# Patient Record
Sex: MALE | Race: WHITE | Employment: OTHER | URBAN - METROPOLITAN AREA
[De-identification: names, ages, dates, MRNs, and addresses within clinical notes are randomized per-mention and may not be internally consistent; named-entity substitution may affect disease eponyms.]

---

## 2017-07-17 PROBLEM — N18.30 CHRONIC KIDNEY DISEASE, STAGE III (MODERATE) (HCC): Status: ACTIVE | Noted: 2017-07-17

## 2018-11-19 ENCOUNTER — APPOINTMENT (OUTPATIENT)
Dept: NUCLEAR MEDICINE | Age: 83
DRG: 194 | End: 2018-11-19
Payer: MEDICARE

## 2018-11-19 ENCOUNTER — APPOINTMENT (OUTPATIENT)
Dept: GENERAL RADIOLOGY | Age: 83
DRG: 194 | End: 2018-11-19
Payer: MEDICARE

## 2018-11-19 ENCOUNTER — HOSPITAL ENCOUNTER (INPATIENT)
Age: 83
LOS: 2 days | Discharge: HOME | DRG: 194 | End: 2018-11-21
Attending: EMERGENCY MEDICINE | Admitting: FAMILY MEDICINE
Payer: MEDICARE

## 2018-11-19 DIAGNOSIS — N18.30 CHRONIC KIDNEY DISEASE, STAGE III (MODERATE) (HCC): ICD-10-CM

## 2018-11-19 DIAGNOSIS — Z92.89 HISTORY OF V/Q SCAN: ICD-10-CM

## 2018-11-19 DIAGNOSIS — R06.02 SOB (SHORTNESS OF BREATH): Primary | ICD-10-CM

## 2018-11-19 DIAGNOSIS — G60.9 IDIOPATHIC PERIPHERAL NEUROPATHY: ICD-10-CM

## 2018-11-19 PROBLEM — T81.718A POSTOPERATIVE PULMONARY EMBOLISM (HCC): Status: ACTIVE | Noted: 2018-11-19

## 2018-11-19 PROBLEM — I26.99 POSTOPERATIVE PULMONARY EMBOLISM (HCC): Status: ACTIVE | Noted: 2018-11-19

## 2018-11-19 LAB
ADENOVIRUS DETECTION BY PCR: NOT DETECTED
ALBUMIN SERPL-MCNC: 3.3 GM/DL (ref 3.4–5)
ALP BLD-CCNC: 65 IU/L (ref 40–128)
ALT SERPL-CCNC: 6 U/L (ref 10–40)
ANION GAP SERPL CALCULATED.3IONS-SCNC: 10 MMOL/L (ref 4–16)
AST SERPL-CCNC: 11 IU/L (ref 15–37)
BASOPHILS ABSOLUTE: 0.1 K/CU MM
BASOPHILS RELATIVE PERCENT: 0.6 % (ref 0–1)
BILIRUB SERPL-MCNC: 0.5 MG/DL (ref 0–1)
BORDETELLA PERTUSSIS PCR: NOT DETECTED
BUN BLDV-MCNC: 28 MG/DL (ref 6–23)
CALCIUM SERPL-MCNC: 8.4 MG/DL (ref 8.3–10.6)
CHLAMYDOPHILA PNEUMONIA PCR: NOT DETECTED
CHLORIDE BLD-SCNC: 95 MMOL/L (ref 99–110)
CO2: 26 MMOL/L (ref 21–32)
CORONAVIRUS 229E PCR: NOT DETECTED
CORONAVIRUS HKU1 PCR: NOT DETECTED
CORONAVIRUS NL63 PCR: NOT DETECTED
CORONAVIRUS OC43 PCR: NOT DETECTED
CREAT SERPL-MCNC: 1.8 MG/DL (ref 0.9–1.3)
D DIMER: 418 NG/ML(DDU)
DIFFERENTIAL TYPE: ABNORMAL
EOSINOPHILS ABSOLUTE: 0.1 K/CU MM
EOSINOPHILS RELATIVE PERCENT: 1.6 % (ref 0–3)
GFR AFRICAN AMERICAN: 44 ML/MIN/1.73M2
GFR NON-AFRICAN AMERICAN: 36 ML/MIN/1.73M2
GLUCOSE BLD-MCNC: 133 MG/DL (ref 70–99)
HCT VFR BLD CALC: 35.4 % (ref 42–52)
HEMOGLOBIN: 11.2 GM/DL (ref 13.5–18)
HUMAN METAPNEUMOVIRUS PCR: NOT DETECTED
IMMATURE NEUTROPHIL %: 0.8 % (ref 0–0.43)
INFLUENZA A BY PCR: NOT DETECTED
INFLUENZA A H1 (2009) PCR: NOT DETECTED
INFLUENZA A H1 PANDEMIC PCR: NOT DETECTED
INFLUENZA A H3 PCR: NOT DETECTED
INFLUENZA B BY PCR: NOT DETECTED
LYMPHOCYTES ABSOLUTE: 1.5 K/CU MM
LYMPHOCYTES RELATIVE PERCENT: 16.9 % (ref 24–44)
MAGNESIUM: 1.6 MG/DL (ref 1.8–2.4)
MCH RBC QN AUTO: 28.7 PG (ref 27–31)
MCHC RBC AUTO-ENTMCNC: 31.6 % (ref 32–36)
MCV RBC AUTO: 90.8 FL (ref 78–100)
MONOCYTES ABSOLUTE: 0.7 K/CU MM
MONOCYTES RELATIVE PERCENT: 7.7 % (ref 0–4)
MYCOPLASMA PNEUMONIAE PCR: NOT DETECTED
NUCLEATED RBC %: 0 %
PARAINFLUENZA 1 PCR: NOT DETECTED
PARAINFLUENZA 2 PCR: NOT DETECTED
PARAINFLUENZA 3 PCR: NOT DETECTED
PARAINFLUENZA 4 PCR: NOT DETECTED
PDW BLD-RTO: 13.6 % (ref 11.7–14.9)
PLATELET # BLD: 251 K/CU MM (ref 140–440)
PMV BLD AUTO: 9.2 FL (ref 7.5–11.1)
POTASSIUM SERPL-SCNC: 4.5 MMOL/L (ref 3.5–5.1)
RBC # BLD: 3.9 M/CU MM (ref 4.6–6.2)
RHINOVIRUS ENTEROVIRUS PCR: ABNORMAL
RSV PCR: NOT DETECTED
SEGMENTED NEUTROPHILS ABSOLUTE COUNT: 6.3 K/CU MM
SEGMENTED NEUTROPHILS RELATIVE PERCENT: 72.4 % (ref 36–66)
SODIUM BLD-SCNC: 131 MMOL/L (ref 135–145)
TOTAL IMMATURE NEUTOROPHIL: 0.07 K/CU MM
TOTAL NUCLEATED RBC: 0 K/CU MM
TOTAL PROTEIN: 5.9 GM/DL (ref 6.4–8.2)
TOTAL RETICULOCYTE COUNT: 0.12 K/CU MM
TROPONIN T: 0.01 NG/ML
TROPONIN T: <0.01 NG/ML
WBC # BLD: 8.7 K/CU MM (ref 4–10.5)

## 2018-11-19 PROCEDURE — 99285 EMERGENCY DEPT VISIT HI MDM: CPT

## 2018-11-19 PROCEDURE — A9540 TC99M MAA: HCPCS | Performed by: PHYSICIAN ASSISTANT

## 2018-11-19 PROCEDURE — 83735 ASSAY OF MAGNESIUM: CPT

## 2018-11-19 PROCEDURE — 85379 FIBRIN DEGRADATION QUANT: CPT

## 2018-11-19 PROCEDURE — 96374 THER/PROPH/DIAG INJ IV PUSH: CPT

## 2018-11-19 PROCEDURE — 84484 ASSAY OF TROPONIN QUANT: CPT

## 2018-11-19 PROCEDURE — 85025 COMPLETE CBC W/AUTO DIFF WBC: CPT

## 2018-11-19 PROCEDURE — 78582 LUNG VENTILAT&PERFUS IMAGING: CPT

## 2018-11-19 PROCEDURE — 71045 X-RAY EXAM CHEST 1 VIEW: CPT

## 2018-11-19 PROCEDURE — 3430000000 HC RX DIAGNOSTIC RADIOPHARMACEUTICAL: Performed by: PHYSICIAN ASSISTANT

## 2018-11-19 PROCEDURE — 80053 COMPREHEN METABOLIC PANEL: CPT

## 2018-11-19 PROCEDURE — 87798 DETECT AGENT NOS DNA AMP: CPT

## 2018-11-19 PROCEDURE — 6360000002 HC RX W HCPCS: Performed by: FAMILY MEDICINE

## 2018-11-19 PROCEDURE — 6370000000 HC RX 637 (ALT 250 FOR IP): Performed by: FAMILY MEDICINE

## 2018-11-19 PROCEDURE — 2580000003 HC RX 258: Performed by: NURSE PRACTITIONER

## 2018-11-19 PROCEDURE — A9539 TC99M PENTETATE: HCPCS | Performed by: PHYSICIAN ASSISTANT

## 2018-11-19 PROCEDURE — 6370000000 HC RX 637 (ALT 250 FOR IP): Performed by: NURSE PRACTITIONER

## 2018-11-19 PROCEDURE — 94761 N-INVAS EAR/PLS OXIMETRY MLT: CPT

## 2018-11-19 PROCEDURE — 6360000002 HC RX W HCPCS: Performed by: NURSE PRACTITIONER

## 2018-11-19 PROCEDURE — 36415 COLL VENOUS BLD VENIPUNCTURE: CPT

## 2018-11-19 PROCEDURE — 2060000000 HC ICU INTERMEDIATE R&B

## 2018-11-19 RX ORDER — GALANTAMINE HYDROBROMIDE 8 MG/1
8 TABLET, FILM COATED ORAL 2 TIMES DAILY
Status: DISCONTINUED | OUTPATIENT
Start: 2018-11-19 | End: 2018-11-21 | Stop reason: HOSPADM

## 2018-11-19 RX ORDER — TAMSULOSIN HYDROCHLORIDE 0.4 MG/1
0.4 CAPSULE ORAL DAILY
Status: DISCONTINUED | OUTPATIENT
Start: 2018-11-19 | End: 2018-11-21 | Stop reason: HOSPADM

## 2018-11-19 RX ORDER — ATENOLOL 50 MG/1
50 TABLET ORAL DAILY
Status: DISCONTINUED | OUTPATIENT
Start: 2018-11-19 | End: 2018-11-19

## 2018-11-19 RX ORDER — ASPIRIN 81 MG/1
324 TABLET, CHEWABLE ORAL ONCE
Status: DISCONTINUED | OUTPATIENT
Start: 2018-11-19 | End: 2018-11-21 | Stop reason: HOSPADM

## 2018-11-19 RX ORDER — GABAPENTIN 300 MG/1
300 CAPSULE ORAL DAILY
Status: DISCONTINUED | OUTPATIENT
Start: 2018-11-19 | End: 2018-11-20

## 2018-11-19 RX ORDER — FLUOXETINE HYDROCHLORIDE 20 MG/1
20 CAPSULE ORAL DAILY
Status: DISCONTINUED | OUTPATIENT
Start: 2018-11-19 | End: 2018-11-21 | Stop reason: HOSPADM

## 2018-11-19 RX ORDER — DOCUSATE SODIUM 100 MG/1
100 CAPSULE, LIQUID FILLED ORAL 2 TIMES DAILY PRN
Status: DISCONTINUED | OUTPATIENT
Start: 2018-11-19 | End: 2018-11-21 | Stop reason: HOSPADM

## 2018-11-19 RX ORDER — SODIUM CHLORIDE 0.9 % (FLUSH) 0.9 %
10 SYRINGE (ML) INJECTION EVERY 12 HOURS SCHEDULED
Status: DISCONTINUED | OUTPATIENT
Start: 2018-11-19 | End: 2018-11-21 | Stop reason: HOSPADM

## 2018-11-19 RX ORDER — ONDANSETRON 2 MG/ML
4 INJECTION INTRAMUSCULAR; INTRAVENOUS EVERY 6 HOURS PRN
Status: DISCONTINUED | OUTPATIENT
Start: 2018-11-19 | End: 2018-11-21 | Stop reason: HOSPADM

## 2018-11-19 RX ORDER — SODIUM CHLORIDE 9 MG/ML
INJECTION, SOLUTION INTRAVENOUS CONTINUOUS
Status: DISCONTINUED | OUTPATIENT
Start: 2018-11-19 | End: 2018-11-21

## 2018-11-19 RX ORDER — TRIAMTERENE AND HYDROCHLOROTHIAZIDE 37.5; 25 MG/1; MG/1
1 TABLET ORAL DAILY
Status: DISCONTINUED | OUTPATIENT
Start: 2018-11-19 | End: 2018-11-19

## 2018-11-19 RX ORDER — KIT FOR THE PREPARATION OF TECHNETIUM TC 99M PENTETATE 20 MG/1
3 INJECTION, POWDER, LYOPHILIZED, FOR SOLUTION INTRAVENOUS; RESPIRATORY (INHALATION)
Status: COMPLETED | OUTPATIENT
Start: 2018-11-19 | End: 2018-11-19

## 2018-11-19 RX ORDER — ACETAMINOPHEN 500 MG
500 TABLET ORAL PRN
Status: ON HOLD | COMMUNITY
End: 2019-08-30 | Stop reason: HOSPADM

## 2018-11-19 RX ORDER — ASPIRIN 81 MG/1
81 TABLET ORAL DAILY
Status: DISCONTINUED | OUTPATIENT
Start: 2018-11-19 | End: 2018-11-21 | Stop reason: HOSPADM

## 2018-11-19 RX ORDER — GUAIFENESIN 600 MG/1
600 TABLET, EXTENDED RELEASE ORAL 2 TIMES DAILY
Status: DISCONTINUED | OUTPATIENT
Start: 2018-11-19 | End: 2018-11-19

## 2018-11-19 RX ORDER — MAGNESIUM SULFATE 1 G/100ML
1 INJECTION INTRAVENOUS ONCE
Status: COMPLETED | OUTPATIENT
Start: 2018-11-19 | End: 2018-11-19

## 2018-11-19 RX ORDER — HEPARIN SODIUM 5000 [USP'U]/ML
5000 INJECTION, SOLUTION INTRAVENOUS; SUBCUTANEOUS EVERY 8 HOURS SCHEDULED
Status: DISCONTINUED | OUTPATIENT
Start: 2018-11-19 | End: 2018-11-19

## 2018-11-19 RX ORDER — ATENOLOL 25 MG/1
12.5 TABLET ORAL DAILY
Status: DISCONTINUED | OUTPATIENT
Start: 2018-11-20 | End: 2018-11-21 | Stop reason: HOSPADM

## 2018-11-19 RX ORDER — PANTOPRAZOLE SODIUM 40 MG/1
40 TABLET, DELAYED RELEASE ORAL
Status: DISCONTINUED | OUTPATIENT
Start: 2018-11-20 | End: 2018-11-21 | Stop reason: HOSPADM

## 2018-11-19 RX ORDER — AMLODIPINE BESYLATE 5 MG/1
5 TABLET ORAL DAILY
Status: DISCONTINUED | OUTPATIENT
Start: 2018-11-19 | End: 2018-11-20

## 2018-11-19 RX ORDER — LORAZEPAM 2 MG/ML
0.5 INJECTION INTRAMUSCULAR ONCE
Status: COMPLETED | OUTPATIENT
Start: 2018-11-19 | End: 2018-11-19

## 2018-11-19 RX ORDER — HYDRALAZINE HYDROCHLORIDE 20 MG/ML
10 INJECTION INTRAMUSCULAR; INTRAVENOUS EVERY 4 HOURS PRN
Status: DISCONTINUED | OUTPATIENT
Start: 2018-11-19 | End: 2018-11-21 | Stop reason: HOSPADM

## 2018-11-19 RX ORDER — SODIUM CHLORIDE 0.9 % (FLUSH) 0.9 %
10 SYRINGE (ML) INJECTION PRN
Status: DISCONTINUED | OUTPATIENT
Start: 2018-11-19 | End: 2018-11-21 | Stop reason: HOSPADM

## 2018-11-19 RX ADMIN — SODIUM CHLORIDE, PRESERVATIVE FREE 10 ML: 5 INJECTION INTRAVENOUS at 22:20

## 2018-11-19 RX ADMIN — GALANTAMINE 8 MG: 8 TABLET, FILM COATED ORAL at 22:20

## 2018-11-19 RX ADMIN — LORAZEPAM 0.5 MG: 2 INJECTION INTRAMUSCULAR; INTRAVENOUS at 15:49

## 2018-11-19 RX ADMIN — Medication 5 MILLICURIE: at 13:38

## 2018-11-19 RX ADMIN — KIT FOR THE PREPARATION OF TECHNETIUM TC 99M PENTETATE 3 MILLICURIE: 20 INJECTION, POWDER, LYOPHILIZED, FOR SOLUTION INTRAVENOUS; RESPIRATORY (INHALATION) at 13:15

## 2018-11-19 RX ADMIN — HEPARIN SODIUM 5000 UNITS: 5000 INJECTION, SOLUTION INTRAVENOUS; SUBCUTANEOUS at 17:02

## 2018-11-19 RX ADMIN — SODIUM CHLORIDE: 9 INJECTION, SOLUTION INTRAVENOUS at 17:02

## 2018-11-19 RX ADMIN — AMLODIPINE BESYLATE 5 MG: 5 TABLET ORAL at 22:19

## 2018-11-19 RX ADMIN — ENOXAPARIN SODIUM 90 MG: 100 INJECTION SUBCUTANEOUS at 22:20

## 2018-11-19 RX ADMIN — MAGNESIUM SULFATE HEPTAHYDRATE 1 G: 1 INJECTION, SOLUTION INTRAVENOUS at 17:36

## 2018-11-19 ASSESSMENT — PAIN DESCRIPTION - FREQUENCY: FREQUENCY: INTERMITTENT

## 2018-11-19 ASSESSMENT — PAIN SCALES - GENERAL
PAINLEVEL_OUTOF10: 4
PAINLEVEL_OUTOF10: 0
PAINLEVEL_OUTOF10: 0

## 2018-11-19 ASSESSMENT — PAIN DESCRIPTION - LOCATION: LOCATION: CHEST

## 2018-11-19 ASSESSMENT — PAIN DESCRIPTION - ORIENTATION: ORIENTATION: RIGHT

## 2018-11-19 ASSESSMENT — PAIN DESCRIPTION - PAIN TYPE: TYPE: ACUTE PAIN

## 2018-11-19 NOTE — H&P
History and Physical      Name:  Yordan Diaz /Age/Sex: 1934  (80 y.o. male)   MRN & CSN:  [id number] & [id number] Admission Date/Time: 2018 10:59 AM   Location:  ED19/ED-19 PCP: Mel Byrd PA-C       Admitting Physicians : Dr. Ken Pichardo is a 80 y.o.  male  who presents with Chest Pain    Assessment and Plan:   1. Dyspnea likely 2/2 post op PE  # Chest pain likely 2/2 bradycardia  # Hypomagnesemia  # Intermediate PE likely 2/2 recent Lap Shauna James assisted inguinal hernia repair with mesh  VQ scan Intermediate probability for pulmonary embolism. CXR show Cardiomegaly without acute process. Slightly elevated troponin. SB with nonspecific T waves changes.   -Heparin sq TID  -1 g of magnesium  x1  -Trend troponin  -Tele and repeat EKG  -Echo  -Resp panel  -Oxygen     2. HTN, uncontrolled  Reports high anxiety  -Ativan 0.5 mg x1  - Continue Maxzide and Atenolol (Check parameters)  -Hydralazine PRN for SBP>160    3. Dementia  -Galantamine    4. Neuropathy  -Gabapentin    5. Anxiety  -Prozac    6. Bladder cancer  -Alfuzosin    7.  CKD  -Dose meds renally  Baseline creat 1.5-18      DVT-PPX: Heparin  Diet: Cardiac    Medications:   Medications:    aspirin  324 mg Oral Once      Infusions:   PRN Meds:      Current Facility-Administered Medications:     aspirin chewable tablet 324 mg, 324 mg, Oral, Once, [place], [place]    Current Outpatient Prescriptions:     triamterene-hydrochlorothiazide (MAXZIDE-25) 37.5-25 MG per tablet, Take 1 tablet by mouth daily, Disp: 90 tablet, Rfl: 3    galantamine (RAZADYNE ER) 8 MG extended release capsule, Take 8 mg by mouth 2 times daily, Disp: , Rfl:     aspirin 81 MG tablet, Take 81 mg by mouth daily, Disp: , Rfl:     omeprazole (PRILOSEC) 20 MG delayed release capsule, Take 20 mg by mouth daily, Disp: , Rfl:     alfuzosin (UROXATRAL) 10 MG SR tablet, Take 1 tablet by mouth daily, Disp: 90 tablet, Rfl: 1    atenolol (TENORMIN) 50 MG

## 2018-11-19 NOTE — ED PROVIDER NOTES
murmurs/rubs/gallops. No carotid bruits or murmurs heard in carotids. No JVD  Vascular:   Radial and femoral pulses palpable and reveal no discernible inequality    GI:  Soft, nontender, normal bowel sounds  Musculoskeletal:    There is no edema, asymmetry, or calf / thigh tenderness bilaterally. No cyanosis. No cool or pale-appearing limb.   Distal cap refill and pulses intact bilateral upper and lower extremities  Bilateral upper and lower extremity ROM intact without pain or obvious deficit    Integument:  Skin warm and dry, no petechiae   Neurologic:  Alert & oriented, normal speech    - Alert & oriented person, place, time, and situation, no speech difficulties or slurring.  - No obvious gross motor deficits  - Cranial nerves 2-12 grossly intact  - Negative meningeal signs.  - Sensation intact to light touch  - Strength 5/5 in upper and lower extremities bilaterally  - No truncal ataxia    Psych: Pleasant affect, no hallucinations          LABS:  Results for orders placed or performed during the hospital encounter of 11/19/18   CBC Auto Differential   Result Value Ref Range    WBC 8.7 4.0 - 10.5 K/CU MM    RBC 3.90 (L) 4.6 - 6.2 M/CU MM    Hemoglobin 11.2 (L) 13.5 - 18.0 GM/DL    Hematocrit 35.4 (L) 42 - 52 %    MCV 90.8 78 - 100 FL    MCH 28.7 27 - 31 PG    MCHC 31.6 (L) 32.0 - 36.0 %    RDW 13.6 11.7 - 14.9 %    Platelets 580 954 - 932 K/CU MM    MPV 9.2 7.5 - 11.1 FL    Differential Type AUTOMATED DIFFERENTIAL     Segs Relative 72.4 (H) 36 - 66 %    Lymphocytes % 16.9 (L) 24 - 44 %    Monocytes % 7.7 (H) 0 - 4 %    Eosinophils % 1.6 0 - 3 %    Basophils % 0.6 0 - 1 %    Segs Absolute 6.3 K/CU MM    Lymphocytes # 1.5 K/CU MM    Monocytes # 0.7 K/CU MM    Eosinophils # 0.1 K/CU MM    Basophils # 0.1 K/CU MM    Nucleated RBC % 0.0 %    Total Nucleated RBC 0.0 K/CU MM    TRC 0.1154 K/CU MM    Total Immature Neutrophil 0.07 K/CU MM    Immature Neutrophil % 0.8 (H) 0 - 0.43 %   Comprehensive Metabolic Panel Decreased activity is seen at the apices and left base, largely matching the ventilation pattern. There is opacity at the left base on recent radiograph as the left hemidiaphragm is not well visualized. Intermediate probability for pulmonary embolism. Xr Chest Portable    Result Date: 11/19/2018  EXAMINATION: SINGLE XRAY VIEW OF THE CHEST 11/19/2018 11:22 am COMPARISON: None HISTORY: ORDERING SYSTEM PROVIDED HISTORY: Chest pain TECHNOLOGIST PROVIDED HISTORY: Reason for exam:->Chest pain Ordering Physician Provided Reason for Exam: Pt reports chest pain x3 days Acuity: Acute Type of Exam: Initial Relevant Medical/Surgical History: CKD; hypertension FINDINGS: Lungs are clear. No edema or pneumothorax. The heart is enlarged. Aorta is tortuous and calcified. Cardiomegaly without acute process. ED COURSE & MEDICAL DECISION MAKING      Exact etiology of patient's chest pain, shortness of breath unclear, however VQ scan today reveals intermediate probability of PE.    1430-discuss patient case with hospitalist, [address] [place] [address]. .  She agrees to admit patient. Given patient's chronic kidney disease status, I will hold on Lovenox at this time. Vital signs and nursing notes reviewed during ED course. Patient care and presentation staffed with supervising MD.  Patient seen by supervising MD today- see his/her note for details of the encounter. All pertinent Lab data and radiographic results reviewed with patient at bedside. The patient and/or the family were informed of the results of any tests/labs/imaging, the treatment plan, and time was allotted to answer questions. See chart for details of medications given during the ED stay. Clinical  IMPRESSION    1. SOB (shortness of breath)    2.  History of V/Q scan         Admission to the hospital    Comment: Please note this report has been produced using speech recognition software and may contain errors related to that system

## 2018-11-19 NOTE — ED TRIAGE NOTES
Pt had a hernia repair at [address] [place] [address] this past Wednesday. Pt reports chest pain x3 days. Pt reports pain on inspiration.

## 2018-11-20 ENCOUNTER — APPOINTMENT (OUTPATIENT)
Dept: ULTRASOUND IMAGING | Age: 83
DRG: 194 | End: 2018-11-20
Payer: MEDICARE

## 2018-11-20 ENCOUNTER — APPOINTMENT (OUTPATIENT)
Dept: CT IMAGING | Age: 83
DRG: 194 | End: 2018-11-20
Payer: MEDICARE

## 2018-11-20 LAB
ANION GAP SERPL CALCULATED.3IONS-SCNC: 9 MMOL/L (ref 4–16)
BACTERIA: NEGATIVE /HPF
BILIRUBIN URINE: NEGATIVE MG/DL
BLOOD, URINE: NEGATIVE
BUN BLDV-MCNC: 25 MG/DL (ref 6–23)
CALCIUM SERPL-MCNC: 8.6 MG/DL (ref 8.3–10.6)
CHLORIDE BLD-SCNC: 96 MMOL/L (ref 99–110)
CHLORIDE URINE RANDOM: 97 MMOL/L (ref 43–210)
CLARITY: CLEAR
CO2: 28 MMOL/L (ref 21–32)
COLOR: YELLOW
CREAT SERPL-MCNC: 1.8 MG/DL (ref 0.9–1.3)
CREATININE URINE: 70.4 MG/DL (ref 39–259)
EKG ATRIAL RATE: 58 BPM
EKG DIAGNOSIS: NORMAL
EKG P AXIS: 9 DEGREES
EKG P-R INTERVAL: 254 MS
EKG Q-T INTERVAL: 456 MS
EKG QRS DURATION: 146 MS
EKG QTC CALCULATION (BAZETT): 447 MS
EKG R AXIS: -6 DEGREES
EKG T AXIS: 36 DEGREES
EKG VENTRICULAR RATE: 58 BPM
GFR AFRICAN AMERICAN: 44 ML/MIN/1.73M2
GFR NON-AFRICAN AMERICAN: 36 ML/MIN/1.73M2
GLUCOSE BLD-MCNC: 90 MG/DL (ref 70–99)
GLUCOSE, URINE: NEGATIVE MG/DL
KETONES, URINE: NEGATIVE MG/DL
LEUKOCYTE ESTERASE, URINE: NEGATIVE
LV EF: 50 %
LVEF MODALITY: NORMAL
MUCUS: ABNORMAL HPF
NITRITE URINE, QUANTITATIVE: NEGATIVE
PH, URINE: 7 (ref 5–8)
POTASSIUM SERPL-SCNC: 4.9 MMOL/L (ref 3.5–5.1)
POTASSIUM, UR: 41.4 MMOL/L (ref 22–119)
PROT/CREAT RATIO, UR: 0.3
PROTEIN UA: NEGATIVE MG/DL
RBC URINE: 1 /HPF (ref 0–3)
SODIUM BLD-SCNC: 133 MMOL/L (ref 135–145)
SODIUM URINE: 104 MMOL/L (ref 35–167)
SPECIFIC GRAVITY UA: 1.01 (ref 1–1.03)
TRICHOMONAS: ABNORMAL /HPF
TROPONIN T: <0.01 NG/ML
URINE TOTAL PROTEIN: 18.1 MG/DL
UROBILINOGEN, URINE: NORMAL MG/DL (ref 0.2–1)
WBC UA: ABNORMAL /HPF (ref 0–2)

## 2018-11-20 PROCEDURE — 99222 1ST HOSP IP/OBS MODERATE 55: CPT | Performed by: INTERNAL MEDICINE

## 2018-11-20 PROCEDURE — 6360000004 HC RX CONTRAST MEDICATION: Performed by: INTERNAL MEDICINE

## 2018-11-20 PROCEDURE — 36415 COLL VENOUS BLD VENIPUNCTURE: CPT

## 2018-11-20 PROCEDURE — 6360000002 HC RX W HCPCS: Performed by: FAMILY MEDICINE

## 2018-11-20 PROCEDURE — 93970 EXTREMITY STUDY: CPT

## 2018-11-20 PROCEDURE — 51702 INSERT TEMP BLADDER CATH: CPT

## 2018-11-20 PROCEDURE — 0T9B70Z DRAINAGE OF BLADDER WITH DRAINAGE DEVICE, VIA NATURAL OR ARTIFICIAL OPENING: ICD-10-PCS | Performed by: INTERNAL MEDICINE

## 2018-11-20 PROCEDURE — 2060000000 HC ICU INTERMEDIATE R&B

## 2018-11-20 PROCEDURE — 6370000000 HC RX 637 (ALT 250 FOR IP): Performed by: FAMILY MEDICINE

## 2018-11-20 PROCEDURE — 84300 ASSAY OF URINE SODIUM: CPT

## 2018-11-20 PROCEDURE — 84156 ASSAY OF PROTEIN URINE: CPT

## 2018-11-20 PROCEDURE — 81001 URINALYSIS AUTO W/SCOPE: CPT

## 2018-11-20 PROCEDURE — 2580000003 HC RX 258: Performed by: NURSE PRACTITIONER

## 2018-11-20 PROCEDURE — 94761 N-INVAS EAR/PLS OXIMETRY MLT: CPT

## 2018-11-20 PROCEDURE — 93306 TTE W/DOPPLER COMPLETE: CPT

## 2018-11-20 PROCEDURE — 82436 ASSAY OF URINE CHLORIDE: CPT

## 2018-11-20 PROCEDURE — 2580000003 HC RX 258: Performed by: FAMILY MEDICINE

## 2018-11-20 PROCEDURE — 6370000000 HC RX 637 (ALT 250 FOR IP): Performed by: NURSE PRACTITIONER

## 2018-11-20 PROCEDURE — 6370000000 HC RX 637 (ALT 250 FOR IP): Performed by: HOSPITALIST

## 2018-11-20 PROCEDURE — 84133 ASSAY OF URINE POTASSIUM: CPT

## 2018-11-20 PROCEDURE — 71275 CT ANGIOGRAPHY CHEST: CPT

## 2018-11-20 PROCEDURE — 51798 US URINE CAPACITY MEASURE: CPT

## 2018-11-20 PROCEDURE — 82570 ASSAY OF URINE CREATININE: CPT

## 2018-11-20 PROCEDURE — 84484 ASSAY OF TROPONIN QUANT: CPT

## 2018-11-20 PROCEDURE — 80048 BASIC METABOLIC PNL TOTAL CA: CPT

## 2018-11-20 RX ORDER — AMLODIPINE BESYLATE 10 MG/1
10 TABLET ORAL DAILY
Status: DISCONTINUED | OUTPATIENT
Start: 2018-11-21 | End: 2018-11-21 | Stop reason: HOSPADM

## 2018-11-20 RX ORDER — ACETAMINOPHEN 80 MG
TABLET,CHEWABLE ORAL
Status: DISPENSED
Start: 2018-11-20 | End: 2018-11-20

## 2018-11-20 RX ORDER — GABAPENTIN 300 MG/1
300 CAPSULE ORAL 2 TIMES DAILY
Status: DISCONTINUED | OUTPATIENT
Start: 2018-11-20 | End: 2018-11-21 | Stop reason: HOSPADM

## 2018-11-20 RX ADMIN — FLUOXETINE HYDROCHLORIDE 20 MG: 20 CAPSULE ORAL at 08:25

## 2018-11-20 RX ADMIN — TAMSULOSIN HYDROCHLORIDE 0.4 MG: 0.4 CAPSULE ORAL at 08:25

## 2018-11-20 RX ADMIN — GABAPENTIN 300 MG: 300 CAPSULE ORAL at 08:25

## 2018-11-20 RX ADMIN — GALANTAMINE 8 MG: 8 TABLET, FILM COATED ORAL at 22:43

## 2018-11-20 RX ADMIN — GALANTAMINE 8 MG: 8 TABLET, FILM COATED ORAL at 08:25

## 2018-11-20 RX ADMIN — SODIUM CHLORIDE: 9 INJECTION, SOLUTION INTRAVENOUS at 08:25

## 2018-11-20 RX ADMIN — SODIUM CHLORIDE, PRESERVATIVE FREE 10 ML: 5 INJECTION INTRAVENOUS at 20:48

## 2018-11-20 RX ADMIN — ATENOLOL 12.5 MG: 25 TABLET ORAL at 08:24

## 2018-11-20 RX ADMIN — IOPAMIDOL 85 ML: 755 INJECTION, SOLUTION INTRAVENOUS at 17:40

## 2018-11-20 RX ADMIN — AMLODIPINE BESYLATE 5 MG: 5 TABLET ORAL at 08:25

## 2018-11-20 RX ADMIN — ENOXAPARIN SODIUM 90 MG: 100 INJECTION SUBCUTANEOUS at 13:16

## 2018-11-20 RX ADMIN — ASPIRIN 81 MG: 81 TABLET, COATED ORAL at 08:24

## 2018-11-20 RX ADMIN — SODIUM CHLORIDE, PRESERVATIVE FREE 10 ML: 5 INJECTION INTRAVENOUS at 17:40

## 2018-11-20 RX ADMIN — GABAPENTIN 300 MG: 300 CAPSULE ORAL at 20:48

## 2018-11-20 RX ADMIN — SODIUM CHLORIDE, PRESERVATIVE FREE 10 ML: 5 INJECTION INTRAVENOUS at 08:25

## 2018-11-20 RX ADMIN — PANTOPRAZOLE SODIUM 40 MG: 40 TABLET, DELAYED RELEASE ORAL at 06:14

## 2018-11-20 ASSESSMENT — PAIN SCALES - GENERAL: PAINLEVEL_OUTOF10: 0

## 2018-11-20 NOTE — CONSULTS
(hypertension) I10    Depression F32.9    PND (post-nasal drip) R09.82    BPH (benign prostatic hyperplasia) N40.0    Cold feet R20.9    Constipation K59.00    Prostate cancer (HCC) C61    Peripheral neuropathy G62.9    Chronic kidney disease, stage III (moderate) (HCC) N18.3    Postoperative pulmonary embolism (HCC) T81.718A, I26.99       Assessment and Recommendations     Impression   1. LADI on Stage 3 CKD   2. Chest Pain (concern for possible PE)  3. HTN   4. BPH     PLAN     1.    -lab work ordered: ua with micro / UPC / urine electrolytes / BMP in am / Dicky Ware in am   -no need for acute RRT at this time   -bladder scan to screen for obstruction   -serum creatinine: 1.8 with normal K and CO2  -currently on NS at 75 / hour     2.   -Chest CTA ordered to screen for PE: VQ scan: intermediate probability for PE  -in context to anticipated contrast exposure: avoid current use of:   ACEI / ARB / Diuretics / NSAID's  -continue IV hydration at this time and also post-study   -patient is also being followed by Cardiology   -Aristeo Del Real ordered   -discussed with patient the potential risk for worsening kidney function   With contrast exposure and rationale for ordering the CTA; benefit   Should outweigh the risk     3. -BP trend: systolic BP's: 620 - 786: during visit: 143/86  -currently on: Amlodipine and Atenolol   -monitor BP trend and treat accordingly     4.   -currently on Flomax   -bladder scan: 360 ml today   -orders to place march catheter     PRISCILLA Vernon      Nephrology Attending Progress Note  11/20/2018 12:23 PM  Subjective:   Admit Date: 11/19/2018  PCP: Ernesto Joe PA-C    Interval History: I have personally performed face to face diagnostic evaluation on this patient. I have personally reviewed pertinent labs and imaging and agree with the care plan above. My additional findings are as follows:   Pt present with htn, bph , ckd 3 with sob and concern for PE. vq scan intermediate and concern

## 2018-11-20 NOTE — FLOWSHEET NOTE
Patient is complaining of a burning sensation since the march catheter has been inserted. Dr. Hayes Campuzano paged and notified. Per Dr. Hayes Campuzano to remove march and do a follow up bladder scan.

## 2018-11-21 ENCOUNTER — APPOINTMENT (OUTPATIENT)
Dept: NUCLEAR MEDICINE | Age: 83
DRG: 194 | End: 2018-11-21
Payer: MEDICARE

## 2018-11-21 VITALS
DIASTOLIC BLOOD PRESSURE: 54 MMHG | BODY MASS INDEX: 30.94 KG/M2 | TEMPERATURE: 98.1 F | SYSTOLIC BLOOD PRESSURE: 155 MMHG | OXYGEN SATURATION: 94 % | RESPIRATION RATE: 17 BRPM | HEIGHT: 68 IN | WEIGHT: 204.15 LBS | HEART RATE: 67 BPM

## 2018-11-21 LAB
ANION GAP SERPL CALCULATED.3IONS-SCNC: 10 MMOL/L (ref 4–16)
BUN BLDV-MCNC: 18 MG/DL (ref 6–23)
CALCIUM SERPL-MCNC: 8.8 MG/DL (ref 8.3–10.6)
CHLORIDE BLD-SCNC: 97 MMOL/L (ref 99–110)
CO2: 27 MMOL/L (ref 21–32)
CREAT SERPL-MCNC: 1.4 MG/DL (ref 0.9–1.3)
GFR AFRICAN AMERICAN: 58 ML/MIN/1.73M2
GFR NON-AFRICAN AMERICAN: 48 ML/MIN/1.73M2
GLUCOSE BLD-MCNC: 101 MG/DL (ref 70–99)
HCT VFR BLD CALC: 40.5 % (ref 42–52)
HEMOGLOBIN: 12.7 GM/DL (ref 13.5–18)
HIGH SENSITIVE C-REACTIVE PROTEIN: 65.8 MG/L
LV EF: 64 %
LVEF MODALITY: NORMAL
MAGNESIUM: 1.8 MG/DL (ref 1.8–2.4)
MCH RBC QN AUTO: 28.2 PG (ref 27–31)
MCHC RBC AUTO-ENTMCNC: 31.4 % (ref 32–36)
MCV RBC AUTO: 89.8 FL (ref 78–100)
PDW BLD-RTO: 13.4 % (ref 11.7–14.9)
PLATELET # BLD: 259 K/CU MM (ref 140–440)
PMV BLD AUTO: 9.4 FL (ref 7.5–11.1)
POTASSIUM SERPL-SCNC: 4.5 MMOL/L (ref 3.5–5.1)
PRO-BNP: 3187 PG/ML
RBC # BLD: 4.51 M/CU MM (ref 4.6–6.2)
SODIUM BLD-SCNC: 134 MMOL/L (ref 135–145)
WBC # BLD: 8.4 K/CU MM (ref 4–10.5)

## 2018-11-21 PROCEDURE — 83880 ASSAY OF NATRIURETIC PEPTIDE: CPT

## 2018-11-21 PROCEDURE — 6370000000 HC RX 637 (ALT 250 FOR IP): Performed by: FAMILY MEDICINE

## 2018-11-21 PROCEDURE — 86141 C-REACTIVE PROTEIN HS: CPT

## 2018-11-21 PROCEDURE — 83735 ASSAY OF MAGNESIUM: CPT

## 2018-11-21 PROCEDURE — 6360000002 HC RX W HCPCS: Performed by: INTERNAL MEDICINE

## 2018-11-21 PROCEDURE — 80048 BASIC METABOLIC PNL TOTAL CA: CPT

## 2018-11-21 PROCEDURE — 93017 CV STRESS TEST TRACING ONLY: CPT

## 2018-11-21 PROCEDURE — 6370000000 HC RX 637 (ALT 250 FOR IP): Performed by: HOSPITALIST

## 2018-11-21 PROCEDURE — 99232 SBSQ HOSP IP/OBS MODERATE 35: CPT | Performed by: INTERNAL MEDICINE

## 2018-11-21 PROCEDURE — 78452 HT MUSCLE IMAGE SPECT MULT: CPT

## 2018-11-21 PROCEDURE — 2580000003 HC RX 258: Performed by: NURSE PRACTITIONER

## 2018-11-21 PROCEDURE — 6370000000 HC RX 637 (ALT 250 FOR IP): Performed by: NURSE PRACTITIONER

## 2018-11-21 PROCEDURE — 36415 COLL VENOUS BLD VENIPUNCTURE: CPT

## 2018-11-21 PROCEDURE — 85027 COMPLETE CBC AUTOMATED: CPT

## 2018-11-21 PROCEDURE — A9500 TC99M SESTAMIBI: HCPCS | Performed by: INTERNAL MEDICINE

## 2018-11-21 PROCEDURE — 3430000000 HC RX DIAGNOSTIC RADIOPHARMACEUTICAL: Performed by: INTERNAL MEDICINE

## 2018-11-21 PROCEDURE — 94761 N-INVAS EAR/PLS OXIMETRY MLT: CPT

## 2018-11-21 RX ORDER — GABAPENTIN 300 MG/1
300 CAPSULE ORAL 2 TIMES DAILY
Qty: 60 CAPSULE | Refills: 0 | Status: ON HOLD
Start: 2018-11-21 | End: 2019-08-23

## 2018-11-21 RX ORDER — TRIAMTERENE AND HYDROCHLOROTHIAZIDE 37.5; 25 MG/1; MG/1
1 TABLET ORAL DAILY
Qty: 90 TABLET | Refills: 0 | Status: SHIPPED
Start: 2018-11-24 | End: 2019-08-11 | Stop reason: SINTOL

## 2018-11-21 RX ORDER — AMLODIPINE BESYLATE 10 MG/1
10 TABLET ORAL DAILY
Qty: 30 TABLET | Refills: 0 | Status: ON HOLD | OUTPATIENT
Start: 2018-11-22 | End: 2019-08-30 | Stop reason: HOSPADM

## 2018-11-21 RX ORDER — ATENOLOL 25 MG/1
12.5 TABLET ORAL DAILY
Qty: 30 TABLET | Refills: 0 | Status: SHIPPED | OUTPATIENT
Start: 2018-11-22 | End: 2018-12-13

## 2018-11-21 RX ADMIN — TAMSULOSIN HYDROCHLORIDE 0.4 MG: 0.4 CAPSULE ORAL at 11:45

## 2018-11-21 RX ADMIN — ASPIRIN 81 MG: 81 TABLET, COATED ORAL at 11:47

## 2018-11-21 RX ADMIN — SODIUM CHLORIDE, PRESERVATIVE FREE 10 ML: 5 INJECTION INTRAVENOUS at 11:47

## 2018-11-21 RX ADMIN — FLUOXETINE HYDROCHLORIDE 20 MG: 20 CAPSULE ORAL at 11:46

## 2018-11-21 RX ADMIN — GABAPENTIN 300 MG: 300 CAPSULE ORAL at 11:46

## 2018-11-21 RX ADMIN — Medication 30 MILLICURIE: at 10:05

## 2018-11-21 RX ADMIN — ATENOLOL 12.5 MG: 25 TABLET ORAL at 11:46

## 2018-11-21 RX ADMIN — GALANTAMINE 8 MG: 8 TABLET, FILM COATED ORAL at 11:47

## 2018-11-21 RX ADMIN — REGADENOSON 0.4 MG: 0.08 INJECTION, SOLUTION INTRAVENOUS at 10:06

## 2018-11-21 RX ADMIN — Medication 10 MILLICURIE: at 08:17

## 2018-11-21 RX ADMIN — AMLODIPINE BESYLATE 10 MG: 10 TABLET ORAL at 11:46

## 2018-11-21 ASSESSMENT — PAIN SCALES - GENERAL: PAINLEVEL_OUTOF10: 0

## 2018-11-21 NOTE — DISCHARGE SUMMARY
and trace bilateral pleural effusions. The differential includes pulmonary edema versus atypical pneumonitis. 3. Moderate cardiomegaly. 4. Coronary artery atherosclerotic vascular disease. 5. Indeterminate cysts in the bilateral kidneys. Consider further evaluation with nonemergent contrast-enhanced renal protocol MRI or CT. Nm Myocardial Spect Rest Exercise Or Rx  Result Date: 11/21/2018  Conclusions   Summary  Normal LV function. There is normal isotope uptake following exercise and at rest. There is no  evidence of exercise induced ischemia. This is a normal study. Code Status:  Full Code      Discharge Medications:     Medication List      START taking these medications    amLODIPine 10 MG tablet  Commonly known as:  NORVASC  Take 1 tablet by mouth daily  Start taking on:  11/22/2018        CHANGE how you take these medications    alfuzosin 10 MG extended release tablet  Commonly known as:  UROXATRAL  Take 1 tablet by mouth daily  What changed:  when to take this     atenolol 25 MG tablet  Commonly known as:  TENORMIN  Take 0.5 tablets by mouth daily  Start taking on:  11/22/2018  What changed:  · medication strength  · how much to take     FLUoxetine 20 MG capsule  Commonly known as:  PROZAC  Take 1 capsule by mouth daily  What changed:  when to take this     triamterene-hydrochlorothiazide 37.5-25 MG per tablet  Commonly known as:  MAXZIDE-25  Take 1 tablet by mouth daily Resume 11/24/18  Start taking on:  11/24/2018  What changed:  · additional instructions  · These instructions start on 11/24/2018. If you are unsure what to do until then, ask your doctor or other care provider. CONTINUE taking these medications    aspirin 81 MG tablet     docusate sodium 100 MG capsule  Commonly known as:  COLACE  Take 1 capsule by mouth 2 times daily as needed for Constipation     gabapentin 300 MG capsule  Commonly known as:  NEURONTIN  Take 1 capsule by mouth 2 times daily for 30 days. Ceci Smith

## 2018-11-21 NOTE — PROGRESS NOTES
Today's plan: Stress test today, normal ECHO and CTA ( no PE)      Admit Date:  11/19/2018    Subjective:BETTER      Chief complaints on admission  Chief Complaint   Patient presents with    Chest Pain         History of present Adela Diaz is a 80 y. o.year old who  presents with had concerns including Chest Pain. Past medical history:    has a past medical history of Cancer ([facility] [address] Chronic kidney disease; Chronic kidney disease, stage III (moderate) ([facility] [address] Depression; Hyperlipidemia; Hypertension; and Hyperthyroidism. Past surgical history:   has no past surgical history on file. Social History:   reports that he has quit smoking. His smoking use included Cigarettes. He has a 30.00 pack-year smoking history. He has never used smokeless tobacco. He reports that he does not drink alcohol or use drugs. Family history:  family history includes Cancer in his brother and sister; Elevated Lipids in an other family member; Heart Disease in an other family member; High Blood Pressure in his brother; Hypertension in an other family member; Other in an other family member. No Known Allergies      Objective:   BP (!) 155/71   Pulse 62   Temp 98.3 °F (36.8 °C) (Oral)   Resp 23   Ht 5' 8\" (1.727 m)   Wt 204 lb 2.3 oz (92.6 kg)   SpO2 94%   BMI 31.04 kg/m²     Intake/Output Summary (Last 24 hours) at 11/21/18 1030  Last data filed at 11/21/18 5340   Gross per 24 hour   Intake                0 ml   Output             1100 ml   Net            -1100 ml       TELEMETRY: Sinus     Physical Exam:  Constitutional:  Well developed, Well nourished, No acute distress, Non-toxic appearance. HENT:  Normocephalic, Atraumatic, Bilateral external ears normal, Oropharynx moist, No oral exudates, Nose normal. Neck- Normal range of motion, No tenderness, Supple, No stridor. Eyes:  PERRL, EOMI, Conjunctiva normal, No discharge.    Respiratory:  Normal breath sounds, No respiratory distress, No wheezing, No chest tenderness. ,no use of accessory muscles, diaphragm movement is normal  Cardiovascular: (PMI) apex non displaced,no lifts no thrills, no s3,no s4, Normal heart rate, Normal rhythm, No murmurs, No rubs, No gallops. Carotid arteries pulse and amplitude are normal no bruit, no abdominal bruit noted ( normal abdominal aorta ausculation), femoral arteries pulse and amplitude are normal no bruit, pedal pulses are normal  GI:  Bowel sounds normal, Soft, No tenderness, No masses, No pulsatile masses. : External genitalia appear normal, No masses or lesions. No discharge. No CVA tenderness. Musculoskeletal:  Intact distal pulses, No edema, No tenderness, No cyanosis, No clubbing. Good range of motion in all major joints. No tenderness to palpation or major deformities noted. Back- No tenderness. Integument:  Warm, Dry, No erythema, No rash. Lymphatic:  No lymphadenopathy noted. Neurologic:  Alert & oriented x 3, Normal motor function, Normal sensory function, No focal deficits noted.    Psychiatric:  Affect normal, Judgment normal, Mood normal.     Medications:    gabapentin  300 mg Oral BID    amLODIPine  10 mg Oral Daily    aspirin  324 mg Oral Once    pantoprazole  40 mg Oral QAM AC    galantamine  8 mg Oral BID    FLUoxetine  20 mg Oral Daily    aspirin  81 mg Oral Daily    tamsulosin  0.4 mg Oral Daily    sodium chloride flush  10 mL Intravenous 2 times per day    atenolol  12.5 mg Oral Daily       technetium sestamibi, technetium sestamibi, docusate sodium, sodium chloride flush, magnesium hydroxide, ondansetron, hydrALAZINE    Lab Data:  CBC: Recent Labs      11/19/18   1106  11/21/18   0646   WBC  8.7  8.4   HGB  11.2*  12.7*   HCT  35.4*  40.5*   MCV  90.8  89.8   PLT  251  259     BMP: Recent Labs      11/19/18   1106  11/20/18   0144  11/21/18   0646   NA  131*  133*  134*   K  4.5  4.9  4.5   CL  95*  96*  97*   CO2  26  28  27   BUN  28*  25*  18   CREATININE  1.8*  1.8*  1.4*     LIVER PROFILE:   Recent Labs      11/19/18   1106   AST  11*   ALT  6*   BILITOT  0.5   ALKPHOS  65     PT/INR: No results for input(s): PROTIME, INR in the last 72 hours. APTT: No results for input(s): APTT in the last 72 hours. BNP:  No results for input(s): BNP in the last 72 hours. TROPONIN: @TROPONINI:3@      Assessment:  80 y. o.year old who is admitted for          Plan:  1. CHEST PAIN STRESS TEST TODAY  2. RHINOVIRUS URI: treat as per Guidelines  3. Hernia repair sx: stable  4. Ckd: stable, recommend IVF  5. HTN: STABLE  6. DYSLIPIDEMIA STABLE  7. PROSTATE CANCER HISTORY: not sure about treatment and stage  Health maintenance: exerise and diet  All labs, medications and tests reviewed, continue all other medications of all above medical condition listed as is.       Modesto Lee MD 11/21/2018 10:30 AM

## 2018-11-22 NOTE — PROGRESS NOTES
Patient's family called and stated they were unable to fill prescriptions yesterday and now the pharmacy is closed. Requesting the meds be called in to [facility]. Paged Dr. Baylee Mcdowell,  as patient was discharged by Jame Pina and he is not here today. Dr. Baylee Mcdowell stated he would call the meds in. Phoned patient's family and notified them that he was calling the meds in now 1320 on 11/122/18.

## 2019-03-21 PROBLEM — F02.80 ALZHEIMER'S DISEASE (HCC): Status: ACTIVE | Noted: 2019-03-21

## 2019-03-21 PROBLEM — G30.9 ALZHEIMER'S DISEASE (HCC): Status: ACTIVE | Noted: 2019-03-21

## 2019-08-03 ENCOUNTER — APPOINTMENT (OUTPATIENT)
Dept: CT IMAGING | Age: 84
DRG: 640 | End: 2019-08-03
Payer: MEDICARE

## 2019-08-03 ENCOUNTER — HOSPITAL ENCOUNTER (INPATIENT)
Age: 84
LOS: 5 days | Discharge: HOME | DRG: 640 | End: 2019-08-08
Attending: EMERGENCY MEDICINE | Admitting: INTERNAL MEDICINE
Payer: MEDICARE

## 2019-08-03 DIAGNOSIS — R11.2 NAUSEA VOMITING AND DIARRHEA: ICD-10-CM

## 2019-08-03 DIAGNOSIS — R19.7 NAUSEA VOMITING AND DIARRHEA: ICD-10-CM

## 2019-08-03 DIAGNOSIS — E87.1 HYPONATREMIA: Primary | ICD-10-CM

## 2019-08-03 DIAGNOSIS — R93.429 ABNORMAL CT SCAN, KIDNEY: ICD-10-CM

## 2019-08-03 DIAGNOSIS — C80.1 CANCER (HCC): ICD-10-CM

## 2019-08-03 PROBLEM — G93.41 ACUTE METABOLIC ENCEPHALOPATHY: Status: ACTIVE | Noted: 2019-08-03

## 2019-08-03 PROBLEM — E87.6 ACUTE HYPOKALEMIA: Status: ACTIVE | Noted: 2019-08-03

## 2019-08-03 LAB
ALBUMIN SERPL-MCNC: 4.1 GM/DL (ref 3.4–5)
ALP BLD-CCNC: 75 IU/L (ref 40–129)
ALT SERPL-CCNC: 20 U/L (ref 10–40)
ANION GAP SERPL CALCULATED.3IONS-SCNC: 12 MMOL/L (ref 4–16)
ANION GAP SERPL CALCULATED.3IONS-SCNC: 12 MMOL/L (ref 4–16)
ANION GAP SERPL CALCULATED.3IONS-SCNC: 13 MMOL/L (ref 4–16)
AST SERPL-CCNC: 31 IU/L (ref 15–37)
BACTERIA: NEGATIVE /HPF
BASOPHILS ABSOLUTE: 0 K/CU MM
BASOPHILS RELATIVE PERCENT: 0.2 % (ref 0–1)
BILIRUB SERPL-MCNC: 1 MG/DL (ref 0–1)
BILIRUBIN URINE: NEGATIVE MG/DL
BLOOD, URINE: ABNORMAL
BUN BLDV-MCNC: 15 MG/DL (ref 6–23)
BUN BLDV-MCNC: 17 MG/DL (ref 6–23)
BUN BLDV-MCNC: 18 MG/DL (ref 6–23)
CALCIUM SERPL-MCNC: 8.5 MG/DL (ref 8.3–10.6)
CALCIUM SERPL-MCNC: 8.9 MG/DL (ref 8.3–10.6)
CALCIUM SERPL-MCNC: 9.1 MG/DL (ref 8.3–10.6)
CHLORIDE BLD-SCNC: 75 MMOL/L (ref 99–110)
CHLORIDE BLD-SCNC: 77 MMOL/L (ref 99–110)
CHLORIDE BLD-SCNC: 81 MMOL/L (ref 99–110)
CLARITY: CLEAR
CO2: 24 MMOL/L (ref 21–32)
CO2: 28 MMOL/L (ref 21–32)
CO2: 28 MMOL/L (ref 21–32)
COLOR: YELLOW
CREAT SERPL-MCNC: 1.2 MG/DL (ref 0.9–1.3)
CREAT SERPL-MCNC: 1.2 MG/DL (ref 0.9–1.3)
CREAT SERPL-MCNC: 1.3 MG/DL (ref 0.9–1.3)
CREATININE URINE: 104.8 MG/DL (ref 39–259)
DIFFERENTIAL TYPE: ABNORMAL
EOSINOPHILS ABSOLUTE: 0 K/CU MM
EOSINOPHILS RELATIVE PERCENT: 0 % (ref 0–3)
GFR AFRICAN AMERICAN: >60 ML/MIN/1.73M2
GFR NON-AFRICAN AMERICAN: 52 ML/MIN/1.73M2
GFR NON-AFRICAN AMERICAN: 58 ML/MIN/1.73M2
GFR NON-AFRICAN AMERICAN: 58 ML/MIN/1.73M2
GLUCOSE BLD-MCNC: 104 MG/DL (ref 70–99)
GLUCOSE BLD-MCNC: 117 MG/DL (ref 70–99)
GLUCOSE BLD-MCNC: 141 MG/DL (ref 70–99)
GLUCOSE BLD-MCNC: 97 MG/DL (ref 70–99)
GLUCOSE, URINE: NEGATIVE MG/DL
HCT VFR BLD CALC: 36.9 % (ref 42–52)
HEMOGLOBIN: 13 GM/DL (ref 13.5–18)
IMMATURE NEUTROPHIL %: 1.1 % (ref 0–0.43)
KETONES, URINE: NEGATIVE MG/DL
LEUKOCYTE ESTERASE, URINE: NEGATIVE
LIPASE: 78 IU/L (ref 13–60)
LYMPHOCYTES ABSOLUTE: 0.4 K/CU MM
LYMPHOCYTES RELATIVE PERCENT: 5.7 % (ref 24–44)
MAGNESIUM: 1.7 MG/DL (ref 1.8–2.4)
MAGNESIUM: 1.9 MG/DL (ref 1.8–2.4)
MCH RBC QN AUTO: 29.1 PG (ref 27–31)
MCHC RBC AUTO-ENTMCNC: 35.2 % (ref 32–36)
MCV RBC AUTO: 82.7 FL (ref 78–100)
MONOCYTES ABSOLUTE: 0.4 K/CU MM
MONOCYTES RELATIVE PERCENT: 5.5 % (ref 0–4)
NITRITE URINE, QUANTITATIVE: NEGATIVE
NUCLEATED RBC %: 0 %
PDW BLD-RTO: 13.6 % (ref 11.7–14.9)
PH, URINE: 6 (ref 5–8)
PLATELET # BLD: 221 K/CU MM (ref 140–440)
PMV BLD AUTO: 9.5 FL (ref 7.5–11.1)
POTASSIUM SERPL-SCNC: 3.1 MMOL/L (ref 3.5–5.1)
POTASSIUM SERPL-SCNC: 3.2 MMOL/L (ref 3.5–5.1)
POTASSIUM SERPL-SCNC: ABNORMAL MMOL/L (ref 3.5–5.1)
POTASSIUM, UR: 63.6 MMOL/L (ref 22–119)
PROT/CREAT RATIO, UR: ABNORMAL
PROTEIN UA: 100 MG/DL
RBC # BLD: 4.46 M/CU MM (ref 4.6–6.2)
RBC URINE: 2 /HPF (ref 0–3)
REASON FOR REJECTION: NORMAL
REASON FOR REJECTION: NORMAL
REJECTED TEST: NORMAL
SEGMENTED NEUTROPHILS ABSOLUTE COUNT: 5.7 K/CU MM
SEGMENTED NEUTROPHILS RELATIVE PERCENT: 87.5 % (ref 36–66)
SODIUM BLD-SCNC: ABNORMAL MMOL/L (ref 135–145)
SODIUM URINE: 12 MMOL/L (ref 35–167)
SPECIFIC GRAVITY UA: 1.04 (ref 1–1.03)
SPECIFIC GRAVITY UA: ABNORMAL (ref 1–1.03)
TOTAL IMMATURE NEUTOROPHIL: 0.07 K/CU MM
TOTAL NUCLEATED RBC: 0 K/CU MM
TOTAL PROTEIN: 7.1 GM/DL (ref 6.4–8.2)
TRICHOMONAS: ABNORMAL /HPF
URINE TOTAL PROTEIN: 100 MG/DL
UROBILINOGEN, URINE: 1 MG/DL (ref 0.2–1)
WBC # BLD: 6.5 K/CU MM (ref 4–10.5)
WBC UA: <1 /HPF (ref 0–2)

## 2019-08-03 PROCEDURE — 6370000000 HC RX 637 (ALT 250 FOR IP): Performed by: INTERNAL MEDICINE

## 2019-08-03 PROCEDURE — 85025 COMPLETE CBC W/AUTO DIFF WBC: CPT

## 2019-08-03 PROCEDURE — 82570 ASSAY OF URINE CREATININE: CPT

## 2019-08-03 PROCEDURE — 6360000004 HC RX CONTRAST MEDICATION: Performed by: EMERGENCY MEDICINE

## 2019-08-03 PROCEDURE — 82962 GLUCOSE BLOOD TEST: CPT

## 2019-08-03 PROCEDURE — 6370000000 HC RX 637 (ALT 250 FOR IP): Performed by: NURSE PRACTITIONER

## 2019-08-03 PROCEDURE — 83935 ASSAY OF URINE OSMOLALITY: CPT

## 2019-08-03 PROCEDURE — 81001 URINALYSIS AUTO W/SCOPE: CPT

## 2019-08-03 PROCEDURE — 83735 ASSAY OF MAGNESIUM: CPT

## 2019-08-03 PROCEDURE — 6360000002 HC RX W HCPCS: Performed by: INTERNAL MEDICINE

## 2019-08-03 PROCEDURE — 94761 N-INVAS EAR/PLS OXIMETRY MLT: CPT

## 2019-08-03 PROCEDURE — 36415 COLL VENOUS BLD VENIPUNCTURE: CPT

## 2019-08-03 PROCEDURE — 82088 ASSAY OF ALDOSTERONE: CPT

## 2019-08-03 PROCEDURE — 84133 ASSAY OF URINE POTASSIUM: CPT

## 2019-08-03 PROCEDURE — 2580000003 HC RX 258: Performed by: NURSE PRACTITIONER

## 2019-08-03 PROCEDURE — 80053 COMPREHEN METABOLIC PANEL: CPT

## 2019-08-03 PROCEDURE — 99285 EMERGENCY DEPT VISIT HI MDM: CPT

## 2019-08-03 PROCEDURE — 2580000003 HC RX 258: Performed by: INTERNAL MEDICINE

## 2019-08-03 PROCEDURE — 6360000002 HC RX W HCPCS: Performed by: EMERGENCY MEDICINE

## 2019-08-03 PROCEDURE — C9113 INJ PANTOPRAZOLE SODIUM, VIA: HCPCS | Performed by: NURSE PRACTITIONER

## 2019-08-03 PROCEDURE — 84156 ASSAY OF PROTEIN URINE: CPT

## 2019-08-03 PROCEDURE — 2580000003 HC RX 258: Performed by: EMERGENCY MEDICINE

## 2019-08-03 PROCEDURE — 96374 THER/PROPH/DIAG INJ IV PUSH: CPT

## 2019-08-03 PROCEDURE — 84588 ASSAY OF VASOPRESSIN: CPT

## 2019-08-03 PROCEDURE — 84300 ASSAY OF URINE SODIUM: CPT

## 2019-08-03 PROCEDURE — 83690 ASSAY OF LIPASE: CPT

## 2019-08-03 PROCEDURE — 80048 BASIC METABOLIC PNL TOTAL CA: CPT

## 2019-08-03 PROCEDURE — 74177 CT ABD & PELVIS W/CONTRAST: CPT

## 2019-08-03 PROCEDURE — 2000000000 HC ICU R&B

## 2019-08-03 PROCEDURE — 93005 ELECTROCARDIOGRAM TRACING: CPT | Performed by: EMERGENCY MEDICINE

## 2019-08-03 PROCEDURE — 6360000002 HC RX W HCPCS: Performed by: NURSE PRACTITIONER

## 2019-08-03 RX ORDER — MAGNESIUM SULFATE IN WATER 40 MG/ML
2 INJECTION, SOLUTION INTRAVENOUS ONCE
Status: COMPLETED | OUTPATIENT
Start: 2019-08-03 | End: 2019-08-03

## 2019-08-03 RX ORDER — 3% SODIUM CHLORIDE 3 G/100ML
25 INJECTION, SOLUTION INTRAVENOUS CONTINUOUS
Status: DISPENSED | OUTPATIENT
Start: 2019-08-03 | End: 2019-08-04

## 2019-08-03 RX ORDER — SODIUM CHLORIDE 9 MG/ML
INJECTION, SOLUTION INTRAVENOUS CONTINUOUS
Status: DISCONTINUED | OUTPATIENT
Start: 2019-08-03 | End: 2019-08-03

## 2019-08-03 RX ORDER — POTASSIUM CHLORIDE 20 MEQ/1
40 TABLET, EXTENDED RELEASE ORAL PRN
Status: DISCONTINUED | OUTPATIENT
Start: 2019-08-03 | End: 2019-08-08 | Stop reason: HOSPADM

## 2019-08-03 RX ORDER — POTASSIUM CHLORIDE 1.5 G/1.77G
40 POWDER, FOR SOLUTION ORAL PRN
Status: DISCONTINUED | OUTPATIENT
Start: 2019-08-03 | End: 2019-08-08 | Stop reason: HOSPADM

## 2019-08-03 RX ORDER — SODIUM CHLORIDE 0.9 % (FLUSH) 0.9 %
10 SYRINGE (ML) INJECTION EVERY 12 HOURS SCHEDULED
Status: DISCONTINUED | OUTPATIENT
Start: 2019-08-03 | End: 2019-08-08 | Stop reason: HOSPADM

## 2019-08-03 RX ORDER — POTASSIUM CHLORIDE 29.8 MG/ML
20 INJECTION INTRAVENOUS ONCE
Status: DISCONTINUED | OUTPATIENT
Start: 2019-08-03 | End: 2019-08-03

## 2019-08-03 RX ORDER — DONEPEZIL HYDROCHLORIDE 10 MG/1
10 TABLET, FILM COATED ORAL NIGHTLY
Status: DISCONTINUED | OUTPATIENT
Start: 2019-08-03 | End: 2019-08-08 | Stop reason: HOSPADM

## 2019-08-03 RX ORDER — TAMSULOSIN HYDROCHLORIDE 0.4 MG/1
0.4 CAPSULE ORAL DAILY
Status: DISCONTINUED | OUTPATIENT
Start: 2019-08-03 | End: 2019-08-06

## 2019-08-03 RX ORDER — POTASSIUM CHLORIDE 20 MEQ/1
40 TABLET, EXTENDED RELEASE ORAL 4 TIMES DAILY
Status: DISCONTINUED | OUTPATIENT
Start: 2019-08-03 | End: 2019-08-05

## 2019-08-03 RX ORDER — 0.9 % SODIUM CHLORIDE 0.9 %
10 VIAL (ML) INJECTION
Status: DISCONTINUED | OUTPATIENT
Start: 2019-08-03 | End: 2019-08-08 | Stop reason: HOSPADM

## 2019-08-03 RX ORDER — POTASSIUM CHLORIDE 20 MEQ/1
40 TABLET, EXTENDED RELEASE ORAL 4 TIMES DAILY
Status: DISCONTINUED | OUTPATIENT
Start: 2019-08-03 | End: 2019-08-03

## 2019-08-03 RX ORDER — GABAPENTIN 300 MG/1
300 CAPSULE ORAL 2 TIMES DAILY
Status: DISCONTINUED | OUTPATIENT
Start: 2019-08-03 | End: 2019-08-03

## 2019-08-03 RX ORDER — ONDANSETRON 2 MG/ML
4 INJECTION INTRAMUSCULAR; INTRAVENOUS EVERY 30 MIN PRN
Status: DISCONTINUED | OUTPATIENT
Start: 2019-08-03 | End: 2019-08-03

## 2019-08-03 RX ORDER — QUETIAPINE FUMARATE 25 MG/1
25 TABLET, FILM COATED ORAL NIGHTLY
Status: DISCONTINUED | OUTPATIENT
Start: 2019-08-03 | End: 2019-08-03

## 2019-08-03 RX ORDER — ALFUZOSIN HYDROCHLORIDE 10 MG/1
10 TABLET, EXTENDED RELEASE ORAL DAILY
Status: DISCONTINUED | OUTPATIENT
Start: 2019-08-03 | End: 2019-08-03 | Stop reason: SDUPTHER

## 2019-08-03 RX ORDER — FLUOXETINE HYDROCHLORIDE 20 MG/1
20 CAPSULE ORAL DAILY
Status: DISCONTINUED | OUTPATIENT
Start: 2019-08-03 | End: 2019-08-04

## 2019-08-03 RX ORDER — RIVASTIGMINE 4.6 MG/24H
1 PATCH, EXTENDED RELEASE TRANSDERMAL DAILY
Status: ON HOLD | COMMUNITY
End: 2019-08-30 | Stop reason: HOSPADM

## 2019-08-03 RX ORDER — LEVOTHYROXINE SODIUM 0.05 MG/1
50 TABLET ORAL DAILY
Status: DISCONTINUED | OUTPATIENT
Start: 2019-08-04 | End: 2019-08-08 | Stop reason: HOSPADM

## 2019-08-03 RX ORDER — AMLODIPINE BESYLATE 10 MG/1
10 TABLET ORAL DAILY
Status: DISCONTINUED | OUTPATIENT
Start: 2019-08-03 | End: 2019-08-08 | Stop reason: HOSPADM

## 2019-08-03 RX ORDER — PANTOPRAZOLE SODIUM 40 MG/10ML
40 INJECTION, POWDER, LYOPHILIZED, FOR SOLUTION INTRAVENOUS DAILY
Status: DISCONTINUED | OUTPATIENT
Start: 2019-08-03 | End: 2019-08-08 | Stop reason: HOSPADM

## 2019-08-03 RX ORDER — POTASSIUM CHLORIDE 7.45 MG/ML
10 INJECTION INTRAVENOUS PRN
Status: DISCONTINUED | OUTPATIENT
Start: 2019-08-03 | End: 2019-08-08 | Stop reason: HOSPADM

## 2019-08-03 RX ORDER — 0.9 % SODIUM CHLORIDE 0.9 %
1000 INTRAVENOUS SOLUTION INTRAVENOUS ONCE
Status: DISCONTINUED | OUTPATIENT
Start: 2019-08-03 | End: 2019-08-03

## 2019-08-03 RX ORDER — ONDANSETRON 2 MG/ML
4 INJECTION INTRAMUSCULAR; INTRAVENOUS EVERY 6 HOURS PRN
Status: DISCONTINUED | OUTPATIENT
Start: 2019-08-03 | End: 2019-08-08 | Stop reason: HOSPADM

## 2019-08-03 RX ORDER — ACETAMINOPHEN 500 MG
500 TABLET ORAL DAILY PRN
Status: DISCONTINUED | OUTPATIENT
Start: 2019-08-03 | End: 2019-08-08 | Stop reason: HOSPADM

## 2019-08-03 RX ORDER — ASPIRIN 81 MG/1
81 TABLET ORAL DAILY
Status: DISCONTINUED | OUTPATIENT
Start: 2019-08-03 | End: 2019-08-08 | Stop reason: HOSPADM

## 2019-08-03 RX ORDER — RIVASTIGMINE 4.6 MG/24H
1 PATCH, EXTENDED RELEASE TRANSDERMAL DAILY
Status: DISCONTINUED | OUTPATIENT
Start: 2019-08-03 | End: 2019-08-08 | Stop reason: HOSPADM

## 2019-08-03 RX ORDER — SODIUM CHLORIDE 0.9 % (FLUSH) 0.9 %
10 SYRINGE (ML) INJECTION PRN
Status: DISCONTINUED | OUTPATIENT
Start: 2019-08-03 | End: 2019-08-08 | Stop reason: HOSPADM

## 2019-08-03 RX ORDER — POTASSIUM CHLORIDE 29.8 MG/ML
20 INJECTION INTRAVENOUS ONCE
Status: COMPLETED | OUTPATIENT
Start: 2019-08-03 | End: 2019-08-03

## 2019-08-03 RX ORDER — DONEPEZIL HYDROCHLORIDE 10 MG/1
10 TABLET, FILM COATED ORAL NIGHTLY
COMMUNITY
End: 2019-08-11 | Stop reason: SINTOL

## 2019-08-03 RX ADMIN — AMLODIPINE BESYLATE 10 MG: 10 TABLET ORAL at 15:38

## 2019-08-03 RX ADMIN — ENOXAPARIN SODIUM 40 MG: 40 INJECTION SUBCUTANEOUS at 15:38

## 2019-08-03 RX ADMIN — ONDANSETRON 4 MG: 2 INJECTION INTRAMUSCULAR; INTRAVENOUS at 09:14

## 2019-08-03 RX ADMIN — POTASSIUM CHLORIDE 20 MEQ: 400 INJECTION, SOLUTION INTRAVENOUS at 18:29

## 2019-08-03 RX ADMIN — SODIUM CHLORIDE: 9 INJECTION, SOLUTION INTRAVENOUS at 10:11

## 2019-08-03 RX ADMIN — TAMSULOSIN HYDROCHLORIDE 0.4 MG: 0.4 CAPSULE ORAL at 15:38

## 2019-08-03 RX ADMIN — FLUOXETINE 20 MG: 20 CAPSULE ORAL at 15:38

## 2019-08-03 RX ADMIN — IOPAMIDOL 75 ML: 755 INJECTION, SOLUTION INTRAVENOUS at 09:59

## 2019-08-03 RX ADMIN — DONEPEZIL HYDROCHLORIDE 10 MG: 10 TABLET, FILM COATED ORAL at 20:49

## 2019-08-03 RX ADMIN — Medication 10 ML: at 20:50

## 2019-08-03 RX ADMIN — GABAPENTIN 300 MG: 300 CAPSULE ORAL at 15:37

## 2019-08-03 RX ADMIN — PANTOPRAZOLE SODIUM 40 MG: 40 INJECTION, POWDER, FOR SOLUTION INTRAVENOUS at 15:38

## 2019-08-03 RX ADMIN — SODIUM CHLORIDE 25 ML/HR: 3 INJECTION, SOLUTION INTRAVENOUS at 20:11

## 2019-08-03 RX ADMIN — ASPIRIN 81 MG: 81 TABLET, COATED ORAL at 15:37

## 2019-08-03 RX ADMIN — POTASSIUM CHLORIDE 40 MEQ: 20 TABLET, EXTENDED RELEASE ORAL at 18:34

## 2019-08-03 RX ADMIN — SODIUM CHLORIDE: 9 INJECTION, SOLUTION INTRAVENOUS at 15:37

## 2019-08-03 RX ADMIN — SODIUM CHLORIDE 1000 ML: 9 INJECTION, SOLUTION INTRAVENOUS at 09:14

## 2019-08-03 RX ADMIN — MAGNESIUM SULFATE HEPTAHYDRATE 2 G: 40 INJECTION, SOLUTION INTRAVENOUS at 15:36

## 2019-08-03 ASSESSMENT — PAIN SCALES - GENERAL
PAINLEVEL_OUTOF10: 0
PAINLEVEL_OUTOF10: 0

## 2019-08-03 NOTE — ED NOTES
Patient ambulating to the bathroom at this time with the help from his son and tech .       Bran Yu RN  08/03/19 5850

## 2019-08-03 NOTE — ED PROVIDER NOTES
Emergency Department Encounter    Patient: Mathieu Nguyen  MRN: [id number]  : 1934  Date of Evaluation: 8/3/2019  ED Provider:  Rebel Burger    Triage Chief Complaint:   Emesis (Started last night ); Diarrhea (Started last night after being constipated for 3 days . ); and Dizziness    [place]:  Mathieu Nguyen is a 80 y.o. male that presents with concern for nausea, vomiting, diarrhea and then felt lightheaded/dizzy this morning. All symptoms started last night. Had been constipated for three days. So he took meta mucil, then miralax and then prune juice. Was able to have a BM at that time. Then all night was having vomiting. This morning dry heaving and very weak. Tells me that he did have diarrhea all night as well, even on the floor because could not make it to the bathroom. Did not pass out, but felt really weak and lightheaded/dizzy/shaky this morning. Called family at that time. No fevers. Having some epigastric/periumbilical \"aggravation\", \"not really sharp pain\". no pain at this time. Does still feel nauseous. No CP or SOB. Is mildly confused but this is baseline per family. Did not pass out all the way. Did not strike his head. ROS:  10 systems reviewed and negative except as above. Past Medical History:   Diagnosis Date    Cancer [facility])     Chronic kidney disease     Chronic kidney disease, stage III (moderate) ([facility] [address] 2017    Depression     Hyperlipidemia     Hypertension     Hyperthyroidism      History reviewed. No pertinent surgical history.   Family History   Problem Relation Age of Onset    Cancer Sister     Cancer Brother     High Blood Pressure Brother     Heart Disease Other     Hypertension Other     Elevated Lipids Other     Other Other         gout     Social History     Socioeconomic History    Marital status: Single     Spouse name: Not on file    Number of children: Not on file    Years of education: Not on file    Highest education level: Not on file Occupational History    Not on file   Social Needs    Financial resource strain: Not on file    Food insecurity:     Worry: Not on file     Inability: Not on file    Transportation needs:     Medical: Not on file     Non-medical: Not on file   Tobacco Use    Smoking status: Former Smoker     Packs/day: 1.00     Years: 30.00     Pack years: 30.00     Types: Cigarettes    Smokeless tobacco: Never Used   Substance and Sexual Activity    Alcohol use: No    Drug use: No    Sexual activity: Not on file   Lifestyle    Physical activity:     Days per week: Not on file     Minutes per session: Not on file    Stress: Not on file   Relationships    Social connections:     Talks on phone: Not on file     Gets together: Not on file     Attends [employer] service: Not on file     Active member of club or organization: Not on file     Attends meetings of clubs or organizations: Not on file     Relationship status: Not on file    Intimate partner violence:     Fear of current or ex partner: Not on file     Emotionally abused: Not on file     Physically abused: Not on file     Forced sexual activity: Not on file   Other Topics Concern    Not on file   Social History Narrative    Not on file     Current Facility-Administered Medications   Medication Dose Route Frequency Provider Last Rate Last Dose    ondansetron (ZOFRAN) injection 4 mg  4 mg Intravenous Q30 Min PRN Alyssa Perez MD   4 mg at 08/03/19 0914    0.9 % sodium chloride infusion   Intravenous Continuous Alyssa Perez  mL/hr at 08/03/19 1011      sodium chloride (PF) 0.9 % injection 10 mL  10 mL Intravenous ONCE PRN Alyssa Perez MD         Current Outpatient Medications   Medication Sig Dispense Refill    ranitidine (ZANTAC) 150 MG tablet Take 150 mg by mouth daily      QUEtiapine (SEROQUEL) 25 MG tablet Take 25 mg by mouth nightly      levothyroxine (SYNTHROID) 50 MCG tablet Take 50 mcg by mouth Daily      tamsulosin (FLOMAX) 0.4 MG capsule Take 1 capsule by mouth daily 90 capsule 1    gabapentin (NEURONTIN) 300 MG capsule Take 1 capsule by mouth 2 times daily for 30 days. . 60 capsule 0    triamterene-hydrochlorothiazide (MAXZIDE-25) 37.5-25 MG per tablet Take 1 tablet by mouth daily Resume 11/24/18 90 tablet 0    amLODIPine (NORVASC) 10 MG tablet Take 1 tablet by mouth daily (Patient taking differently: Take 5 mg by mouth daily Dose changed 12/12/18 per QUANG Jimenez NP) 30 tablet 0    acetaminophen (TYLENOL) 500 MG tablet Take 500 mg by mouth daily as needed for Pain      galantamine (RAZADYNE ER) 8 MG extended release capsule Take 10 mg by mouth 2 times daily       aspirin 81 MG tablet Take 81 mg by mouth daily      omeprazole (PRILOSEC) 20 MG delayed release capsule Take 20 mg by mouth daily      alfuzosin (UROXATRAL) 10 MG SR tablet Take 1 tablet by mouth daily (Patient taking differently: Take 10 mg by mouth nightly ) 90 tablet 1    docusate sodium (COLACE) 100 MG capsule Take 1 capsule by mouth 2 times daily as needed for Constipation 90 capsule 1    FLUoxetine (PROZAC) 20 MG capsule Take 1 capsule by mouth daily (Patient taking differently: Take 20 mg by mouth nightly ) 90 capsule 1     No Known Allergies    Nursing Notes Reviewed    Physical Exam:  Triage VS:    ED Triage Vitals [08/03/19 0824]   Enc Vitals Group      BP (!) 177/56      Pulse 58      Resp 12      Temp 98.2 °F (36.8 °C)      Temp Source Oral      SpO2 100 %      Weight 200 lb (90.7 kg)      Height 5' 11\" (1.803 m)      Head Circumference       Peak Flow       Pain Score       Pain Loc       Pain Edu? Excl. in [address]? My pulse ox interpretation is - normal    General appearance:  No acute distress. Skin:  Warm. Dry. Eye:  Extraocular movements intact. Ears, nose, mouth and throat:  Oral mucosa moist   Neck:  Trachea midline. Extremity:  No swelling. Normal ROM    Heart:  Regular rate and rhythm, normal S1 & S2, no extra heart sounds. >60 mL/min/1.73m2    Anion Gap 12 4 - 16   Lipase   Result Value Ref Range    Lipase 78 (H) 13 - 60 IU/L      Radiographs (if obtained):    [x] Radiologist's Report Reviewed:  CT ABDOMEN PELVIS W IV CONTRAST Additional Contrast? None   Preliminary Result   1. No acute abdominal or pelvic abnormality. 2. Bilateral renal cysts some which are hyperdense. These are indeterminate   but likely favor hemorrhagic or complex cyst.  Dedicated renal CT or MRI can   be performed if clinically indicated on a nonemergent basis. 3. Stable bilateral adrenal gland thickening with a stable indeterminate 1.9   cm left adrenal nodule. Consider follow-up adrenal CT in 12 months. 4. Chronic interstitial changes within the lung bases. EKG (if obtained): (All EKG's are interpreted by myself in the absence of a cardiologist)  Sinus bradycardia with rate of 58bpm, normal intervals, no ST elevation. Left ventricular hypertrophy. No previous to compare. Chart review shows recent radiographs:  No results found. MDM:  80-year-old male with history as above presents with concern for nausea vomiting and diarrhea, he has some baseline dementia and is at his baseline mental status according to family. Apparently had been constipated for a few days and then took multiple laxatives in a row, suspect that likely caused this onset of symptoms. Started with fluids and nausea meds and labs were ordered. Found to be severely hyponatremic, he actually had not received any fluids as yet and so bolus was canceled and he was started on maintenance at 100 mL/h, his CT does not show any evidence of any acute surgical process or infectious process, I do suspect this was all related to laxative overdose. Plan will be for admission given his severe hyponatremia. His mental status is at baseline according to family members. Discussed with hospitalist team      Total critical care time today provided was 32 minutes.  This excludes

## 2019-08-03 NOTE — H&P
History and Physical  Barbara Cantor [facility]   Internal Medicine Hospitalist      Name:  Coleman Abbasi /Age/Sex: 1934  (80 y.o. male)   MRN & CSN:  [id number] & [id number] Admission Date/Time: 8/3/2019  8:24 AM   Location:  I[facility]/NONE PCP: [place], [facility] Day: 1      Supervising Physician: Dr. Laureano Hawk     Chief Complaint: Emesis (Started last night ); Diarrhea (Started last night after being constipated for 3 days . ); and Dizziness     Assessment and Plan:   Coleman Abbasi is a 80 y.o.  male who presents with Acute metabolic encephalopathy     Acute metabolic encephalopathy - could be r/t Alzheimer vs severe hyponatremia. - admit inpatient, telemetry monitoring           - Neuro, NV checks         - orthostatic BP and pulse         - OT/PT eval and treat         - check lab works in AM         - CCM for Alzheimer: Aricept, Exelon patch         - supportive care management     Severe acute hyponatremia - likely d/t vomiting and diarrhea, pt was constipated for 4 days, then took metamucil, miralax, lactulose, and then prune juice, CT abdomen shows no acute abdominal or pelvic abnormality.          - serum Na level 117         - consult Nephrology, Dr. Vikki Jordan         - BMP lab q 4 hrs to monitor avoid quick correction of > 0.5 mEq/L/hr and > 4-6 mEq/L in 1st 24 hrs         - nursing communication: to reports BMP/Na level result to nephrology including if Na correction if > 0.5 mEq/L/hr or > 4-6 mEq/L in 1st 24 hrs.         - cont gentle IVF         - neuro checks         - check lab works in AM         - Fall/seizure precautions            Acute hypokalemia, 2/2 to above condition - serum K 3.1        - cont tele monitorin        - on K sliding scale protocol        - replete and re-check, BMP in AM        - pending Mg level     CKD stage 3 - stable, Crea level wnl.        - monitor I/O        - avoid nephrotoxic agents, adjust meds for GFR if needed        - Neurology, Dr. Tello Spine. Patient and family reports that he was constipated for 4 days, then took metamucil, miralax, lactulose, and then prune juice. Then all night, he felt nauseated with nonbilious, nonbloody emesis, and nonbloody diarrhea. He denied abdominal pain or discomfort. Pt seen and examined. He is awake, alert, and oriented. Family stated that patient's present mentation is baseline. Patient denies CP, palpitation, fever, chills or diaphoresis. Denies cough, SOB or difficulty breathing. Denies any other symptoms including HA, paresthesias, abdominal pain, dysuria, hematuria, frequency or urgency, and B/L weakness. Upon interview, the patient provided the history as above. ED Course: Discussed case with ED physician prior to admission. ROS: As per HPI, otherwise 10-systems reviewed negative. Objective:   No intake or output data in the 24 hours ending 08/03/19 1209     Vitals:   Vitals:    08/03/19 0824 08/03/19 1012   BP: (!) 177/56 (!) 166/56   Pulse: 58 61   Resp: 12 14   Temp: 98.2 °F (36.8 °C)    TempSrc: Oral    SpO2: 100% 100%   Weight: 200 lb (90.7 kg)    Height: 5' 11\" (1.803 m)      Physical Exam: 08/03/19    GEN  -Awake, alert, appearing Frail, elderly male, sitting upright in bed, cooperative, able to give adequate history. NAD. No body habitus. Appears given age. EYES -PERRL. No vision changes. No scleral erythema, discharge, or conjunctivitis. HENT -MM are moist. Oral pharynx without exudates, no evidence of thrush. B/L hard of hearing. NECK -Supple, no apparent thyromegaly or masses. RESP -LS CTA equal bilat, no wheezes, rales or rhonchi. Symmetric chest movement. No respiratory distress noted. C/V  -S1/S2 auscultated. RRR without appreciable M/R/G. No JVD or carotid bruits. Peripheral pulses equal bilaterally and palpable. No peripheral edema. No reproducible chest wall tenderness. GI  -Abdomen is soft non-distended, no significant tenderness.  No masses or guarding. +

## 2019-08-04 ENCOUNTER — APPOINTMENT (OUTPATIENT)
Dept: GENERAL RADIOLOGY | Age: 84
DRG: 640 | End: 2019-08-04
Payer: MEDICARE

## 2019-08-04 LAB
ALBUMIN SERPL-MCNC: 3.8 GM/DL (ref 3.4–5)
ALP BLD-CCNC: 69 IU/L (ref 40–129)
ALT SERPL-CCNC: 21 U/L (ref 10–40)
ANION GAP SERPL CALCULATED.3IONS-SCNC: 10 MMOL/L (ref 4–16)
AST SERPL-CCNC: 32 IU/L (ref 15–37)
BASOPHILS ABSOLUTE: 0 K/CU MM
BASOPHILS RELATIVE PERCENT: 0.2 % (ref 0–1)
BILIRUB SERPL-MCNC: 0.9 MG/DL (ref 0–1)
BUN BLDV-MCNC: 18 MG/DL (ref 6–23)
BUN BLDV-MCNC: 20 MG/DL (ref 6–23)
BUN BLDV-MCNC: 25 MG/DL (ref 6–23)
CALCIUM SERPL-MCNC: 8.5 MG/DL (ref 8.3–10.6)
CALCIUM SERPL-MCNC: 8.7 MG/DL (ref 8.3–10.6)
CALCIUM SERPL-MCNC: 8.8 MG/DL (ref 8.3–10.6)
CHLORIDE BLD-SCNC: 84 MMOL/L (ref 99–110)
CHLORIDE BLD-SCNC: 86 MMOL/L (ref 99–110)
CHLORIDE BLD-SCNC: 88 MMOL/L (ref 99–110)
CO2: 23 MMOL/L (ref 21–32)
CO2: 25 MMOL/L (ref 21–32)
CO2: 26 MMOL/L (ref 21–32)
CREAT SERPL-MCNC: 1.2 MG/DL (ref 0.9–1.3)
CREAT SERPL-MCNC: 1.3 MG/DL (ref 0.9–1.3)
CREAT SERPL-MCNC: 1.5 MG/DL (ref 0.9–1.3)
DIFFERENTIAL TYPE: ABNORMAL
EKG ATRIAL RATE: 58 BPM
EKG DIAGNOSIS: NORMAL
EKG P-R INTERVAL: 178 MS
EKG Q-T INTERVAL: 478 MS
EKG QRS DURATION: 164 MS
EKG QTC CALCULATION (BAZETT): 469 MS
EKG R AXIS: -15 DEGREES
EKG T AXIS: 40 DEGREES
EKG VENTRICULAR RATE: 58 BPM
EOSINOPHILS ABSOLUTE: 0 K/CU MM
EOSINOPHILS RELATIVE PERCENT: 0.1 % (ref 0–3)
GFR AFRICAN AMERICAN: 54 ML/MIN/1.73M2
GFR AFRICAN AMERICAN: >60 ML/MIN/1.73M2
GFR AFRICAN AMERICAN: >60 ML/MIN/1.73M2
GFR NON-AFRICAN AMERICAN: 44 ML/MIN/1.73M2
GFR NON-AFRICAN AMERICAN: 52 ML/MIN/1.73M2
GFR NON-AFRICAN AMERICAN: 58 ML/MIN/1.73M2
GLUCOSE BLD-MCNC: 102 MG/DL (ref 70–99)
GLUCOSE BLD-MCNC: 105 MG/DL (ref 70–99)
GLUCOSE BLD-MCNC: 105 MG/DL (ref 70–99)
HCT VFR BLD CALC: 40.7 % (ref 42–52)
HEMOGLOBIN: 13.3 GM/DL (ref 13.5–18)
IMMATURE NEUTROPHIL %: 0.8 % (ref 0–0.43)
LYMPHOCYTES ABSOLUTE: 0.8 K/CU MM
LYMPHOCYTES RELATIVE PERCENT: 9.3 % (ref 24–44)
MAGNESIUM: 2.2 MG/DL (ref 1.8–2.4)
MAGNESIUM: 2.6 MG/DL (ref 1.8–2.4)
MCH RBC QN AUTO: 29 PG (ref 27–31)
MCHC RBC AUTO-ENTMCNC: 32.7 % (ref 32–36)
MCV RBC AUTO: 88.7 FL (ref 78–100)
MONOCYTES ABSOLUTE: 0.8 K/CU MM
MONOCYTES RELATIVE PERCENT: 8.6 % (ref 0–4)
NUCLEATED RBC %: 0 %
OSMOLALITY URINE: NORMAL MOS/L (ref 292–1090)
PDW BLD-RTO: 13.9 % (ref 11.7–14.9)
PLATELET # BLD: 281 K/CU MM (ref 140–440)
PMV BLD AUTO: 11 FL (ref 7.5–11.1)
POTASSIUM SERPL-SCNC: 3.4 MMOL/L (ref 3.5–5.1)
POTASSIUM SERPL-SCNC: 3.6 MMOL/L (ref 3.5–5.1)
POTASSIUM SERPL-SCNC: 3.9 MMOL/L (ref 3.5–5.1)
PRO-BNP: 2318 PG/ML
RBC # BLD: 4.59 M/CU MM (ref 4.6–6.2)
REASON FOR REJECTION: NORMAL
REASON FOR REJECTION: NORMAL
REJECTED TEST: NORMAL
SEGMENTED NEUTROPHILS ABSOLUTE COUNT: 7.4 K/CU MM
SEGMENTED NEUTROPHILS RELATIVE PERCENT: 81 % (ref 36–66)
SODIUM BLD-SCNC: 120 MMOL/L (ref 135–145)
SODIUM BLD-SCNC: 121 MMOL/L (ref 135–145)
SODIUM BLD-SCNC: 121 MMOL/L (ref 135–145)
SODIUM BLD-SCNC: ABNORMAL MMOL/L (ref 135–145)
TOTAL IMMATURE NEUTOROPHIL: 0.07 K/CU MM
TOTAL NUCLEATED RBC: 0 K/CU MM
TOTAL PROTEIN: 6 GM/DL (ref 6.4–8.2)
TSH HIGH SENSITIVITY: 1.6 UIU/ML (ref 0.27–4.2)
WBC # BLD: 9.1 K/CU MM (ref 4–10.5)

## 2019-08-04 PROCEDURE — 93005 ELECTROCARDIOGRAM TRACING: CPT | Performed by: INTERNAL MEDICINE

## 2019-08-04 PROCEDURE — 80053 COMPREHEN METABOLIC PANEL: CPT

## 2019-08-04 PROCEDURE — 2580000003 HC RX 258: Performed by: INTERNAL MEDICINE

## 2019-08-04 PROCEDURE — C9113 INJ PANTOPRAZOLE SODIUM, VIA: HCPCS | Performed by: NURSE PRACTITIONER

## 2019-08-04 PROCEDURE — 2580000003 HC RX 258: Performed by: NURSE PRACTITIONER

## 2019-08-04 PROCEDURE — 80048 BASIC METABOLIC PNL TOTAL CA: CPT

## 2019-08-04 PROCEDURE — 93010 ELECTROCARDIOGRAM REPORT: CPT | Performed by: INTERNAL MEDICINE

## 2019-08-04 PROCEDURE — 84443 ASSAY THYROID STIM HORMONE: CPT

## 2019-08-04 PROCEDURE — 83880 ASSAY OF NATRIURETIC PEPTIDE: CPT

## 2019-08-04 PROCEDURE — 6370000000 HC RX 637 (ALT 250 FOR IP): Performed by: NURSE PRACTITIONER

## 2019-08-04 PROCEDURE — 83735 ASSAY OF MAGNESIUM: CPT

## 2019-08-04 PROCEDURE — 6370000000 HC RX 637 (ALT 250 FOR IP): Performed by: INTERNAL MEDICINE

## 2019-08-04 PROCEDURE — 6360000002 HC RX W HCPCS: Performed by: NURSE PRACTITIONER

## 2019-08-04 PROCEDURE — 51798 US URINE CAPACITY MEASURE: CPT

## 2019-08-04 PROCEDURE — 84295 ASSAY OF SERUM SODIUM: CPT

## 2019-08-04 PROCEDURE — 71045 X-RAY EXAM CHEST 1 VIEW: CPT

## 2019-08-04 PROCEDURE — 85025 COMPLETE CBC W/AUTO DIFF WBC: CPT

## 2019-08-04 PROCEDURE — 2000000000 HC ICU R&B

## 2019-08-04 RX ADMIN — Medication 10 ML: at 20:39

## 2019-08-04 RX ADMIN — DONEPEZIL HYDROCHLORIDE 10 MG: 10 TABLET, FILM COATED ORAL at 20:40

## 2019-08-04 RX ADMIN — POTASSIUM CHLORIDE 40 MEQ: 20 TABLET, EXTENDED RELEASE ORAL at 13:34

## 2019-08-04 RX ADMIN — LEVOTHYROXINE SODIUM 50 MCG: 50 TABLET ORAL at 07:09

## 2019-08-04 RX ADMIN — SODIUM CHLORIDE 25 ML/HR: 3 INJECTION, SOLUTION INTRAVENOUS at 03:55

## 2019-08-04 RX ADMIN — POTASSIUM CHLORIDE 40 MEQ: 20 TABLET, EXTENDED RELEASE ORAL at 17:58

## 2019-08-04 RX ADMIN — AMLODIPINE BESYLATE 10 MG: 10 TABLET ORAL at 09:22

## 2019-08-04 RX ADMIN — PANTOPRAZOLE SODIUM 40 MG: 40 INJECTION, POWDER, FOR SOLUTION INTRAVENOUS at 09:22

## 2019-08-04 RX ADMIN — POTASSIUM CHLORIDE 40 MEQ: 1.5 POWDER, FOR SOLUTION ORAL at 02:11

## 2019-08-04 RX ADMIN — ASPIRIN 81 MG: 81 TABLET, COATED ORAL at 09:23

## 2019-08-04 RX ADMIN — TAMSULOSIN HYDROCHLORIDE 0.4 MG: 0.4 CAPSULE ORAL at 09:23

## 2019-08-04 RX ADMIN — ENOXAPARIN SODIUM 40 MG: 40 INJECTION SUBCUTANEOUS at 09:22

## 2019-08-04 RX ADMIN — POTASSIUM CHLORIDE 40 MEQ: 20 TABLET, EXTENDED RELEASE ORAL at 09:33

## 2019-08-04 RX ADMIN — POTASSIUM CHLORIDE 40 MEQ: 20 TABLET, EXTENDED RELEASE ORAL at 20:40

## 2019-08-04 RX ADMIN — Medication 10 ML: at 09:23

## 2019-08-04 ASSESSMENT — PAIN SCALES - GENERAL
PAINLEVEL_OUTOF10: 0

## 2019-08-04 NOTE — PROGRESS NOTES
Nephrology Progress Note  8/4/2019 1:01 PM        Subjective:   Admit Date: 8/3/2019  PCP: Anderson Srivastava PA-C    Interval History: no overt sx     Diet: some    ROS:  Better UOP ,  ? Wt gain     Data:     Current meds:    sodium chloride flush  10 mL Intravenous 2 times per day    enoxaparin  40 mg Subcutaneous Daily    amLODIPine  10 mg Oral Daily    aspirin  81 mg Oral Daily    levothyroxine  50 mcg Oral Daily    tamsulosin  0.4 mg Oral Daily    pantoprazole  40 mg Intravenous Daily    donepezil  10 mg Oral Nightly    rivastigmine  1 patch Transdermal Daily    potassium chloride  40 mEq Oral 4x Daily           I/O last 3 completed shifts: In: 100 [P.O.:100]  Out: 1550 [Urine:1550]    CBC:   Recent Labs     08/03/19  0830 08/04/19  0625   WBC 6.5 9.1   HGB 13.0* 13.3*    281          Recent Labs     08/03/19  1602 08/03/19  2300 08/04/19  0625 08/04/19  0840     NA CALLED TO 3EEMILIA RN ON [id number] AT 1707 BY [facility]  RESULTS READ BACK  * 117  NA CALLED TO JEAN PAUL MCELROY RN 8/3 2353 BY [facility]   RESULTS READ BACK  * 115  NA CALLED TO ICU, JORGE COPELAND RN ON [id number] AT 0834 BY [facility]  RESULTS READ BACK  * 120*   K 2.8  K CALLED TO EMILIA LADD RN ON [id number] AT 1707 BY [facility]  RESULTS READ BACK  * 3.2*  --  3.4*   CL 75* 81*  --  84*   CO2 28 24  --  26   BUN 17 18  --  18   CREATININE 1.2 1.3  --  1.2   GLUCOSE 104* 97  --  102*       Lab Results   Component Value Date    CALCIUM 8.7 08/04/2019    PHOS 3.1 04/13/2018       Objective:     Vitals: BP (!) 141/69   Pulse 62   Temp 98 °F (36.7 °C) (Oral)   Resp 14   Ht 5' 9\" (1.753 m)   Wt 202 lb 2.6 oz (91.7 kg)   SpO2 99%   BMI 29.85 kg/m²     General appearance:  No ac distress, no confusion  HEENT:  No pallor  Neck:  supple  Lungs:  No gross crackles  Heart:  Seems irregular  Abdomen: soft  Extremities:  No edema       Problem List :         Impression :     1.  Low na - ac on ch - his urine Na was very low-

## 2019-08-04 NOTE — PLAN OF CARE
Problem: Falls - Risk of:  Goal: Will remain free from falls  Description  Will remain free from falls  Outcome: Ongoing  Goal: Absence of physical injury  Description  Absence of physical injury  Outcome: Ongoing     Problem: SAFETY  Goal: Free from accidental physical injury  Outcome: Ongoing  Goal: Free from intentional harm  Outcome: Ongoing     Problem: DAILY CARE  Goal: Daily care needs are met  Outcome: Ongoing     Problem: KNOWLEDGE DEFICIT  Goal: Patient/S.O. demonstrates understanding of disease process, treatment plan, medications, and discharge instructions.   Outcome: Ongoing     Problem: DISCHARGE BARRIERS  Goal: Patient's continuum of care needs are met  Outcome: Ongoing

## 2019-08-04 NOTE — CONSULTS
earlier. ALLERGIES:  No known drug allergies. FAMILY MEDICAL HISTORY:  Noncontributory or unknown at this time. CURRENT MEDICATIONS:  He is on amlodipine 10 mg a day, aspirin, Aricept,  fluoxetine, Synthroid, magnesium sulfate was supplemented. He is on  Exelon, Flomax, as well as normal saline at 75 mL an hour, gabapentin  and Seroquel. REVIEW OF SYSTEMS:  He has been falling apparently at home. Has some  unsteady gait and nausea, vomiting and one episode of diarrhea. No  fever, chills, or rigor. Rest of the review of systems is negative  other than previous paragraph. PHYSICAL EXAMINATION:  VITAL SIGNS:  At the time of examination reveal temperature afebrile. His T-max was 98.2, blood pressure 150s/60s, saturating about 98%, pulse  60, respiratory rate 22. GENERAL:  The patient is without any acute distress. He does not seem  confused, but his speech is a little slow. He is slow to walk and has  some difficulty with balance. HEENT:  Head and neck:  Normocephalic, atraumatic. Eyes:  Mild  conjunctival pallor. Ear, Mouth and Throat:  Dry oral mucosa. CARDIOVASCULAR:  Seems regular rate and rhythm. Mild systolic ejection  murmur. RESPIRATORY:  No crackles. ABDOMEN:  Soft. EXTREMITIES:  I really did not see any pitting edema. LABORATORY VALUES AND ANCILLARY SERVICES:  As mentioned earlier, even  current sodium is 116 and potassium is 2.8. IMPRESSION:  An 77-year-old male with acute-on-chronic hyponatremia. 1.  Acute-on-chronic hyponatremia, likely symptomatic. His  lightheadedness, unsteady gait, and fall all could come from  hyponatremia. Although he had nausea, vomiting, and one episode of  diarrhea, that can cause hypovolemia and subsequent hyponatremia, but he  might have multiple etiologies especially with chronicity, which include  hydrochlorothiazide, SSRI as well as other etiology. All could be  accentuated by the profound hypokalemia.   2.  Hypokalemia, although it

## 2019-08-05 LAB
ANION GAP SERPL CALCULATED.3IONS-SCNC: 10 MMOL/L (ref 4–16)
ANION GAP SERPL CALCULATED.3IONS-SCNC: 9 MMOL/L (ref 4–16)
ANION GAP SERPL CALCULATED.3IONS-SCNC: 9 MMOL/L (ref 4–16)
BUN BLDV-MCNC: 22 MG/DL (ref 6–23)
BUN BLDV-MCNC: 26 MG/DL (ref 6–23)
BUN BLDV-MCNC: 33 MG/DL (ref 6–23)
CALCIUM SERPL-MCNC: 8.4 MG/DL (ref 8.3–10.6)
CALCIUM SERPL-MCNC: 8.8 MG/DL (ref 8.3–10.6)
CALCIUM SERPL-MCNC: 8.8 MG/DL (ref 8.3–10.6)
CHLORIDE BLD-SCNC: 89 MMOL/L (ref 99–110)
CHLORIDE BLD-SCNC: 92 MMOL/L (ref 99–110)
CHLORIDE BLD-SCNC: 93 MMOL/L (ref 99–110)
CO2: 23 MMOL/L (ref 21–32)
CO2: 24 MMOL/L (ref 21–32)
CO2: 25 MMOL/L (ref 21–32)
CREAT SERPL-MCNC: 1.2 MG/DL (ref 0.9–1.3)
CREAT SERPL-MCNC: 1.5 MG/DL (ref 0.9–1.3)
CREAT SERPL-MCNC: 1.7 MG/DL (ref 0.9–1.3)
EKG ATRIAL RATE: 59 BPM
EKG DIAGNOSIS: NORMAL
EKG P AXIS: 37 DEGREES
EKG P-R INTERVAL: 234 MS
EKG Q-T INTERVAL: 482 MS
EKG QRS DURATION: 168 MS
EKG QTC CALCULATION (BAZETT): 477 MS
EKG R AXIS: -6 DEGREES
EKG T AXIS: 34 DEGREES
EKG VENTRICULAR RATE: 59 BPM
GFR AFRICAN AMERICAN: 47 ML/MIN/1.73M2
GFR AFRICAN AMERICAN: 54 ML/MIN/1.73M2
GFR AFRICAN AMERICAN: >60 ML/MIN/1.73M2
GFR NON-AFRICAN AMERICAN: 39 ML/MIN/1.73M2
GFR NON-AFRICAN AMERICAN: 44 ML/MIN/1.73M2
GFR NON-AFRICAN AMERICAN: 58 ML/MIN/1.73M2
GLUCOSE BLD-MCNC: 101 MG/DL (ref 70–99)
GLUCOSE BLD-MCNC: 115 MG/DL (ref 70–99)
GLUCOSE BLD-MCNC: 131 MG/DL (ref 70–99)
MAGNESIUM: 2 MG/DL (ref 1.8–2.4)
POTASSIUM SERPL-SCNC: 3.9 MMOL/L (ref 3.5–5.1)
POTASSIUM SERPL-SCNC: 4.2 MMOL/L (ref 3.5–5.1)
POTASSIUM SERPL-SCNC: 4.5 MMOL/L (ref 3.5–5.1)
SODIUM BLD-SCNC: 122 MMOL/L (ref 135–145)
SODIUM BLD-SCNC: 125 MMOL/L (ref 135–145)
SODIUM BLD-SCNC: 127 MMOL/L (ref 135–145)

## 2019-08-05 PROCEDURE — 6360000002 HC RX W HCPCS: Performed by: NURSE PRACTITIONER

## 2019-08-05 PROCEDURE — 93010 ELECTROCARDIOGRAM REPORT: CPT | Performed by: INTERNAL MEDICINE

## 2019-08-05 PROCEDURE — 6370000000 HC RX 637 (ALT 250 FOR IP): Performed by: NURSE PRACTITIONER

## 2019-08-05 PROCEDURE — 83735 ASSAY OF MAGNESIUM: CPT

## 2019-08-05 PROCEDURE — 36415 COLL VENOUS BLD VENIPUNCTURE: CPT

## 2019-08-05 PROCEDURE — 2580000003 HC RX 258: Performed by: NURSE PRACTITIONER

## 2019-08-05 PROCEDURE — C9113 INJ PANTOPRAZOLE SODIUM, VIA: HCPCS | Performed by: NURSE PRACTITIONER

## 2019-08-05 PROCEDURE — 87086 URINE CULTURE/COLONY COUNT: CPT

## 2019-08-05 PROCEDURE — 80048 BASIC METABOLIC PNL TOTAL CA: CPT

## 2019-08-05 PROCEDURE — 1200000000 HC SEMI PRIVATE

## 2019-08-05 RX ORDER — DEXTROSE MONOHYDRATE 50 MG/ML
INJECTION, SOLUTION INTRAVENOUS CONTINUOUS
Status: DISPENSED | OUTPATIENT
Start: 2019-08-05 | End: 2019-08-05

## 2019-08-05 RX ORDER — DOXAZOSIN MESYLATE 4 MG/1
2 TABLET ORAL 2 TIMES DAILY
Status: DISCONTINUED | OUTPATIENT
Start: 2019-08-05 | End: 2019-08-06

## 2019-08-05 RX ADMIN — MAGNESIUM HYDROXIDE 30 ML: 400 SUSPENSION ORAL at 20:34

## 2019-08-05 RX ADMIN — PANTOPRAZOLE SODIUM 40 MG: 40 INJECTION, POWDER, FOR SOLUTION INTRAVENOUS at 08:30

## 2019-08-05 RX ADMIN — DONEPEZIL HYDROCHLORIDE 10 MG: 10 TABLET, FILM COATED ORAL at 20:24

## 2019-08-05 RX ADMIN — AMLODIPINE BESYLATE 10 MG: 10 TABLET ORAL at 08:23

## 2019-08-05 RX ADMIN — Medication 2 MG: at 21:24

## 2019-08-05 RX ADMIN — DEXTROSE MONOHYDRATE: 50 INJECTION, SOLUTION INTRAVENOUS at 08:13

## 2019-08-05 RX ADMIN — ENOXAPARIN SODIUM 40 MG: 40 INJECTION SUBCUTANEOUS at 08:29

## 2019-08-05 RX ADMIN — DOXAZOSIN 2 MG: 4 TABLET ORAL at 08:22

## 2019-08-05 RX ADMIN — DOXAZOSIN 2 MG: 4 TABLET ORAL at 20:24

## 2019-08-05 RX ADMIN — LEVOTHYROXINE SODIUM 50 MCG: 50 TABLET ORAL at 06:34

## 2019-08-05 RX ADMIN — ACETAMINOPHEN 500 MG: 500 TABLET ORAL at 09:25

## 2019-08-05 RX ADMIN — Medication 10 ML: at 20:25

## 2019-08-05 RX ADMIN — TAMSULOSIN HYDROCHLORIDE 0.4 MG: 0.4 CAPSULE ORAL at 08:22

## 2019-08-05 RX ADMIN — ASPIRIN 81 MG: 81 TABLET, COATED ORAL at 08:22

## 2019-08-05 ASSESSMENT — PAIN SCALES - GENERAL
PAINLEVEL_OUTOF10: 0
PAINLEVEL_OUTOF10: 0
PAINLEVEL_OUTOF10: 2
PAINLEVEL_OUTOF10: 8
PAINLEVEL_OUTOF10: 0

## 2019-08-05 ASSESSMENT — PAIN DESCRIPTION - PAIN TYPE
TYPE: ACUTE PAIN
TYPE: ACUTE PAIN

## 2019-08-05 ASSESSMENT — PAIN DESCRIPTION - ORIENTATION
ORIENTATION: LEFT
ORIENTATION: LEFT

## 2019-08-05 ASSESSMENT — PAIN DESCRIPTION - LOCATION
LOCATION: SHOULDER
LOCATION: SHOULDER

## 2019-08-05 ASSESSMENT — PAIN DESCRIPTION - FREQUENCY: FREQUENCY: CONTINUOUS

## 2019-08-05 ASSESSMENT — PAIN DESCRIPTION - DESCRIPTORS
DESCRIPTORS: ACHING
DESCRIPTORS: ACHING

## 2019-08-05 ASSESSMENT — PAIN DESCRIPTION - PROGRESSION: CLINICAL_PROGRESSION: RAPIDLY IMPROVING

## 2019-08-05 NOTE — PROGRESS NOTES
Hospitalist Progress Note      Name:  Rae Alvarez /Age/Sex: 1934  (80 y.o. male)   MRN & CSN:  [id number] & [id number] Admission Date/Time: 8/3/2019  8:24 AM   Location:  [facility] PCP: Mayur Recinos, [facility] Day: 3    Assessment and Plan:   Rae Alvarez is a 80 y.o.  male  who presents with Acute metabolic encephalopathy    1. Hyponatremia, Severe: could be from GI loss. Na 117 on admission, Urine Na 12, urine osmolality high. Was started on hypertonic saline. Na 127 today. Started on D5W. Nephrology on consult  2. Hypokalemia: replaced accordingly  3. Acute Encephalopathy, Metabolic: Likely from hyponatremia. TSh wnl  4. Hypertension: Blood pressure uncontrolled. Started on Cardura. 5. Dementia: on Aricept, Exelon Patch and Seroquel   6. Hypothyroidism: last TSH. continue Synthroid. 7. Bilateral Renal Cyst: outpatient follow up. 8. Left Adrenal Nodule    Diet DIET GENERAL;   DVT Prophylaxis [x] Lovenox, []  Heparin, [] SCDs, [] Warfarin  [] NOAC     GI Prophylaxis [] PPI,  [x] H2 Blocker,  [] Carafate,  [] Diet/Tube Feeds   Code Status Full Code   MDM [] Low, [] Moderate,[x]  High     History of Present Illness:     Chief Complaint: Acute metabolic encephalopathy    He was seen and examined. Was confused last night with urinary frequency . No fever, cough or chest pain. Ten point ROS reviewed negative, unless as noted above    Objective: Intake/Output Summary (Last 24 hours) at 2019 1311  Last data filed at 2019 0750  Gross per 24 hour   Intake 10 ml   Output 975 ml   Net -965 ml      Vitals:   Vitals:    19 1115   BP: (!) 143/64   Pulse: 62   Resp: 18   Temp: 98.8 °F (37.1 °C)   SpO2: 99%     Physical Exam:   GEN Awake male, sitting upright in bed in no apparent distress. Appears given age. Hard of hearing  EYES Pupils are equally round. No scleral erythema, discharge, or conjunctivitis.   HENT Mucous membranes are moist. Oral pharynx without exudates,

## 2019-08-06 LAB
ALDOSTERONE: 17.8 NG/DL
ALDOSTERONE: NORMAL NG/DL
ANION GAP SERPL CALCULATED.3IONS-SCNC: 10 MMOL/L (ref 4–16)
ANION GAP SERPL CALCULATED.3IONS-SCNC: 13 MMOL/L (ref 4–16)
BUN BLDV-MCNC: 32 MG/DL (ref 6–23)
BUN BLDV-MCNC: 35 MG/DL (ref 6–23)
CALCIUM SERPL-MCNC: 8.8 MG/DL (ref 8.3–10.6)
CALCIUM SERPL-MCNC: 9.1 MG/DL (ref 8.3–10.6)
CHLORIDE BLD-SCNC: 90 MMOL/L (ref 99–110)
CHLORIDE BLD-SCNC: 90 MMOL/L (ref 99–110)
CO2: 23 MMOL/L (ref 21–32)
CO2: 23 MMOL/L (ref 21–32)
CREAT SERPL-MCNC: 1.4 MG/DL (ref 0.9–1.3)
CREAT SERPL-MCNC: 1.7 MG/DL (ref 0.9–1.3)
CULTURE: NORMAL
GFR AFRICAN AMERICAN: 47 ML/MIN/1.73M2
GFR AFRICAN AMERICAN: 58 ML/MIN/1.73M2
GFR NON-AFRICAN AMERICAN: 39 ML/MIN/1.73M2
GFR NON-AFRICAN AMERICAN: 48 ML/MIN/1.73M2
GLUCOSE BLD-MCNC: 102 MG/DL (ref 70–99)
GLUCOSE BLD-MCNC: 116 MG/DL (ref 70–99)
Lab: NORMAL
POTASSIUM SERPL-SCNC: 4.3 MMOL/L (ref 3.5–5.1)
POTASSIUM SERPL-SCNC: 4.4 MMOL/L (ref 3.5–5.1)
SODIUM BLD-SCNC: 123 MMOL/L (ref 135–145)
SODIUM BLD-SCNC: 126 MMOL/L (ref 135–145)
SPECIMEN: NORMAL

## 2019-08-06 PROCEDURE — 6360000002 HC RX W HCPCS: Performed by: NURSE PRACTITIONER

## 2019-08-06 PROCEDURE — 6370000000 HC RX 637 (ALT 250 FOR IP): Performed by: NURSE PRACTITIONER

## 2019-08-06 PROCEDURE — 1200000000 HC SEMI PRIVATE

## 2019-08-06 PROCEDURE — 6370000000 HC RX 637 (ALT 250 FOR IP): Performed by: INTERNAL MEDICINE

## 2019-08-06 PROCEDURE — 80048 BASIC METABOLIC PNL TOTAL CA: CPT

## 2019-08-06 PROCEDURE — 94761 N-INVAS EAR/PLS OXIMETRY MLT: CPT

## 2019-08-06 PROCEDURE — 2580000003 HC RX 258: Performed by: NURSE PRACTITIONER

## 2019-08-06 PROCEDURE — 2580000003 HC RX 258: Performed by: INTERNAL MEDICINE

## 2019-08-06 PROCEDURE — 36415 COLL VENOUS BLD VENIPUNCTURE: CPT

## 2019-08-06 PROCEDURE — C9113 INJ PANTOPRAZOLE SODIUM, VIA: HCPCS | Performed by: NURSE PRACTITIONER

## 2019-08-06 RX ORDER — DOXAZOSIN MESYLATE 4 MG/1
4 TABLET ORAL 2 TIMES DAILY
Status: DISCONTINUED | OUTPATIENT
Start: 2019-08-06 | End: 2019-08-08 | Stop reason: HOSPADM

## 2019-08-06 RX ORDER — SODIUM CHLORIDE 9 MG/ML
INJECTION, SOLUTION INTRAVENOUS CONTINUOUS
Status: DISCONTINUED | OUTPATIENT
Start: 2019-08-06 | End: 2019-08-07

## 2019-08-06 RX ORDER — CETIRIZINE HYDROCHLORIDE 10 MG/1
10 TABLET ORAL DAILY
Status: DISCONTINUED | OUTPATIENT
Start: 2019-08-06 | End: 2019-08-08 | Stop reason: HOSPADM

## 2019-08-06 RX ADMIN — Medication 2 MG: at 21:25

## 2019-08-06 RX ADMIN — ASPIRIN 81 MG: 81 TABLET, COATED ORAL at 08:27

## 2019-08-06 RX ADMIN — DOXAZOSIN 4 MG: 4 TABLET ORAL at 21:25

## 2019-08-06 RX ADMIN — ENOXAPARIN SODIUM 40 MG: 40 INJECTION SUBCUTANEOUS at 08:27

## 2019-08-06 RX ADMIN — Medication 10 ML: at 21:25

## 2019-08-06 RX ADMIN — DONEPEZIL HYDROCHLORIDE 10 MG: 10 TABLET, FILM COATED ORAL at 21:25

## 2019-08-06 RX ADMIN — LEVOTHYROXINE SODIUM 50 MCG: 50 TABLET ORAL at 05:27

## 2019-08-06 RX ADMIN — Medication 10 ML: at 08:27

## 2019-08-06 RX ADMIN — SODIUM CHLORIDE: 9 INJECTION, SOLUTION INTRAVENOUS at 10:18

## 2019-08-06 RX ADMIN — AMLODIPINE BESYLATE 10 MG: 10 TABLET ORAL at 08:27

## 2019-08-06 RX ADMIN — PANTOPRAZOLE SODIUM 40 MG: 40 INJECTION, POWDER, FOR SOLUTION INTRAVENOUS at 09:35

## 2019-08-06 RX ADMIN — CETIRIZINE HYDROCHLORIDE 10 MG: 10 TABLET, FILM COATED ORAL at 12:04

## 2019-08-06 RX ADMIN — DOXAZOSIN 4 MG: 4 TABLET ORAL at 08:27

## 2019-08-06 ASSESSMENT — PAIN SCALES - GENERAL
PAINLEVEL_OUTOF10: 0

## 2019-08-06 NOTE — PROGRESS NOTES
Nephrology Progress Note  8/6/2019 7:35 AM        Subjective:   Admit Date: 8/3/2019  PCP: Marysol Dacosta PA-C    Interval History:  Daughter reports improved confusion since  Yesterday and patient has consumed approximately 1.5 pitchers  Of water yesterday. Data:     Current meds:    doxazosin  2 mg Oral BID    sodium chloride flush  10 mL Intravenous 2 times per day    enoxaparin  40 mg Subcutaneous Daily    amLODIPine  10 mg Oral Daily    aspirin  81 mg Oral Daily    levothyroxine  50 mcg Oral Daily    tamsulosin  0.4 mg Oral Daily    pantoprazole  40 mg Intravenous Daily    donepezil  10 mg Oral Nightly    rivastigmine  1 patch Transdermal Daily           I/O last 3 completed shifts: In: 480 [P.O.:480]  Out: 125 [Urine:125]    CBC:   Recent Labs     08/03/19  0830 08/04/19  0625   WBC 6.5 9.1   HGB 13.0* 13.3*    281          Recent Labs     08/05/19  0610 08/05/19  1358 08/05/19  2215   * 125* 122*   K 4.2 4.5 3.9   CL 93* 92* 89*   CO2 25 23 24   BUN 22 26* 33*   CREATININE 1.2 1.5* 1.7*   GLUCOSE 101* 131* 115*       Lab Results   Component Value Date    CALCIUM 8.4 08/05/2019    PHOS 3.1 04/13/2018       Objective:     Vitals: BP (!) 173/77   Pulse 64   Temp 96.5 °F (35.8 °C) (Axillary)   Resp 16   Ht 5' 9\" (1.753 m)   Wt 200 lb (90.7 kg)   SpO2 96%   BMI 29.53 kg/m²     General appearance: awake and answering questions appropriately  HEENT: Head: normocephalic, atraumatic. Neck: supple, symmetrical, trachea midline  Cardiovascular: normal S1 and S2  Pulmonary: diminished lung sounds bilaterally   Abdomen:  soft / non-tender   Extremities: Trace edema to bilateral lower legs     Impression :     1. Hyponatremia: acute on chronic: low urine Na / elevated urine osm        (likely hypovolemic hyponatremia)    2. Hypokalemia   3. Adrenal adenoma: may be secreting aldosterone  4. Possible urinary retention  5. Metabolic Encephalopathy    6.   HTN     Recommendation/Plan

## 2019-08-06 NOTE — PROGRESS NOTES
conjunctivitis. HENT Mucous membranes are moist. Oral pharynx without exudates, no evidence of thrush. NECK Supple, no apparent thyromegaly or masses. RESP Clear to auscultation, no wheezes, rales or rhonchi. Symmetric chest movement while on room air. CARDIO/VASC S1/S2 auscultated. Regular rate without appreciable murmurs, rubs, or gallops. No JVD or carotid bruits. Peripheral pulses equal bilaterally and palpable. No peripheral edema. GI Abdomen is soft without significant tenderness, masses, or guarding. Bowel sounds are normoactive. Rectal exam deferred. MSK No gross joint deformities. SKIN Normal coloration, warm, dry. NEURO Cranial nerves appear grossly intact, normal speech, no lateralizing weakness. PSYCH Awake, alert, oriented x 4. Affect appropriate.     Medications:   Medications:    doxazosin  4 mg Oral BID    cetirizine  10 mg Oral Daily    sodium chloride flush  10 mL Intravenous 2 times per day    enoxaparin  40 mg Subcutaneous Daily    amLODIPine  10 mg Oral Daily    aspirin  81 mg Oral Daily    levothyroxine  50 mcg Oral Daily    pantoprazole  40 mg Intravenous Daily    donepezil  10 mg Oral Nightly    rivastigmine  1 patch Transdermal Daily      Infusions:    sodium chloride 50 mL/hr at 08/06/19 1018     PRN Meds:     sodium chloride (PF) 10 mL ONCE PRN   sodium chloride flush 10 mL PRN   magnesium hydroxide 30 mL Daily PRN   ondansetron 4 mg Q6H PRN   acetaminophen 500 mg Daily PRN   melatonin ER 2 mg Nightly PRN   potassium chloride 40 mEq PRN   Or     potassium alternative oral replacement 40 mEq PRN   Or     potassium chloride 10 mEq PRN       Recent Labs     08/04/19  0625   WBC 9.1   HGB 13.3*   HCT 40.7*         Recent Labs     08/05/19  1358 08/05/19  2215 08/06/19  0740   * 122* 123*   K 4.5 3.9 4.3   CL 92* 89* 90*   CO2 23 24 23   BUN 26* 33* 32*   CREATININE 1.5* 1.7* 1.4*     Recent Labs     08/04/19  0840   AST 32   ALT 21   BILITOT 0.9   ALKPHOS 69

## 2019-08-07 PROCEDURE — 2580000003 HC RX 258: Performed by: INTERNAL MEDICINE

## 2019-08-07 PROCEDURE — 97116 GAIT TRAINING THERAPY: CPT

## 2019-08-07 PROCEDURE — 97166 OT EVAL MOD COMPLEX 45 MIN: CPT

## 2019-08-07 PROCEDURE — 6360000002 HC RX W HCPCS: Performed by: NURSE PRACTITIONER

## 2019-08-07 PROCEDURE — 97530 THERAPEUTIC ACTIVITIES: CPT

## 2019-08-07 PROCEDURE — 6370000000 HC RX 637 (ALT 250 FOR IP): Performed by: NURSE PRACTITIONER

## 2019-08-07 PROCEDURE — C9113 INJ PANTOPRAZOLE SODIUM, VIA: HCPCS | Performed by: NURSE PRACTITIONER

## 2019-08-07 PROCEDURE — 97162 PT EVAL MOD COMPLEX 30 MIN: CPT

## 2019-08-07 PROCEDURE — 2580000003 HC RX 258: Performed by: NURSE PRACTITIONER

## 2019-08-07 PROCEDURE — 82533 TOTAL CORTISOL: CPT

## 2019-08-07 PROCEDURE — 94761 N-INVAS EAR/PLS OXIMETRY MLT: CPT

## 2019-08-07 PROCEDURE — 1200000000 HC SEMI PRIVATE

## 2019-08-07 PROCEDURE — 6370000000 HC RX 637 (ALT 250 FOR IP): Performed by: INTERNAL MEDICINE

## 2019-08-07 RX ORDER — SODIUM CHLORIDE 1000 MG
1 TABLET, SOLUBLE MISCELLANEOUS 2 TIMES DAILY WITH MEALS
Status: DISCONTINUED | OUTPATIENT
Start: 2019-08-07 | End: 2019-08-08 | Stop reason: HOSPADM

## 2019-08-07 RX ADMIN — DONEPEZIL HYDROCHLORIDE 10 MG: 10 TABLET, FILM COATED ORAL at 21:33

## 2019-08-07 RX ADMIN — Medication 2 MG: at 21:33

## 2019-08-07 RX ADMIN — LEVOTHYROXINE SODIUM 50 MCG: 50 TABLET ORAL at 05:08

## 2019-08-07 RX ADMIN — DOXAZOSIN 4 MG: 4 TABLET ORAL at 21:33

## 2019-08-07 RX ADMIN — ENOXAPARIN SODIUM 40 MG: 40 INJECTION SUBCUTANEOUS at 07:37

## 2019-08-07 RX ADMIN — PANTOPRAZOLE SODIUM 40 MG: 40 INJECTION, POWDER, FOR SOLUTION INTRAVENOUS at 08:14

## 2019-08-07 RX ADMIN — CETIRIZINE HYDROCHLORIDE 10 MG: 10 TABLET, FILM COATED ORAL at 07:37

## 2019-08-07 RX ADMIN — ASPIRIN 81 MG: 81 TABLET, COATED ORAL at 07:37

## 2019-08-07 RX ADMIN — SODIUM CHLORIDE TAB 1 GM 1 G: 1 TAB at 08:26

## 2019-08-07 RX ADMIN — SODIUM CHLORIDE: 9 INJECTION, SOLUTION INTRAVENOUS at 05:23

## 2019-08-07 RX ADMIN — AMLODIPINE BESYLATE 10 MG: 10 TABLET ORAL at 12:12

## 2019-08-07 RX ADMIN — Medication 10 ML: at 07:37

## 2019-08-07 RX ADMIN — SODIUM CHLORIDE TAB 1 GM 1 G: 1 TAB at 16:42

## 2019-08-07 RX ADMIN — DOXAZOSIN 4 MG: 4 TABLET ORAL at 07:37

## 2019-08-07 ASSESSMENT — PAIN SCALES - GENERAL
PAINLEVEL_OUTOF10: 0

## 2019-08-07 NOTE — PROGRESS NOTES
Hospitalist Progress Note      Name:  Tan Valle /Age/Sex: 1934  (80 y.o. male)   MRN & CSN:  [id number] & [id number] Admission Date/Time: 8/3/2019  8:24 AM   [facility]  [facility] PCP: Dasha Burleson, [facility] Day: 5    Assessment and Plan:   Tan Valle is a 80 y.o.  male  who presents with Acute metabolic encephalopathy    1. Acute on Chronic Hypovolemic Hyponatremia, Severe and symptomatic: likely due to GI loss and hypovolemia  - Na 117 on admission, now 126  - Urine Na 12, urine osmolality high suggestive of hypovolemia   -Was started on hypertonic saline.   -Symptoms improved afterward  -Currently on NS 50mls/hr  -Nephro on board  -Possible dc tomorrow      2. Hypokalemia: replaced accordingly    3. Acute Encephalopathy, Metabolic: improved  -Likely from hyponatremia, in the setting of dementia. -TSh wnl    4. Hypertension:   -Blood pressure uncontrolled. Started on Cardura. 5. Dementia: on Aricept, Exelon Patch    6. Hypothyroidism: last TSH. continue Synthroid. 7. Bilateral Renal Cyst: outpatient follow up. 8. Left Adrenal Nodule  -Needs work for pheo, hypercortisolism and hyperaldosteronism (due to HTN and Hypokalemia)  -Will order     PT/OT      Diet DIET GENERAL; Daily Fluid Restriction: 1500 ml   DVT Prophylaxis [] Lovenox, []  Heparin, [] SCDs, [] Ambulation   GI Prophylaxis [] PPI,  [] H2 Blocker,  [] Carafate,  [] Diet/Tube Feeds   Code Status Full Code   Disposition Home   MDM      History of Present Illness:       Patient was seen and examined  No new complaint today  Denied any further confusion as attested by the family members  No CP, dizziness, SOB, headaches    Ten point ROS reviewed negative, unless as noted above    Objective:        Intake/Output Summary (Last 24 hours) at 2019 1532  Last data filed at 2019 0737  Gross per 24 hour   Intake 951 ml   Output --   Net 951 ml      Vitals:   Vitals:    19 1048   BP: (!) 163/70   Pulse: 66

## 2019-08-07 NOTE — CARE COORDINATION
CM placed a white board after review of notes. Pt refused yest and hopefully from Nephrology notes poss SNF placement needed. CM will follow.   CM met with pt and his dtr Serina Robles [phone] or son in law [place] [phone]. Heriberto Amezcua is POA. CM informed that pt lives alone in a one floor home. Pt DME includes a cane and built in shower seat. Pt has insurance and meds are through the [facility]. PTA. Eura Ashlee Pt is able to take care of home, MOW grass and cooks for himself. Pt is able to drive. Pt has a PCp. Pt has 5 children and all are local. Dtr shared that they are ordering [employer] home meal delivery. Pts family will be staying with pt until he reaches his baseline. CM discussed home care and dtr is open for PT/OT with [address] [place] [address] Rd. CM called and made the referral to [address] [place] [address] Rd. CM called and spoke with Coral Escobedo. CM faxed face sheet and h/p.    Plan at this time is for pt to return home with his 5 children taking turns staying with pt until her reaches baseline. [address] [place] [address] Rd will follow, referral made. Will need home care order when pt is discharged.      Upon discharge pt will be going home with [address] [place] [address] Rd home care  Please fax H/P, D/S.  AVS, order, and face sheet to [phone]  Please call [phone] and inform of discharge

## 2019-08-07 NOTE — PROGRESS NOTES
Physical Therapy  [facility] ACUTE CARE PHYSICAL THERAPY EVALUATION  Lucero Amador, 1934, 4112/4112-A, 8/7/2019    History  [place]:  The primary encounter diagnosis was Hyponatremia. Diagnoses of Nausea vomiting and diarrhea, Abnormal CT scan, kidney, and Cancer ([facility] [address] were also pertinent to this visit. Patient  has a past medical history of Bladder cancer ([address] Cancer ([facility] [address] Chronic kidney disease, Chronic kidney disease, stage III (moderate) ([facility] [address] Depression, Hyperlipidemia, Hypertension, and Hyperthyroidism. Patient  has no past surgical history on file. Subjective:  Patient states:  \"I am always doing something. I keep myself very busy\"    Pain:  Denies   Communication with other providers:  Handoff to RN, co-eval with OT. Restrictions: general precautions, fall risk, bed exit     Home Setup/Prior level of function  Social/Functional History  Lives With: Alone  Type of Home: House  Home Layout: One level  Home Access: Level entry  Bathroom Shower/Tub: Walk-in shower  Bathroom Toilet: Standard  Bathroom Equipment: Built-in shower seat, Grab bars in shower, Hand-held shower, Grab bars around toilet  Home Equipment: Fabian Garcia, [address] [facility] [employer] Help From: Family  ADL Assistance: Independent  Homemaking Assistance: Independent  Homemaking Responsibilities: Yes  Ambulation Assistance: Independent(typically with cane )  Transfer Assistance: Independent  Active : Yes  Occupation: Retired  Type of occupation: [employer] years   Leisure & Hobbies: yard work, house chores, cooking   IADL Comments: push mows his acre of land   Additional Comments: 5 kids checking in frequently. Somone planning to stay with pt upon discharge until pt feels stronger.  Report of 1 fall in the last 3 months     Examination of body systems (includes body structures/functions, activity/participation limitations):  · Observation:  Supine in bed upon arrival. Dtr Jaycee Reyes visiting   · Vision:  Glasses at all times   · Hearing:  [place]. Bone block in ear from a surgery due to prior accident when pt was very young. · Cardiopulmonary:  Stable throughout session   · Orientation: oriented to person, disoriented to time. Disoriented to place initially but able to determine type of place and city with cues. Musculoskeletal  · ROM R/L:  WFL BLEs. · Strength R/L:  Hips 4+/5, knees and ankles 5/5, good in function and endurance. · Neuro:  Diminished sensation to bilat feet     Mobility/treatment:  · Rolling L/R:  NT  · Supine to sit:  SBA with HOB flat and without use of bed rail  · Transfers:   · Sit to stand: from EOB/standard toilet CGA/close SBA for safety   · Stand to sit: to recliner and standard toilet CGA/close SBA for safety   · Step pivot w/ SPC to recliner and standard toilet CGA/close SBA for safety  · Sitting balance:  SBA EOB/recliner static and light dynamic   · Standing balance:  SBA at sink performing self care tasks   · Gait: 200ft with SPC SBA/CGA having 1 LOB with head turn to R while ambulating. Pt demonstrated ability to self recover with CGA for tactile cues. Pt demonstrated overall unsteadiness and veer from path with change in head position. Fair pace, step length and foot clearance bilat. Inconsistent step width with very narrowed MARCIE a times. · Educated on POC, role of PT, DME, discharge recommendations with pt and his dtr. [facility] 6 Clicks Inpatient Mobility:  AM-PAC Inpatient Mobility Raw Score : 18    Safety: patient left in chair with chair alarm, call light within reach, RN notified, gait belt used. Assessment: Body structures, Functions, Activity limitations: Decreased functional mobility ; Decreased safe awareness; Decreased endurance; Decreased balance; Decreased cognition; Decreased sensation; Decreased strength  Pt is an 80year old male admitted with acute metabolic encephalopathy. Recommend home w/ 24/7 supervision/assist from [facility] once medically stable.  Prior to

## 2019-08-08 VITALS
HEIGHT: 69 IN | RESPIRATION RATE: 18 BRPM | TEMPERATURE: 97.8 F | OXYGEN SATURATION: 99 % | HEART RATE: 69 BPM | BODY MASS INDEX: 29.62 KG/M2 | WEIGHT: 200 LBS | SYSTOLIC BLOOD PRESSURE: 111 MMHG | DIASTOLIC BLOOD PRESSURE: 55 MMHG

## 2019-08-08 LAB
ANION GAP SERPL CALCULATED.3IONS-SCNC: 7 MMOL/L (ref 4–16)
BUN BLDV-MCNC: 35 MG/DL (ref 6–23)
CALCIUM SERPL-MCNC: 8.5 MG/DL (ref 8.3–10.6)
CHLORIDE BLD-SCNC: 97 MMOL/L (ref 99–110)
CO2: 27 MMOL/L (ref 21–32)
CREAT SERPL-MCNC: 1.4 MG/DL (ref 0.9–1.3)
GFR AFRICAN AMERICAN: 58 ML/MIN/1.73M2
GFR NON-AFRICAN AMERICAN: 48 ML/MIN/1.73M2
GLUCOSE BLD-MCNC: 89 MG/DL (ref 70–99)
POTASSIUM SERPL-SCNC: 4.2 MMOL/L (ref 3.5–5.1)
SODIUM BLD-SCNC: 131 MMOL/L (ref 135–145)

## 2019-08-08 PROCEDURE — C9113 INJ PANTOPRAZOLE SODIUM, VIA: HCPCS | Performed by: NURSE PRACTITIONER

## 2019-08-08 PROCEDURE — 2580000003 HC RX 258: Performed by: NURSE PRACTITIONER

## 2019-08-08 PROCEDURE — 36415 COLL VENOUS BLD VENIPUNCTURE: CPT

## 2019-08-08 PROCEDURE — 6370000000 HC RX 637 (ALT 250 FOR IP): Performed by: NURSE PRACTITIONER

## 2019-08-08 PROCEDURE — 80048 BASIC METABOLIC PNL TOTAL CA: CPT

## 2019-08-08 PROCEDURE — 6360000002 HC RX W HCPCS: Performed by: NURSE PRACTITIONER

## 2019-08-08 PROCEDURE — 83835 ASSAY OF METANEPHRINES: CPT

## 2019-08-08 PROCEDURE — 6370000000 HC RX 637 (ALT 250 FOR IP): Performed by: INTERNAL MEDICINE

## 2019-08-08 PROCEDURE — 94761 N-INVAS EAR/PLS OXIMETRY MLT: CPT

## 2019-08-08 RX ORDER — DOXAZOSIN MESYLATE 4 MG/1
4 TABLET ORAL 2 TIMES DAILY
Qty: 30 TABLET | Refills: 3 | Status: ON HOLD | OUTPATIENT
Start: 2019-08-08 | End: 2019-08-30 | Stop reason: HOSPADM

## 2019-08-08 RX ADMIN — DOXAZOSIN 4 MG: 4 TABLET ORAL at 08:45

## 2019-08-08 RX ADMIN — ASPIRIN 81 MG: 81 TABLET, COATED ORAL at 08:45

## 2019-08-08 RX ADMIN — AMLODIPINE BESYLATE 10 MG: 10 TABLET ORAL at 08:45

## 2019-08-08 RX ADMIN — PANTOPRAZOLE SODIUM 40 MG: 40 INJECTION, POWDER, FOR SOLUTION INTRAVENOUS at 08:45

## 2019-08-08 RX ADMIN — LEVOTHYROXINE SODIUM 50 MCG: 50 TABLET ORAL at 06:32

## 2019-08-08 RX ADMIN — Medication 10 ML: at 08:51

## 2019-08-08 RX ADMIN — CETIRIZINE HYDROCHLORIDE 10 MG: 10 TABLET, FILM COATED ORAL at 08:52

## 2019-08-08 RX ADMIN — SODIUM CHLORIDE TAB 1 GM 1 G: 1 TAB at 08:45

## 2019-08-08 RX ADMIN — ENOXAPARIN SODIUM 40 MG: 40 INJECTION SUBCUTANEOUS at 08:45

## 2019-08-08 NOTE — DISCHARGE SUMMARY
Blood pressure (!) 147/69, pulse 65, temperature 97.8 °F (36.6 °C), temperature source Oral, resp. rate 16, height 5' 9\" (1.753 m), weight 200 lb (90.7 kg), SpO2 96 %. General - AAO x 3  Psych - Appropriate affect/speech. No agitation  Eyes - Eye lids intact. No scleral icterus  ENT - Lips wnl. External ear clear/dry/intact. No thyromegaly on inspection  Neuro - No gross peripheral or central neuro deficits on inspection  Heart - Sinus. RRR. S1 and S2 present. No elevated JVD appreciated  Lung - Adequate air entry b/l, No crackles/wheezes appreciated  GI -  Soft. No guarding/rigidity. No hepatosplenomegaly/ascites. BS+   - No CVA/suprapubic tenderness or palpable bladder distension  Skin - Intact. No rash/petechiae/ecchymosis. Warm extremities        Significant Diagnostic Studies:   CBC: No results for input(s): WBC, HGB, PLT in the last 72 hours. WBC   Date/Time Value Ref Range Status   08/04/2019 06:25 AM 9.1 4.0 - 10.5 K/CU MM Final   08/03/2019 08:30 AM 6.5 4.0 - 10.5 K/CU MM Final   11/21/2018 06:46 AM 8.4 4.0 - 10.5 K/CU MM Final     Hemoglobin   Date/Time Value Ref Range Status   08/04/2019 06:25 AM 13.3 (L) 13.5 - 18.0 GM/DL Final   08/03/2019 08:30 AM 13.0 (L) 13.5 - 18.0 GM/DL Final   11/21/2018 06:46 AM 12.7 (L) 13.5 - 18.0 GM/DL Final     Platelets   Date/Time Value Ref Range Status   08/04/2019 06:25  140 - 440 K/CU MM Final   08/03/2019 08:30  140 - 440 K/CU MM Final   11/21/2018 06:46  140 - 440 K/CU MM Final    CMP:  Recent Labs     08/06/19  0740 08/06/19  1556 08/08/19  0651   * 126* 131*   K 4.3 4.4 4.2   CL 90* 90* 97*   CO2 23 23 27   BUN 32* 35* 35*   CREATININE 1.4* 1.7* 1.4*   CALCIUM 8.8 9.1 8.5     Troponin: No results for input(s): TROPONINI in the last 72 hours. BNP: No results for input(s): BNP in the last 72 hours. Lipids: No results for input(s): CHOL, HDL in the last 72 hours.     Invalid input(s): LDLCALCU  ABGs:No results for input(s): PH,

## 2019-08-11 ENCOUNTER — HOSPITAL ENCOUNTER (INPATIENT)
Age: 84
LOS: 9 days | Discharge: NURSING, REHAB OR LONG-TERM CARE | DRG: 242 | End: 2019-08-20
Attending: EMERGENCY MEDICINE | Admitting: INTERNAL MEDICINE
Payer: MEDICARE

## 2019-08-11 ENCOUNTER — APPOINTMENT (OUTPATIENT)
Dept: CT IMAGING | Age: 84
DRG: 242 | End: 2019-08-11
Payer: MEDICARE

## 2019-08-11 ENCOUNTER — APPOINTMENT (OUTPATIENT)
Dept: GENERAL RADIOLOGY | Age: 84
DRG: 242 | End: 2019-08-11
Payer: MEDICARE

## 2019-08-11 DIAGNOSIS — N17.9 AKI (ACUTE KIDNEY INJURY) (HCC): ICD-10-CM

## 2019-08-11 DIAGNOSIS — R77.8 ELEVATED TROPONIN: ICD-10-CM

## 2019-08-11 DIAGNOSIS — R79.89 ELEVATED BRAIN NATRIURETIC PEPTIDE (BNP) LEVEL: ICD-10-CM

## 2019-08-11 DIAGNOSIS — I44.2 AV BLOCK, 3RD DEGREE (HCC): Primary | ICD-10-CM

## 2019-08-11 DIAGNOSIS — J18.9 PNEUMONIA DUE TO ORGANISM: ICD-10-CM

## 2019-08-11 PROBLEM — I21.4 NSTEMI (NON-ST ELEVATED MYOCARDIAL INFARCTION) (HCC): Status: ACTIVE | Noted: 2019-08-11

## 2019-08-11 PROBLEM — I25.110 CORONARY ARTERY DISEASE INVOLVING NATIVE CORONARY ARTERY OF NATIVE HEART WITH UNSTABLE ANGINA PECTORIS (HCC): Status: ACTIVE | Noted: 2019-08-11

## 2019-08-11 LAB
ACTIVATED CLOTTING TIME, LOW RANGE: 268 SEC
ALBUMIN SERPL-MCNC: 3.1 GM/DL (ref 3.4–5)
ALCOHOL SCREEN SERUM: NORMAL %WT/VOL
ALP BLD-CCNC: 43 IU/L (ref 40–129)
ALT SERPL-CCNC: 35 U/L (ref 10–40)
AMMONIA: 15 UMOL/L (ref 16–60)
AMPHETAMINES: NEGATIVE
ANION GAP SERPL CALCULATED.3IONS-SCNC: 12 MMOL/L (ref 4–16)
APTT: 40.2 SECONDS (ref 21.2–33)
AST SERPL-CCNC: 137 IU/L (ref 15–37)
BACTERIA: ABNORMAL /HPF
BARBITURATE SCREEN URINE: NEGATIVE
BASE EXCESS: ABNORMAL (ref 0–3.3)
BASE EXCESS: ABNORMAL (ref 0–3.3)
BASOPHILS ABSOLUTE: 0 K/CU MM
BASOPHILS RELATIVE PERCENT: 0.2 % (ref 0–1)
BENZODIAZEPINE SCREEN, URINE: NEGATIVE
BILIRUB SERPL-MCNC: 0.8 MG/DL (ref 0–1)
BILIRUBIN URINE: NEGATIVE MG/DL
BLOOD, URINE: ABNORMAL
BUN BLDV-MCNC: 49 MG/DL (ref 6–23)
CALCIUM SERPL-MCNC: 8.9 MG/DL (ref 8.3–10.6)
CANNABINOID SCREEN URINE: NEGATIVE
CARBON MONOXIDE, BLOOD: 1.6 % (ref 0–5)
CHLORIDE BLD-SCNC: 98 MMOL/L (ref 99–110)
CHOLESTEROL: 115 MG/DL
CLARITY: CLEAR
CO2 CONTENT: 22.4 MMOL/L (ref 19–24)
CO2: 20 MMOL/L (ref 21–32)
COCAINE METABOLITE: NEGATIVE
COLOR: YELLOW
COMMENT: ABNORMAL
COMMENT: ABNORMAL
CREAT SERPL-MCNC: 2.1 MG/DL (ref 0.9–1.3)
DIFFERENTIAL TYPE: ABNORMAL
EOSINOPHILS ABSOLUTE: 0 K/CU MM
EOSINOPHILS RELATIVE PERCENT: 0.1 % (ref 0–3)
GFR AFRICAN AMERICAN: 37 ML/MIN/1.73M2
GFR NON-AFRICAN AMERICAN: 30 ML/MIN/1.73M2
GLUCOSE BLD-MCNC: 187 MG/DL (ref 70–99)
GLUCOSE, URINE: 50 MG/DL
HCO3 ARTERIAL: 21.3 MMOL/L (ref 18–23)
HCO3 VENOUS: 20.6 MMOL/L (ref 19–25)
HCT VFR BLD CALC: 32.1 % (ref 42–52)
HDLC SERPL-MCNC: 50 MG/DL
HEMOGLOBIN: 10 GM/DL (ref 13.5–18)
IMMATURE NEUTROPHIL %: 1 % (ref 0–0.43)
INR BLD: 1.16 INDEX
KETONES, URINE: NEGATIVE MG/DL
LACTATE: 1.3 MMOL/L (ref 0.4–2)
LACTATE: 3.5 MMOL/L (ref 0.4–2)
LACTATE: ABNORMAL MMOL/L (ref 0.4–2)
LDL CHOLESTEROL DIRECT: 58 MG/DL
LEUKOCYTE ESTERASE, URINE: NEGATIVE
LYMPHOCYTES ABSOLUTE: 0.4 K/CU MM
LYMPHOCYTES RELATIVE PERCENT: 3.5 % (ref 24–44)
MAGNESIUM: 1.9 MG/DL (ref 1.8–2.4)
MCH RBC QN AUTO: 28.8 PG (ref 27–31)
MCHC RBC AUTO-ENTMCNC: 31.2 % (ref 32–36)
MCV RBC AUTO: 92.5 FL (ref 78–100)
METANEPH/PLASMA INTERP: NORMAL
METANEPH/PLASMA INTERP: NORMAL
METANEPHRINE, PLASMA: 0.17 NMOL/L (ref 0–0.49)
METHEMOGLOBIN ARTERIAL: 0.3 %
MONOCYTES ABSOLUTE: 1 K/CU MM
MONOCYTES RELATIVE PERCENT: 8.2 % (ref 0–4)
MUCUS: ABNORMAL HPF
NITRITE URINE, QUANTITATIVE: NEGATIVE
NORMETANEPHRINE PLASMA: 0.85 NMOL/L (ref 0–0.89)
NUCLEATED RBC %: 0 %
O2 SAT, VEN: 67.5 % (ref 50–70)
O2 SATURATION: 97.1 % (ref 96–97)
OPIATES, URINE: NEGATIVE
OXYCODONE: NORMAL
PCO2 ARTERIAL: 36 MMHG (ref 32–45)
PCO2, VEN: 40 MMHG (ref 38–52)
PDW BLD-RTO: 14.9 % (ref 11.7–14.9)
PH BLOOD: 7.38 (ref 7.34–7.45)
PH VENOUS: 7.32 (ref 7.32–7.42)
PH, URINE: 6 (ref 5–8)
PHENCYCLIDINE, URINE: NEGATIVE
PLATELET # BLD: 230 K/CU MM (ref 140–440)
PMV BLD AUTO: 10 FL (ref 7.5–11.1)
PO2 ARTERIAL: 108 MMHG (ref 75–100)
PO2, VEN: 36 MMHG (ref 28–48)
POTASSIUM SERPL-SCNC: 4.9 MMOL/L (ref 3.5–5.1)
PRO-BNP: ABNORMAL PG/ML
PROCALCITONIN: 0.34
PROTEIN UA: 100 MG/DL
PROTHROMBIN TIME: 13.2 SECONDS (ref 9.12–12.5)
RBC # BLD: 3.47 M/CU MM (ref 4.6–6.2)
RBC URINE: 7 /HPF (ref 0–3)
REASON FOR REJECTION: NORMAL
REASON FOR REJECTION: NORMAL
REJECTED TEST: NORMAL
SEGMENTED NEUTROPHILS ABSOLUTE COUNT: 11 K/CU MM
SEGMENTED NEUTROPHILS RELATIVE PERCENT: 87 % (ref 36–66)
SODIUM BLD-SCNC: 130 MMOL/L (ref 135–145)
SPECIFIC GRAVITY UA: 1.03 (ref 1–1.03)
SPECIFIC GRAVITY UA: ABNORMAL (ref 1–1.03)
SQUAMOUS EPITHELIAL: 1 /HPF
TOTAL IMMATURE NEUTOROPHIL: 0.12 K/CU MM
TOTAL NUCLEATED RBC: 0 K/CU MM
TOTAL PROTEIN: 5.9 GM/DL (ref 6.4–8.2)
TRICHOMONAS: ABNORMAL /HPF
TRIGL SERPL-MCNC: 55 MG/DL
TROPONIN T: 4.01 NG/ML
TROPONIN T: 5.5 NG/ML
TROPONIN T: 7.32 NG/ML
TSH HIGH SENSITIVITY: 4.45 UIU/ML (ref 0.27–4.2)
UROBILINOGEN, URINE: NORMAL MG/DL (ref 0.2–1)
WBC # BLD: 12.6 K/CU MM (ref 4–10.5)
WBC UA: 4 /HPF (ref 0–2)

## 2019-08-11 PROCEDURE — 83735 ASSAY OF MAGNESIUM: CPT

## 2019-08-11 PROCEDURE — 93005 ELECTROCARDIOGRAM TRACING: CPT | Performed by: EMERGENCY MEDICINE

## 2019-08-11 PROCEDURE — 33210 INSERT ELECTRD/PM CATH SNGL: CPT

## 2019-08-11 PROCEDURE — 99285 EMERGENCY DEPT VISIT HI MDM: CPT

## 2019-08-11 PROCEDURE — 37799 UNLISTED PX VASCULAR SURGERY: CPT

## 2019-08-11 PROCEDURE — G0480 DRUG TEST DEF 1-7 CLASSES: HCPCS

## 2019-08-11 PROCEDURE — 94761 N-INVAS EAR/PLS OXIMETRY MLT: CPT

## 2019-08-11 PROCEDURE — 92950 HEART/LUNG RESUSCITATION CPR: CPT

## 2019-08-11 PROCEDURE — 2700000000 HC OXYGEN THERAPY PER DAY

## 2019-08-11 PROCEDURE — 83605 ASSAY OF LACTIC ACID: CPT

## 2019-08-11 PROCEDURE — C9600 PERC DRUG-EL COR STENT SING: HCPCS

## 2019-08-11 PROCEDURE — 93458 L HRT ARTERY/VENTRICLE ANGIO: CPT | Performed by: INTERNAL MEDICINE

## 2019-08-11 PROCEDURE — C1769 GUIDE WIRE: HCPCS

## 2019-08-11 PROCEDURE — 85025 COMPLETE CBC W/AUTO DIFF WBC: CPT

## 2019-08-11 PROCEDURE — 80307 DRUG TEST PRSMV CHEM ANLYZR: CPT

## 2019-08-11 PROCEDURE — 2720000010 HC SURG SUPPLY STERILE

## 2019-08-11 PROCEDURE — 6360000004 HC RX CONTRAST MEDICATION

## 2019-08-11 PROCEDURE — 33210 INSERT ELECTRD/PM CATH SNGL: CPT | Performed by: INTERNAL MEDICINE

## 2019-08-11 PROCEDURE — 72125 CT NECK SPINE W/O DYE: CPT

## 2019-08-11 PROCEDURE — 2709999900 HC NON-CHARGEABLE SUPPLY

## 2019-08-11 PROCEDURE — 92953 TEMPORARY EXTERNAL PACING: CPT

## 2019-08-11 PROCEDURE — 2580000003 HC RX 258

## 2019-08-11 PROCEDURE — 2500000003 HC RX 250 WO HCPCS

## 2019-08-11 PROCEDURE — 85610 PROTHROMBIN TIME: CPT

## 2019-08-11 PROCEDURE — 6360000002 HC RX W HCPCS

## 2019-08-11 PROCEDURE — 2580000003 HC RX 258: Performed by: EMERGENCY MEDICINE

## 2019-08-11 PROCEDURE — 70450 CT HEAD/BRAIN W/O DYE: CPT

## 2019-08-11 PROCEDURE — 83721 ASSAY OF BLOOD LIPOPROTEIN: CPT

## 2019-08-11 PROCEDURE — 85347 COAGULATION TIME ACTIVATED: CPT

## 2019-08-11 PROCEDURE — 84145 PROCALCITONIN (PCT): CPT

## 2019-08-11 PROCEDURE — 2580000003 HC RX 258: Performed by: INTERNAL MEDICINE

## 2019-08-11 PROCEDURE — 2000000000 HC ICU R&B

## 2019-08-11 PROCEDURE — 85730 THROMBOPLASTIN TIME PARTIAL: CPT

## 2019-08-11 PROCEDURE — 82140 ASSAY OF AMMONIA: CPT

## 2019-08-11 PROCEDURE — 84443 ASSAY THYROID STIM HORMONE: CPT

## 2019-08-11 PROCEDURE — 93010 ELECTROCARDIOGRAM REPORT: CPT | Performed by: INTERNAL MEDICINE

## 2019-08-11 PROCEDURE — 6360000002 HC RX W HCPCS: Performed by: INTERNAL MEDICINE

## 2019-08-11 PROCEDURE — B2111ZZ FLUOROSCOPY OF MULTIPLE CORONARY ARTERIES USING LOW OSMOLAR CONTRAST: ICD-10-PCS | Performed by: INTERNAL MEDICINE

## 2019-08-11 PROCEDURE — 027034Z DILATION OF CORONARY ARTERY, ONE ARTERY WITH DRUG-ELUTING INTRALUMINAL DEVICE, PERCUTANEOUS APPROACH: ICD-10-PCS | Performed by: INTERNAL MEDICINE

## 2019-08-11 PROCEDURE — 87040 BLOOD CULTURE FOR BACTERIA: CPT

## 2019-08-11 PROCEDURE — 84484 ASSAY OF TROPONIN QUANT: CPT

## 2019-08-11 PROCEDURE — 99221 1ST HOSP IP/OBS SF/LOW 40: CPT | Performed by: INTERNAL MEDICINE

## 2019-08-11 PROCEDURE — C1894 INTRO/SHEATH, NON-LASER: HCPCS

## 2019-08-11 PROCEDURE — 6370000000 HC RX 637 (ALT 250 FOR IP)

## 2019-08-11 PROCEDURE — 6360000002 HC RX W HCPCS: Performed by: EMERGENCY MEDICINE

## 2019-08-11 PROCEDURE — 80061 LIPID PANEL: CPT

## 2019-08-11 PROCEDURE — 94640 AIRWAY INHALATION TREATMENT: CPT

## 2019-08-11 PROCEDURE — 84436 ASSAY OF TOTAL THYROXINE: CPT

## 2019-08-11 PROCEDURE — 92928 PRQ TCAT PLMT NTRAC ST 1 LES: CPT | Performed by: INTERNAL MEDICINE

## 2019-08-11 PROCEDURE — 36415 COLL VENOUS BLD VENIPUNCTURE: CPT

## 2019-08-11 PROCEDURE — 82805 BLOOD GASES W/O2 SATURATION: CPT

## 2019-08-11 PROCEDURE — 83880 ASSAY OF NATRIURETIC PEPTIDE: CPT

## 2019-08-11 PROCEDURE — 93458 L HRT ARTERY/VENTRICLE ANGIO: CPT

## 2019-08-11 PROCEDURE — 4A023N7 MEASUREMENT OF CARDIAC SAMPLING AND PRESSURE, LEFT HEART, PERCUTANEOUS APPROACH: ICD-10-PCS | Performed by: INTERNAL MEDICINE

## 2019-08-11 PROCEDURE — 81001 URINALYSIS AUTO W/SCOPE: CPT

## 2019-08-11 PROCEDURE — 96375 TX/PRO/DX INJ NEW DRUG ADDON: CPT

## 2019-08-11 PROCEDURE — 6370000000 HC RX 637 (ALT 250 FOR IP): Performed by: INTERNAL MEDICINE

## 2019-08-11 PROCEDURE — 80053 COMPREHEN METABOLIC PANEL: CPT

## 2019-08-11 PROCEDURE — 6370000000 HC RX 637 (ALT 250 FOR IP): Performed by: EMERGENCY MEDICINE

## 2019-08-11 PROCEDURE — 96365 THER/PROPH/DIAG IV INF INIT: CPT

## 2019-08-11 PROCEDURE — 82803 BLOOD GASES ANY COMBINATION: CPT

## 2019-08-11 PROCEDURE — 71045 X-RAY EXAM CHEST 1 VIEW: CPT

## 2019-08-11 PROCEDURE — 5A12012 PERFORMANCE OF CARDIAC OUTPUT, SINGLE, MANUAL: ICD-10-PCS | Performed by: INTERNAL MEDICINE

## 2019-08-11 PROCEDURE — 2500000003 HC RX 250 WO HCPCS: Performed by: INTERNAL MEDICINE

## 2019-08-11 RX ORDER — HEPARIN SODIUM 1000 [USP'U]/ML
30 INJECTION, SOLUTION INTRAVENOUS; SUBCUTANEOUS PRN
Status: DISCONTINUED | OUTPATIENT
Start: 2019-08-11 | End: 2019-08-13

## 2019-08-11 RX ORDER — 0.9 % SODIUM CHLORIDE 0.9 %
1000 INTRAVENOUS SOLUTION INTRAVENOUS ONCE
Status: DISCONTINUED | OUTPATIENT
Start: 2019-08-11 | End: 2019-08-11

## 2019-08-11 RX ORDER — ATROPINE SULFATE 0.1 MG/ML
0.5 INJECTION INTRAVENOUS ONCE
Status: COMPLETED | OUTPATIENT
Start: 2019-08-11 | End: 2019-08-11

## 2019-08-11 RX ORDER — 0.9 % SODIUM CHLORIDE 0.9 %
1000 INTRAVENOUS SOLUTION INTRAVENOUS ONCE
Status: COMPLETED | OUTPATIENT
Start: 2019-08-11 | End: 2019-08-11

## 2019-08-11 RX ORDER — IPRATROPIUM BROMIDE AND ALBUTEROL SULFATE 2.5; .5 MG/3ML; MG/3ML
1 SOLUTION RESPIRATORY (INHALATION) ONCE
Status: COMPLETED | OUTPATIENT
Start: 2019-08-11 | End: 2019-08-11

## 2019-08-11 RX ORDER — DOPAMINE HYDROCHLORIDE 160 MG/100ML
2.5 INJECTION, SOLUTION INTRAVENOUS CONTINUOUS
Status: DISCONTINUED | OUTPATIENT
Start: 2019-08-11 | End: 2019-08-12

## 2019-08-11 RX ORDER — FAMOTIDINE 20 MG/1
20 TABLET, FILM COATED ORAL 2 TIMES DAILY
Status: DISCONTINUED | OUTPATIENT
Start: 2019-08-11 | End: 2019-08-19

## 2019-08-11 RX ORDER — ATROPINE SULFATE 0.1 MG/ML
INJECTION INTRAVENOUS
Status: COMPLETED
Start: 2019-08-11 | End: 2019-08-11

## 2019-08-11 RX ORDER — LEVOTHYROXINE SODIUM 0.05 MG/1
50 TABLET ORAL DAILY
Status: DISCONTINUED | OUTPATIENT
Start: 2019-08-11 | End: 2019-08-20 | Stop reason: HOSPADM

## 2019-08-11 RX ORDER — SODIUM CHLORIDE 0.9 % (FLUSH) 0.9 %
10 SYRINGE (ML) INJECTION EVERY 12 HOURS SCHEDULED
Status: DISCONTINUED | OUTPATIENT
Start: 2019-08-11 | End: 2019-08-15 | Stop reason: SDUPTHER

## 2019-08-11 RX ORDER — POLYETHYLENE GLYCOL 3350 17 G/17G
17 POWDER, FOR SOLUTION ORAL DAILY PRN
Status: DISCONTINUED | OUTPATIENT
Start: 2019-08-11 | End: 2019-08-20 | Stop reason: HOSPADM

## 2019-08-11 RX ORDER — ATORVASTATIN CALCIUM 40 MG/1
80 TABLET, FILM COATED ORAL NIGHTLY
Status: DISCONTINUED | OUTPATIENT
Start: 2019-08-11 | End: 2019-08-20 | Stop reason: HOSPADM

## 2019-08-11 RX ORDER — HEPARIN SODIUM 10000 [USP'U]/100ML
10.5 INJECTION, SOLUTION INTRAVENOUS CONTINUOUS
Status: DISCONTINUED | OUTPATIENT
Start: 2019-08-11 | End: 2019-08-13

## 2019-08-11 RX ORDER — HEPARIN SODIUM 1000 [USP'U]/ML
60 INJECTION, SOLUTION INTRAVENOUS; SUBCUTANEOUS PRN
Status: DISCONTINUED | OUTPATIENT
Start: 2019-08-11 | End: 2019-08-13

## 2019-08-11 RX ORDER — ONDANSETRON 2 MG/ML
4 INJECTION INTRAMUSCULAR; INTRAVENOUS EVERY 6 HOURS PRN
Status: DISCONTINUED | OUTPATIENT
Start: 2019-08-11 | End: 2019-08-20 | Stop reason: HOSPADM

## 2019-08-11 RX ORDER — HEPARIN SODIUM 1000 [USP'U]/ML
60 INJECTION, SOLUTION INTRAVENOUS; SUBCUTANEOUS ONCE
Status: DISCONTINUED | OUTPATIENT
Start: 2019-08-11 | End: 2019-08-13

## 2019-08-11 RX ORDER — SODIUM CHLORIDE 0.9 % (FLUSH) 0.9 %
10 SYRINGE (ML) INJECTION PRN
Status: DISCONTINUED | OUTPATIENT
Start: 2019-08-11 | End: 2019-08-15 | Stop reason: SDUPTHER

## 2019-08-11 RX ORDER — ACETAMINOPHEN 325 MG/1
650 TABLET ORAL EVERY 4 HOURS PRN
Status: DISCONTINUED | OUTPATIENT
Start: 2019-08-11 | End: 2019-08-20 | Stop reason: HOSPADM

## 2019-08-11 RX ORDER — ASPIRIN 81 MG/1
81 TABLET, CHEWABLE ORAL DAILY
Status: DISCONTINUED | OUTPATIENT
Start: 2019-08-12 | End: 2019-08-20 | Stop reason: HOSPADM

## 2019-08-11 RX ORDER — HEPARIN SODIUM 10000 [USP'U]/100ML
1000 INJECTION, SOLUTION INTRAVENOUS CONTINUOUS
Status: DISCONTINUED | OUTPATIENT
Start: 2019-08-11 | End: 2019-08-11 | Stop reason: SDUPTHER

## 2019-08-11 RX ORDER — SODIUM CHLORIDE 9 MG/ML
INJECTION, SOLUTION INTRAVENOUS CONTINUOUS
Status: DISCONTINUED | OUTPATIENT
Start: 2019-08-11 | End: 2019-08-15

## 2019-08-11 RX ORDER — CLOPIDOGREL BISULFATE 75 MG/1
75 TABLET ORAL DAILY
Status: DISCONTINUED | OUTPATIENT
Start: 2019-08-12 | End: 2019-08-19

## 2019-08-11 RX ADMIN — Medication 10 ML: at 20:43

## 2019-08-11 RX ADMIN — Medication 22 MCG/MIN: at 14:15

## 2019-08-11 RX ADMIN — DOPAMINE HYDROCHLORIDE 2.5 MCG/KG/MIN: 160 INJECTION, SOLUTION INTRAVENOUS at 10:12

## 2019-08-11 RX ADMIN — SODIUM CHLORIDE: 900 INJECTION INTRAVENOUS at 14:00

## 2019-08-11 RX ADMIN — FAMOTIDINE 20 MG: 20 TABLET ORAL at 20:42

## 2019-08-11 RX ADMIN — IPRATROPIUM BROMIDE AND ALBUTEROL SULFATE 1 AMPULE: .5; 3 SOLUTION RESPIRATORY (INHALATION) at 10:37

## 2019-08-11 RX ADMIN — SODIUM CHLORIDE 1000 ML: 9 INJECTION, SOLUTION INTRAVENOUS at 09:08

## 2019-08-11 RX ADMIN — ATROPINE SULFATE 0.5 MG: 0.1 INJECTION INTRAVENOUS at 09:36

## 2019-08-11 RX ADMIN — ATORVASTATIN CALCIUM 80 MG: 40 TABLET, FILM COATED ORAL at 20:42

## 2019-08-11 RX ADMIN — PIPERACILLIN AND TAZOBACTAM 3.38 G: 3; .375 INJECTION, POWDER, FOR SOLUTION INTRAVENOUS at 10:15

## 2019-08-11 RX ADMIN — HEPARIN SODIUM 2720 UNITS: 1000 INJECTION, SOLUTION INTRAVENOUS; SUBCUTANEOUS at 18:53

## 2019-08-11 RX ADMIN — HEPARIN SODIUM AND DEXTROSE 1000 UNITS/HR: 10000; 5 INJECTION INTRAVENOUS at 14:29

## 2019-08-11 RX ADMIN — ATROPINE SULFATE 0.5 MG: 0.1 INJECTION, SOLUTION INTRAVENOUS at 09:51

## 2019-08-11 RX ADMIN — ATROPINE SULFATE 0.5 MG: 0.1 INJECTION, SOLUTION INTRAVENOUS at 09:36

## 2019-08-11 RX ADMIN — ACETAMINOPHEN 650 MG: 325 TABLET ORAL at 20:49

## 2019-08-11 RX ADMIN — SODIUM CHLORIDE, PRESERVATIVE FREE 10 ML: 5 INJECTION INTRAVENOUS at 20:43

## 2019-08-11 ASSESSMENT — PAIN SCALES - GENERAL
PAINLEVEL_OUTOF10: 0
PAINLEVEL_OUTOF10: 3

## 2019-08-11 ASSESSMENT — PAIN DESCRIPTION - FREQUENCY: FREQUENCY: INTERMITTENT

## 2019-08-11 ASSESSMENT — PAIN DESCRIPTION - LOCATION: LOCATION: SHOULDER

## 2019-08-11 ASSESSMENT — PAIN DESCRIPTION - ONSET: ONSET: ON-GOING

## 2019-08-11 ASSESSMENT — PAIN DESCRIPTION - PAIN TYPE: TYPE: CHRONIC PAIN

## 2019-08-11 ASSESSMENT — PAIN DESCRIPTION - ORIENTATION: ORIENTATION: LEFT

## 2019-08-11 ASSESSMENT — PAIN DESCRIPTION - PROGRESSION: CLINICAL_PROGRESSION: NOT CHANGED

## 2019-08-11 ASSESSMENT — PAIN DESCRIPTION - DESCRIPTORS: DESCRIPTORS: ACHING

## 2019-08-11 ASSESSMENT — PAIN - FUNCTIONAL ASSESSMENT: PAIN_FUNCTIONAL_ASSESSMENT: ACTIVITIES ARE NOT PREVENTED

## 2019-08-11 NOTE — CONSULTS
Consults           Nephrology Service Consultation    Patient:  Natasha Anderson  MRN: [id number]  Consulting physician:  Torres Connor MD  Reason for Consult: arf on ckd with confusion and increase trop    History Obtained From:  patient, family member - kids, electronic medical record  PCP: Marleni Kuhn PA-C    HISTORY OF PRESENT ILLNESS:   The patient is a 80 y.o. male who presents with weakness and confusion was dc a few days ago to home and went later that day to [address] [place] [address] and dc home and per family stopped his aricept. He took his bp meds this morning and went to bathroom with son. While son was outside pt tried go to shower and fell and appear hit head. Sent to er and noted hypotensive with bradycardia. Got atropine and ivf. cxr with pulm edema and increase trop but need negative head ct before start on heparin. In above setting noted arf 2.1 on ckd 3 creat 1.2-1.4 and pt was not on ace arb diuretic and renal asked evaluate. Saw in er and dw his kids. Aware severity illness and admit and do work up no acute hd yet. Past Medical History:        Diagnosis Date    Anxiety     Bladder cancer ([address]     [facility]     Chronic kidney disease     Chronic kidney disease, stage III (moderate) ([facility] 7/17/2017    Dementia     Depression     GERD (gastroesophageal reflux disease)     Gout     Hyperlipidemia     Hypertension     Hyperthyroidism        Past Surgical History:    History reviewed. No pertinent surgical history. Medications:   Scheduled Meds:   sodium chloride  1,000 mL Intravenous Once    sodium chloride  1,000 mL Intravenous Once    ipratropium-albuterol  1 ampule Inhalation Once    piperacillin-tazobactam  3.375 g Intravenous Once     Continuous Infusions:   DOPamine       PRN Meds:. Allergies:  Patient has no known allergies. Social History:   TOBACCO:   reports that he has quit smoking. His smoking use included cigarettes. He has a 30.00 pack-year smoking history.  He has never

## 2019-08-11 NOTE — H&P
Oral Nightly    [START ON 8/12/2019] aspirin  81 mg Oral Daily    [START ON 8/12/2019] clopidogrel  75 mg Oral Daily    heparin (porcine)  60 Units/kg Intravenous Once    [START ON 8/13/2019] enoxaparin  40 mg Subcutaneous Daily      Infusions:    DOPamine Stopped (08/11/19 1415)    sodium chloride 20 mL/hr at 08/11/19 1400    heparin (porcine) 1,000 Units/hr (08/11/19 1429)    norepinephrine 22 mcg/min (08/11/19 1415)     PRN Meds:     sodium chloride flush 10 mL PRN   ondansetron 4 mg Q6H PRN   polyethylene glycol 17 g Daily PRN   sodium chloride flush 10 mL PRN   acetaminophen 650 mg Q4H PRN   magnesium hydroxide 30 mL Daily PRN   ondansetron 4 mg Q6H PRN   heparin (porcine) 60 Units/kg PRN   heparin (porcine) 30 Units/kg PRN       CBC   Recent Labs     08/11/19  0830   WBC 12.6*   HGB 10.0*   HCT 32.1*         BMP   Recent Labs     08/11/19  0830   *   K 4.9   CL 98*   CO2 20*   BUN 49*   CREATININE 2.1*     LHC 08/11/19  Angiographic Findings   Diagnostic Findings   Cardiac Arteries and Lesion Findings     LMCA: Single stenosis. 50 % distal stenosis. LAD: Single stenosis. 95 % near ostial stenosis. LCx: Single stenosis. 60 to 70 % ostial & proximal stenosis    RCA: Single stenosis. 99 % mid vessel stenosis    Intervention:  Successful stenting of RCA with excellent results.       Electronically signed by Kiel Ang MD on 8/11/2019 at 2:45 PM

## 2019-08-11 NOTE — CONSULTS
This 25-year-old gentleman appears to be quite  sick, who has been on atenolol at home, is now noted to be in complete  heart block and congestive heart failure versus bilateral pneumonias. He is DNR-CCA. I discussed possibility of temporary pacemaker placement  and possibility of permanent pacemaker with the family. They do want  these procedures, but do not want CPR or intubation. I discussed the  need for temporary pacemaker wire procedure and possibilities of  complications with the family. We will proceed with transvenous  pacemaker placement and then the patient will be kept in intensive care  unit for medical management, and further plans to be based on the  patient's clinical course.         Tay Perdomo MD    D: 08/11/2019 10:30:16       T: 08/11/2019 12:28:31     [email or web address]  Job#: [id number]     Doc#: [id number]    CC:

## 2019-08-11 NOTE — ED PROVIDER NOTES
Triage Chief Complaint:   Shortness of Breath (Discharged from [address] [place] [address] on Friday. Taking antibiotic for pneumonia. Increased cough and SOB.)    Wiyot:  Fernandez Wren is a 80 y.o. male that presents patient comes today for evaluation of shortness of breath. Patient was coming in by family members to provide more of a history as the patient was slightly drowsy. He does have a history of dementia as well. The patient had a fall earlier today and they report that he was little short of breath when this happened. The patient has had recurrent falls over the last several weeks. They have been in and out of the hospital intermittently and wanted to bring him in to get evaluated because of this issue. Patient has stated that he feels otherwise well. ROS:  General:  No fevers, no chills, no weakness  Eyes:  No recent vison changes, no discharge  ENT:  No sore throat, no nasal congestion, no hearing changes  Cardiovascular:  No chest pain, no palpitations  Respiratory:  No shortness of breath, no cough, no wheezing  Gastrointestinal:  No pain, no nausea, no vomiting, no diarrhea  Musculoskeletal:  No muscle pain, no joint pain  Skin:  No rash, no pruritis, no easy bruising  Neurologic:  No speech problems, no headache, no extremity numbness, no extremity tingling, no extremity weakness  Psychiatric:  No anxiety  Genitourinary:  No dysuria, no hematuria  Endocrine:  No unexpected weight gain, no unexpected weight loss  Extremities:  no edema, no pain    Past Medical History:   Diagnosis Date    Anxiety     Bladder cancer ([address]     Cancer ([address]     Chronic kidney disease     Chronic kidney disease, stage III (moderate) ([address] 7/17/2017    Dementia     Depression     GERD (gastroesophageal reflux disease)     Gout     Hyperlipidemia     Hypertension     Hyperthyroidism      History reviewed. No pertinent surgical history.   Family History   Problem Relation Age of Onset    Cancer Sister     Cancer Brother  High Blood Pressure Brother     Heart Disease Other     Hypertension Other     Elevated Lipids Other     Other Other         gout     Social History     Socioeconomic History    Marital status:       Spouse name: Not on file    Number of children: Not on file    Years of education: Not on file    Highest education level: Not on file   Occupational History    Not on file   Social Needs    Financial resource strain: Not on file    Food insecurity:     Worry: Not on file     Inability: Not on file    Transportation needs:     Medical: Not on file     Non-medical: Not on file   Tobacco Use    Smoking status: Former Smoker     Packs/day: 1.00     Years: 30.00     Pack years: 30.00     Types: Cigarettes    Smokeless tobacco: Never Used   Substance and Sexual Activity    Alcohol use: No    Drug use: No    Sexual activity: Not on file   Lifestyle    Physical activity:     Days per week: Not on file     Minutes per session: Not on file    Stress: Not on file   Relationships    Social connections:     Talks on phone: Not on file     Gets together: Not on file     Attends Gnosticism service: Not on file     Active member of club or organization: Not on file     Attends meetings of clubs or organizations: Not on file     Relationship status: Not on file    Intimate partner violence:     Fear of current or ex partner: Not on file     Emotionally abused: Not on file     Physically abused: Not on file     Forced sexual activity: Not on file   Other Topics Concern    Not on file   Social History Narrative    Not on file     Current Facility-Administered Medications   Medication Dose Route Frequency Provider Last Rate Last Dose    DOPamine (INTROPIN) 400 mg in dextrose 5 % 250 mL infusion  2.5 mcg/kg/min Intravenous Continuous Amy Richardson MD 68 mL/hr at 08/11/19 1046 20 mcg/kg/min at 08/11/19 1046    vancomycin (VANCOCIN) 1,750 mg in dextrose 5 % 500 mL IVPB  20 mg/kg Intravenous Once Amy Richardson MD bilaterally. Abdominal:  Normal bowel sounds. Soft. Nontender. Non distended. Back:  No CVA tenderness to palpation     Neurological:  Alert and oriented times 2.   Motor and sensory grossly intact, coordination intact, cranial nerves II through XII intact, reflexes intact            Psychiatric:  Appropriate    I have reviewed and interpreted all of the currently available lab results from this visit (if applicable):  Results for orders placed or performed during the hospital encounter of 08/11/19   CBC Auto Differential   Result Value Ref Range    WBC 12.6 (H) 4.0 - 10.5 K/CU MM    RBC 3.47 (L) 4.6 - 6.2 M/CU MM    Hemoglobin 10.0 (L) 13.5 - 18.0 GM/DL    Hematocrit 32.1 (L) 42 - 52 %    MCV 92.5 78 - 100 FL    MCH 28.8 27 - 31 PG    MCHC 31.2 (L) 32.0 - 36.0 %    RDW 14.9 11.7 - 14.9 %    Platelets 109 622 - 683 K/CU MM    MPV 10.0 7.5 - 11.1 FL    Differential Type AUTOMATED DIFFERENTIAL     Segs Relative 87.0 (H) 36 - 66 %    Lymphocytes % 3.5 (L) 24 - 44 %    Monocytes % 8.2 (H) 0 - 4 %    Eosinophils % 0.1 0 - 3 %    Basophils % 0.2 0 - 1 %    Segs Absolute 11.0 K/CU MM    Lymphocytes # 0.4 K/CU MM    Monocytes # 1.0 K/CU MM    Eosinophils # 0.0 K/CU MM    Basophils # 0.0 K/CU MM    Nucleated RBC % 0.0 %    Total Nucleated RBC 0.0 K/CU MM    Total Immature Neutrophil 0.12 K/CU MM    Immature Neutrophil % 1.0 (H) 0 - 0.43 %   TSH without Reflex   Result Value Ref Range    TSH, High Sensitivity 4.450 (H) 0.270 - 4.20 uIu/ml   Blood Gas, Venous   Result Value Ref Range    pH, Ralf 7.32 7.32 - 7.42    pCO2, Ralf 40 38 - 52 mmHG    pO2, Ralf 36 28 - 48 mmHG    Base Excess 5  MINUS   (H) 0 - 3.3    HCO3, Venous 20.6 19 - 25 MMOL/L    O2 Sat, Ralf 67.5 50 - 70 %    Comment V  B G    Troponin   Result Value Ref Range    Troponin T 4.010 (HH) <0.01 NG/ML   Brain Natriuretic Peptide   Result Value Ref Range    Pro-BNP 38,123 (H) <300 PG/ML   Ammonia   Result Value Ref Range    Ammonia 15 (L) 16 - 60 UMOL/L   Lactic using speech recognition software and may contain errors related to that system including errors in grammar, punctuation, and spelling, as well as words and phrases that may be inappropriate. If there are any questions or concerns please feel free to contact the dictating provider for clarification.        Yina Swan MD  08/11/19 5853

## 2019-08-11 NOTE — ED NOTES
Bed: 03TR-03  Expected date:   Expected time:   Means of arrival:   Comments:  angelita Malave RN  08/11/19 4431

## 2019-08-11 NOTE — ED NOTES
This nurse escorted the patient to Cath lab. Non rebreather placed on patient during transport d/t O2 Sat 84-86% on 6 litters. Patient drowsy during transport but easy to arouse and able to answer questions. Family in ICU waiting area. Rosemarie Alfonso, [facility]  08/11/19 0275

## 2019-08-12 ENCOUNTER — APPOINTMENT (OUTPATIENT)
Dept: INTERVENTIONAL RADIOLOGY/VASCULAR | Age: 84
DRG: 242 | End: 2019-08-12
Payer: MEDICARE

## 2019-08-12 ENCOUNTER — APPOINTMENT (OUTPATIENT)
Dept: GENERAL RADIOLOGY | Age: 84
DRG: 242 | End: 2019-08-12
Payer: MEDICARE

## 2019-08-12 LAB
ANION GAP SERPL CALCULATED.3IONS-SCNC: 14 MMOL/L (ref 4–16)
APTT: 49.4 SECONDS (ref 21.2–33)
APTT: 49.5 SECONDS (ref 21.2–33)
APTT: 51.3 SECONDS (ref 21.2–33)
APTT: 55.4 SECONDS (ref 21.2–33)
APTT: 61 SECONDS (ref 21.2–33)
APTT: 74.1 SECONDS (ref 21.2–33)
BUN BLDV-MCNC: 60 MG/DL (ref 6–23)
CALCIUM SERPL-MCNC: 8.6 MG/DL (ref 8.3–10.6)
CHLORIDE BLD-SCNC: 102 MMOL/L (ref 99–110)
CO2: 19 MMOL/L (ref 21–32)
CORTISOL SALIVARY: 0.19 UG/DL
CORTISOL SALIVARY: NORMAL UG/DL
CREAT SERPL-MCNC: 2.3 MG/DL (ref 0.9–1.3)
EKG ATRIAL RATE: 122 BPM
EKG DIAGNOSIS: NORMAL
EKG DIAGNOSIS: NORMAL
EKG P AXIS: 61 DEGREES
EKG Q-T INTERVAL: 442 MS
EKG Q-T INTERVAL: 480 MS
EKG QRS DURATION: 152 MS
EKG QRS DURATION: 154 MS
EKG QTC CALCULATION (BAZETT): 394 MS
EKG QTC CALCULATION (BAZETT): 415 MS
EKG R AXIS: -59 DEGREES
EKG R AXIS: -69 DEGREES
EKG T AXIS: 117 DEGREES
EKG T AXIS: 122 DEGREES
EKG VENTRICULAR RATE: 45 BPM
EKG VENTRICULAR RATE: 48 BPM
GFR AFRICAN AMERICAN: 33 ML/MIN/1.73M2
GFR NON-AFRICAN AMERICAN: 27 ML/MIN/1.73M2
GLUCOSE BLD-MCNC: 122 MG/DL (ref 70–99)
GLUCOSE BLD-MCNC: 132 MG/DL (ref 70–99)
HCT VFR BLD CALC: 28.4 % (ref 42–52)
HEMOGLOBIN: 9.2 GM/DL (ref 13.5–18)
LV EF: 43 %
LVEF MODALITY: NORMAL
MCH RBC QN AUTO: 29.2 PG (ref 27–31)
MCHC RBC AUTO-ENTMCNC: 32.4 % (ref 32–36)
MCV RBC AUTO: 90.2 FL (ref 78–100)
PDW BLD-RTO: 14.8 % (ref 11.7–14.9)
PLATELET # BLD: 239 K/CU MM (ref 140–440)
PMV BLD AUTO: 10 FL (ref 7.5–11.1)
POTASSIUM SERPL-SCNC: 4.2 MMOL/L (ref 3.5–5.1)
RBC # BLD: 3.15 M/CU MM (ref 4.6–6.2)
SODIUM BLD-SCNC: 135 MMOL/L (ref 135–145)
WBC # BLD: 12.3 K/CU MM (ref 4–10.5)

## 2019-08-12 PROCEDURE — 37799 UNLISTED PX VASCULAR SURGERY: CPT

## 2019-08-12 PROCEDURE — 99223 1ST HOSP IP/OBS HIGH 75: CPT | Performed by: INTERNAL MEDICINE

## 2019-08-12 PROCEDURE — 2580000003 HC RX 258: Performed by: INTERNAL MEDICINE

## 2019-08-12 PROCEDURE — 51702 INSERT TEMP BLADDER CATH: CPT

## 2019-08-12 PROCEDURE — 36556 INSERT NON-TUNNEL CV CATH: CPT

## 2019-08-12 PROCEDURE — 6360000002 HC RX W HCPCS: Performed by: INTERNAL MEDICINE

## 2019-08-12 PROCEDURE — 6370000000 HC RX 637 (ALT 250 FOR IP): Performed by: INTERNAL MEDICINE

## 2019-08-12 PROCEDURE — 6360000002 HC RX W HCPCS

## 2019-08-12 PROCEDURE — 85027 COMPLETE CBC AUTOMATED: CPT

## 2019-08-12 PROCEDURE — 82962 GLUCOSE BLOOD TEST: CPT

## 2019-08-12 PROCEDURE — 71045 X-RAY EXAM CHEST 1 VIEW: CPT

## 2019-08-12 PROCEDURE — 80048 BASIC METABOLIC PNL TOTAL CA: CPT

## 2019-08-12 PROCEDURE — 93010 ELECTROCARDIOGRAM REPORT: CPT | Performed by: INTERNAL MEDICINE

## 2019-08-12 PROCEDURE — 93306 TTE W/DOPPLER COMPLETE: CPT

## 2019-08-12 PROCEDURE — 2700000000 HC OXYGEN THERAPY PER DAY

## 2019-08-12 PROCEDURE — 05HM33Z INSERTION OF INFUSION DEVICE INTO RIGHT INTERNAL JUGULAR VEIN, PERCUTANEOUS APPROACH: ICD-10-PCS | Performed by: RADIOLOGY

## 2019-08-12 PROCEDURE — 76937 US GUIDE VASCULAR ACCESS: CPT

## 2019-08-12 PROCEDURE — 85730 THROMBOPLASTIN TIME PARTIAL: CPT

## 2019-08-12 PROCEDURE — 99232 SBSQ HOSP IP/OBS MODERATE 35: CPT | Performed by: INTERNAL MEDICINE

## 2019-08-12 PROCEDURE — 94761 N-INVAS EAR/PLS OXIMETRY MLT: CPT

## 2019-08-12 PROCEDURE — APPSS15 APP SPLIT SHARED TIME 0-15 MINUTES: Performed by: NURSE PRACTITIONER

## 2019-08-12 PROCEDURE — 2709999900 HC NON-CHARGEABLE SUPPLY

## 2019-08-12 PROCEDURE — 94660 CPAP INITIATION&MGMT: CPT

## 2019-08-12 PROCEDURE — 2000000000 HC ICU R&B

## 2019-08-12 RX ORDER — LANOLIN ALCOHOL/MO/W.PET/CERES
3 CREAM (GRAM) TOPICAL NIGHTLY PRN
Status: DISCONTINUED | OUTPATIENT
Start: 2019-08-12 | End: 2019-08-20 | Stop reason: HOSPADM

## 2019-08-12 RX ORDER — DIPHENHYDRAMINE HYDROCHLORIDE 50 MG/ML
INJECTION INTRAMUSCULAR; INTRAVENOUS
Status: COMPLETED
Start: 2019-08-12 | End: 2019-08-12

## 2019-08-12 RX ORDER — FUROSEMIDE 10 MG/ML
60 INJECTION INTRAMUSCULAR; INTRAVENOUS ONCE
Status: COMPLETED | OUTPATIENT
Start: 2019-08-12 | End: 2019-08-12

## 2019-08-12 RX ORDER — DIPHENHYDRAMINE HYDROCHLORIDE 50 MG/ML
25 INJECTION INTRAMUSCULAR; INTRAVENOUS ONCE
Status: COMPLETED | OUTPATIENT
Start: 2019-08-13 | End: 2019-08-12

## 2019-08-12 RX ORDER — LORAZEPAM 2 MG/ML
1 INJECTION INTRAMUSCULAR ONCE
Status: COMPLETED | OUTPATIENT
Start: 2019-08-13 | End: 2019-08-13

## 2019-08-12 RX ORDER — FUROSEMIDE 10 MG/ML
INJECTION INTRAMUSCULAR; INTRAVENOUS
Status: COMPLETED
Start: 2019-08-12 | End: 2019-08-12

## 2019-08-12 RX ADMIN — FUROSEMIDE 60 MG: 10 INJECTION INTRAMUSCULAR; INTRAVENOUS at 16:36

## 2019-08-12 RX ADMIN — FAMOTIDINE 20 MG: 20 TABLET ORAL at 08:05

## 2019-08-12 RX ADMIN — Medication 10 ML: at 21:55

## 2019-08-12 RX ADMIN — HEPARIN SODIUM AND DEXTROSE 15.5 UNITS/KG/HR: 10000; 5 INJECTION INTRAVENOUS at 06:39

## 2019-08-12 RX ADMIN — CLOPIDOGREL BISULFATE 75 MG: 75 TABLET ORAL at 08:05

## 2019-08-12 RX ADMIN — LEVOTHYROXINE SODIUM 50 MCG: 50 TABLET ORAL at 06:21

## 2019-08-12 RX ADMIN — FUROSEMIDE 60 MG: 10 INJECTION, SOLUTION INTRAMUSCULAR; INTRAVENOUS at 16:36

## 2019-08-12 RX ADMIN — Medication 9 MCG/MIN: at 12:34

## 2019-08-12 RX ADMIN — SODIUM CHLORIDE, PRESERVATIVE FREE 10 ML: 5 INJECTION INTRAVENOUS at 21:55

## 2019-08-12 RX ADMIN — SODIUM CHLORIDE, PRESERVATIVE FREE 10 ML: 5 INJECTION INTRAVENOUS at 08:16

## 2019-08-12 RX ADMIN — DIPHENHYDRAMINE HYDROCHLORIDE 25 MG: 50 INJECTION INTRAMUSCULAR; INTRAVENOUS at 23:58

## 2019-08-12 RX ADMIN — ASPIRIN 81 MG 81 MG: 81 TABLET ORAL at 08:05

## 2019-08-12 RX ADMIN — ACETAMINOPHEN 650 MG: 325 TABLET ORAL at 14:28

## 2019-08-12 RX ADMIN — DIPHENHYDRAMINE HYDROCHLORIDE 25 MG: 50 INJECTION, SOLUTION INTRAMUSCULAR; INTRAVENOUS at 23:58

## 2019-08-12 RX ADMIN — HEPARIN SODIUM AND DEXTROSE 21.5 UNITS/KG/HR: 10000; 5 INJECTION INTRAVENOUS at 20:25

## 2019-08-12 ASSESSMENT — PAIN SCALES - GENERAL
PAINLEVEL_OUTOF10: 0
PAINLEVEL_OUTOF10: 3
PAINLEVEL_OUTOF10: 0

## 2019-08-12 ASSESSMENT — PAIN DESCRIPTION - FREQUENCY: FREQUENCY: INTERMITTENT

## 2019-08-12 ASSESSMENT — PAIN DESCRIPTION - LOCATION: LOCATION: PENIS

## 2019-08-12 ASSESSMENT — PAIN DESCRIPTION - PAIN TYPE: TYPE: ACUTE PAIN

## 2019-08-12 ASSESSMENT — PAIN DESCRIPTION - DESCRIPTORS: DESCRIPTORS: BURNING

## 2019-08-12 NOTE — PROGRESS NOTES
Hospitalist Progress Note      Name:  Tan Pereira /Age/Sex: 1934  (80 y.o. male)   MRN & CSN:  [id number] & [id number] Admission Date/Time: 2019  7:33 AM   Location:  -A PCP: Julius Haro, [facility] Day: 2    Assessment and Plan:   Tan Pereira is a 80 y.o.  male  who presents with Complete heart block ([address]    1. Complete heart block: Likely due to RCA NSTEMI . Was also on Rivastigmine patch for dementia which could have also contributed to bradycardia. S/p temporary pacemaker. Cardiology on consult. Rivastigmine held. 2. RCA NSTEMI: troponin up to 7.3 with complete heart block. Left heart cath showed 99% RCA occlusion s/p TESHA PCI. On ASA, Plavix and high intensity statin. No beta-blockers yet due to heart block. No ACE inhibitor due to LADI. 3. Cardiogenic shock: Secondary to CHB. S/p temp pacer and norepinephrine drip. Monitoring of vital signs in the ICU. Await echocardiography. Cardiology to follow closely. 4. Multivessel CAD: Left heart cath 2019 showed 50% LMCA, 95% ostial LAD, 60 to 70% ostial and proximal LCx and 99% mid RCA disease. On ASA/Plavix/Statin. 5. LADI on CKD 3 - stable, likely from prerenal injury or ATN secondary to shock: Baseline creatinine 1.2-1.4. Creatinine today is stable 2. 3. Estimated Creatinine Clearance: 27 mL/min (A) (based on SCr of 2.3 mg/dL (H)). Nephrology on board. Recommending pressor therapy and IV fluids for now. Shaw catheter placed for strict input output monitoring. Obtain urinalysis with microscopy. No diuretics or ACE inhibitors/ ARBs. Renal dosing of medications. 6. Probable pneumonia: Chest x-ray done on admission showed bilateral patchy airspace opacities concerning for pulmonary edema versus pneumonia. His NT proBNP is very high at 59369 from baseline 2318. Blood cultures were sent in the ED and he was started on broad-spectrum antibiotics with IV vancomycin and Zosyn.   I am more convinced that guarding. Bowel sounds are normoactive. Rectal exam deferred.        No costovertebral angle tenderness. Shaw catheter is present. MSK    No gross joint deformities. SKIN    Normal coloration, warm, dry. NEURO           Awake and follows commands. PSYCH            Awake, alert. Appropriately conversational. Affect appropriate.     Medications:   Medications:    vancomycin  20 mg/kg Intravenous Once    levothyroxine  50 mcg Oral Daily    sodium chloride flush  10 mL Intravenous 2 times per day    sodium chloride flush  10 mL Intravenous 2 times per day    famotidine  20 mg Oral BID    atorvastatin  80 mg Oral Nightly    aspirin  81 mg Oral Daily    clopidogrel  75 mg Oral Daily    heparin (porcine)  60 Units/kg Intravenous Once    [START ON 8/13/2019] enoxaparin  40 mg Subcutaneous Daily      Infusions:    sodium chloride 20 mL/hr at 08/11/19 1400    norepinephrine 9 mcg/min (08/11/19 2059)    heparin (porcine) 17.5 Units/kg/hr (08/12/19 0958)     PRN Meds:   sodium chloride flush 10 mL PRN   ondansetron 4 mg Q6H PRN   polyethylene glycol 17 g Daily PRN   sodium chloride flush 10 mL PRN   acetaminophen 650 mg Q4H PRN   magnesium hydroxide 30 mL Daily PRN   ondansetron 4 mg Q6H PRN   heparin (porcine) 60 Units/kg PRN   heparin (porcine) 30 Units/kg PRN       CBC   Recent Labs     08/11/19  0830 08/12/19  0420   WBC 12.6* 12.3*   HGB 10.0* 9.2*   HCT 32.1* 28.4*    239      BMP Recent Labs     08/11/19  0830 08/12/19  0420   * 135   K 4.9 4.2   CL 98* 102   CO2 20* 19*   BUN 49* 60*   CREATININE 2.1* 2.3*         Electronically signed by Kim Dailey MD on 8/12/2019 at 10:42 AM

## 2019-08-12 NOTE — CONSULTS
pertinent surgical history. Current Medications:   Current Facility-Administered Medications: melatonin tablet 3 mg, 3 mg, Oral, Nightly PRN  levothyroxine (SYNTHROID) tablet 50 mcg, 50 mcg, Oral, Daily  sodium chloride flush 0.9 % injection 10 mL, 10 mL, Intravenous, 2 times per day  sodium chloride flush 0.9 % injection 10 mL, 10 mL, Intravenous, PRN  ondansetron (ZOFRAN) injection 4 mg, 4 mg, Intravenous, Q6H PRN  polyethylene glycol (GLYCOLAX) packet 17 g, 17 g, Oral, Daily PRN  0.9 % sodium chloride infusion, , Intravenous, Continuous  sodium chloride flush 0.9 % injection 10 mL, 10 mL, Intravenous, 2 times per day  sodium chloride flush 0.9 % injection 10 mL, 10 mL, Intravenous, PRN  acetaminophen (TYLENOL) tablet 650 mg, 650 mg, Oral, Q4H PRN  magnesium hydroxide (MILK OF MAGNESIA) 400 MG/5ML suspension 30 mL, 30 mL, Oral, Daily PRN  famotidine (PEPCID) tablet 20 mg, 20 mg, Oral, BID  ondansetron (ZOFRAN) injection 4 mg, 4 mg, Intravenous, Q6H PRN  atorvastatin (LIPITOR) tablet 80 mg, 80 mg, Oral, Nightly  aspirin chewable tablet 81 mg, 81 mg, Oral, Daily  clopidogrel (PLAVIX) tablet 75 mg, 75 mg, Oral, Daily  heparin (porcine) injection 5,440 Units, 60 Units/kg, Intravenous, Once  heparin (porcine) injection 5,440 Units, 60 Units/kg, Intravenous, PRN  heparin (porcine) injection 2,720 Units, 30 Units/kg, Intravenous, PRN  norepinephrine (LEVOPHED) 16 mg in dextrose 5% 250 mL infusion, 2 mcg/min, Intravenous, Continuous  [START ON 8/13/2019] enoxaparin (LOVENOX) injection 40 mg, 40 mg, Subcutaneous, Daily  heparin 25,000 units in dextrose 5% 250 mL infusion, 10.5 Units/kg/hr, Intravenous, Continuous  Allergies:  Lithium and Naproxen    Social History:   Social History     Socioeconomic History    Marital status:       Spouse name: Not on file    Number of children: Not on file    Years of education: Not on file    Highest education level: Not on file   Occupational History    Not on file   Social

## 2019-08-12 NOTE — PROGRESS NOTES
questions. Electronically signed by PRISCILLA Leigh CNP on 8/12/2019 at 1:17 PM   I have seen ,spoken to  and examined this patient personally, independently of the nurse practitioner. I have reviewed the hospital care given to date and reviewed all pertinent labs and imaging. The plan was developed mutually at the time of the visit with the patient, Clinical Nurse Practitioner  and myself. I have spoken with patient, nursing staff and provided written and verbal instructions . The above note has been reviewed and I agree with the assessment, diagnosis, and treatment plan with changes made by me as follows     CARDIOLOGY ATTENDING ADDENDUM    HPI:  I have reviewed the above HPI  And agree with above   Ebb Yaritza is a 80 y. o.year old who and presents with had concerns including Shortness of Breath (Discharged from [place] on Friday. Taking antibiotic for pneumonia. Increased cough and SOB.). Chief Complaint   Patient presents with    Shortness of Breath     Discharged from [place] on Friday. Taking antibiotic for pneumonia. Increased cough and SOB. Interval history:  Patient seems to be doing better this am. Still in [facility]    Physical Exam:  General:  Awake, alert, NAD  Head:normal  Eye:normal  Neck:  No JVD   Chest:  Clear to auscultation, respiration easy  Cardiovascular:  RRR S1S2  Abdomen:   nontender  Extremities:  12+ edema  Pulses; palpable  Neuro: grossly normal      MEDICAL DECISION MAKING;    I agree with the above plan, which was planned by myself and discussed with NP.     Singh Bender MD [facility]

## 2019-08-12 NOTE — PROGRESS NOTES
Perfect serve message sent to Dr. Tayo Medina regarding possible central line placement as patient is recieving levophed via peripheral IV.

## 2019-08-12 NOTE — PROGRESS NOTES
1. Complete heart block  2. Severe multivessel CAD with RCA intervention  3. Severe mitral regurgitation  4. Moderate to severe LV systolic dysfunction    Patient with significant CAD with triple vessel and left main disease  Patient also has severe MR  Patient also has complete heart block sp temporary pacemaker    Discussed with family about patient with moderate to severe LV dysfunction and complete heart block - will need pacemaker - possible BIV pacer    Given patient also has significant CAD he may need surgery    Patient family wants to discuss with ct surgery about options first.    Will get CT surgery consult  If patient is getting CABG with mitral valve repair - then may consider pacemaker at the same time and avoid two procedures.     If CABG is denied then I could plan for BIV pacemaker    Full note to follow

## 2019-08-12 NOTE — CONSULTS
 Hypertension     Hyperthyroidism        Surgical history : History reviewed. No pertinent surgical history. Family history:   Family History   Problem Relation Age of Onset    Cancer Sister     Cancer Brother     High Blood Pressure Brother     Heart Disease Other     Hypertension Other     Elevated Lipids Other     Other Other         gout       Social history :  reports that he has quit smoking. His smoking use included cigarettes. He has a 30.00 pack-year smoking history. He has never used smokeless tobacco. He reports that he does not drink alcohol or use drugs. No Known Allergies    No current facility-administered medications on file prior to encounter. Current Outpatient Medications on File Prior to Encounter   Medication Sig Dispense Refill    doxazosin (CARDURA) 4 MG tablet Take 1 tablet by mouth 2 times daily 30 tablet 3    rivastigmine (EXELON) 4.6 MG/24HR Place 1 patch onto the skin daily      ranitidine (ZANTAC) 150 MG tablet Take 150 mg by mouth daily      QUEtiapine (SEROQUEL) 25 MG tablet Take 25 mg by mouth nightly      levothyroxine (SYNTHROID) 50 MCG tablet Take 50 mcg by mouth Daily      amLODIPine (NORVASC) 10 MG tablet Take 1 tablet by mouth daily (Patient taking differently: Take 5 mg by mouth daily Dose changed 12/12/18 per QUANG Jimenez NP) 30 tablet 0    acetaminophen (TYLENOL) 500 MG tablet Take 500 mg by mouth daily as needed for Pain      aspirin 81 MG tablet Take 81 mg by mouth daily      alfuzosin (UROXATRAL) 10 MG SR tablet Take 1 tablet by mouth daily 90 tablet 1    docusate sodium (COLACE) 100 MG capsule Take 1 capsule by mouth 2 times daily as needed for Constipation 90 capsule 1    gabapentin (NEURONTIN) 300 MG capsule Take 1 capsule by mouth 2 times daily for 30 days. . 60 capsule 0       Review of Systems:   Review of Systems   Unable to perform ROS: Mental status change           Examination:    BP (!) 92/53   Pulse 72   Temp 98.5 °F (36.9 °C)

## 2019-08-12 NOTE — CONSULTS
Nutrition Education    Nutrition Assessment: Pt agreed to throw out the salt in the house and continue his fruit and vegetable intake. Pt family asking about home delivery services. Referred them to a few different options  · Verbally reviewed information with Family  · Written educational materials provided. · Contact name and number provided. · Refer to Patient Education activity for more details.     Electronically signed by Drake Sanders RD, LD on 4/46/31 at 10:37 AM    Contact Number: [phone]    Time Spent: 20 minutes

## 2019-08-13 LAB
ACTIVATED CLOTTING TIME, LOW RANGE: 142 SEC
ANION GAP SERPL CALCULATED.3IONS-SCNC: 13 MMOL/L (ref 4–16)
APTT: 73.1 SECONDS (ref 21.2–33)
APTT: 78.2 SECONDS (ref 21.2–33)
BASE EXCESS: ABNORMAL (ref 0–3.3)
BASOPHILS ABSOLUTE: 0 K/CU MM
BASOPHILS RELATIVE PERCENT: 0.2 % (ref 0–1)
BUN BLDV-MCNC: 59 MG/DL (ref 6–23)
CALCIUM SERPL-MCNC: 8.5 MG/DL (ref 8.3–10.6)
CARBON MONOXIDE, BLOOD: 1.5 % (ref 0–5)
CHLORIDE BLD-SCNC: 103 MMOL/L (ref 99–110)
CO2 CONTENT: 23.2 MMOL/L (ref 19–24)
CO2: 22 MMOL/L (ref 21–32)
COMMENT: ABNORMAL
CREAT SERPL-MCNC: 1.9 MG/DL (ref 0.9–1.3)
DIFFERENTIAL TYPE: ABNORMAL
EOSINOPHILS ABSOLUTE: 0 K/CU MM
EOSINOPHILS RELATIVE PERCENT: 0.2 % (ref 0–3)
GFR AFRICAN AMERICAN: 41 ML/MIN/1.73M2
GFR NON-AFRICAN AMERICAN: 34 ML/MIN/1.73M2
GLUCOSE BLD-MCNC: 88 MG/DL (ref 70–99)
HCO3 ARTERIAL: 22.2 MMOL/L (ref 18–23)
HCT VFR BLD CALC: 28.5 % (ref 42–52)
HEMOGLOBIN: 9.3 GM/DL (ref 13.5–18)
IMMATURE NEUTROPHIL %: 1 % (ref 0–0.43)
LYMPHOCYTES ABSOLUTE: 0.7 K/CU MM
LYMPHOCYTES RELATIVE PERCENT: 7.4 % (ref 24–44)
MCH RBC QN AUTO: 29.2 PG (ref 27–31)
MCHC RBC AUTO-ENTMCNC: 32.6 % (ref 32–36)
MCV RBC AUTO: 89.3 FL (ref 78–100)
METHEMOGLOBIN ARTERIAL: 1 %
MONOCYTES ABSOLUTE: 0.7 K/CU MM
MONOCYTES RELATIVE PERCENT: 7 % (ref 0–4)
NUCLEATED RBC %: 0 %
O2 SATURATION: 88.5 % (ref 96–97)
PCO2 ARTERIAL: 32 MMHG (ref 32–45)
PDW BLD-RTO: 14.7 % (ref 11.7–14.9)
PH BLOOD: 7.45 (ref 7.34–7.45)
PLATELET # BLD: 263 K/CU MM (ref 140–440)
PLATELET # BLD: 272 K/CU MM (ref 140–440)
PMV BLD AUTO: 9.5 FL (ref 7.5–11.1)
PO2 ARTERIAL: 54 MMHG (ref 75–100)
POTASSIUM SERPL-SCNC: 3.6 MMOL/L (ref 3.5–5.1)
RBC # BLD: 3.19 M/CU MM (ref 4.6–6.2)
SEGMENTED NEUTROPHILS ABSOLUTE COUNT: 8.4 K/CU MM
SEGMENTED NEUTROPHILS RELATIVE PERCENT: 84.2 % (ref 36–66)
SODIUM BLD-SCNC: 138 MMOL/L (ref 135–145)
T4 TOTAL: 6.53 UG/DL (ref 5.1–14.1)
T4 TOTAL: NORMAL UG/DL (ref 5.1–14.1)
TOTAL IMMATURE NEUTOROPHIL: 0.1 K/CU MM
TOTAL NUCLEATED RBC: 0 K/CU MM
WBC # BLD: 10 K/CU MM (ref 4–10.5)

## 2019-08-13 PROCEDURE — 80048 BASIC METABOLIC PNL TOTAL CA: CPT

## 2019-08-13 PROCEDURE — 93312 ECHO TRANSESOPHAGEAL: CPT

## 2019-08-13 PROCEDURE — 85025 COMPLETE CBC W/AUTO DIFF WBC: CPT

## 2019-08-13 PROCEDURE — 6360000002 HC RX W HCPCS: Performed by: PHYSICIAN ASSISTANT

## 2019-08-13 PROCEDURE — 99231 SBSQ HOSP IP/OBS SF/LOW 25: CPT | Performed by: NURSE PRACTITIONER

## 2019-08-13 PROCEDURE — C1725 CATH, TRANSLUMIN NON-LASER: HCPCS

## 2019-08-13 PROCEDURE — 85049 AUTOMATED PLATELET COUNT: CPT

## 2019-08-13 PROCEDURE — 93325 DOPPLER ECHO COLOR FLOW MAPG: CPT | Performed by: INTERNAL MEDICINE

## 2019-08-13 PROCEDURE — 2580000003 HC RX 258: Performed by: INTERNAL MEDICINE

## 2019-08-13 PROCEDURE — 7100000001 HC PACU RECOVERY - ADDTL 15 MIN

## 2019-08-13 PROCEDURE — 36592 COLLECT BLOOD FROM PICC: CPT

## 2019-08-13 PROCEDURE — 37799 UNLISTED PX VASCULAR SURGERY: CPT

## 2019-08-13 PROCEDURE — 85347 COAGULATION TIME ACTIVATED: CPT

## 2019-08-13 PROCEDURE — 82803 BLOOD GASES ANY COMBINATION: CPT

## 2019-08-13 PROCEDURE — C1874 STENT, COATED/COV W/DEL SYS: HCPCS

## 2019-08-13 PROCEDURE — 93312 ECHO TRANSESOPHAGEAL: CPT | Performed by: INTERNAL MEDICINE

## 2019-08-13 PROCEDURE — 6360000002 HC RX W HCPCS: Performed by: INTERNAL MEDICINE

## 2019-08-13 PROCEDURE — 94761 N-INVAS EAR/PLS OXIMETRY MLT: CPT

## 2019-08-13 PROCEDURE — 6370000000 HC RX 637 (ALT 250 FOR IP): Performed by: INTERNAL MEDICINE

## 2019-08-13 PROCEDURE — 2000000000 HC ICU R&B

## 2019-08-13 PROCEDURE — 7100000000 HC PACU RECOVERY - FIRST 15 MIN

## 2019-08-13 PROCEDURE — 94660 CPAP INITIATION&MGMT: CPT

## 2019-08-13 PROCEDURE — 2700000000 HC OXYGEN THERAPY PER DAY

## 2019-08-13 RX ORDER — MORPHINE SULFATE 4 MG/ML
4 INJECTION, SOLUTION INTRAMUSCULAR; INTRAVENOUS
Status: COMPLETED | OUTPATIENT
Start: 2019-08-13 | End: 2019-08-13

## 2019-08-13 RX ORDER — LORAZEPAM 2 MG/ML
1 INJECTION INTRAMUSCULAR ONCE
Status: COMPLETED | OUTPATIENT
Start: 2019-08-13 | End: 2019-08-13

## 2019-08-13 RX ORDER — LORAZEPAM 2 MG/ML
1 INJECTION INTRAMUSCULAR ONCE
Status: DISCONTINUED | OUTPATIENT
Start: 2019-08-14 | End: 2019-08-13

## 2019-08-13 RX ORDER — FUROSEMIDE 10 MG/ML
40 INJECTION INTRAMUSCULAR; INTRAVENOUS ONCE
Status: COMPLETED | OUTPATIENT
Start: 2019-08-13 | End: 2019-08-13

## 2019-08-13 RX ORDER — ZIPRASIDONE MESYLATE 20 MG/ML
10 INJECTION, POWDER, LYOPHILIZED, FOR SOLUTION INTRAMUSCULAR ONCE
Status: COMPLETED | OUTPATIENT
Start: 2019-08-14 | End: 2019-08-14

## 2019-08-13 RX ADMIN — SODIUM CHLORIDE, PRESERVATIVE FREE 10 ML: 5 INJECTION INTRAVENOUS at 20:32

## 2019-08-13 RX ADMIN — Medication 10 ML: at 10:07

## 2019-08-13 RX ADMIN — SODIUM CHLORIDE, PRESERVATIVE FREE 10 ML: 5 INJECTION INTRAVENOUS at 08:47

## 2019-08-13 RX ADMIN — ASPIRIN 81 MG 81 MG: 81 TABLET ORAL at 08:47

## 2019-08-13 RX ADMIN — MORPHINE SULFATE 4 MG: 4 INJECTION, SOLUTION INTRAMUSCULAR; INTRAVENOUS at 06:20

## 2019-08-13 RX ADMIN — Medication 10 ML: at 20:32

## 2019-08-13 RX ADMIN — FAMOTIDINE 20 MG: 20 TABLET ORAL at 20:32

## 2019-08-13 RX ADMIN — ATORVASTATIN CALCIUM 80 MG: 40 TABLET, FILM COATED ORAL at 20:32

## 2019-08-13 RX ADMIN — LORAZEPAM 1 MG: 2 INJECTION INTRAMUSCULAR; INTRAVENOUS at 01:06

## 2019-08-13 RX ADMIN — FAMOTIDINE 20 MG: 20 TABLET ORAL at 08:46

## 2019-08-13 RX ADMIN — HEPARIN SODIUM AND DEXTROSE 21.5 UNITS/KG/HR: 10000; 5 INJECTION INTRAVENOUS at 08:47

## 2019-08-13 RX ADMIN — FUROSEMIDE 40 MG: 10 INJECTION, SOLUTION INTRAMUSCULAR; INTRAVENOUS at 10:07

## 2019-08-13 RX ADMIN — CLOPIDOGREL BISULFATE 75 MG: 75 TABLET ORAL at 08:46

## 2019-08-13 RX ADMIN — LORAZEPAM 1 MG: 2 INJECTION INTRAMUSCULAR; INTRAVENOUS at 03:01

## 2019-08-13 RX ADMIN — SODIUM CHLORIDE: 900 INJECTION INTRAVENOUS at 20:19

## 2019-08-13 ASSESSMENT — PAIN SCALES - PAIN ASSESSMENT IN ADVANCED DEMENTIA (PAINAD)
FACIALEXPRESSION: 0
BODYLANGUAGE: 0
TOTALSCORE: 0
TOTALSCORE: 0
FACIALEXPRESSION: 0
CONSOLABILITY: 0
FACIALEXPRESSION: 0
BODYLANGUAGE: 0
NEGVOCALIZATION: 0
CONSOLABILITY: 0
FACIALEXPRESSION: 0
BREATHING: 0
BREATHING: 0
NEGVOCALIZATION: 0
CONSOLABILITY: 0
NEGVOCALIZATION: 0
CONSOLABILITY: 0
FACIALEXPRESSION: 0
NEGVOCALIZATION: 0
CONSOLABILITY: 0
BODYLANGUAGE: 0
TOTALSCORE: 0
BREATHING: 0
TOTALSCORE: 0
NEGVOCALIZATION: 0
FACIALEXPRESSION: 0
TOTALSCORE: 0
BODYLANGUAGE: 0
TOTALSCORE: 0
TOTALSCORE: 0
BODYLANGUAGE: 0
NEGVOCALIZATION: 0
BREATHING: 0
CONSOLABILITY: 0
NEGVOCALIZATION: 0
BODYLANGUAGE: 0
BODYLANGUAGE: 0
FACIALEXPRESSION: 0
CONSOLABILITY: 0
BREATHING: 0

## 2019-08-13 ASSESSMENT — PAIN DESCRIPTION - PAIN TYPE: TYPE: CHRONIC PAIN

## 2019-08-13 ASSESSMENT — PAIN DESCRIPTION - PROGRESSION: CLINICAL_PROGRESSION: GRADUALLY WORSENING

## 2019-08-13 ASSESSMENT — PAIN DESCRIPTION - LOCATION: LOCATION: NECK

## 2019-08-13 ASSESSMENT — PAIN SCALES - GENERAL
PAINLEVEL_OUTOF10: 0
PAINLEVEL_OUTOF10: 6
PAINLEVEL_OUTOF10: 0

## 2019-08-13 ASSESSMENT — PAIN SCALES - WONG BAKER
WONGBAKER_NUMERICALRESPONSE: 0
WONGBAKER_NUMERICALRESPONSE: 0
WONGBAKER_NUMERICALRESPONSE: 4
WONGBAKER_NUMERICALRESPONSE: 6

## 2019-08-13 ASSESSMENT — PAIN DESCRIPTION - ORIENTATION: ORIENTATION: RIGHT

## 2019-08-13 ASSESSMENT — PAIN DESCRIPTION - FREQUENCY: FREQUENCY: INTERMITTENT

## 2019-08-13 ASSESSMENT — PAIN - FUNCTIONAL ASSESSMENT: PAIN_FUNCTIONAL_ASSESSMENT: PREVENTS OR INTERFERES SOME ACTIVE ACTIVITIES AND ADLS

## 2019-08-13 ASSESSMENT — PAIN DESCRIPTION - DESCRIPTORS: DESCRIPTORS: ACHING

## 2019-08-13 ASSESSMENT — PAIN DESCRIPTION - ONSET: ONSET: AWAKENED FROM SLEEP

## 2019-08-13 NOTE — PROGRESS NOTES
prophylaxis, patient ambulating   GI Prophylaxis [] PPI, [] H2 Blocker, [] No GI prophylaxis, patient is receiving diet/Tube Feeds   Code Status DNR-CCA   Disposition Patient requires continued admission due to complete HB, CAD   MDM [] Low, [] Moderate,[x]  High     History of Present Illness: Subjective     Patient Seen & Examined at the bedside     Patient is resting in bed with no distress - no fever reported   Appears to be confused    No chest pain and SOB reported     Ten point ROS reviewed negative, unless as noted above    Objective: Intake/Output Summary (Last 24 hours) at 8/13/2019 0919  Last data filed at 8/13/2019 0527  Gross per 24 hour   Intake 966.54 ml   Output 3200 ml   Net -2233.46 ml      Vitals:   Vitals:    08/13/19 0828   BP:    Pulse:    Resp: 19   Temp:    SpO2: 94%     Physical Exam:    GEN Awake male, resting in bed in no apparent distress. Appears given age. RESP Clear to auscultation, no wheezes, rales or rhonchi. CARDIO/VASC -S1/S2 auscultated. Regular rate without appreciable murmurs, rubs, or gallops. Peripheral pulses equal bilaterally and palpable. No peripheral edema. GI Abdomen is soft without significant tenderness, masses, or guarding. Bowel sounds are normoactive. Rectal exam deferred.  Shaw catheter is present. MSK No gross joint deformities. Spontaneous movement of all extremities  SKIN Normal coloration, warm, dry. NEURO Confused and disoriented -Cranial nerves appear grossly intact, normal speech, no lateralizing weakness.     Medications:   Medications:    furosemide  40 mg Intravenous Once    levothyroxine  50 mcg Oral Daily    sodium chloride flush  10 mL Intravenous 2 times per day    sodium chloride flush  10 mL Intravenous 2 times per day    famotidine  20 mg Oral BID    atorvastatin  80 mg Oral Nightly    aspirin  81 mg Oral Daily    clopidogrel  75 mg Oral Daily    heparin (porcine)  60 Units/kg Intravenous Once    enoxaparin  40 mg

## 2019-08-14 ENCOUNTER — APPOINTMENT (OUTPATIENT)
Dept: CT IMAGING | Age: 84
DRG: 242 | End: 2019-08-14
Payer: MEDICARE

## 2019-08-14 ENCOUNTER — APPOINTMENT (OUTPATIENT)
Dept: GENERAL RADIOLOGY | Age: 84
DRG: 242 | End: 2019-08-14
Payer: MEDICARE

## 2019-08-14 ENCOUNTER — APPOINTMENT (OUTPATIENT)
Dept: INTERVENTIONAL RADIOLOGY/VASCULAR | Age: 84
DRG: 242 | End: 2019-08-14
Payer: MEDICARE

## 2019-08-14 LAB
ANION GAP SERPL CALCULATED.3IONS-SCNC: 14 MMOL/L (ref 4–16)
APTT: 27.3 SECONDS (ref 21.2–33)
BUN BLDV-MCNC: 57 MG/DL (ref 6–23)
CALCIUM SERPL-MCNC: 8.7 MG/DL (ref 8.3–10.6)
CHLORIDE BLD-SCNC: 100 MMOL/L (ref 99–110)
CO2: 24 MMOL/L (ref 21–32)
CREAT SERPL-MCNC: 2 MG/DL (ref 0.9–1.3)
FLUID TYPE: ABNORMAL INDEX
GFR AFRICAN AMERICAN: 39 ML/MIN/1.73M2
GFR NON-AFRICAN AMERICAN: 32 ML/MIN/1.73M2
GLUCOSE BLD-MCNC: 85 MG/DL (ref 70–99)
GLUCOSE, FLUID: ABNORMAL MG/DL
HCT VFR BLD CALC: 30 % (ref 42–52)
HEMOGLOBIN: 9.5 GM/DL (ref 13.5–18)
LACTATE DEHYDROGENASE, FLUID: 257 IU/L
LYMPHOCYTES, BODY FLUID: 50 %
MCH RBC QN AUTO: 28.5 PG (ref 27–31)
MCHC RBC AUTO-ENTMCNC: 31.7 % (ref 32–36)
MCV RBC AUTO: 90.1 FL (ref 78–100)
MESOTHELIAL FLUID: 10 /100 WBC
MONOCYTE, FLUID: 15 %
NEUTROPHIL, FLUID: 35 %
PDW BLD-RTO: 14.5 % (ref 11.7–14.9)
PLATELET # BLD: 283 K/CU MM (ref 140–440)
PMV BLD AUTO: 9.9 FL (ref 7.5–11.1)
POTASSIUM SERPL-SCNC: 3.3 MMOL/L (ref 3.5–5.1)
PROCALCITONIN: 0.53
PROTEIN FLUID: ABNORMAL G/DL
RBC # BLD: 3.33 M/CU MM (ref 4.6–6.2)
RBC FLUID: 980 /CU MM
SODIUM BLD-SCNC: 138 MMOL/L (ref 135–145)
WBC # BLD: 9.6 K/CU MM (ref 4–10.5)
WBC FLUID: 601 /CU MM

## 2019-08-14 PROCEDURE — 36592 COLLECT BLOOD FROM PICC: CPT

## 2019-08-14 PROCEDURE — 83615 LACTATE (LD) (LDH) ENZYME: CPT

## 2019-08-14 PROCEDURE — 2580000003 HC RX 258: Performed by: INTERNAL MEDICINE

## 2019-08-14 PROCEDURE — 2000000000 HC ICU R&B

## 2019-08-14 PROCEDURE — 6370000000 HC RX 637 (ALT 250 FOR IP): Performed by: INTERNAL MEDICINE

## 2019-08-14 PROCEDURE — 87073 CULTURE BACTERIA ANAEROBIC: CPT

## 2019-08-14 PROCEDURE — 71045 X-RAY EXAM CHEST 1 VIEW: CPT

## 2019-08-14 PROCEDURE — 89051 BODY FLUID CELL COUNT: CPT

## 2019-08-14 PROCEDURE — 0W993ZZ DRAINAGE OF RIGHT PLEURAL CAVITY, PERCUTANEOUS APPROACH: ICD-10-PCS | Performed by: RADIOLOGY

## 2019-08-14 PROCEDURE — 33210 INSERT ELECTRD/PM CATH SNGL: CPT | Performed by: INTERNAL MEDICINE

## 2019-08-14 PROCEDURE — 99232 SBSQ HOSP IP/OBS MODERATE 35: CPT | Performed by: INTERNAL MEDICINE

## 2019-08-14 PROCEDURE — APPNB30 APP NON BILLABLE TIME 0-30 MINS: Performed by: NURSE PRACTITIONER

## 2019-08-14 PROCEDURE — 6360000002 HC RX W HCPCS

## 2019-08-14 PROCEDURE — 71250 CT THORAX DX C-: CPT

## 2019-08-14 PROCEDURE — 32555 ASPIRATE PLEURA W/ IMAGING: CPT

## 2019-08-14 PROCEDURE — 84145 PROCALCITONIN (PCT): CPT

## 2019-08-14 PROCEDURE — 80048 BASIC METABOLIC PNL TOTAL CA: CPT

## 2019-08-14 PROCEDURE — 87040 BLOOD CULTURE FOR BACTERIA: CPT

## 2019-08-14 PROCEDURE — P9047 ALBUMIN (HUMAN), 25%, 50ML: HCPCS | Performed by: INTERNAL MEDICINE

## 2019-08-14 PROCEDURE — 6360000002 HC RX W HCPCS: Performed by: INTERNAL MEDICINE

## 2019-08-14 PROCEDURE — 33210 INSERT ELECTRD/PM CATH SNGL: CPT

## 2019-08-14 PROCEDURE — 87071 CULTURE AEROBIC QUANT OTHER: CPT

## 2019-08-14 PROCEDURE — 85730 THROMBOPLASTIN TIME PARTIAL: CPT

## 2019-08-14 PROCEDURE — 70450 CT HEAD/BRAIN W/O DYE: CPT

## 2019-08-14 PROCEDURE — 2720000010 HC SURG SUPPLY STERILE

## 2019-08-14 PROCEDURE — 2709999900 HC NON-CHARGEABLE SUPPLY

## 2019-08-14 PROCEDURE — 85027 COMPLETE CBC AUTOMATED: CPT

## 2019-08-14 PROCEDURE — 87205 SMEAR GRAM STAIN: CPT

## 2019-08-14 PROCEDURE — 2700000000 HC OXYGEN THERAPY PER DAY

## 2019-08-14 PROCEDURE — C1894 INTRO/SHEATH, NON-LASER: HCPCS

## 2019-08-14 PROCEDURE — 84157 ASSAY OF PROTEIN OTHER: CPT

## 2019-08-14 PROCEDURE — 82945 GLUCOSE OTHER FLUID: CPT

## 2019-08-14 PROCEDURE — 6360000002 HC RX W HCPCS: Performed by: NURSE PRACTITIONER

## 2019-08-14 RX ORDER — POTASSIUM CHLORIDE 7.45 MG/ML
10 INJECTION INTRAVENOUS PRN
Status: DISCONTINUED | OUTPATIENT
Start: 2019-08-14 | End: 2019-08-20 | Stop reason: HOSPADM

## 2019-08-14 RX ORDER — POTASSIUM CHLORIDE 1.5 G/1.77G
40 POWDER, FOR SOLUTION ORAL PRN
Status: DISCONTINUED | OUTPATIENT
Start: 2019-08-14 | End: 2019-08-20 | Stop reason: HOSPADM

## 2019-08-14 RX ORDER — POTASSIUM CHLORIDE 20 MEQ/1
40 TABLET, EXTENDED RELEASE ORAL PRN
Status: DISCONTINUED | OUTPATIENT
Start: 2019-08-14 | End: 2019-08-20 | Stop reason: HOSPADM

## 2019-08-14 RX ORDER — ALBUMIN (HUMAN) 12.5 G/50ML
12.5 SOLUTION INTRAVENOUS ONCE
Status: COMPLETED | OUTPATIENT
Start: 2019-08-14 | End: 2019-08-14

## 2019-08-14 RX ORDER — FUROSEMIDE 10 MG/ML
40 INJECTION INTRAMUSCULAR; INTRAVENOUS 2 TIMES DAILY
Status: DISCONTINUED | OUTPATIENT
Start: 2019-08-14 | End: 2019-08-15

## 2019-08-14 RX ADMIN — SODIUM CHLORIDE: 900 INJECTION INTRAVENOUS at 23:46

## 2019-08-14 RX ADMIN — FAMOTIDINE 20 MG: 20 TABLET ORAL at 09:53

## 2019-08-14 RX ADMIN — CLOPIDOGREL BISULFATE 75 MG: 75 TABLET ORAL at 09:53

## 2019-08-14 RX ADMIN — ASPIRIN 81 MG 81 MG: 81 TABLET ORAL at 09:53

## 2019-08-14 RX ADMIN — Medication 10 ML: at 09:54

## 2019-08-14 RX ADMIN — ENOXAPARIN SODIUM 40 MG: 100 INJECTION SUBCUTANEOUS at 09:52

## 2019-08-14 RX ADMIN — VANCOMYCIN HYDROCHLORIDE 1750 MG: 5 INJECTION, POWDER, LYOPHILIZED, FOR SOLUTION INTRAVENOUS at 13:15

## 2019-08-14 RX ADMIN — ZIPRASIDONE MESYLATE 10 MG: 20 INJECTION, POWDER, LYOPHILIZED, FOR SOLUTION INTRAMUSCULAR at 00:30

## 2019-08-14 RX ADMIN — ACETAMINOPHEN 650 MG: 325 TABLET ORAL at 00:35

## 2019-08-14 RX ADMIN — ALBUMIN (HUMAN) 12.5 G: 0.25 INJECTION, SOLUTION INTRAVENOUS at 18:16

## 2019-08-14 RX ADMIN — SODIUM CHLORIDE, PRESERVATIVE FREE 10 ML: 5 INJECTION INTRAVENOUS at 20:46

## 2019-08-14 RX ADMIN — SODIUM CHLORIDE, PRESERVATIVE FREE 10 ML: 5 INJECTION INTRAVENOUS at 09:54

## 2019-08-14 RX ADMIN — FUROSEMIDE 40 MG: 10 INJECTION, SOLUTION INTRAVENOUS at 13:13

## 2019-08-14 RX ADMIN — FUROSEMIDE 40 MG: 10 INJECTION, SOLUTION INTRAVENOUS at 18:16

## 2019-08-14 RX ADMIN — ATORVASTATIN CALCIUM 80 MG: 40 TABLET, FILM COATED ORAL at 20:45

## 2019-08-14 RX ADMIN — LEVOTHYROXINE SODIUM 50 MCG: 50 TABLET ORAL at 11:06

## 2019-08-14 RX ADMIN — Medication 10 ML: at 20:46

## 2019-08-14 RX ADMIN — CEFEPIME 2 G: 2 INJECTION, POWDER, FOR SOLUTION INTRAVENOUS at 13:09

## 2019-08-14 RX ADMIN — MELATONIN 3 MG: at 20:46

## 2019-08-14 RX ADMIN — POTASSIUM CHLORIDE 40 MEQ: 1.5 POWDER, FOR SOLUTION ORAL at 13:53

## 2019-08-14 RX ADMIN — CEFEPIME 2 G: 2 INJECTION, POWDER, FOR SOLUTION INTRAVENOUS at 23:46

## 2019-08-14 RX ADMIN — FAMOTIDINE 20 MG: 20 TABLET ORAL at 20:46

## 2019-08-14 ASSESSMENT — PAIN SCALES - PAIN ASSESSMENT IN ADVANCED DEMENTIA (PAINAD)
TOTALSCORE: 5
NEGVOCALIZATION: 1
CONSOLABILITY: 0
FACIALEXPRESSION: 0
CONSOLABILITY: 0
BREATHING: 0
CONSOLABILITY: 0
BREATHING: 0
NEGVOCALIZATION: 0
CONSOLABILITY: 1
NEGVOCALIZATION: 2
NEGVOCALIZATION: 0
FACIALEXPRESSION: 0
BREATHING: 0
FACIALEXPRESSION: 1
FACIALEXPRESSION: 0
BREATHING: 0
CONSOLABILITY: 0
NEGVOCALIZATION: 0
CONSOLABILITY: 0
FACIALEXPRESSION: 1
FACIALEXPRESSION: 0
NEGVOCALIZATION: 0
BODYLANGUAGE: 0
FACIALEXPRESSION: 0
TOTALSCORE: 7
BODYLANGUAGE: 0
FACIALEXPRESSION: 0
BREATHING: 0
NEGVOCALIZATION: 0
BREATHING: 0
TOTALSCORE: 6
NEGVOCALIZATION: 0
FACIALEXPRESSION: 1
BODYLANGUAGE: 0
FACIALEXPRESSION: 0
BODYLANGUAGE: 0
CONSOLABILITY: 0
TOTALSCORE: 0
BREATHING: 0
FACIALEXPRESSION: 0
CONSOLABILITY: 1
NEGVOCALIZATION: 0
CONSOLABILITY: 0
BREATHING: 0
FACIALEXPRESSION: 0
TOTALSCORE: 0
TOTALSCORE: 0
NEGVOCALIZATION: 0
CONSOLABILITY: 1
FACIALEXPRESSION: 0
BREATHING: 0
BREATHING: 0
BODYLANGUAGE: 1
BREATHING: 1
TOTALSCORE: 0
TOTALSCORE: 0
CONSOLABILITY: 0
NEGVOCALIZATION: 0
TOTALSCORE: 0
CONSOLABILITY: 0
BREATHING: 1
BODYLANGUAGE: 0
FACIALEXPRESSION: 0
TOTALSCORE: 0
CONSOLABILITY: 0
BODYLANGUAGE: 0
NEGVOCALIZATION: 0
BODYLANGUAGE: 1
NEGVOCALIZATION: 0
BODYLANGUAGE: 0
TOTALSCORE: 0
BODYLANGUAGE: 0
BODYLANGUAGE: 0
TOTALSCORE: 0
FACIALEXPRESSION: 0
NEGVOCALIZATION: 2
BREATHING: 2
TOTALSCORE: 0
BODYLANGUAGE: 0
NEGVOCALIZATION: 0
CONSOLABILITY: 0
CONSOLABILITY: 0
BODYLANGUAGE: 0
TOTALSCORE: 0
BODYLANGUAGE: 0
BREATHING: 0
BREATHING: 0
NEGVOCALIZATION: 0
TOTALSCORE: 0
BREATHING: 0
BODYLANGUAGE: 0
BREATHING: 0
TOTALSCORE: 0
CONSOLABILITY: 0
FACIALEXPRESSION: 0
BODYLANGUAGE: 1
BODYLANGUAGE: 0
TOTALSCORE: 0
FACIALEXPRESSION: 0
CONSOLABILITY: 0
NEGVOCALIZATION: 0

## 2019-08-14 ASSESSMENT — PAIN SCALES - GENERAL
PAINLEVEL_OUTOF10: 3
PAINLEVEL_OUTOF10: 0

## 2019-08-14 NOTE — FLOWSHEET NOTE
Pt back from IR, right groin sheath CD&I all vitals remain stable slight periods of confusion but baseline. Dr. Tila Silva at bedside to see pt, no new orders.

## 2019-08-14 NOTE — PROGRESS NOTES
Nephrology Progress Note  8/14/2019 12:18 PM  Subjective:   Admit Date: 8/11/2019  PCP: Mabel Woo PA-C  Interval History: Pt more confused overnight and plan PPM Friday and will need thoracenteisis as well    Diet: DIET CARDIAC;  Pain is:Mild      Data:   Scheduled Meds:   furosemide  40 mg Intravenous BID    cefepime  2 g Intravenous Q12H    levothyroxine  50 mcg Oral Daily    sodium chloride flush  10 mL Intravenous 2 times per day    sodium chloride flush  10 mL Intravenous 2 times per day    famotidine  20 mg Oral BID    atorvastatin  80 mg Oral Nightly    aspirin  81 mg Oral Daily    clopidogrel  75 mg Oral Daily    enoxaparin  40 mg Subcutaneous Daily     Continuous Infusions:   sodium chloride 20 mL/hr at 08/13/19 2019     PRN Meds:potassium chloride **OR** potassium alternative oral replacement **OR** potassium chloride, melatonin, sodium chloride flush, ondansetron, polyethylene glycol, sodium chloride flush, acetaminophen, magnesium hydroxide, ondansetron  I/O last 3 completed shifts: In: 959 [P.O.:240; I.V.:504]  Out: 2675 [Urine:2675]  I/O this shift:  In: 240 [P.O.:240]  Out: 650 [Urine:650]    Intake/Output Summary (Last 24 hours) at 8/14/2019 1218  Last data filed at 8/14/2019 1152  Gross per 24 hour   Intake 984 ml   Output 2775 ml   Net -1791 ml     CBC:   Recent Labs     08/12/19  0420 08/13/19  0505 08/13/19  1010 08/14/19  0515   WBC 12.3*  --  10.0 9.6   HGB 9.2*  --  9.3* 9.5*    272 263 283     BMP:    Recent Labs     08/12/19  0420 08/13/19  1010 08/14/19  0515    138 138   K 4.2 3.6 3.3*    103 100   CO2 19* 22 24   BUN 60* 59* 57*   CREATININE 2.3* 1.9* 2.0*   GLUCOSE 122* 88 85     Hepatic: No results for input(s): AST, ALT, ALB, BILITOT, ALKPHOS in the last 72 hours. Troponin: No results for input(s): TROPONINI in the last 72 hours. BNP: No results for input(s): BNP in the last 72 hours.   Lipids:   Recent Labs     08/11/19  1415   CHOL 115   HDL 50

## 2019-08-14 NOTE — FLOWSHEET NOTE
Pt to IR at this time, staff in IR reminded of Transvenous pacer with sheath. Pt alert and oriented and vitals stable at this time.

## 2019-08-14 NOTE — PROGRESS NOTES
[address]  consulted by Dr. Patrice Fraire for monitoring and adjustment. Indication for treatment: Pneumonia  Goal trough: 15-20 mcg/mL     Pertinent Laboratory Values:   Temp Readings from Last 3 Encounters:   08/14/19 98.3 °F (36.8 °C) (Oral)   08/08/19 97.8 °F (36.6 °C) (Oral)   11/21/18 98.1 °F (36.7 °C) (Oral)     Recent Labs     08/11/19  1500 08/12/19  0420 08/13/19  1010 08/14/19  0515   WBC  --  12.3* 10.0 9.6   LACTATE 1.3  --   --   --      Recent Labs     08/12/19  0420 08/13/19  1010 08/14/19  0515   BUN 60* 59* 57*   CREATININE 2.3* 1.9* 2.0*     Estimated Creatinine Clearance: 30 mL/min (A) (based on SCr of 2 mg/dL (H)). Intake/Output Summary (Last 24 hours) at 8/14/2019 1221  Last data filed at 8/14/2019 1152  Gross per 24 hour   Intake 984 ml   Output 2775 ml   Net -1791 ml       Pertinent Cultures:  Date    Source    Results  8/11   Blood    NGTD    Vancomycin level:   TROUGH:  No results for input(s): VANCOTROUGH in the last 72 hours. RANDOM:  No results for input(s): VANCORANDOM in the last 72 hours. Assessment:  · WBC and temperature: WNL  · SCr, BUN, and urine output: LADI (baseline Scr ~1.2)  · Day(s) of therapy: 1   · Vancomycin level: to be collected     Plan:  · Will proceed with intermittent dosing based on levels 2/2 impaired renal function   · Give vancomycin 1750 mg IVPB x 1 (18 mg/kg)   · Random level 24h post-dose   · Pharmacy will continue to monitor patient and adjust therapy as indicated    Thank you for the consult.   Nick Pickard RPh  8/14/2019 12:21 PM

## 2019-08-14 NOTE — PROGRESS NOTES
history includes Cancer in his brother and sister; Elevated Lipids in an other family member; Heart Disease in an other family member; High Blood Pressure in his brother; Hypertension in an other family member; Other in an other family member. No Known Allergies    Review of Systems:   All 14 systems were reviewed and are negative  Except for the positive findings which are documented     /61   Pulse 94   Temp 98.3 °F (36.8 °C) (Oral)   Resp 20   Ht 5' 9\" (1.753 m)   Wt 205 lb 4 oz (93.1 kg)   SpO2 93%   BMI 30.31 kg/m²       Intake/Output Summary (Last 24 hours) at 8/14/2019 1828  Last data filed at 8/14/2019 1647  Gross per 24 hour   Intake 984 ml   Output 1775 ml   Net -791 ml       Physical Exam:  Constitutional:  Well developed, Well nourished, No acute distress  HENT:  Normocephalic, Atraumatic, Bilateral external ears normal  Neck- trachea is midline no carotid bruit noted  Eyes:  PERRL, Conjunctiva normal  Respiratory:  Normal breath sounds, No respiratory distress, No wheezing, No chest tenderness. Cardiovascular:  Normal heart rate, irregular rhythm, no murmurs appreciated, No rubs appreciated, No gallops appreciated, JVP not elevated  Abdomen/GI:  Soft, No tenderness  Musculoskeletal:  Intact distal pulses, no edema, No tenderness, No cyanosis, No clubbing. R DP and TP palpable. Integument:  Warm, Dry, R groin is soft and without hematoma or bleeding, Sheath intact with transparent dressing clean dry intact. Lymphatic:  No lymphadenopathy noted.    Neurologic:  Alert & confused  Psychiatric:  Affect and Mood : unchanged    Medications:    furosemide  40 mg Intravenous BID    cefepime  2 g Intravenous Q12H    vancomycin (VANCOCIN) intermittent dosing (placeholder)   Other RX Placeholder    levothyroxine  50 mcg Oral Daily    sodium chloride flush  10 mL Intravenous 2 times per day    sodium chloride flush  10 mL Intravenous 2 times per day    famotidine  20 mg Oral BID   

## 2019-08-15 ENCOUNTER — ANESTHESIA (OUTPATIENT)
Dept: OPERATING ROOM | Age: 84
DRG: 242 | End: 2019-08-15
Payer: MEDICARE

## 2019-08-15 ENCOUNTER — ANESTHESIA EVENT (OUTPATIENT)
Dept: OPERATING ROOM | Age: 84
DRG: 242 | End: 2019-08-15
Payer: MEDICARE

## 2019-08-15 ENCOUNTER — APPOINTMENT (OUTPATIENT)
Dept: GENERAL RADIOLOGY | Age: 84
DRG: 242 | End: 2019-08-15
Payer: MEDICARE

## 2019-08-15 VITALS
RESPIRATION RATE: 1 BRPM | OXYGEN SATURATION: 100 % | DIASTOLIC BLOOD PRESSURE: 61 MMHG | SYSTOLIC BLOOD PRESSURE: 102 MMHG

## 2019-08-15 LAB
ANION GAP SERPL CALCULATED.3IONS-SCNC: 13 MMOL/L (ref 4–16)
APTT: 28.1 SECONDS (ref 21.2–33)
BUN BLDV-MCNC: 61 MG/DL (ref 6–23)
CALCIUM SERPL-MCNC: 8.7 MG/DL (ref 8.3–10.6)
CHLORIDE BLD-SCNC: 105 MMOL/L (ref 99–110)
CO2: 24 MMOL/L (ref 21–32)
CREAT SERPL-MCNC: 1.9 MG/DL (ref 0.9–1.3)
DOSE AMOUNT: ABNORMAL
DOSE TIME: ABNORMAL
GFR AFRICAN AMERICAN: 41 ML/MIN/1.73M2
GFR NON-AFRICAN AMERICAN: 34 ML/MIN/1.73M2
GLUCOSE BLD-MCNC: 106 MG/DL (ref 70–99)
HCT VFR BLD CALC: 30.3 % (ref 42–52)
HEMOGLOBIN: 9.5 GM/DL (ref 13.5–18)
MCH RBC QN AUTO: 28.6 PG (ref 27–31)
MCHC RBC AUTO-ENTMCNC: 31.4 % (ref 32–36)
MCV RBC AUTO: 91.3 FL (ref 78–100)
PDW BLD-RTO: 14.6 % (ref 11.7–14.9)
PLATELET # BLD: 293 K/CU MM (ref 140–440)
PMV BLD AUTO: 9.9 FL (ref 7.5–11.1)
POTASSIUM SERPL-SCNC: 3.6 MMOL/L (ref 3.5–5.1)
RBC # BLD: 3.32 M/CU MM (ref 4.6–6.2)
SODIUM BLD-SCNC: 142 MMOL/L (ref 135–145)
VANCOMYCIN TROUGH: 9.4 UG/ML (ref 10–20)
WBC # BLD: 9.7 K/CU MM (ref 4–10.5)

## 2019-08-15 PROCEDURE — 36592 COLLECT BLOOD FROM PICC: CPT

## 2019-08-15 PROCEDURE — 2500000003 HC RX 250 WO HCPCS: Performed by: SURGERY

## 2019-08-15 PROCEDURE — 3600000013 HC SURGERY LEVEL 3 ADDTL 15MIN: Performed by: SURGERY

## 2019-08-15 PROCEDURE — 02HK3JZ INSERTION OF PACEMAKER LEAD INTO RIGHT VENTRICLE, PERCUTANEOUS APPROACH: ICD-10-PCS | Performed by: SURGERY

## 2019-08-15 PROCEDURE — 2580000003 HC RX 258: Performed by: INTERNAL MEDICINE

## 2019-08-15 PROCEDURE — 85730 THROMBOPLASTIN TIME PARTIAL: CPT

## 2019-08-15 PROCEDURE — 6360000002 HC RX W HCPCS: Performed by: SURGERY

## 2019-08-15 PROCEDURE — 6360000002 HC RX W HCPCS: Performed by: INTERNAL MEDICINE

## 2019-08-15 PROCEDURE — 85027 COMPLETE CBC AUTOMATED: CPT

## 2019-08-15 PROCEDURE — 3600000003 HC SURGERY LEVEL 3 BASE: Performed by: SURGERY

## 2019-08-15 PROCEDURE — 3700000001 HC ADD 15 MINUTES (ANESTHESIA): Performed by: SURGERY

## 2019-08-15 PROCEDURE — 2709999900 HC NON-CHARGEABLE SUPPLY: Performed by: SURGERY

## 2019-08-15 PROCEDURE — APPSS30 APP SPLIT SHARED TIME 16-30 MINUTES: Performed by: NURSE PRACTITIONER

## 2019-08-15 PROCEDURE — 76000 FLUOROSCOPY <1 HR PHYS/QHP: CPT

## 2019-08-15 PROCEDURE — 6360000002 HC RX W HCPCS: Performed by: NURSE ANESTHETIST, CERTIFIED REGISTERED

## 2019-08-15 PROCEDURE — 2000000000 HC ICU R&B

## 2019-08-15 PROCEDURE — 99232 SBSQ HOSP IP/OBS MODERATE 35: CPT | Performed by: INTERNAL MEDICINE

## 2019-08-15 PROCEDURE — 87081 CULTURE SCREEN ONLY: CPT

## 2019-08-15 PROCEDURE — C1785 PMKR, DUAL, RATE-RESP: HCPCS | Performed by: SURGERY

## 2019-08-15 PROCEDURE — 0JH606Z INSERTION OF PACEMAKER, DUAL CHAMBER INTO CHEST SUBCUTANEOUS TISSUE AND FASCIA, OPEN APPROACH: ICD-10-PCS | Performed by: SURGERY

## 2019-08-15 PROCEDURE — 94761 N-INVAS EAR/PLS OXIMETRY MLT: CPT

## 2019-08-15 PROCEDURE — C1898 LEAD, PMKR, OTHER THAN TRANS: HCPCS | Performed by: SURGERY

## 2019-08-15 PROCEDURE — 3700000000 HC ANESTHESIA ATTENDED CARE: Performed by: SURGERY

## 2019-08-15 PROCEDURE — 6370000000 HC RX 637 (ALT 250 FOR IP): Performed by: INTERNAL MEDICINE

## 2019-08-15 PROCEDURE — 2500000003 HC RX 250 WO HCPCS: Performed by: NURSE ANESTHETIST, CERTIFIED REGISTERED

## 2019-08-15 PROCEDURE — 85049 AUTOMATED PLATELET COUNT: CPT

## 2019-08-15 PROCEDURE — 02H63JZ INSERTION OF PACEMAKER LEAD INTO RIGHT ATRIUM, PERCUTANEOUS APPROACH: ICD-10-PCS | Performed by: SURGERY

## 2019-08-15 PROCEDURE — 2580000003 HC RX 258: Performed by: SURGERY

## 2019-08-15 PROCEDURE — 2580000003 HC RX 258: Performed by: PHYSICIAN ASSISTANT

## 2019-08-15 PROCEDURE — 80202 ASSAY OF VANCOMYCIN: CPT

## 2019-08-15 PROCEDURE — 71045 X-RAY EXAM CHEST 1 VIEW: CPT

## 2019-08-15 PROCEDURE — 6370000000 HC RX 637 (ALT 250 FOR IP): Performed by: PHYSICIAN ASSISTANT

## 2019-08-15 PROCEDURE — 2700000000 HC OXYGEN THERAPY PER DAY

## 2019-08-15 PROCEDURE — 6360000002 HC RX W HCPCS: Performed by: PHYSICIAN ASSISTANT

## 2019-08-15 PROCEDURE — 80048 BASIC METABOLIC PNL TOTAL CA: CPT

## 2019-08-15 DEVICE — PACEMAKER CARD 22.5GM W50.8XH46.6MM D7.4MM TI POLYUR SIL: Type: IMPLANTABLE DEVICE | Site: CHEST  WALL | Status: FUNCTIONAL

## 2019-08-15 DEVICE — LEAD PACE 6FR L52CM SIL INSUL BPLR PLATINIZED STEROID DXAC: Type: IMPLANTABLE DEVICE | Site: HEART | Status: FUNCTIONAL

## 2019-08-15 DEVICE — LEAD PACE AD 6FR L58CM VENT SIL PLAT INSUL BPLR PLATINIZED 507658] MEDTRONIC CARDIAC RTHYM MGT]: Type: IMPLANTABLE DEVICE | Site: HEART | Status: FUNCTIONAL

## 2019-08-15 RX ORDER — LIDOCAINE HYDROCHLORIDE 10 MG/ML
INJECTION, SOLUTION INFILTRATION; PERINEURAL
Status: COMPLETED | OUTPATIENT
Start: 2019-08-15 | End: 2019-08-15

## 2019-08-15 RX ORDER — METOPROLOL TARTRATE 5 MG/5ML
INJECTION INTRAVENOUS PRN
Status: DISCONTINUED | OUTPATIENT
Start: 2019-08-15 | End: 2019-08-16 | Stop reason: SDUPTHER

## 2019-08-15 RX ORDER — AMIODARONE HYDROCHLORIDE 50 MG/ML
INJECTION, SOLUTION INTRAVENOUS PRN
Status: DISCONTINUED | OUTPATIENT
Start: 2019-08-15 | End: 2019-08-16 | Stop reason: SDUPTHER

## 2019-08-15 RX ORDER — SODIUM CHLORIDE 0.9 % (FLUSH) 0.9 %
10 SYRINGE (ML) INJECTION PRN
Status: DISCONTINUED | OUTPATIENT
Start: 2019-08-15 | End: 2019-08-20 | Stop reason: HOSPADM

## 2019-08-15 RX ORDER — FENTANYL CITRATE 50 UG/ML
INJECTION, SOLUTION INTRAMUSCULAR; INTRAVENOUS PRN
Status: DISCONTINUED | OUTPATIENT
Start: 2019-08-15 | End: 2019-08-16 | Stop reason: SDUPTHER

## 2019-08-15 RX ORDER — LIDOCAINE HYDROCHLORIDE 20 MG/ML
INJECTION, SOLUTION EPIDURAL; INFILTRATION; INTRACAUDAL; PERINEURAL PRN
Status: DISCONTINUED | OUTPATIENT
Start: 2019-08-15 | End: 2019-08-16 | Stop reason: SDUPTHER

## 2019-08-15 RX ORDER — ONDANSETRON 2 MG/ML
INJECTION INTRAMUSCULAR; INTRAVENOUS PRN
Status: DISCONTINUED | OUTPATIENT
Start: 2019-08-15 | End: 2019-08-16 | Stop reason: SDUPTHER

## 2019-08-15 RX ORDER — PROPOFOL 10 MG/ML
INJECTION, EMULSION INTRAVENOUS CONTINUOUS PRN
Status: DISCONTINUED | OUTPATIENT
Start: 2019-08-15 | End: 2019-08-16 | Stop reason: SDUPTHER

## 2019-08-15 RX ORDER — SODIUM CHLORIDE 0.9 % (FLUSH) 0.9 %
10 SYRINGE (ML) INJECTION EVERY 12 HOURS SCHEDULED
Status: DISCONTINUED | OUTPATIENT
Start: 2019-08-15 | End: 2019-08-20 | Stop reason: HOSPADM

## 2019-08-15 RX ADMIN — ATORVASTATIN CALCIUM 80 MG: 40 TABLET, FILM COATED ORAL at 21:31

## 2019-08-15 RX ADMIN — AMIODARONE HYDROCHLORIDE 150 MG: 50 INJECTION, SOLUTION INTRAVENOUS at 12:22

## 2019-08-15 RX ADMIN — FAMOTIDINE 20 MG: 20 TABLET ORAL at 21:31

## 2019-08-15 RX ADMIN — METOPROLOL TARTRATE 10 MG: 5 INJECTION, SOLUTION INTRAVENOUS at 12:33

## 2019-08-15 RX ADMIN — FENTANYL CITRATE 25 MCG: 50 INJECTION INTRAMUSCULAR; INTRAVENOUS at 12:03

## 2019-08-15 RX ADMIN — Medication 10 ML: at 08:45

## 2019-08-15 RX ADMIN — AMIODARONE HYDROCHLORIDE 1 MG/MIN: 50 INJECTION, SOLUTION INTRAVENOUS at 13:11

## 2019-08-15 RX ADMIN — AMIODARONE HYDROCHLORIDE 0.5 MG/MIN: 50 INJECTION, SOLUTION INTRAVENOUS at 20:34

## 2019-08-15 RX ADMIN — FENTANYL CITRATE 25 MCG: 50 INJECTION INTRAMUSCULAR; INTRAVENOUS at 11:58

## 2019-08-15 RX ADMIN — Medication 10 ML: at 21:39

## 2019-08-15 RX ADMIN — PROPOFOL 30 MCG/KG/MIN: 10 INJECTION, EMULSION INTRAVENOUS at 12:00

## 2019-08-15 RX ADMIN — Medication 10 ML: at 21:31

## 2019-08-15 RX ADMIN — SODIUM CHLORIDE, PRESERVATIVE FREE 10 ML: 5 INJECTION INTRAVENOUS at 08:45

## 2019-08-15 RX ADMIN — LIDOCAINE HYDROCHLORIDE 100 MG: 20 INJECTION, SOLUTION EPIDURAL; INFILTRATION; INTRACAUDAL; PERINEURAL at 11:51

## 2019-08-15 RX ADMIN — FUROSEMIDE 40 MG: 10 INJECTION, SOLUTION INTRAVENOUS at 08:45

## 2019-08-15 RX ADMIN — ACETAMINOPHEN 650 MG: 325 TABLET ORAL at 21:31

## 2019-08-15 RX ADMIN — FENTANYL CITRATE 50 MCG: 50 INJECTION INTRAMUSCULAR; INTRAVENOUS at 12:01

## 2019-08-15 RX ADMIN — VANCOMYCIN 1500 MG: 1 INJECTION, SOLUTION INTRAVENOUS at 17:33

## 2019-08-15 RX ADMIN — CEFEPIME 2 G: 2 INJECTION, POWDER, FOR SOLUTION INTRAVENOUS at 11:55

## 2019-08-15 RX ADMIN — ONDANSETRON 4 MG: 2 INJECTION INTRAMUSCULAR; INTRAVENOUS at 11:51

## 2019-08-15 ASSESSMENT — PAIN SCALES - PAIN ASSESSMENT IN ADVANCED DEMENTIA (PAINAD)
NEGVOCALIZATION: 0
TOTALSCORE: 0
CONSOLABILITY: 0
NEGVOCALIZATION: 0
TOTALSCORE: 0
TOTALSCORE: 0
NEGVOCALIZATION: 0
NEGVOCALIZATION: 0
BREATHING: 0
FACIALEXPRESSION: 0
CONSOLABILITY: 0
BODYLANGUAGE: 0
CONSOLABILITY: 0
BODYLANGUAGE: 0
BODYLANGUAGE: 0
CONSOLABILITY: 0
TOTALSCORE: 0
NEGVOCALIZATION: 0
BODYLANGUAGE: 0
BREATHING: 0
BODYLANGUAGE: 0
BODYLANGUAGE: 0
FACIALEXPRESSION: 0
FACIALEXPRESSION: 0
CONSOLABILITY: 0
BREATHING: 0
BREATHING: 0
TOTALSCORE: 0
CONSOLABILITY: 0
BODYLANGUAGE: 0
FACIALEXPRESSION: 0
BREATHING: 0
FACIALEXPRESSION: 0
CONSOLABILITY: 0
NEGVOCALIZATION: 0
TOTALSCORE: 0
BODYLANGUAGE: 0
BODYLANGUAGE: 0
BREATHING: 0
BODYLANGUAGE: 0
BREATHING: 0
BREATHING: 0
NEGVOCALIZATION: 0
BODYLANGUAGE: 0
BREATHING: 0
NEGVOCALIZATION: 0
BREATHING: 0
TOTALSCORE: 0
BODYLANGUAGE: 0
TOTALSCORE: 0
CONSOLABILITY: 0
BREATHING: 0
BREATHING: 0
FACIALEXPRESSION: 0
FACIALEXPRESSION: 0
TOTALSCORE: 0
NEGVOCALIZATION: 0
NEGVOCALIZATION: 0
BREATHING: 0
NEGVOCALIZATION: 0
BREATHING: 0
CONSOLABILITY: 0
NEGVOCALIZATION: 0
TOTALSCORE: 0
BREATHING: 0
BREATHING: 0
TOTALSCORE: 0
BODYLANGUAGE: 0
BREATHING: 0
TOTALSCORE: 0
CONSOLABILITY: 0
BREATHING: 0
CONSOLABILITY: 0
NEGVOCALIZATION: 0
TOTALSCORE: 0
NEGVOCALIZATION: 0
TOTALSCORE: 0
FACIALEXPRESSION: 0
TOTALSCORE: 0
FACIALEXPRESSION: 0
BODYLANGUAGE: 0
BODYLANGUAGE: 0
TOTALSCORE: 0
BREATHING: 0
TOTALSCORE: 0
NEGVOCALIZATION: 0
TOTALSCORE: 0
NEGVOCALIZATION: 0
BREATHING: 0
BODYLANGUAGE: 0
BODYLANGUAGE: 0
TOTALSCORE: 0
CONSOLABILITY: 0
NEGVOCALIZATION: 0
BODYLANGUAGE: 0
CONSOLABILITY: 0
FACIALEXPRESSION: 0
TOTALSCORE: 0
FACIALEXPRESSION: 0
TOTALSCORE: 0
CONSOLABILITY: 0
CONSOLABILITY: 0
FACIALEXPRESSION: 0
CONSOLABILITY: 0
FACIALEXPRESSION: 0
BODYLANGUAGE: 0
FACIALEXPRESSION: 0
NEGVOCALIZATION: 0
BODYLANGUAGE: 0
CONSOLABILITY: 0
BREATHING: 0
NEGVOCALIZATION: 0
BODYLANGUAGE: 0
TOTALSCORE: 0
FACIALEXPRESSION: 0
CONSOLABILITY: 0
NEGVOCALIZATION: 0
CONSOLABILITY: 0
NEGVOCALIZATION: 0
BODYLANGUAGE: 0
BODYLANGUAGE: 0
FACIALEXPRESSION: 0
TOTALSCORE: 0
FACIALEXPRESSION: 0
TOTALSCORE: 0
BREATHING: 0
BODYLANGUAGE: 0
NEGVOCALIZATION: 0
TOTALSCORE: 0
BREATHING: 0
NEGVOCALIZATION: 0
BODYLANGUAGE: 0
BODYLANGUAGE: 0
FACIALEXPRESSION: 0
FACIALEXPRESSION: 0
CONSOLABILITY: 0
FACIALEXPRESSION: 0
FACIALEXPRESSION: 0
CONSOLABILITY: 0
FACIALEXPRESSION: 0
CONSOLABILITY: 0
FACIALEXPRESSION: 0
CONSOLABILITY: 0
FACIALEXPRESSION: 0
NEGVOCALIZATION: 0
CONSOLABILITY: 0
NEGVOCALIZATION: 0
FACIALEXPRESSION: 0
CONSOLABILITY: 0
FACIALEXPRESSION: 0
BREATHING: 0
CONSOLABILITY: 0
NEGVOCALIZATION: 0
NEGVOCALIZATION: 0
BREATHING: 0
NEGVOCALIZATION: 0
FACIALEXPRESSION: 0
BODYLANGUAGE: 0
BODYLANGUAGE: 0
NEGVOCALIZATION: 0
BODYLANGUAGE: 0
TOTALSCORE: 0
CONSOLABILITY: 0
TOTALSCORE: 0

## 2019-08-15 ASSESSMENT — PULMONARY FUNCTION TESTS
PIF_VALUE: 0
PIF_VALUE: 1
PIF_VALUE: 0
PIF_VALUE: 1
PIF_VALUE: 0
PIF_VALUE: 0
PIF_VALUE: 1
PIF_VALUE: 0
PIF_VALUE: 1
PIF_VALUE: 0
PIF_VALUE: 0

## 2019-08-15 ASSESSMENT — PAIN SCALES - GENERAL: PAINLEVEL_OUTOF10: 3

## 2019-08-15 NOTE — PROGRESS NOTES
Dr. Cheryl Fowler at bedside for patient update. MD updated that IV fluids have been discontinued. Per Dr. Cheryl Fowler give am dose of lasix and discontinue twice daily orders. RN to implement new orders.

## 2019-08-15 NOTE — PROGRESS NOTES
tobacco. He reports that he does not drink alcohol or use drugs. Family history:  family history includes Cancer in his brother and sister; Elevated Lipids in an other family member; Heart Disease in an other family member; High Blood Pressure in his brother; Hypertension in an other family member; Other in an other family member. No Known Allergies    Review of Systems:   All 14 systems were reviewed and are negative  Except for the positive findings which are documented     /72   Pulse 71   Temp 97.7 °F (36.5 °C) (Oral)   Resp 19   Ht 5' 9\" (1.753 m)   Wt 199 lb 11.8 oz (90.6 kg)   SpO2 100%   BMI 29.50 kg/m²       Intake/Output Summary (Last 24 hours) at 8/15/2019 1207  Last data filed at 8/15/2019 0602  Gross per 24 hour   Intake 842 ml   Output 1675 ml   Net -833 ml       Physical Exam:  Constitutional:  Well developed, Well nourished, No acute distress  HENT:  Normocephalic, Atraumatic, Bilateral external ears normal  Neck- trachea is midline no carotid bruit noted  Eyes:  PERRL, Conjunctiva normal  Respiratory:  Normal breath sounds, No respiratory distress, No wheezing, No chest tenderness. Cardiovascular:  Normal heart rate, irregular rhythm, no murmurs appreciated, No rubs appreciated, No gallops appreciated, JVP not elevated  Abdomen/GI:  Soft, No tenderness  Musculoskeletal:  Intact distal pulses, no edema, No tenderness, No cyanosis, No clubbing. R DP and TP palpable. Integument:  Warm, Dry, R groin is soft and without hematoma or bleeding. Lymphatic:  No lymphadenopathy noted.    Neurologic:  Alert & confused  Psychiatric:  Affect and Mood : unchanged    Medications:    cefepime  2 g Intravenous Q12H    vancomycin (VANCOCIN) intermittent dosing (placeholder)   Other RX Placeholder    levothyroxine  50 mcg Oral Daily    sodium chloride flush  10 mL Intravenous 2 times per day    sodium chloride flush  10 mL Intravenous 2 times per day    famotidine  20 mg Oral BID   

## 2019-08-15 NOTE — ANESTHESIA PRE PROCEDURE
limited  Mouth opening: > = 3 FB Dental:    (+) upper dentures and lower dentures      Pulmonary:                              Cardiovascular:    (+) hypertension: moderate, angina:, past MI: no interval change, CAD:,                   Neuro/Psych:               GI/Hepatic/Renal:   (+) GERD: poorly controlled,           Endo/Other:    (+) hyperthyroidism::., .                 Abdominal:           Vascular:                                        Anesthesia Plan      general, MAC and TIVA     ASA 4       Induction: intravenous. Anesthetic plan and risks discussed with patient and legal guardian. Use of blood products discussed with patient and legal guardian whom.                    Gabriel Curran, APRN - CRNA   8/15/2019

## 2019-08-15 NOTE — PROGRESS NOTES
Patient returned from Pacemaker placement at this time. Left Chest wall dressing remains clean/ dry/ and intact. Patient wakes up to stimuli and answers all questions. Report received at bedside by OR Nurse and [facility] CRNA. Patient family updated by Dr. Edmund Bowles. RN to monitor patient closely.

## 2019-08-15 NOTE — PROGRESS NOTES
Hyponatremia - resolved    # Dementia - chronic     Patient off rivastigmine   Confused and disoriented    Diet DIET CARDIAC;   DVT Prophylaxis [] Lovenox, []  Heparin, [] SCDs, []No VTE prophylaxis, patient ambulating   GI Prophylaxis [] PPI, [] H2 Blocker, [] No GI prophylaxis, patient is receiving diet/Tube Feeds   Code Status DNR-CCA   Disposition Patient requires continued admission due to complete HB, CAD   MDM [] Low, [] Moderate,[x]  High     History of Present Illness: Subjective     Patient Seen & Examined at the bedside     Patient is resting in bed with no distress - no fever reported   Able to recognize his family members     Ten point ROS reviewed negative, unless as noted above    Objective: Intake/Output Summary (Last 24 hours) at 8/15/2019 0858  Last data filed at 8/15/2019 0602  Gross per 24 hour   Intake 1082 ml   Output 2325 ml   Net -1243 ml      Vitals:   Vitals:    08/15/19 0803   BP: 122/66   Pulse: 78   Resp: 21   Temp:    SpO2: 100%     Physical Exam:    GEN Awake male, resting in bed in no apparent distress. Appears given age. RESP Clear to auscultation, no wheezes, rales or rhonchi. CARDIO/VASC -S1/S2 auscultated. Regular rate without appreciable murmurs, rubs, or gallops. Peripheral pulses equal bilaterally and palpable. No peripheral edema. GI Abdomen is soft without significant tenderness, masses, or guarding. Bowel sounds are normoactive. Rectal exam deferred.  Shaw catheter is present. MSK No gross joint deformities. Spontaneous movement of all extremities  SKIN Normal coloration, warm, dry. NEURO Confused and disoriented -Cranial nerves appear grossly intact, normal speech, no lateralizing weakness.     Medications:   Medications:    furosemide  40 mg Intravenous BID    cefepime  2 g Intravenous Q12H    vancomycin (VANCOCIN) intermittent dosing (placeholder)   Other RX Placeholder    levothyroxine  50 mcg Oral Daily    sodium chloride flush  10 mL Intravenous 2

## 2019-08-15 NOTE — PROGRESS NOTES
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 Plan     1 Cr 1.9 this AM and +2L UOP and no need for acute HD at this time; IVF off and Lasix d/c'ed after 1 dose this AM  2 Na 142 and stable   3 PPM today with Dr. Abbott Said   4 Recent 's and low stable overall; monitor   5 still confused at this time and monitor   6 s/p thoracentesis 8/14 with 200 ml off       PRISCILLA Carcamo       Nephrology Attending Progress Note  8/15/2019 12:07 PM  Subjective:   Admit Date: 8/11/2019  PCP: Alonso Meier PA-C    Interval History: I have personally performed face to face diagnostic evaluation on this patient. I have personally reviewed pertinent labs and imaging and agree with the care plan above.  My additional findings are as follows:  Pt seen this am prior to surgery and PPM today    Objective:   Vitals: /72   Pulse 71   Temp 97.7 °F (36.5 °C) (Oral)   Resp 19   Ht 5' 9\" (1.753 m)   Wt 199 lb 11.8 oz (90.6 kg)   SpO2 100%   BMI 29.50 kg/m²   Awake weak  Soft nt  Trace edema    Assessment and Plan:  IMP:  As stated above    Plan     1 bp low stable monitor  2 has temp PM and for PPM today  3 fu plan cabg based on how he does, sp

## 2019-08-15 NOTE — PROGRESS NOTES
Dr. Kishan Marcial at bedside. Hold PO medications and Blood thinners. Patient to Permanent Pacer placement today. Patient family and POA at bedside for update and in agreement with procedure.

## 2019-08-16 ENCOUNTER — APPOINTMENT (OUTPATIENT)
Dept: ULTRASOUND IMAGING | Age: 84
DRG: 242 | End: 2019-08-16
Payer: MEDICARE

## 2019-08-16 ENCOUNTER — APPOINTMENT (OUTPATIENT)
Dept: GENERAL RADIOLOGY | Age: 84
DRG: 242 | End: 2019-08-16
Payer: MEDICARE

## 2019-08-16 LAB
ANION GAP SERPL CALCULATED.3IONS-SCNC: 10 MMOL/L (ref 4–16)
APTT: 28.1 SECONDS (ref 21.2–33)
APTT: 42.7 SECONDS (ref 21.2–33)
APTT: >240 SECONDS (ref 21.2–33)
BUN BLDV-MCNC: 53 MG/DL (ref 6–23)
CALCIUM SERPL-MCNC: 8.7 MG/DL (ref 8.3–10.6)
CHLORIDE BLD-SCNC: 102 MMOL/L (ref 99–110)
CO2: 27 MMOL/L (ref 21–32)
CREAT SERPL-MCNC: 1.7 MG/DL (ref 0.9–1.3)
CULTURE: NORMAL
DOSE AMOUNT: NORMAL
DOSE TIME: NORMAL
GFR AFRICAN AMERICAN: 47 ML/MIN/1.73M2
GFR NON-AFRICAN AMERICAN: 39 ML/MIN/1.73M2
GLUCOSE BLD-MCNC: 108 MG/DL (ref 70–99)
HCT VFR BLD CALC: 30.2 % (ref 42–52)
HCT VFR BLD CALC: 30.3 % (ref 42–52)
HEMOGLOBIN: 9.6 GM/DL (ref 13.5–18)
HEMOGLOBIN: 9.6 GM/DL (ref 13.5–18)
Lab: NORMAL
MCH RBC QN AUTO: 29.3 PG (ref 27–31)
MCH RBC QN AUTO: 29.4 PG (ref 27–31)
MCHC RBC AUTO-ENTMCNC: 31.7 % (ref 32–36)
MCHC RBC AUTO-ENTMCNC: 31.8 % (ref 32–36)
MCV RBC AUTO: 92.4 FL (ref 78–100)
MCV RBC AUTO: 92.6 FL (ref 78–100)
PDW BLD-RTO: 14.6 % (ref 11.7–14.9)
PDW BLD-RTO: 14.7 % (ref 11.7–14.9)
PLATELET # BLD: 276 K/CU MM (ref 140–440)
PLATELET # BLD: 282 K/CU MM (ref 140–440)
PMV BLD AUTO: 10.1 FL (ref 7.5–11.1)
PMV BLD AUTO: 9.8 FL (ref 7.5–11.1)
POTASSIUM SERPL-SCNC: 3.4 MMOL/L (ref 3.5–5.1)
RBC # BLD: 3.26 M/CU MM (ref 4.6–6.2)
RBC # BLD: 3.28 M/CU MM (ref 4.6–6.2)
SODIUM BLD-SCNC: 139 MMOL/L (ref 135–145)
SPECIMEN: NORMAL
VANCOMYCIN TROUGH: 14.3 UG/ML (ref 10–20)
WBC # BLD: 10.6 K/CU MM (ref 4–10.5)
WBC # BLD: 11.8 K/CU MM (ref 4–10.5)

## 2019-08-16 PROCEDURE — 97116 GAIT TRAINING THERAPY: CPT

## 2019-08-16 PROCEDURE — 6360000002 HC RX W HCPCS: Performed by: PHYSICIAN ASSISTANT

## 2019-08-16 PROCEDURE — 2580000003 HC RX 258: Performed by: INTERNAL MEDICINE

## 2019-08-16 PROCEDURE — 6360000002 HC RX W HCPCS: Performed by: INTERNAL MEDICINE

## 2019-08-16 PROCEDURE — 2580000003 HC RX 258: Performed by: PHYSICIAN ASSISTANT

## 2019-08-16 PROCEDURE — 94761 N-INVAS EAR/PLS OXIMETRY MLT: CPT

## 2019-08-16 PROCEDURE — 2700000000 HC OXYGEN THERAPY PER DAY

## 2019-08-16 PROCEDURE — 93005 ELECTROCARDIOGRAM TRACING: CPT | Performed by: INTERNAL MEDICINE

## 2019-08-16 PROCEDURE — 93971 EXTREMITY STUDY: CPT

## 2019-08-16 PROCEDURE — 6370000000 HC RX 637 (ALT 250 FOR IP): Performed by: PHYSICIAN ASSISTANT

## 2019-08-16 PROCEDURE — 93880 EXTRACRANIAL BILAT STUDY: CPT

## 2019-08-16 PROCEDURE — 71046 X-RAY EXAM CHEST 2 VIEWS: CPT

## 2019-08-16 PROCEDURE — 97163 PT EVAL HIGH COMPLEX 45 MIN: CPT

## 2019-08-16 PROCEDURE — 85027 COMPLETE CBC AUTOMATED: CPT

## 2019-08-16 PROCEDURE — 97166 OT EVAL MOD COMPLEX 45 MIN: CPT

## 2019-08-16 PROCEDURE — 85730 THROMBOPLASTIN TIME PARTIAL: CPT

## 2019-08-16 PROCEDURE — 2000000000 HC ICU R&B

## 2019-08-16 PROCEDURE — 6370000000 HC RX 637 (ALT 250 FOR IP): Performed by: INTERNAL MEDICINE

## 2019-08-16 PROCEDURE — APPSS30 APP SPLIT SHARED TIME 16-30 MINUTES: Performed by: NURSE PRACTITIONER

## 2019-08-16 PROCEDURE — 97530 THERAPEUTIC ACTIVITIES: CPT

## 2019-08-16 PROCEDURE — 80202 ASSAY OF VANCOMYCIN: CPT

## 2019-08-16 PROCEDURE — 97535 SELF CARE MNGMENT TRAINING: CPT

## 2019-08-16 PROCEDURE — 36592 COLLECT BLOOD FROM PICC: CPT

## 2019-08-16 PROCEDURE — 80048 BASIC METABOLIC PNL TOTAL CA: CPT

## 2019-08-16 PROCEDURE — 99232 SBSQ HOSP IP/OBS MODERATE 35: CPT | Performed by: INTERNAL MEDICINE

## 2019-08-16 RX ORDER — HEPARIN SODIUM 10000 [USP'U]/100ML
12 INJECTION, SOLUTION INTRAVENOUS CONTINUOUS
Status: DISCONTINUED | OUTPATIENT
Start: 2019-08-16 | End: 2019-08-19 | Stop reason: ALTCHOICE

## 2019-08-16 RX ORDER — CARVEDILOL 6.25 MG/1
6.25 TABLET ORAL 2 TIMES DAILY WITH MEALS
Status: DISCONTINUED | OUTPATIENT
Start: 2019-08-16 | End: 2019-08-19

## 2019-08-16 RX ORDER — HEPARIN SODIUM 1000 [USP'U]/ML
60 INJECTION, SOLUTION INTRAVENOUS; SUBCUTANEOUS PRN
Status: DISCONTINUED | OUTPATIENT
Start: 2019-08-16 | End: 2019-08-19 | Stop reason: ALTCHOICE

## 2019-08-16 RX ORDER — 0.9 % SODIUM CHLORIDE 0.9 %
250 INTRAVENOUS SOLUTION INTRAVENOUS ONCE
Status: COMPLETED | OUTPATIENT
Start: 2019-08-16 | End: 2019-08-16

## 2019-08-16 RX ORDER — HEPARIN SODIUM 5000 [USP'U]/ML
30 INJECTION, SOLUTION INTRAVENOUS; SUBCUTANEOUS PRN
Status: DISCONTINUED | OUTPATIENT
Start: 2019-08-16 | End: 2019-08-19 | Stop reason: ALTCHOICE

## 2019-08-16 RX ORDER — HEPARIN SODIUM 1000 [USP'U]/ML
60 INJECTION, SOLUTION INTRAVENOUS; SUBCUTANEOUS ONCE
Status: COMPLETED | OUTPATIENT
Start: 2019-08-16 | End: 2019-08-16

## 2019-08-16 RX ADMIN — CARVEDILOL 6.25 MG: 6.25 TABLET, FILM COATED ORAL at 11:47

## 2019-08-16 RX ADMIN — ATORVASTATIN CALCIUM 80 MG: 40 TABLET, FILM COATED ORAL at 21:10

## 2019-08-16 RX ADMIN — CEFEPIME 2 G: 2 INJECTION, POWDER, FOR SOLUTION INTRAVENOUS at 11:54

## 2019-08-16 RX ADMIN — ASPIRIN 81 MG 81 MG: 81 TABLET ORAL at 11:43

## 2019-08-16 RX ADMIN — CARVEDILOL 6.25 MG: 6.25 TABLET, FILM COATED ORAL at 17:05

## 2019-08-16 RX ADMIN — Medication 10 ML: at 11:46

## 2019-08-16 RX ADMIN — ONDANSETRON HYDROCHLORIDE 4 MG: 2 INJECTION, SOLUTION INTRAMUSCULAR; INTRAVENOUS at 18:59

## 2019-08-16 RX ADMIN — HEPARIN SODIUM 12 UNITS/KG/HR: 10000 INJECTION, SOLUTION INTRAVENOUS at 16:45

## 2019-08-16 RX ADMIN — POTASSIUM CHLORIDE 40 MEQ: 1.5 POWDER, FOR SOLUTION ORAL at 06:49

## 2019-08-16 RX ADMIN — CEFEPIME 2 G: 2 INJECTION, POWDER, FOR SOLUTION INTRAVENOUS at 00:09

## 2019-08-16 RX ADMIN — HEPARIN SODIUM 5400 UNITS: 1000 INJECTION, SOLUTION INTRAVENOUS; SUBCUTANEOUS at 16:46

## 2019-08-16 RX ADMIN — SODIUM CHLORIDE 250 ML: 9 INJECTION, SOLUTION INTRAVENOUS at 17:02

## 2019-08-16 RX ADMIN — FAMOTIDINE 20 MG: 20 TABLET ORAL at 11:43

## 2019-08-16 RX ADMIN — LEVOTHYROXINE SODIUM 50 MCG: 50 TABLET ORAL at 06:49

## 2019-08-16 RX ADMIN — FAMOTIDINE 20 MG: 20 TABLET ORAL at 21:10

## 2019-08-16 RX ADMIN — Medication 10 ML: at 21:10

## 2019-08-16 RX ADMIN — VANCOMYCIN HYDROCHLORIDE 1500 MG: 5 INJECTION, POWDER, LYOPHILIZED, FOR SOLUTION INTRAVENOUS at 17:03

## 2019-08-16 ASSESSMENT — PAIN SCALES - PAIN ASSESSMENT IN ADVANCED DEMENTIA (PAINAD)
TOTALSCORE: 0
FACIALEXPRESSION: 0
TOTALSCORE: 0
CONSOLABILITY: 0
NEGVOCALIZATION: 0
NEGVOCALIZATION: 0
CONSOLABILITY: 0
CONSOLABILITY: 0
BODYLANGUAGE: 0
CONSOLABILITY: 0
FACIALEXPRESSION: 0
NEGVOCALIZATION: 0
BREATHING: 0
BREATHING: 0
BODYLANGUAGE: 0
FACIALEXPRESSION: 0
TOTALSCORE: 0
BODYLANGUAGE: 0
BODYLANGUAGE: 0
NEGVOCALIZATION: 0
BREATHING: 0
BODYLANGUAGE: 0
TOTALSCORE: 0
NEGVOCALIZATION: 0
BREATHING: 0
BREATHING: 0
CONSOLABILITY: 0
BREATHING: 0
TOTALSCORE: 0
FACIALEXPRESSION: 0
NEGVOCALIZATION: 0
BODYLANGUAGE: 0
FACIALEXPRESSION: 0
CONSOLABILITY: 0
TOTALSCORE: 0
FACIALEXPRESSION: 0

## 2019-08-16 ASSESSMENT — PAIN SCALES - GENERAL
PAINLEVEL_OUTOF10: 0
PAINLEVEL_OUTOF10: 0

## 2019-08-16 NOTE — PROGRESS NOTES
Nephrology Progress Note  8/16/2019 11:15 AM  Subjective:   Admit Date: 8/11/2019  PCP: Nereida Srivastava PA-C  Interval History: s/p PPM but holding on CABG at this time; pt confusion improved; renal improved      Diet: DIET CARDIAC;  Pain is:Mild  Data:   Scheduled Meds:   carvedilol  6.25 mg Oral BID WC    sodium chloride flush  10 mL Intravenous 2 times per day    cefepime  2 g Intravenous Q12H    vancomycin (VANCOCIN) intermittent dosing (placeholder)   Other RX Placeholder    levothyroxine  50 mcg Oral Daily    famotidine  20 mg Oral BID    atorvastatin  80 mg Oral Nightly    aspirin  81 mg Oral Daily    [Held by provider] clopidogrel  75 mg Oral Daily     Continuous Infusions:   amiodarone 450mg/250ml D5W infusion 0.5 mg/min (08/16/19 0741)     PRN Meds:sodium chloride flush, potassium chloride **OR** potassium alternative oral replacement **OR** potassium chloride, melatonin, ondansetron, polyethylene glycol, acetaminophen, magnesium hydroxide, ondansetron  I/O last 3 completed shifts: In: 240 [P.O.:240]  Out: 3100 [Urine:3100]  No intake/output data recorded. Intake/Output Summary (Last 24 hours) at 8/16/2019 1115  Last data filed at 8/16/2019 0604  Gross per 24 hour   Intake 240 ml   Output 3100 ml   Net -2860 ml     CBC:   Recent Labs     08/14/19  0515 08/15/19  0410 08/16/19  0459   WBC 9.6 9.7 10.6*   HGB 9.5* 9.5* 9.6*    293 282     BMP:    Recent Labs     08/14/19  0515 08/15/19  0410 08/16/19  0459    142 139   K 3.3* 3.6 3.4*    105 102   CO2 24 24 27   BUN 57* 61* 53*   CREATININE 2.0* 1.9* 1.7*   GLUCOSE 85 106* 108*     Hepatic: No results for input(s): AST, ALT, ALB, BILITOT, ALKPHOS in the last 72 hours. Troponin: No results for input(s): TROPONINI in the last 72 hours. BNP: No results for input(s): BNP in the last 72 hours. Lipids:   No results for input(s): CHOL, HDL in the last 72 hours.     Invalid input(s): LDLCALCU  ABGs:   Lab Results   Component

## 2019-08-16 NOTE — CONSULTS
[facility], 1934, 2124/2124-A, 8/16/2019    History  Sun'aq:  The primary encounter diagnosis was AV block, 3rd degree ([address] Diagnoses of Elevated troponin, Elevated brain natriuretic peptide (BNP) level, Pneumonia due to organism, and LADI (acute kidney injury) ([address] were also pertinent to this visit. Patient  has a past medical history of Anxiety, Bladder cancer ([address] Cancer ([address] Chronic kidney disease, Chronic kidney disease, stage III (moderate) ([address] Coronary artery disease involving native coronary artery of native heart with unstable angina pectoris ([address] Dementia, Depression, GERD (gastroesophageal reflux disease), Gout, Hyperlipidemia, Hypertension, and Hyperthyroidism. Patient  has a past surgical history that includes thoracentesis (Right, 08/14/2019) and Pacemaker insertion (N/A, 8/15/2019). Subjective:  Patient states: \"Thank you girls for taking good care of me\". Pain:  Denies. Communication with other providers:  Handoff to RN, co-eval with OT. Restrictions: Fall risk    Home Setup/Prior level of function  Social/Functional History  Lives With: Alone  Type of Home: House  Home Layout: One level  Home Access: Stairs to enter without rails  Entrance Stairs - Number of Steps: 1  Bathroom Shower/Tub: Walk-in shower  Bathroom Equipment: Shower chair, Grab bars in shower  Home Equipment: Rolling walker  ADL Assistance: [address] Responsibilities: Yes  Ambulation Assistance: Independent  Transfer Assistance: Independent  Active :  Yes  Additional Comments: x1 fall from MI in last 6 months    Examination of body systems (includes body structures/functions, activity/participation limitations):  · Observation:  Supine in bed upon arrival   · Vision:  WFL, glasses  · Hearing:  Min [place]  · Cardiopulmonary:  On 2L of O2, none at baseline  · Cognition: min impaired, see 38    Electronically signed by:    Darren Ross, PT  8/16/2019, 2:30 PM

## 2019-08-16 NOTE — PROGRESS NOTES
Cardiology Progress Note     Today's Plan: CPM- coreg with parameters. Had PPM    Admit Date:  8/11/2019    Consult reason/ Seen today for: bradycardia  Elevated trop    Subjective and  Overnight Events:   He is working with PT. pleasantly confused. No chest pain    Telemetry A-V paced/ PVC    Assessment / Plan / Recommendation:     1. CHB- had PPM placed- stable   2. ASCVD: HAD PCI with stent to RCA- HR improved for short period of time then converted back to 3rd degree heart block and temp pacer was placed in cath lab under fluoroscopy. Continue DAPT for 1 year post hospital. ? For CABG- CTS is following  ? If to have during this hospitalization or to bring back and do. 3. Hypotension; improved. Off levophed  4. Dyslipidemia: continue statins  5. LADI/ CKD nephrology following       History of Presenting Illness:    Chief complain on admission : 80 y. o.year old who is admitted for  Chief Complaint   Patient presents with    Shortness of Breath     [facility] Friday. Taking antibiotic for pneumonia. Increased cough and SOB. Past medical history:    has a past medical history of Anxiety, Bladder cancer ([address] Cancer ([address] Chronic kidney disease, Chronic kidney disease, stage III (moderate) ([address] Coronary artery disease involving native coronary artery of native heart with unstable angina pectoris ([address] Dementia, Depression, GERD (gastroesophageal reflux disease), Gout, Hyperlipidemia, Hypertension, and Hyperthyroidism. Past surgical history:   has a past surgical history that includes thoracentesis (Right, 08/14/2019) and Pacemaker insertion (N/A, 8/15/2019). Social History:   reports that he has quit smoking. His smoking use included cigarettes. He has a 30.00 pack-year smoking history. He has never used smokeless tobacco. He reports that he does not drink alcohol or use drugs.   Family history:  family history includes Cancer in his brother and sister; Elevated Lipids in an other family member; Heart Disease in an other family member; High Blood Pressure in his brother; Hypertension in an other family member; Other in an other family member. No Known Allergies    Review of Systems:   All 14 systems were reviewed and are negative  Except for the positive findings which are documented     BP (!) 108/45   Pulse 69   Temp 97.7 °F (36.5 °C) (Oral)   Resp 17   Ht 5' 9\" (1.753 m)   Wt 198 lb 6.6 oz (90 kg)   SpO2 97%   BMI 29.30 kg/m²       Intake/Output Summary (Last 24 hours) at 8/16/2019 1506  Last data filed at 8/16/2019 0604  Gross per 24 hour   Intake 240 ml   Output 2100 ml   Net -1860 ml       Physical Exam:  Constitutional:  Well developed, Well nourished, No acute distress  HENT:  Normocephalic, Atraumatic, Bilateral external ears normal  Neck- trachea is midline no carotid bruit noted  Eyes:  PERRL, Conjunctiva normal  Respiratory:  Normal breath sounds, No respiratory distress, No wheezing, No chest tenderness. Cardiovascular: paced rhythm, no murmurs appreciated, No rubs appreciated, No gallops appreciated, JVP not elevated  Abdomen/GI:  Soft, No tenderness  Musculoskeletal:  Intact distal pulses, no edema, No tenderness, No cyanosis  Integument:  Warm, Dry  Lymphatic:  No lymphadenopathy noted.    Neurologic:  Alert & confused  Psychiatric:  Affect and Mood : unchanged    Medications:    carvedilol  6.25 mg Oral BID WC    sodium chloride  250 mL Intravenous Once    heparin (porcine)  60 Units/kg Intravenous Once    sodium chloride flush  10 mL Intravenous 2 times per day    cefepime  2 g Intravenous Q12H    vancomycin (VANCOCIN) intermittent dosing (placeholder)   Other RX Placeholder    levothyroxine  50 mcg Oral Daily    famotidine  20 mg Oral BID    atorvastatin  80 mg Oral Nightly    aspirin  81 mg Oral Daily    [Held by provider] clopidogrel  75 mg Oral Daily      heparin

## 2019-08-16 NOTE — PLAN OF CARE
PT and OT evals were completed today. Please see related notes.     Michael, [address] [facility] [address]  3:31 PM 8/16/2019

## 2019-08-16 NOTE — FLOWSHEET NOTE
Pt has not been pulling at lines/tubing during shift. Pt has had eyes closed and snoring for most of shift. When pt wakes up pt is able to answer most questions appropriately. VSS and no signs of distress noted.

## 2019-08-16 NOTE — PROGRESS NOTES
PATIENT NAME: Severiano Krill    TODAY'S DATE: 08/16/19    SUBJECTIVE:    Pt is a little more lucid today. Sitting up and eating lunch. OBJECTIVE:   VITALS:    Vitals:    08/16/19 1402   BP: (!) 108/45   Pulse: 69   Resp: 17   Temp:    SpO2: 97%     INTAKE/OUTPUT:    Date 08/16/19 0000 - 08/16/19 2359   Shift 0214-0616 8705-3406 6793-6878 24 Hour Total   INTAKE   Shift Total(mL/kg)       OUTPUT   Urine(mL/kg/hr) 850(1.2)   850   Shift Total(mL/kg) 850(9.4)   850(9.4)   Weight (kg) 90 90 90 90      Patient Vitals for the past 96 hrs (Last 3 readings):   Weight   08/16/19 0602 198 lb 6.6 oz (90 kg)   08/15/19 0602 199 lb 11.8 oz (90.6 kg)   08/14/19 0602 205 lb 4 oz (93.1 kg)       EXAM:  Blood pressure (!) 108/45, pulse 69, temperature 97.7 °F (36.5 °C), temperature source Oral, resp. rate 17, height 5' 9\" (1.753 m), weight 198 lb 6.6 oz (90 kg), SpO2 97 %. General appearance: No apparent distress, appears stated age and cooperative. Skin: unremarkable  HEENT Normocephalic, atraumatic without obvious deformity. Neck: Supple, Trachea midline   Lungs: Good respiratory effort. Clear to auscultation, bilaterally   Heart: Regular rate/ rhythm   Abdomen: Soft, non-tender or non-distended   Extremities: min edema warm well perfused  Neurologic: Alert, grossly intact  Mental status: normal affect      Data:  CBC:   Recent Labs     08/14/19  0515 08/15/19  0410 08/16/19  0459   WBC 9.6 9.7 10.6*   HGB 9.5* 9.5* 9.6*   HCT 30.0* 30.3* 30.3*    293 282     BMP:    Recent Labs     08/14/19  0515 08/15/19  0410 08/16/19  0459    142 139   K 3.3* 3.6 3.4*    105 102   CO2 24 24 27   BUN 57* 61* 53*   CREATININE 2.0* 1.9* 1.7*   GLUCOSE 85 106* 108*     Hepatic:   No results for input(s): AST, ALT, ALB, BILITOT, ALKPHOS in the last 72 hours. Mag:      No results for input(s): MG in the last 72 hours. Phos:   No results for input(s): PHOS in the last 72 hours.    INR:   No results for input(s): INR in the last 72 hours. Radiology Review:  CXR Summary     Impression   Mild congestive heart failure. ASSESSMENT AND PLAN:    Patient Active Problem List   Diagnosis    HTN (hypertension)    Depression    PND (post-nasal drip)    BPH (benign prostatic hyperplasia)    Cold feet    Constipation    Pro[facility] [address]    Peripheral neuropathy    Chronic kidney disease, stage III (moderate) (HCC)    Postoperative pulmonary embolism (HCC)    Alzheimer's disease    Acute metabolic encephalopathy    Cancer ([facility] [address]    Acute hyponatremia    Acute hypokalemia    Complete heart block ([facility] [address]    Coronary artery disease involving native coronary artery of native heart with unstable angina pectoris ([facility])    NSTEMI (non-ST elevated myocardial infarction) ([facility] [address]    AV block, 3rd degree ([facility] [address]    CHB (complete heart block) ([facility] [address]       S/p PPM.  Pt more awake today, answers questions appropriately. Need PT/OT eval.   Continue to hold plavix and start ACS protocol heparin. Carotid duplex shows DIVYA 70-99%, needs CTA but creatinine still elevated. Hopeful that we can DC pt and possibly have him return for outpt CABG after his confusion resolves.      Terry Becker PA-C

## 2019-08-17 LAB
ANION GAP SERPL CALCULATED.3IONS-SCNC: 12 MMOL/L (ref 4–16)
APTT: 29.5 SECONDS (ref 21.2–33)
APTT: 37.8 SECONDS (ref 21.2–33)
APTT: 58.3 SECONDS (ref 21.2–33)
BUN BLDV-MCNC: 60 MG/DL (ref 6–23)
CALCIUM SERPL-MCNC: 8.6 MG/DL (ref 8.3–10.6)
CHLORIDE BLD-SCNC: 103 MMOL/L (ref 99–110)
CO2: 25 MMOL/L (ref 21–32)
CREAT SERPL-MCNC: 1.9 MG/DL (ref 0.9–1.3)
DOSE AMOUNT: NORMAL
DOSE TIME: NORMAL
EKG ATRIAL RATE: 74 BPM
EKG DIAGNOSIS: NORMAL
EKG P AXIS: 52 DEGREES
EKG P-R INTERVAL: 208 MS
EKG Q-T INTERVAL: 394 MS
EKG QRS DURATION: 140 MS
EKG QTC CALCULATION (BAZETT): 437 MS
EKG R AXIS: -71 DEGREES
EKG T AXIS: 124 DEGREES
EKG VENTRICULAR RATE: 74 BPM
GFR AFRICAN AMERICAN: 41 ML/MIN/1.73M2
GFR NON-AFRICAN AMERICAN: 34 ML/MIN/1.73M2
GLUCOSE BLD-MCNC: 110 MG/DL (ref 70–99)
HCT VFR BLD CALC: 29.8 % (ref 42–52)
HEMOGLOBIN: 9.3 GM/DL (ref 13.5–18)
MCH RBC QN AUTO: 28.8 PG (ref 27–31)
MCHC RBC AUTO-ENTMCNC: 31.2 % (ref 32–36)
MCV RBC AUTO: 92.3 FL (ref 78–100)
PDW BLD-RTO: 14.6 % (ref 11.7–14.9)
PLATELET # BLD: 272 K/CU MM (ref 140–440)
PMV BLD AUTO: 10.1 FL (ref 7.5–11.1)
POTASSIUM SERPL-SCNC: 3.8 MMOL/L (ref 3.5–5.1)
RBC # BLD: 3.23 M/CU MM (ref 4.6–6.2)
SODIUM BLD-SCNC: 140 MMOL/L (ref 135–145)
VANCOMYCIN RANDOM: 19.2 UG/ML
VANCOMYCIN RANDOM: NORMAL UG/ML
WBC # BLD: 12.3 K/CU MM (ref 4–10.5)

## 2019-08-17 PROCEDURE — 36592 COLLECT BLOOD FROM PICC: CPT

## 2019-08-17 PROCEDURE — 6370000000 HC RX 637 (ALT 250 FOR IP): Performed by: PHYSICIAN ASSISTANT

## 2019-08-17 PROCEDURE — 6360000002 HC RX W HCPCS: Performed by: INTERNAL MEDICINE

## 2019-08-17 PROCEDURE — 6360000002 HC RX W HCPCS: Performed by: PHYSICIAN ASSISTANT

## 2019-08-17 PROCEDURE — 2700000000 HC OXYGEN THERAPY PER DAY

## 2019-08-17 PROCEDURE — 80048 BASIC METABOLIC PNL TOTAL CA: CPT

## 2019-08-17 PROCEDURE — 97116 GAIT TRAINING THERAPY: CPT

## 2019-08-17 PROCEDURE — 99233 SBSQ HOSP IP/OBS HIGH 50: CPT | Performed by: INTERNAL MEDICINE

## 2019-08-17 PROCEDURE — 2580000003 HC RX 258: Performed by: PHYSICIAN ASSISTANT

## 2019-08-17 PROCEDURE — 85730 THROMBOPLASTIN TIME PARTIAL: CPT

## 2019-08-17 PROCEDURE — 94761 N-INVAS EAR/PLS OXIMETRY MLT: CPT

## 2019-08-17 PROCEDURE — 6370000000 HC RX 637 (ALT 250 FOR IP): Performed by: NURSE PRACTITIONER

## 2019-08-17 PROCEDURE — 2000000000 HC ICU R&B

## 2019-08-17 PROCEDURE — 80202 ASSAY OF VANCOMYCIN: CPT

## 2019-08-17 PROCEDURE — 6370000000 HC RX 637 (ALT 250 FOR IP): Performed by: INTERNAL MEDICINE

## 2019-08-17 PROCEDURE — 85027 COMPLETE CBC AUTOMATED: CPT

## 2019-08-17 PROCEDURE — 93010 ELECTROCARDIOGRAM REPORT: CPT | Performed by: INTERNAL MEDICINE

## 2019-08-17 RX ORDER — GUAIFENESIN/DEXTROMETHORPHAN 100-10MG/5
5 SYRUP ORAL EVERY 4 HOURS PRN
Status: DISCONTINUED | OUTPATIENT
Start: 2019-08-17 | End: 2019-08-20 | Stop reason: HOSPADM

## 2019-08-17 RX ORDER — FUROSEMIDE 10 MG/ML
40 INJECTION INTRAMUSCULAR; INTRAVENOUS ONCE
Status: COMPLETED | OUTPATIENT
Start: 2019-08-17 | End: 2019-08-17

## 2019-08-17 RX ADMIN — CEFEPIME 2 G: 2 INJECTION, POWDER, FOR SOLUTION INTRAVENOUS at 12:59

## 2019-08-17 RX ADMIN — FAMOTIDINE 20 MG: 20 TABLET ORAL at 21:27

## 2019-08-17 RX ADMIN — VANCOMYCIN HYDROCHLORIDE 1250 MG: 5 INJECTION, POWDER, LYOPHILIZED, FOR SOLUTION INTRAVENOUS at 16:28

## 2019-08-17 RX ADMIN — HEPARIN SODIUM 15 UNITS/KG/HR: 10000 INJECTION, SOLUTION INTRAVENOUS at 12:59

## 2019-08-17 RX ADMIN — CARVEDILOL 6.25 MG: 6.25 TABLET, FILM COATED ORAL at 17:53

## 2019-08-17 RX ADMIN — ASPIRIN 81 MG 81 MG: 81 TABLET ORAL at 09:06

## 2019-08-17 RX ADMIN — Medication 10 ML: at 09:07

## 2019-08-17 RX ADMIN — GUAIFENESIN AND DEXTROMETHORPHAN 5 ML: 100; 10 SYRUP ORAL at 04:21

## 2019-08-17 RX ADMIN — FUROSEMIDE 40 MG: 10 INJECTION, SOLUTION INTRAVENOUS at 06:49

## 2019-08-17 RX ADMIN — HEPARIN SODIUM 2700 UNITS: 5000 INJECTION INTRAVENOUS; SUBCUTANEOUS at 12:59

## 2019-08-17 RX ADMIN — CARVEDILOL 6.25 MG: 6.25 TABLET, FILM COATED ORAL at 09:06

## 2019-08-17 RX ADMIN — CEFEPIME 2 G: 2 INJECTION, POWDER, FOR SOLUTION INTRAVENOUS at 00:12

## 2019-08-17 RX ADMIN — ATORVASTATIN CALCIUM 80 MG: 40 TABLET, FILM COATED ORAL at 21:27

## 2019-08-17 RX ADMIN — Medication 10 ML: at 21:26

## 2019-08-17 RX ADMIN — HEPARIN SODIUM 5400 UNITS: 1000 INJECTION, SOLUTION INTRAVENOUS; SUBCUTANEOUS at 04:30

## 2019-08-17 RX ADMIN — LEVOTHYROXINE SODIUM 50 MCG: 50 TABLET ORAL at 09:06

## 2019-08-17 RX ADMIN — FAMOTIDINE 20 MG: 20 TABLET ORAL at 09:06

## 2019-08-17 ASSESSMENT — PAIN SCALES - GENERAL
PAINLEVEL_OUTOF10: 0

## 2019-08-17 NOTE — PROGRESS NOTES
Nephrology Progress Note  8/17/2019 4:29 AM  Subjective:   Admit Date: 8/11/2019  PCP: Gunjan Srivastava PA-C  Interval History: pt awake weak and monitor no acute distress    Diet: DIET CARDIAC;  Pain is:Mild      Data:   Scheduled Meds:   carvedilol  6.25 mg Oral BID WC    sodium chloride flush  10 mL Intravenous 2 times per day    cefepime  2 g Intravenous Q12H    vancomycin (VANCOCIN) intermittent dosing (placeholder)   Other RX Placeholder    levothyroxine  50 mcg Oral Daily    famotidine  20 mg Oral BID    atorvastatin  80 mg Oral Nightly    aspirin  81 mg Oral Daily    [Held by provider] clopidogrel  75 mg Oral Daily     Continuous Infusions:   heparin (porcine) 8 Units/kg/hr (08/16/19 2101)     PRN Meds:guaiFENesin-dextromethorphan, heparin (porcine), heparin (porcine), sodium chloride flush, potassium chloride **OR** potassium alternative oral replacement **OR** potassium chloride, melatonin, ondansetron, polyethylene glycol, acetaminophen, magnesium hydroxide, ondansetron  I/O last 3 completed shifts: In: 0134 [P.O.:150; I.V.:1107]  Out: 850 [Urine:850]  No intake/output data recorded. Intake/Output Summary (Last 24 hours) at 8/17/2019 0429  Last data filed at 8/16/2019 1910  Gross per 24 hour   Intake 1257 ml   Output 850 ml   Net 407 ml     CBC:   Recent Labs     08/15/19  0410 08/16/19  0459 08/16/19  1652   WBC 9.7 10.6* 11.8*   HGB 9.5* 9.6* 9.6*    282 276     BMP:    Recent Labs     08/14/19  0515 08/15/19  0410 08/16/19  0459    142 139   K 3.3* 3.6 3.4*    105 102   CO2 24 24 27   BUN 57* 61* 53*   CREATININE 2.0* 1.9* 1.7*   GLUCOSE 85 106* 108*     Hepatic: No results for input(s): AST, ALT, ALB, BILITOT, ALKPHOS in the last 72 hours. Troponin: No results for input(s): TROPONINI in the last 72 hours. BNP: No results for input(s): BNP in the last 72 hours. Lipids:   No results for input(s): CHOL, HDL in the last 72 hours.     Invalid input(s): LDLCALCU  ABGs:

## 2019-08-17 NOTE — PROGRESS NOTES
Cardiology Progress Note     Admit Date:  8/11/2019    Consult reason/ Seen today for :   Heart block s/p pacemker  Ischemic cardiomyopathy  Cad      Subjective and  Overnight Events : Much improved after pacemaker he actually ambulated in the ulis today more alert and awake family at bedside wants to know the plan for CABG discussed with CT surgery. Feeling much better      Chief complain on admission : 80 y. o.year old who is admitted for  Chief Complaint   Patient presents with    Shortness of Breath     Discharged from [facility] on Friday. Taking antibiotic for pneumonia. Increased cough and SOB. Assessment / Plan: Third-degree heart block status post pacemaker  Multivessel coronary artery disease plan for CABG as per CTS, post stent to RCA continue dapt if possible, okay to hold Plavix for surgery may need Aggrastat? Hypotension resolved  Agree with statins  Renal failure as per primary team  DVT Prophylaxis if no contraindication    Past medical history:    has a past medical history of Anxiety, Bladder cancer ([facility] [address] Cancer ([facility] [address] Chronic kidney disease, Chronic kidney disease, stage III (moderate) ([address] [address] Coronary artery disease involving native coronary artery of native heart with unstable angina pectoris ([facility] [address] Dementia, Depression, GERD (gastroesophageal reflux disease), Gout, Hyperlipidemia, Hypertension, and Hyperthyroidism. Past surgical history:   has a past surgical history that includes thoracentesis (Right, 08/14/2019) and Pacemaker insertion (N/A, 8/15/2019). Social History:   reports that he has quit smoking. His smoking use included cigarettes. He has a 30.00 pack-year smoking history. He has never used smokeless tobacco. He reports that he does not drink alcohol or use drugs.   Family history:  family history includes Cancer in his brother and sister; Elevated Lipids in an other family member; Heart Disease in an listed as is except for changes mentioned above. Thank you very much for consult , please call with questions.     Perry Guallpa MD 8/17/2019 1:31 PM

## 2019-08-17 NOTE — PROGRESS NOTES
input(s): PHOS in the last 72 hours. INR:   No results for input(s): INR in the last 72 hours. Radiology Review:  CXR Summary     Impression   Mild congestive heart failure. ASSESSMENT AND PLAN:    Patient Active Problem List   Diagnosis    HTN (hypertension)    Depression    PND (post-nasal drip)    BPH (benign prostatic hyperplasia)    Cold feet    Constipation    Prostate cancer ([facility] [address]    Peripheral neuropathy    Chronic kidney disease, stage III (moderate) (HCC)    Postoperative pulmonary embolism (HCC)    Alzheimer's disease    Acute metabolic encephalopathy    Cancer ([facility] [address]    Acute hyponatremia    Acute hypokalemia    Complete heart block ([address]    Coronary artery disease involving native coronary artery of native heart with unstable angina pectoris ([facility])    NSTEMI (non-ST elevated myocardial infarction) ([facility] [address]    AV block, 3rd degree ([facility] [address]    CHB (complete heart block) ([facility] [address]       S/p PPM.  Pt more awake today, answers questions appropriately. PT/OT   Continue to hold plavix and continue heparin. Carotid duplex shows DIVYA 70-99%, needs CTA but creatinine still elevated. Hopeful that we can DC pt and possibly have him return for outpt CABG after his confusion resolves. Discussed with Dr. Antoni Coles. Will make final plan tomorrow.

## 2019-08-18 LAB
ANION GAP SERPL CALCULATED.3IONS-SCNC: 9 MMOL/L (ref 4–16)
APTT: 73.2 SECONDS (ref 21.2–33)
APTT: 77.4 SECONDS (ref 21.2–33)
BUN BLDV-MCNC: 55 MG/DL (ref 6–23)
CALCIUM SERPL-MCNC: 8.6 MG/DL (ref 8.3–10.6)
CHLORIDE BLD-SCNC: 102 MMOL/L (ref 99–110)
CO2: 27 MMOL/L (ref 21–32)
CREAT SERPL-MCNC: 1.8 MG/DL (ref 0.9–1.3)
CULTURE: ABNORMAL
DOSE AMOUNT: NORMAL
DOSE TIME: NORMAL
GFR AFRICAN AMERICAN: 44 ML/MIN/1.73M2
GFR NON-AFRICAN AMERICAN: 36 ML/MIN/1.73M2
GLUCOSE BLD-MCNC: 104 MG/DL (ref 70–99)
GRAM SMEAR: ABNORMAL
HCT VFR BLD CALC: 29.5 % (ref 42–52)
HEMOGLOBIN: 9.2 GM/DL (ref 13.5–18)
Lab: ABNORMAL
MCH RBC QN AUTO: 28.8 PG (ref 27–31)
MCHC RBC AUTO-ENTMCNC: 31.2 % (ref 32–36)
MCV RBC AUTO: 92.2 FL (ref 78–100)
PDW BLD-RTO: 14.6 % (ref 11.7–14.9)
PLATELET # BLD: 268 K/CU MM (ref 140–440)
PMV BLD AUTO: 10 FL (ref 7.5–11.1)
POTASSIUM SERPL-SCNC: 3.6 MMOL/L (ref 3.5–5.1)
RBC # BLD: 3.2 M/CU MM (ref 4.6–6.2)
SODIUM BLD-SCNC: 138 MMOL/L (ref 135–145)
SPECIMEN: ABNORMAL
VANCOMYCIN TROUGH: 19.3 UG/ML (ref 10–20)
WBC # BLD: 11.2 K/CU MM (ref 4–10.5)

## 2019-08-18 PROCEDURE — 2700000000 HC OXYGEN THERAPY PER DAY

## 2019-08-18 PROCEDURE — 80202 ASSAY OF VANCOMYCIN: CPT

## 2019-08-18 PROCEDURE — 6370000000 HC RX 637 (ALT 250 FOR IP): Performed by: INTERNAL MEDICINE

## 2019-08-18 PROCEDURE — 85730 THROMBOPLASTIN TIME PARTIAL: CPT

## 2019-08-18 PROCEDURE — 99232 SBSQ HOSP IP/OBS MODERATE 35: CPT | Performed by: INTERNAL MEDICINE

## 2019-08-18 PROCEDURE — 6360000002 HC RX W HCPCS: Performed by: INTERNAL MEDICINE

## 2019-08-18 PROCEDURE — 2580000003 HC RX 258: Performed by: PHYSICIAN ASSISTANT

## 2019-08-18 PROCEDURE — 2140000000 HC CCU INTERMEDIATE R&B

## 2019-08-18 PROCEDURE — 94640 AIRWAY INHALATION TREATMENT: CPT

## 2019-08-18 PROCEDURE — 6370000000 HC RX 637 (ALT 250 FOR IP): Performed by: NURSE PRACTITIONER

## 2019-08-18 PROCEDURE — 6360000002 HC RX W HCPCS: Performed by: PHYSICIAN ASSISTANT

## 2019-08-18 PROCEDURE — 85027 COMPLETE CBC AUTOMATED: CPT

## 2019-08-18 PROCEDURE — 6370000000 HC RX 637 (ALT 250 FOR IP): Performed by: PHYSICIAN ASSISTANT

## 2019-08-18 PROCEDURE — 94761 N-INVAS EAR/PLS OXIMETRY MLT: CPT

## 2019-08-18 PROCEDURE — 80048 BASIC METABOLIC PNL TOTAL CA: CPT

## 2019-08-18 PROCEDURE — 85049 AUTOMATED PLATELET COUNT: CPT

## 2019-08-18 PROCEDURE — 51702 INSERT TEMP BLADDER CATH: CPT

## 2019-08-18 PROCEDURE — 36592 COLLECT BLOOD FROM PICC: CPT

## 2019-08-18 PROCEDURE — 51798 US URINE CAPACITY MEASURE: CPT

## 2019-08-18 RX ORDER — VANCOMYCIN HYDROCHLORIDE 1 G/200ML
1000 INJECTION, SOLUTION INTRAVENOUS EVERY 24 HOURS
Status: DISCONTINUED | OUTPATIENT
Start: 2019-08-18 | End: 2019-08-20

## 2019-08-18 RX ORDER — FUROSEMIDE 10 MG/ML
40 INJECTION INTRAMUSCULAR; INTRAVENOUS DAILY
Status: DISCONTINUED | OUTPATIENT
Start: 2019-08-19 | End: 2019-08-19

## 2019-08-18 RX ORDER — GUAIFENESIN 600 MG/1
600 TABLET, EXTENDED RELEASE ORAL 2 TIMES DAILY
Status: DISCONTINUED | OUTPATIENT
Start: 2019-08-18 | End: 2019-08-20 | Stop reason: HOSPADM

## 2019-08-18 RX ORDER — IPRATROPIUM BROMIDE AND ALBUTEROL SULFATE 2.5; .5 MG/3ML; MG/3ML
1 SOLUTION RESPIRATORY (INHALATION)
Status: DISCONTINUED | OUTPATIENT
Start: 2019-08-18 | End: 2019-08-18

## 2019-08-18 RX ORDER — FUROSEMIDE 10 MG/ML
40 INJECTION INTRAMUSCULAR; INTRAVENOUS ONCE
Status: COMPLETED | OUTPATIENT
Start: 2019-08-18 | End: 2019-08-18

## 2019-08-18 RX ORDER — TAMSULOSIN HYDROCHLORIDE 0.4 MG/1
0.4 CAPSULE ORAL DAILY
Status: DISCONTINUED | OUTPATIENT
Start: 2019-08-18 | End: 2019-08-20 | Stop reason: HOSPADM

## 2019-08-18 RX ORDER — ALFUZOSIN HYDROCHLORIDE 10 MG/1
10 TABLET, EXTENDED RELEASE ORAL DAILY
Status: DISCONTINUED | OUTPATIENT
Start: 2019-08-18 | End: 2019-08-18

## 2019-08-18 RX ORDER — IPRATROPIUM BROMIDE AND ALBUTEROL SULFATE 2.5; .5 MG/3ML; MG/3ML
1 SOLUTION RESPIRATORY (INHALATION) EVERY 4 HOURS PRN
Status: DISCONTINUED | OUTPATIENT
Start: 2019-08-18 | End: 2019-08-20 | Stop reason: HOSPADM

## 2019-08-18 RX ADMIN — METOPROLOL TARTRATE 25 MG: 25 TABLET ORAL at 20:15

## 2019-08-18 RX ADMIN — FAMOTIDINE 20 MG: 20 TABLET ORAL at 08:10

## 2019-08-18 RX ADMIN — GUAIFENESIN AND DEXTROMETHORPHAN 5 ML: 100; 10 SYRUP ORAL at 00:44

## 2019-08-18 RX ADMIN — VANCOMYCIN HYDROCHLORIDE 1000 MG: 1 INJECTION, SOLUTION INTRAVENOUS at 20:24

## 2019-08-18 RX ADMIN — ASPIRIN 81 MG 81 MG: 81 TABLET ORAL at 08:10

## 2019-08-18 RX ADMIN — IPRATROPIUM BROMIDE AND ALBUTEROL SULFATE 1 AMPULE: .5; 3 SOLUTION RESPIRATORY (INHALATION) at 08:26

## 2019-08-18 RX ADMIN — Medication 10 ML: at 08:14

## 2019-08-18 RX ADMIN — CARVEDILOL 6.25 MG: 6.25 TABLET, FILM COATED ORAL at 18:33

## 2019-08-18 RX ADMIN — FAMOTIDINE 20 MG: 20 TABLET ORAL at 20:15

## 2019-08-18 RX ADMIN — GUAIFENESIN 600 MG: 600 TABLET, EXTENDED RELEASE ORAL at 20:15

## 2019-08-18 RX ADMIN — FUROSEMIDE 40 MG: 10 INJECTION, SOLUTION INTRAVENOUS at 06:21

## 2019-08-18 RX ADMIN — Medication 10 ML: at 20:17

## 2019-08-18 RX ADMIN — HEPARIN SODIUM 17 UNITS/KG/HR: 10000 INJECTION, SOLUTION INTRAVENOUS at 08:14

## 2019-08-18 RX ADMIN — CEFEPIME 2 G: 2 INJECTION, POWDER, FOR SOLUTION INTRAVENOUS at 12:18

## 2019-08-18 RX ADMIN — GUAIFENESIN AND DEXTROMETHORPHAN 5 ML: 100; 10 SYRUP ORAL at 08:12

## 2019-08-18 RX ADMIN — GUAIFENESIN 600 MG: 600 TABLET, EXTENDED RELEASE ORAL at 04:49

## 2019-08-18 RX ADMIN — ATORVASTATIN CALCIUM 80 MG: 40 TABLET, FILM COATED ORAL at 20:15

## 2019-08-18 RX ADMIN — CARVEDILOL 6.25 MG: 6.25 TABLET, FILM COATED ORAL at 08:10

## 2019-08-18 RX ADMIN — CEFEPIME 2 G: 2 INJECTION, POWDER, FOR SOLUTION INTRAVENOUS at 01:07

## 2019-08-18 RX ADMIN — TAMSULOSIN HYDROCHLORIDE 0.4 MG: 0.4 CAPSULE ORAL at 08:10

## 2019-08-18 RX ADMIN — IPRATROPIUM BROMIDE AND ALBUTEROL SULFATE 1 AMPULE: .5; 3 SOLUTION RESPIRATORY (INHALATION) at 15:46

## 2019-08-18 RX ADMIN — LEVOTHYROXINE SODIUM 50 MCG: 50 TABLET ORAL at 06:21

## 2019-08-18 ASSESSMENT — PAIN SCALES - GENERAL
PAINLEVEL_OUTOF10: 0

## 2019-08-18 NOTE — PROGRESS NOTES
medications of all above medical condition listed as is except for changes mentioned above. Thank you very much for consult , please call with questions.     Louis Costello MD 8/18/2019 5:13 PM

## 2019-08-18 NOTE — PROGRESS NOTES
ambulating   GI Prophylaxis [] PPI, [] H2 Blocker, [] No GI prophylaxis, patient is receiving diet/Tube Feeds   Code Status DNR-CCA   Disposition Patient requires continued admission due to complete HB, CAD   MDM [] Low, [] Moderate,[x]  High     History of Present Illness: Subjective     Patient Seen & Examined at the bedside     Patient is resting on his chair and eating breakfast -   More lucid and oriented - walking in the hallways with no issues   Patient denies any chest pain or palpitation    family at the bedside    Ten point ROS reviewed negative, unless as noted above    Objective: Intake/Output Summary (Last 24 hours) at 8/18/2019 1217  Last data filed at 8/18/2019 6564  Gross per 24 hour   Intake 631.5 ml   Output 1700 ml   Net -1068.5 ml      Vitals:   Vitals:    08/18/19 1105   BP: (!) 106/56   Pulse: 64   Resp: 20   Temp:    SpO2: 98%     Physical Exam:    GEN Awake male, resting in his recliner in no apparent distress. Appears given age. RESP Clear to auscultation, no wheezes, rales or rhonchi. CARDIO/VASC -S1/S2 auscultated. Regular rate without appreciable murmurs, rubs, or gallops. Peripheral pulses equal bilaterally and palpable. No peripheral edema. GI Abdomen is soft without significant tenderness, masses, or guarding. Bowel sounds are normoactive. Rectal exam deferred.  Shaw catheter is present. MSK No gross joint deformities. Spontaneous movement of all extremities  SKIN Normal coloration, warm, dry. NEURO Less confused, oriented x2- Cranial nerves appear grossly intact, normal speech, no lateralizing weakness.     Medications:   Medications:    guaiFENesin  600 mg Oral BID    tamsulosin  0.4 mg Oral Daily    carvedilol  6.25 mg Oral BID WC    sodium chloride flush  10 mL Intravenous 2 times per day    cefepime  2 g Intravenous Q12H    vancomycin (VANCOCIN) intermittent dosing (placeholder)   Other RX Placeholder    levothyroxine  50 mcg Oral Daily    famotidine  20

## 2019-08-18 NOTE — PROGRESS NOTES
[facility] PATIENT NAME: Trevor Huber    TODAY'S DATE: 08/18/19    SUBJECTIVE:    Pt is more lucid today. Sitting up. Did well with PT. PVCs better. No issues overnight. OBJECTIVE:   VITALS:    Vitals:    08/18/19 1305   BP: 126/62   Pulse: 81   Resp: 23   Temp:    SpO2: 97%     INTAKE/OUTPUT:    Date 08/18/19 0000 - 08/18/19 2359   Shift 3412-2132 6910-7595 9249-3018 24 Hour Total   INTAKE   I.V.(mL/kg/hr) 186.5(0.2)   186.5   IV Piggyback 50   50   Shift Total(mL/kg) 236.5(2.5)   236.5(2.5)   OUTPUT   Urine(mL/kg/hr) 675(0.9)   675   Shift Total(mL/kg) 675(7.2)   675(7.2)   Weight (kg) 93.6 93.6 93.6 93.6      Patient Vitals for the past 96 hrs (Last 3 readings):   Weight   08/18/19 0438 206 lb 5.6 oz (93.6 kg)   08/17/19 0532 202 lb 2.6 oz (91.7 kg)   08/16/19 0602 198 lb 6.6 oz (90 kg)       EXAM:  Blood pressure 126/62, pulse 81, temperature 98.7 °F (37.1 °C), resp. rate 23, height 5' 9\" (1.753 m), weight 206 lb 5.6 oz (93.6 kg), SpO2 97 %. General appearance: No apparent distress, appears stated age and cooperative. Skin: unremarkable  HEENT Normocephalic, atraumatic without obvious deformity. Neck: Supple, Trachea midline   Lungs: Good respiratory effort. Clear to auscultation, bilaterally   Heart: Regular rate/ rhythm   Abdomen: Soft, non-tender or non-distended   Extremities: min edema warm well perfused  Neurologic: Alert, grossly intact  Mental status: normal affect      Data:  CBC:   Recent Labs     08/16/19  1652 08/17/19  0555 08/18/19  0620   WBC 11.8* 12.3* 11.2*   HGB 9.6* 9.3* 9.2*   HCT 30.2* 29.8* 29.5*    272 268     BMP:    Recent Labs     08/16/19  0459 08/17/19  0555 08/18/19  0620    140 138   K 3.4* 3.8 3.6    103 102   CO2 27 25 27   BUN 53* 60* 55*   CREATININE 1.7* 1.9* 1.8*   GLUCOSE 108* 110* 104*     Hepatic:   No results for input(s): AST, ALT, ALB, BILITOT, ALKPHOS in the last 72 hours. Mag:      No results for input(s): MG in the last 72 hours.    Phos:

## 2019-08-18 NOTE — PROGRESS NOTES
· Pharmacy will continue to monitor patient and adjust therapy as indicated    RANDOM LEVEL SCHEDULED FOR 8/20 @17:30    Thank you for the consult. Nannette Babinski Relda Leu  8/18/2019 3:11 PM

## 2019-08-19 ENCOUNTER — APPOINTMENT (OUTPATIENT)
Dept: GENERAL RADIOLOGY | Age: 84
DRG: 242 | End: 2019-08-19
Payer: MEDICARE

## 2019-08-19 LAB
ANION GAP SERPL CALCULATED.3IONS-SCNC: 11 MMOL/L (ref 4–16)
APTT: 83.1 SECONDS (ref 21.2–33)
BUN BLDV-MCNC: 58 MG/DL (ref 6–23)
CALCIUM SERPL-MCNC: 8.9 MG/DL (ref 8.3–10.6)
CHLORIDE BLD-SCNC: 103 MMOL/L (ref 99–110)
CO2: 27 MMOL/L (ref 21–32)
CREAT SERPL-MCNC: 2.1 MG/DL (ref 0.9–1.3)
CULTURE: NORMAL
CULTURE: NORMAL
GFR AFRICAN AMERICAN: 37 ML/MIN/1.73M2
GFR NON-AFRICAN AMERICAN: 30 ML/MIN/1.73M2
GLUCOSE BLD-MCNC: 120 MG/DL (ref 70–99)
HCT VFR BLD CALC: 30.3 % (ref 42–52)
HEMOGLOBIN: 9.5 GM/DL (ref 13.5–18)
Lab: NORMAL
Lab: NORMAL
MCH RBC QN AUTO: 29.1 PG (ref 27–31)
MCHC RBC AUTO-ENTMCNC: 31.4 % (ref 32–36)
MCV RBC AUTO: 92.9 FL (ref 78–100)
PDW BLD-RTO: 15 % (ref 11.7–14.9)
PLATELET # BLD: 288 K/CU MM (ref 140–440)
PMV BLD AUTO: 10.2 FL (ref 7.5–11.1)
POTASSIUM SERPL-SCNC: 3.6 MMOL/L (ref 3.5–5.1)
RBC # BLD: 3.26 M/CU MM (ref 4.6–6.2)
SODIUM BLD-SCNC: 141 MMOL/L (ref 135–145)
SPECIMEN: NORMAL
SPECIMEN: NORMAL
WBC # BLD: 11.6 K/CU MM (ref 4–10.5)

## 2019-08-19 PROCEDURE — 6370000000 HC RX 637 (ALT 250 FOR IP): Performed by: SURGERY

## 2019-08-19 PROCEDURE — 97116 GAIT TRAINING THERAPY: CPT

## 2019-08-19 PROCEDURE — 6360000002 HC RX W HCPCS: Performed by: PHYSICIAN ASSISTANT

## 2019-08-19 PROCEDURE — 6360000002 HC RX W HCPCS: Performed by: NURSE PRACTITIONER

## 2019-08-19 PROCEDURE — 6370000000 HC RX 637 (ALT 250 FOR IP): Performed by: NURSE PRACTITIONER

## 2019-08-19 PROCEDURE — 6370000000 HC RX 637 (ALT 250 FOR IP): Performed by: INTERNAL MEDICINE

## 2019-08-19 PROCEDURE — APPSS30 APP SPLIT SHARED TIME 16-30 MINUTES: Performed by: NURSE PRACTITIONER

## 2019-08-19 PROCEDURE — 94150 VITAL CAPACITY TEST: CPT

## 2019-08-19 PROCEDURE — 94010 BREATHING CAPACITY TEST: CPT

## 2019-08-19 PROCEDURE — 6370000000 HC RX 637 (ALT 250 FOR IP): Performed by: PHYSICIAN ASSISTANT

## 2019-08-19 PROCEDURE — 2140000000 HC CCU INTERMEDIATE R&B

## 2019-08-19 PROCEDURE — 94761 N-INVAS EAR/PLS OXIMETRY MLT: CPT

## 2019-08-19 PROCEDURE — 2580000003 HC RX 258: Performed by: PHYSICIAN ASSISTANT

## 2019-08-19 PROCEDURE — 97530 THERAPEUTIC ACTIVITIES: CPT

## 2019-08-19 PROCEDURE — 85049 AUTOMATED PLATELET COUNT: CPT

## 2019-08-19 PROCEDURE — 71046 X-RAY EXAM CHEST 2 VIEWS: CPT

## 2019-08-19 PROCEDURE — 99232 SBSQ HOSP IP/OBS MODERATE 35: CPT | Performed by: INTERNAL MEDICINE

## 2019-08-19 PROCEDURE — 80048 BASIC METABOLIC PNL TOTAL CA: CPT

## 2019-08-19 PROCEDURE — 85027 COMPLETE CBC AUTOMATED: CPT

## 2019-08-19 PROCEDURE — 85730 THROMBOPLASTIN TIME PARTIAL: CPT

## 2019-08-19 RX ORDER — FAMOTIDINE 20 MG/1
20 TABLET, FILM COATED ORAL DAILY
Status: DISCONTINUED | OUTPATIENT
Start: 2019-08-19 | End: 2019-08-20 | Stop reason: HOSPADM

## 2019-08-19 RX ORDER — FUROSEMIDE 10 MG/ML
40 INJECTION INTRAMUSCULAR; INTRAVENOUS DAILY
Status: DISCONTINUED | OUTPATIENT
Start: 2019-08-19 | End: 2019-08-19

## 2019-08-19 RX ORDER — CLOPIDOGREL BISULFATE 75 MG/1
75 TABLET ORAL DAILY
Status: DISCONTINUED | OUTPATIENT
Start: 2019-08-19 | End: 2019-08-20 | Stop reason: HOSPADM

## 2019-08-19 RX ORDER — METOPROLOL TARTRATE 50 MG/1
100 TABLET, FILM COATED ORAL 2 TIMES DAILY
Status: DISCONTINUED | OUTPATIENT
Start: 2019-08-19 | End: 2019-08-20 | Stop reason: HOSPADM

## 2019-08-19 RX ORDER — METOPROLOL TARTRATE 50 MG/1
50 TABLET, FILM COATED ORAL 2 TIMES DAILY
Status: DISCONTINUED | OUTPATIENT
Start: 2019-08-19 | End: 2019-08-19

## 2019-08-19 RX ORDER — FAMOTIDINE 20 MG/1
20 TABLET, FILM COATED ORAL DAILY
Status: DISCONTINUED | OUTPATIENT
Start: 2019-08-20 | End: 2019-08-19

## 2019-08-19 RX ADMIN — CEFEPIME 2 G: 2 INJECTION, POWDER, FOR SOLUTION INTRAVENOUS at 01:10

## 2019-08-19 RX ADMIN — Medication 10 ML: at 10:17

## 2019-08-19 RX ADMIN — ONDANSETRON HYDROCHLORIDE 4 MG: 2 INJECTION, SOLUTION INTRAMUSCULAR; INTRAVENOUS at 12:11

## 2019-08-19 RX ADMIN — Medication 10 ML: at 20:47

## 2019-08-19 RX ADMIN — GUAIFENESIN 600 MG: 600 TABLET, EXTENDED RELEASE ORAL at 10:14

## 2019-08-19 RX ADMIN — LEVOTHYROXINE SODIUM 50 MCG: 50 TABLET ORAL at 10:15

## 2019-08-19 RX ADMIN — MELATONIN 3 MG: at 20:47

## 2019-08-19 RX ADMIN — METOPROLOL TARTRATE 50 MG: 50 TABLET ORAL at 10:15

## 2019-08-19 RX ADMIN — FUROSEMIDE 40 MG: 10 INJECTION, SOLUTION INTRAMUSCULAR; INTRAVENOUS at 00:58

## 2019-08-19 RX ADMIN — CEFEPIME 2 G: 2 INJECTION, POWDER, FOR SOLUTION INTRAVENOUS at 12:12

## 2019-08-19 RX ADMIN — FUROSEMIDE 40 MG: 10 INJECTION, SOLUTION INTRAMUSCULAR; INTRAVENOUS at 10:18

## 2019-08-19 RX ADMIN — ASPIRIN 81 MG 81 MG: 81 TABLET ORAL at 10:15

## 2019-08-19 RX ADMIN — CLOPIDOGREL BISULFATE 75 MG: 75 TABLET ORAL at 10:15

## 2019-08-19 RX ADMIN — GUAIFENESIN 600 MG: 600 TABLET, EXTENDED RELEASE ORAL at 20:47

## 2019-08-19 RX ADMIN — FAMOTIDINE 20 MG: 20 TABLET ORAL at 16:59

## 2019-08-19 RX ADMIN — ATORVASTATIN CALCIUM 80 MG: 40 TABLET, FILM COATED ORAL at 20:47

## 2019-08-19 RX ADMIN — TAMSULOSIN HYDROCHLORIDE 0.4 MG: 0.4 CAPSULE ORAL at 10:15

## 2019-08-19 RX ADMIN — VANCOMYCIN HYDROCHLORIDE 1000 MG: 1 INJECTION, SOLUTION INTRAVENOUS at 20:47

## 2019-08-19 RX ADMIN — HEPARIN SODIUM 17 UNITS/KG/HR: 10000 INJECTION, SOLUTION INTRAVENOUS at 01:06

## 2019-08-19 RX ADMIN — FAMOTIDINE 20 MG: 20 TABLET ORAL at 10:15

## 2019-08-19 ASSESSMENT — PAIN SCALES - GENERAL
PAINLEVEL_OUTOF10: 0

## 2019-08-19 NOTE — PROGRESS NOTES
72 hours. Invalid input(s): LDLCALCU  ABGs:   Lab Results   Component Value Date    PO2ART 54 08/13/2019    BFQ7PNB 32.0 08/13/2019     INR: No results for input(s): INR in the last 72 hours. Renal Labs  Albumin:    Lab Results   Component Value Date    LABALBU 3.1 08/11/2019     Calcium:    Lab Results   Component Value Date    CALCIUM 8.9 08/19/2019     Phosphorus:    Lab Results   Component Value Date    PHOS 3.1 04/13/2018     U/A:    Lab Results   Component Value Date    NITRU NEGATIVE 08/11/2019    NITRU Negative 04/13/2018    COLORU YELLOW 08/11/2019    PHUR 7.0 04/13/2018    LABCAST CANCELED 04/13/2018    LABCAST CANCELED 04/13/2018    WBCUA 4 08/11/2019    RBCUA 7 08/11/2019    MUCUS RARE 08/11/2019    TRICHOMONAS NONE SEEN 08/11/2019    YEAST CANCELED 04/13/2018    BACTERIA OCCASIONAL 08/11/2019    CLARITYU CLEAR 08/11/2019    SPECGRAV 1.032 08/11/2019    SPECGRAV  08/11/2019     (NOTE)  CONSIDER URINE OSMOLARITY TEST IF CLINICALLY INDICATED        UROBILINOGEN NORMAL 08/11/2019    BILIRUBINUR NEGATIVE 08/11/2019    BLOODU MODERATE 08/11/2019    GLUCOSEU Negative 04/13/2018    KETUA NEGATIVE 08/11/2019     ABG:    Lab Results   Component Value Date    JJR1RXW 32.0 08/13/2019    PO2ART 54 08/13/2019    WPQ0DEY 22.2 08/13/2019     HgBA1c:  No results found for: LABA1C  Microalbumen/Creatinine ratio:  No components found for: RUCREAT          Objective:   Vitals: BP (!) 128/93   Pulse 73   Temp 97.7 °F (36.5 °C) (Oral)   Resp 22   Ht 5' 9\" (1.753 m)   Wt 157 lb 3 oz (71.3 kg)   SpO2 95%   BMI 23.21 kg/m²   General appearance: awake weak   HEENT: Head: Normal, normocephalic, atraumatic.   Neck: supple, symmetrical, trachea midline  Lungs: diminished breath sounds bilaterally  Heart: S1, S2 normal  Abdomen: abnormal findings:  soft nt obese  Extremities: edema trace  Neurologic: Mental status: alertness: interactive      Patient Active Problem List:     HTN (hypertension)     Depression     PND (post-nasal drip)     BPH (benign prostatic hyperplasia)     Cold feet     Constipation     Prostate cancer (HCC)     Peripheral neuropathy     Chronic kidney disease, stage III (moderate) (HCC)     Postoperative pulmonary embolism (HCC)     Alzheimer's disease     Acute metabolic encephalopathy     Cancer (HCC)     Acute hyponatremia     Acute hypokalemia     Complete heart block (HCC)     Coronary artery disease involving native coronary artery of native heart with unstable angina pectoris (HCC)     NSTEMI (non-ST elevated myocardial infarction) (HCC)     AV block, 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 Plan     1 renal slight increase with diuretic and hold lasix for now  2 na stable  3 sp PPM and monitor  4 fu ct-s plan for cabg or medical therapy  Now also carotid stenosis and want angiogram and dw family risk with contrast and it appear want do rehab first and then see.  Stop lasix and baseline creat about 1.5-1.6  5 monitor affect and improving daily  6 o2 improving slowly and dw them slow and small meals as well to decrease risk aspiration  Culture negative  Ok from renal to do Jazmin Menchaca MD

## 2019-08-19 NOTE — PROGRESS NOTES
[address]    Hospitalist Progress Note      Name:  Chester Cardenas /Age/Sex: 1934  (80 y.o. male)   MRN & CSN:  [id number] & [id number] Admission Date/Time: 2019  7:33 AM   Location:  [address] [place] [address] PCP: Lavonne Giles PA-C         Hospital Day: 9    Assessment and Plan:   Chester Cardenas is a 80 y.o.  male  who presents with Complete heart block ([address]     # Multi-vessel CAD S/p PCI to RCA     On ASA, Plavix and high intensity statin( heparin drip was DC on  )  No beta-blockers due to heart block.  No ACE inhibitor due to LADI  Able to ambulate in the hallways - start to use Spirometer   CT surgery consulted for CABG and possible valve repair/replacement   Plavix to be started per CT surgery as surgery is postponed for sometime (week to 10 days)    # Acute respiratory failure from HCAP and large pleural effusion     Weaned off Vapotherm to regular Nasal canula     # HCAP     CT chest from  showing pneumonia - bilateral   (Chest x-ray on admission showed bilateral patchy airspace opacities)  Continue with IV Vanco and Cefepime     # Right ICA stenosis - management per CT Surgery      # Complete heart block: S/p pacemaker 8/15    Likely due to ischemia from CAD    Cardiology and EP following       # Right large pleural effusion S/p Thoracentesis by IR     # Valvular Heart disease and cardiomyopathy     Moderate to severe MR, Moderate AR  Per ECHO LVEF 40-45%  CT surgery consulted and no decision yet on CABG and possible MV repair     # LADI on CKD 3 - stable,    likely from prerenal injury or ATN from shock:   Baseline creatinine 1.2-1.4,  Creatinine 2.3>1.9>2.1  Nephrology on board.    Avoid nephrotoxins, continue Renal dosing of medications    # Cardiogenic shock - resolved     Likely Secondary to CHB and CAD  Cardiology following     # Hyponatremia - resolved    # Dementia - chronic     Patient off rivastigmine   Now back to baseline      Dispo- ARU in am     Diet DIET CARDIAC;   DVT Prophylaxis []

## 2019-08-19 NOTE — PROGRESS NOTES
Danis Chavarria PATIENT NAME: Mesfin Christianson    TODAY'S DATE: 08/19/19    SUBJECTIVE:    Pt is more lucid today. Sitting up. Did well with PT. PVCs better. No issues overnight. OBJECTIVE:   VITALS:    Vitals:    08/19/19 0906   BP: (!) 128/93   Pulse: 73   Resp: 22   Temp: 97.7 °F (36.5 °C)   SpO2: 95%     INTAKE/OUTPUT:    Date 08/19/19 0000 - 08/19/19 2359   Shift 5933-3200 5316-4306 7706-0455 24 Hour Total   INTAKE   I.V.(mL/kg/hr) 374(0.7)   374   IV Piggyback 250   250   Shift Total(mL/kg) 624(8.8)   624(8.8)   OUTPUT   Urine(mL/kg/hr) 250(0.4) 200(0.4)  450   Shift Total(mL/kg) 250(3.5) 200(2.8)  450(6.3)   Weight (kg) 71.3 71.3 71.3 71.3      Patient Vitals for the past 96 hrs (Last 3 readings):   Weight   08/19/19 0602 157 lb 3 oz (71.3 kg)   08/19/19 0248 196 lb 10.4 oz (89.2 kg)   08/18/19 0438 206 lb 5.6 oz (93.6 kg)       EXAM:  Blood pressure (!) 128/93, pulse 73, temperature 97.7 °F (36.5 °C), temperature source Oral, resp. rate 22, height 5' 9\" (1.753 m), weight 157 lb 3 oz (71.3 kg), SpO2 95 %. General appearance: No apparent distress, appears stated age and cooperative. Skin: unremarkable  HEENT Normocephalic, atraumatic without obvious deformity. Neck: Supple, Trachea midline   Lungs: Good respiratory effort. Clear to auscultation, bilaterally   Heart: Regular rate/ rhythm   Abdomen: Soft, non-tender or non-distended   Extremities: min edema warm well perfused  Neurologic: Alert, grossly intact  Mental status: normal affect      Data:  CBC:   Recent Labs     08/17/19  0555 08/18/19  0620 08/19/19  0528   WBC 12.3* 11.2* 11.6*   HGB 9.3* 9.2* 9.5*   HCT 29.8* 29.5* 30.3*    268 288     BMP:    Recent Labs     08/17/19  0555 08/18/19  0620 08/19/19  0528    138 141   K 3.8 3.6 3.6    102 103   CO2 25 27 27   BUN 60* 55* 58*   CREATININE 1.9* 1.8* 2.1*   GLUCOSE 110* 104* 120*     Hepatic:   No results for input(s): AST, ALT, ALB, BILITOT, ALKPHOS in the last 72 hours.   Mag:      No

## 2019-08-19 NOTE — PLAN OF CARE
Problem: Falls - Risk of:  Goal: Will remain free from falls  Description  Will remain free from falls  Outcome: Ongoing  Goal: Absence of physical injury  Description  Absence of physical injury  Outcome: Ongoing     Problem: Risk for Impaired Skin Integrity  Goal: Tissue integrity - skin and mucous membranes  Description  Structural intactness and normal physiological function of skin and  mucous membranes. Outcome: Ongoing     Problem: Pain:  Goal: Pain level will decrease  Description  Pain level will decrease  Outcome: Ongoing  Goal: Control of acute pain  Description  Control of acute pain  Outcome: Ongoing  Goal: Control of chronic pain  Description  Control of chronic pain  Outcome: Ongoing     Problem: SAFETY  Goal: Free from accidental physical injury  Outcome: Ongoing  Goal: Free from intentional harm  Outcome: Ongoing     Problem: DAILY CARE  Goal: Daily care needs are met  Outcome: Ongoing     Problem: KNOWLEDGE DEFICIT  Goal: Patient/S.O. demonstrates understanding of disease process, treatment plan, medications, and discharge instructions.   Outcome: Ongoing     Problem: DISCHARGE BARRIERS  Goal: Patient's continuum of care needs are met  Outcome: Ongoing

## 2019-08-19 NOTE — CARE COORDINATION
Received referral for ARU. Reviewed patients clinicals and PT/OT notes. Met with patient, daughters and ANGLE at bedside and . Discussed ARU. Explained to patient the required 3 hours of therapy a day. Also explained the average length of stay is 11 days, could be longer or shorter depending on recommendations of therapy and Dr. Richard Stone. Patient expresses his understanding and states he's agreeable to admit to ARU. Per family patient has dementia at baseline, since presenting to hospital is more confused than his baseline. Per patient and family goal is to return home at discharge from ARU. Patient approved for ARU. Bed available tomorrow 8/20. Notified Rupa LANZA

## 2019-08-20 ENCOUNTER — HOSPITAL ENCOUNTER (INPATIENT)
Age: 84
LOS: 10 days | Discharge: HOME HEALTH | DRG: 070 | End: 2019-08-30
Attending: PHYSICAL MEDICINE & REHABILITATION | Admitting: PHYSICAL MEDICINE & REHABILITATION
Payer: MEDICARE

## 2019-08-20 VITALS
SYSTOLIC BLOOD PRESSURE: 109 MMHG | RESPIRATION RATE: 25 BRPM | DIASTOLIC BLOOD PRESSURE: 42 MMHG | BODY MASS INDEX: 29.39 KG/M2 | HEART RATE: 60 BPM | OXYGEN SATURATION: 96 % | TEMPERATURE: 97.8 F | WEIGHT: 198.41 LBS | HEIGHT: 69 IN

## 2019-08-20 PROBLEM — G93.41 METABOLIC ENCEPHALOPATHY: Status: ACTIVE | Noted: 2019-08-20

## 2019-08-20 LAB
ANION GAP SERPL CALCULATED.3IONS-SCNC: 12 MMOL/L (ref 4–16)
BUN BLDV-MCNC: 59 MG/DL (ref 6–23)
CALCIUM SERPL-MCNC: 9.2 MG/DL (ref 8.3–10.6)
CHLORIDE BLD-SCNC: 104 MMOL/L (ref 99–110)
CO2: 27 MMOL/L (ref 21–32)
CREAT SERPL-MCNC: 2 MG/DL (ref 0.9–1.3)
GFR AFRICAN AMERICAN: 39 ML/MIN/1.73M2
GFR NON-AFRICAN AMERICAN: 32 ML/MIN/1.73M2
GLUCOSE BLD-MCNC: 104 MG/DL (ref 70–99)
HCT VFR BLD CALC: 29.8 % (ref 42–52)
HEMOGLOBIN: 9.2 GM/DL (ref 13.5–18)
MCH RBC QN AUTO: 28.7 PG (ref 27–31)
MCHC RBC AUTO-ENTMCNC: 30.9 % (ref 32–36)
MCV RBC AUTO: 92.8 FL (ref 78–100)
PDW BLD-RTO: 15.1 % (ref 11.7–14.9)
PLATELET # BLD: 253 K/CU MM (ref 140–440)
PMV BLD AUTO: 10.4 FL (ref 7.5–11.1)
POTASSIUM SERPL-SCNC: 3.5 MMOL/L (ref 3.5–5.1)
RBC # BLD: 3.21 M/CU MM (ref 4.6–6.2)
SODIUM BLD-SCNC: 143 MMOL/L (ref 135–145)
WBC # BLD: 8.3 K/CU MM (ref 4–10.5)

## 2019-08-20 PROCEDURE — 99223 1ST HOSP IP/OBS HIGH 75: CPT | Performed by: PHYSICAL MEDICINE & REHABILITATION

## 2019-08-20 PROCEDURE — 6370000000 HC RX 637 (ALT 250 FOR IP): Performed by: INTERNAL MEDICINE

## 2019-08-20 PROCEDURE — 6370000000 HC RX 637 (ALT 250 FOR IP): Performed by: PHYSICIAN ASSISTANT

## 2019-08-20 PROCEDURE — 6360000002 HC RX W HCPCS: Performed by: INTERNAL MEDICINE

## 2019-08-20 PROCEDURE — 94640 AIRWAY INHALATION TREATMENT: CPT

## 2019-08-20 PROCEDURE — 85027 COMPLETE CBC AUTOMATED: CPT

## 2019-08-20 PROCEDURE — 2580000003 HC RX 258: Performed by: PHYSICIAN ASSISTANT

## 2019-08-20 PROCEDURE — 6370000000 HC RX 637 (ALT 250 FOR IP): Performed by: SURGERY

## 2019-08-20 PROCEDURE — 80048 BASIC METABOLIC PNL TOTAL CA: CPT

## 2019-08-20 PROCEDURE — APPSS30 APP SPLIT SHARED TIME 16-30 MINUTES: Performed by: NURSE PRACTITIONER

## 2019-08-20 PROCEDURE — 94761 N-INVAS EAR/PLS OXIMETRY MLT: CPT

## 2019-08-20 PROCEDURE — 6370000000 HC RX 637 (ALT 250 FOR IP): Performed by: NURSE PRACTITIONER

## 2019-08-20 PROCEDURE — 99233 SBSQ HOSP IP/OBS HIGH 50: CPT | Performed by: INTERNAL MEDICINE

## 2019-08-20 PROCEDURE — 2700000000 HC OXYGEN THERAPY PER DAY

## 2019-08-20 PROCEDURE — 6360000002 HC RX W HCPCS: Performed by: PHYSICIAN ASSISTANT

## 2019-08-20 PROCEDURE — 6370000000 HC RX 637 (ALT 250 FOR IP): Performed by: PHYSICAL MEDICINE & REHABILITATION

## 2019-08-20 PROCEDURE — 1280000000 HC REHAB R&B

## 2019-08-20 PROCEDURE — 51798 US URINE CAPACITY MEASURE: CPT

## 2019-08-20 PROCEDURE — 2580000003 HC RX 258: Performed by: INTERNAL MEDICINE

## 2019-08-20 RX ORDER — LEVOTHYROXINE SODIUM 0.05 MG/1
50 TABLET ORAL DAILY
Status: DISCONTINUED | OUTPATIENT
Start: 2019-08-21 | End: 2019-08-30 | Stop reason: HOSPADM

## 2019-08-20 RX ORDER — CLOPIDOGREL BISULFATE 75 MG/1
75 TABLET ORAL DAILY
Status: DISCONTINUED | OUTPATIENT
Start: 2019-08-21 | End: 2019-08-30 | Stop reason: HOSPADM

## 2019-08-20 RX ORDER — POTASSIUM CHLORIDE 20 MEQ/1
40 TABLET, EXTENDED RELEASE ORAL PRN
Status: DISCONTINUED | OUTPATIENT
Start: 2019-08-20 | End: 2019-08-20

## 2019-08-20 RX ORDER — BISACODYL 10 MG
10 SUPPOSITORY, RECTAL RECTAL DAILY PRN
Status: DISCONTINUED | OUTPATIENT
Start: 2019-08-20 | End: 2019-08-30 | Stop reason: HOSPADM

## 2019-08-20 RX ORDER — POTASSIUM CHLORIDE 20 MEQ/1
40 TABLET, EXTENDED RELEASE ORAL PRN
Status: CANCELLED | OUTPATIENT
Start: 2019-08-20

## 2019-08-20 RX ORDER — GUAIFENESIN/DEXTROMETHORPHAN 100-10MG/5
5 SYRUP ORAL EVERY 4 HOURS PRN
Status: DISCONTINUED | OUTPATIENT
Start: 2019-08-20 | End: 2019-08-30 | Stop reason: HOSPADM

## 2019-08-20 RX ORDER — LANOLIN ALCOHOL/MO/W.PET/CERES
3 CREAM (GRAM) TOPICAL NIGHTLY PRN
Status: CANCELLED | OUTPATIENT
Start: 2019-08-20

## 2019-08-20 RX ORDER — ASPIRIN 81 MG/1
81 TABLET, CHEWABLE ORAL DAILY
Status: CANCELLED | OUTPATIENT
Start: 2019-08-21

## 2019-08-20 RX ORDER — POLYETHYLENE GLYCOL 3350 17 G/17G
17 POWDER, FOR SOLUTION ORAL DAILY PRN
Status: DISCONTINUED | OUTPATIENT
Start: 2019-08-20 | End: 2019-08-30 | Stop reason: HOSPADM

## 2019-08-20 RX ORDER — POTASSIUM CHLORIDE 7.45 MG/ML
10 INJECTION INTRAVENOUS PRN
Status: DISCONTINUED | OUTPATIENT
Start: 2019-08-20 | End: 2019-08-20

## 2019-08-20 RX ORDER — GUAIFENESIN 600 MG/1
600 TABLET, EXTENDED RELEASE ORAL 2 TIMES DAILY
Status: DISCONTINUED | OUTPATIENT
Start: 2019-08-20 | End: 2019-08-30 | Stop reason: HOSPADM

## 2019-08-20 RX ORDER — ACETAMINOPHEN 325 MG/1
650 TABLET ORAL EVERY 4 HOURS PRN
Status: DISCONTINUED | OUTPATIENT
Start: 2019-08-20 | End: 2019-08-30 | Stop reason: HOSPADM

## 2019-08-20 RX ORDER — FUROSEMIDE 20 MG/1
20 TABLET ORAL 2 TIMES DAILY
Status: DISCONTINUED | OUTPATIENT
Start: 2019-08-20 | End: 2019-08-20 | Stop reason: HOSPADM

## 2019-08-20 RX ORDER — POTASSIUM CHLORIDE 7.45 MG/ML
10 INJECTION INTRAVENOUS PRN
Status: CANCELLED | OUTPATIENT
Start: 2019-08-20

## 2019-08-20 RX ORDER — SODIUM CHLORIDE 0.9 % (FLUSH) 0.9 %
10 SYRINGE (ML) INJECTION EVERY 12 HOURS SCHEDULED
Status: CANCELLED | OUTPATIENT
Start: 2019-08-20

## 2019-08-20 RX ORDER — TAMSULOSIN HYDROCHLORIDE 0.4 MG/1
0.4 CAPSULE ORAL DAILY
Status: CANCELLED | OUTPATIENT
Start: 2019-08-21

## 2019-08-20 RX ORDER — POLYETHYLENE GLYCOL 3350 17 G/17G
17 POWDER, FOR SOLUTION ORAL DAILY PRN
Status: CANCELLED | OUTPATIENT
Start: 2019-08-20

## 2019-08-20 RX ORDER — ASPIRIN 81 MG/1
81 TABLET, CHEWABLE ORAL DAILY
Status: DISCONTINUED | OUTPATIENT
Start: 2019-08-21 | End: 2019-08-30 | Stop reason: HOSPADM

## 2019-08-20 RX ORDER — LEVOTHYROXINE SODIUM 0.05 MG/1
50 TABLET ORAL DAILY
Status: CANCELLED | OUTPATIENT
Start: 2019-08-21

## 2019-08-20 RX ORDER — FUROSEMIDE 20 MG/1
20 TABLET ORAL 2 TIMES DAILY
Status: DISCONTINUED | OUTPATIENT
Start: 2019-08-20 | End: 2019-08-28

## 2019-08-20 RX ORDER — ATORVASTATIN CALCIUM 40 MG/1
80 TABLET, FILM COATED ORAL NIGHTLY
Status: DISCONTINUED | OUTPATIENT
Start: 2019-08-20 | End: 2019-08-30 | Stop reason: HOSPADM

## 2019-08-20 RX ORDER — SODIUM CHLORIDE 0.9 % (FLUSH) 0.9 %
10 SYRINGE (ML) INJECTION EVERY 12 HOURS SCHEDULED
Status: DISCONTINUED | OUTPATIENT
Start: 2019-08-20 | End: 2019-08-20

## 2019-08-20 RX ORDER — METOPROLOL TARTRATE 50 MG/1
100 TABLET, FILM COATED ORAL 2 TIMES DAILY
Status: DISCONTINUED | OUTPATIENT
Start: 2019-08-20 | End: 2019-08-30

## 2019-08-20 RX ORDER — METOPROLOL TARTRATE 50 MG/1
100 TABLET, FILM COATED ORAL 2 TIMES DAILY
Status: CANCELLED | OUTPATIENT
Start: 2019-08-20

## 2019-08-20 RX ORDER — TAMSULOSIN HYDROCHLORIDE 0.4 MG/1
0.4 CAPSULE ORAL DAILY
Status: DISCONTINUED | OUTPATIENT
Start: 2019-08-21 | End: 2019-08-30 | Stop reason: HOSPADM

## 2019-08-20 RX ORDER — GUAIFENESIN/DEXTROMETHORPHAN 100-10MG/5
5 SYRUP ORAL EVERY 4 HOURS PRN
Status: CANCELLED | OUTPATIENT
Start: 2019-08-20

## 2019-08-20 RX ORDER — POTASSIUM CHLORIDE 1.5 G/1.77G
40 POWDER, FOR SOLUTION ORAL PRN
Status: CANCELLED | OUTPATIENT
Start: 2019-08-20

## 2019-08-20 RX ORDER — FAMOTIDINE 20 MG/1
20 TABLET, FILM COATED ORAL DAILY
Status: DISCONTINUED | OUTPATIENT
Start: 2019-08-21 | End: 2019-08-30 | Stop reason: HOSPADM

## 2019-08-20 RX ORDER — ONDANSETRON 4 MG/1
4 TABLET, ORALLY DISINTEGRATING ORAL EVERY 6 HOURS PRN
Status: DISCONTINUED | OUTPATIENT
Start: 2019-08-20 | End: 2019-08-30 | Stop reason: HOSPADM

## 2019-08-20 RX ORDER — GUAIFENESIN 600 MG/1
600 TABLET, EXTENDED RELEASE ORAL 2 TIMES DAILY
Status: CANCELLED | OUTPATIENT
Start: 2019-08-20

## 2019-08-20 RX ORDER — CLOPIDOGREL BISULFATE 75 MG/1
75 TABLET ORAL DAILY
Status: CANCELLED | OUTPATIENT
Start: 2019-08-21

## 2019-08-20 RX ORDER — ONDANSETRON 2 MG/ML
4 INJECTION INTRAMUSCULAR; INTRAVENOUS EVERY 6 HOURS PRN
Status: CANCELLED | OUTPATIENT
Start: 2019-08-20

## 2019-08-20 RX ORDER — SODIUM CHLORIDE 0.9 % (FLUSH) 0.9 %
10 SYRINGE (ML) INJECTION PRN
Status: CANCELLED | OUTPATIENT
Start: 2019-08-20

## 2019-08-20 RX ORDER — IPRATROPIUM BROMIDE AND ALBUTEROL SULFATE 2.5; .5 MG/3ML; MG/3ML
1 SOLUTION RESPIRATORY (INHALATION) EVERY 4 HOURS PRN
Status: CANCELLED | OUTPATIENT
Start: 2019-08-20

## 2019-08-20 RX ORDER — ACETAMINOPHEN 325 MG/1
650 TABLET ORAL EVERY 4 HOURS PRN
Status: CANCELLED | OUTPATIENT
Start: 2019-08-20

## 2019-08-20 RX ORDER — LANOLIN ALCOHOL/MO/W.PET/CERES
3 CREAM (GRAM) TOPICAL NIGHTLY PRN
Status: DISCONTINUED | OUTPATIENT
Start: 2019-08-20 | End: 2019-08-30 | Stop reason: HOSPADM

## 2019-08-20 RX ORDER — POTASSIUM CHLORIDE 1.5 G/1.77G
40 POWDER, FOR SOLUTION ORAL PRN
Status: DISCONTINUED | OUTPATIENT
Start: 2019-08-20 | End: 2019-08-20

## 2019-08-20 RX ORDER — ATORVASTATIN CALCIUM 40 MG/1
80 TABLET, FILM COATED ORAL NIGHTLY
Status: CANCELLED | OUTPATIENT
Start: 2019-08-20

## 2019-08-20 RX ORDER — ONDANSETRON 2 MG/ML
4 INJECTION INTRAMUSCULAR; INTRAVENOUS EVERY 6 HOURS PRN
Status: DISCONTINUED | OUTPATIENT
Start: 2019-08-20 | End: 2019-08-20

## 2019-08-20 RX ORDER — SODIUM CHLORIDE 0.9 % (FLUSH) 0.9 %
10 SYRINGE (ML) INJECTION PRN
Status: DISCONTINUED | OUTPATIENT
Start: 2019-08-20 | End: 2019-08-20

## 2019-08-20 RX ORDER — IPRATROPIUM BROMIDE AND ALBUTEROL SULFATE 2.5; .5 MG/3ML; MG/3ML
1 SOLUTION RESPIRATORY (INHALATION) EVERY 4 HOURS PRN
Status: DISCONTINUED | OUTPATIENT
Start: 2019-08-20 | End: 2019-08-30 | Stop reason: HOSPADM

## 2019-08-20 RX ORDER — FAMOTIDINE 20 MG/1
20 TABLET, FILM COATED ORAL DAILY
Status: CANCELLED | OUTPATIENT
Start: 2019-08-21

## 2019-08-20 RX ADMIN — CLOPIDOGREL BISULFATE 75 MG: 75 TABLET ORAL at 10:24

## 2019-08-20 RX ADMIN — GUAIFENESIN 600 MG: 600 TABLET, EXTENDED RELEASE ORAL at 20:57

## 2019-08-20 RX ADMIN — FUROSEMIDE 20 MG: 20 TABLET ORAL at 18:45

## 2019-08-20 RX ADMIN — GUAIFENESIN 600 MG: 600 TABLET, EXTENDED RELEASE ORAL at 10:23

## 2019-08-20 RX ADMIN — METOPROLOL TARTRATE 100 MG: 50 TABLET ORAL at 20:53

## 2019-08-20 RX ADMIN — IPRATROPIUM BROMIDE AND ALBUTEROL SULFATE 1 AMPULE: .5; 3 SOLUTION RESPIRATORY (INHALATION) at 08:31

## 2019-08-20 RX ADMIN — Medication 10 ML: at 10:27

## 2019-08-20 RX ADMIN — ASPIRIN 81 MG 81 MG: 81 TABLET ORAL at 10:23

## 2019-08-20 RX ADMIN — ACETAMINOPHEN 650 MG: 325 TABLET ORAL at 05:07

## 2019-08-20 RX ADMIN — CEFEPIME HYDROCHLORIDE 2 G: 1 INJECTION, POWDER, FOR SOLUTION INTRAMUSCULAR; INTRAVENOUS at 10:28

## 2019-08-20 RX ADMIN — METOPROLOL TARTRATE 100 MG: 50 TABLET ORAL at 10:13

## 2019-08-20 RX ADMIN — LEVOTHYROXINE SODIUM 50 MCG: 50 TABLET ORAL at 07:09

## 2019-08-20 RX ADMIN — FAMOTIDINE 20 MG: 20 TABLET ORAL at 10:23

## 2019-08-20 RX ADMIN — TAMSULOSIN HYDROCHLORIDE 0.4 MG: 0.4 CAPSULE ORAL at 10:22

## 2019-08-20 RX ADMIN — ATORVASTATIN CALCIUM 80 MG: 40 TABLET, FILM COATED ORAL at 20:57

## 2019-08-20 RX ADMIN — ONDANSETRON HYDROCHLORIDE 4 MG: 2 INJECTION, SOLUTION INTRAMUSCULAR; INTRAVENOUS at 12:09

## 2019-08-20 ASSESSMENT — PAIN SCALES - GENERAL
PAINLEVEL_OUTOF10: 3
PAINLEVEL_OUTOF10: 0
PAINLEVEL_OUTOF10: 3
PAINLEVEL_OUTOF10: 0

## 2019-08-20 ASSESSMENT — PAIN SCALES - WONG BAKER
WONGBAKER_NUMERICALRESPONSE: 0
WONGBAKER_NUMERICALRESPONSE: 0

## 2019-08-20 ASSESSMENT — PAIN DESCRIPTION - ONSET: ONSET: GRADUAL

## 2019-08-20 ASSESSMENT — PAIN DESCRIPTION - FREQUENCY: FREQUENCY: INTERMITTENT

## 2019-08-20 ASSESSMENT — PAIN - FUNCTIONAL ASSESSMENT: PAIN_FUNCTIONAL_ASSESSMENT: ACTIVITIES ARE NOT PREVENTED

## 2019-08-20 ASSESSMENT — PAIN DESCRIPTION - DESCRIPTORS: DESCRIPTORS: ACHING

## 2019-08-20 ASSESSMENT — PAIN DESCRIPTION - LOCATION: LOCATION: HEAD

## 2019-08-20 ASSESSMENT — PAIN DESCRIPTION - PAIN TYPE: TYPE: CHRONIC PAIN

## 2019-08-20 ASSESSMENT — PAIN DESCRIPTION - PROGRESSION: CLINICAL_PROGRESSION: GRADUALLY IMPROVING

## 2019-08-20 NOTE — PROGRESS NOTES
was developed mutually at the time of the visit with the patient,  NP   and myself. I have spoken with patient, nursing staff and provided written and verbal instructions . The above note has been reviewed and I agree with the assessment, diagnosis, and treatment plan with changes made by me as follows     HPI:  I have reviewed the above HPI  And agree with above   Johan Nguyen is a 80 y. o.year old who and presents with had concerns including Shortness of Breath (Discharged from [facility] on Friday. Taking antibiotic for pneumonia. Increased cough and SOB.). Chief Complaint   Patient presents with    Shortness of Breath     Discharged from [facility] on Friday. Taking antibiotic for pneumonia. Increased cough and SOB. Please review addendum/changes made to note above   Interval history:  No new issues     Physical Exam:  General:  Awake, alert, NAD  Head:normal  Eye:normal  Neck:  No JVD   Chest:  Clear to auscultation, respiration easy  Cardiovascular:  S1 and S2 audible, No added heart sounds, No signs of ankle edema, or volume overload, No evidence of JVD, No crackles  Abdomen:   nontender  Extremities:  no* edema  Pulses; palpable  Neuro: grossly normal      MEDICAL DECISION MAKING;    I agree with the above plan, which was planned by myself and discussed with NP.     Titrate up metoprolol   Ok to discharge      Jae Russo MD [facility] [place]

## 2019-08-20 NOTE — DISCHARGE SUMMARY
creatinine 1.2-1.4,  Creatinine 2.3>1.9>2.1. Stable at 2.0 at time of discharge. Nephrology on board. Luz Maria Stafford follow up with patient in 3 weeks. Nephrology was okay for patient to be on oral lasix 20 mg BID until follow up.      # Cardiogenic shock - resolved   Likely Secondary to CHB and CAD. Required vasopressors for a while in the ICU. Cardiology was consulted.     # Hyponatremia - resolved     # Dementia - chronic   # Fall at home - suspected to be from cardiac syncope. CT c-spine and Ct brain done on admission did not show any bony abnormalities.     Patient off rivastigmine   Now back to baseline       Dispo- to ARU. The patient expressed appropriate understanding of and agreement with the discharge recommendations, medications, and plan. Consults this admission:  IP CONSULT TO CARDIOLOGY  IP CONSULT TO NEPHROLOGY  PHARMACY TO DOSE VANCOMYCIN  IP CONSULT TO ELECTROPHYSIOLOGY  IP CONSULT TO HOSPITALIST  IP CONSULT TO DIETITIAN  IP CONSULT TO CARDIAC REHAB  IP CONSULT TO INTERVENTIONAL RADIOLOGY  IP CONSULT TO CARDIOTHORACIC SURGERY  IP CONSULT TO PHARMACY  IP CONSULT TO CASE MANAGEMENT    Discharge Instruction:   Follow up appointments:   Primary care physician:  within 2 weeks  Cardiology within 4 weeks  Nephrology within 3 weeks  CT surgery in 2 weeks    Diet:  cardiac diet   Activity: activity as tolerated  Disposition: Discharged to:   []Home, []C, []SNF, [x]Acute Rehab, []Hospice.   Condition on discharge: Stable    Discharge Medications:      Rudi Hampton   Home Medication Instructions FIR:[id number]    Printed on:08/20/19 0110   Medication Information                      acetaminophen (TYLENOL) 500 MG tablet  Take 500 mg by mouth daily as needed for Pain             alfuzosin (UROXATRAL) 10 MG SR tablet  Take 1 tablet by mouth daily             amLODIPine (NORVASC) 10 MG tablet  Take 1 tablet by mouth daily             aspirin 81 MG tablet  Take 81 mg by mouth daily             docusate

## 2019-08-20 NOTE — PROGRESS NOTES
INR: No results for input(s): INR in the last 72 hours. Renal Labs  Albumin:    Lab Results   Component Value Date    LABALBU 3.1 08/11/2019     Calcium:    Lab Results   Component Value Date    CALCIUM 9.2 08/20/2019     Phosphorus:    Lab Results   Component Value Date    PHOS 3.1 04/13/2018     U/A:    Lab Results   Component Value Date    NITRU NEGATIVE 08/11/2019    NITRU Negative 04/13/2018    COLORU YELLOW 08/11/2019    PHUR 7.0 04/13/2018    LABCAST CANCELED 04/13/2018    LABCAST CANCELED 04/13/2018    WBCUA 4 08/11/2019    RBCUA 7 08/11/2019    MUCUS RARE 08/11/2019    TRICHOMONAS NONE SEEN 08/11/2019    YEAST CANCELED 04/13/2018    BACTERIA OCCASIONAL 08/11/2019    CLARITYU CLEAR 08/11/2019    SPECGRAV 1.032 08/11/2019    SPECGRAV  08/11/2019     (NOTE)  CONSIDER URINE OSMOLARITY TEST IF CLINICALLY INDICATED        UROBILINOGEN NORMAL 08/11/2019    BILIRUBINUR NEGATIVE 08/11/2019    BLOODU MODERATE 08/11/2019    GLUCOSEU Negative 04/13/2018    KETUA NEGATIVE 08/11/2019     ABG:    Lab Results   Component Value Date    LKC8HFI 32.0 08/13/2019    PO2ART 54 08/13/2019    FIF8WVF 22.2 08/13/2019     HgBA1c:  No results found for: LABA1C  Microalbumen/Creatinine ratio:  No components found for: RUCREAT          Objective:   Vitals: BP (!) 109/42   Pulse 60   Temp 97.8 °F (36.6 °C) (Oral)   Resp 25   Ht 5' 9\" (1.753 m)   Wt 198 lb 6.6 oz (90 kg)   SpO2 96%   BMI 29.30 kg/m²   General appearance: awake weak   HEENT: Head: Normal, normocephalic, atraumatic.   Neck: supple, symmetrical, trachea midline  Lungs: diminished breath sounds bilaterally  Heart: S1, S2 normal  Abdomen: abnormal findings:  soft nt obese  Extremities: edema trace  Neurologic: Mental status: alertness: awake      Patient Active Problem List:     HTN (hypertension)     Depression     PND (post-nasal drip)     BPH (benign prostatic hyperplasia)     Cold feet     Constipation     Prostate cancer (HCC)     Peripheral neuropathy Chronic kidney disease, stage III (moderate) (HCC)     Postoperative pulmonary embolism (HCC)     Alzheimer's disease     Acute metabolic encephalopathy     Cancer (HCC)     Acute hyponatremia     Acute hypokalemia     Complete heart block (HCC)     Coronary artery disease involving native coronary artery of native heart with unstable angina pectoris (HCC)     NSTEMI (non-ST elevated myocardial infarction) (HCC)     AV block, 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     Plan     1 renal monitor holding stable at 2 not uremic and maintain lasix 20mg bid  2 na stable  3 sp ppm  4 bp low stable  5 agree aru rehab  6 o2 stable fu ct-s about cabg in future and work up carotid angio when family comfortable with benefit for contrast risk  Will follow outpt and in aru as need             Liban Ayon MD

## 2019-08-21 PROCEDURE — 97166 OT EVAL MOD COMPLEX 45 MIN: CPT

## 2019-08-21 PROCEDURE — 6370000000 HC RX 637 (ALT 250 FOR IP): Performed by: PHYSICAL MEDICINE & REHABILITATION

## 2019-08-21 PROCEDURE — 97530 THERAPEUTIC ACTIVITIES: CPT

## 2019-08-21 PROCEDURE — 1280000000 HC REHAB R&B

## 2019-08-21 PROCEDURE — 51798 US URINE CAPACITY MEASURE: CPT

## 2019-08-21 PROCEDURE — 97535 SELF CARE MNGMENT TRAINING: CPT

## 2019-08-21 PROCEDURE — 99232 SBSQ HOSP IP/OBS MODERATE 35: CPT | Performed by: PHYSICAL MEDICINE & REHABILITATION

## 2019-08-21 PROCEDURE — 6370000000 HC RX 637 (ALT 250 FOR IP): Performed by: INTERNAL MEDICINE

## 2019-08-21 PROCEDURE — 97110 THERAPEUTIC EXERCISES: CPT

## 2019-08-21 PROCEDURE — 97163 PT EVAL HIGH COMPLEX 45 MIN: CPT

## 2019-08-21 PROCEDURE — 92523 SPEECH SOUND LANG COMPREHEN: CPT

## 2019-08-21 PROCEDURE — 6360000002 HC RX W HCPCS: Performed by: PHYSICAL MEDICINE & REHABILITATION

## 2019-08-21 PROCEDURE — 51701 INSERT BLADDER CATHETER: CPT

## 2019-08-21 RX ADMIN — CLOPIDOGREL BISULFATE 75 MG: 75 TABLET ORAL at 10:02

## 2019-08-21 RX ADMIN — LEVOTHYROXINE SODIUM 50 MCG: 50 TABLET ORAL at 10:02

## 2019-08-21 RX ADMIN — TAMSULOSIN HYDROCHLORIDE 0.4 MG: 0.4 CAPSULE ORAL at 10:01

## 2019-08-21 RX ADMIN — GUAIFENESIN 600 MG: 600 TABLET, EXTENDED RELEASE ORAL at 10:02

## 2019-08-21 RX ADMIN — ACETAMINOPHEN 650 MG: 325 TABLET ORAL at 22:21

## 2019-08-21 RX ADMIN — FUROSEMIDE 20 MG: 20 TABLET ORAL at 17:33

## 2019-08-21 RX ADMIN — ASPIRIN 81 MG 81 MG: 81 TABLET ORAL at 10:02

## 2019-08-21 RX ADMIN — GUAIFENESIN 600 MG: 600 TABLET, EXTENDED RELEASE ORAL at 22:22

## 2019-08-21 RX ADMIN — METOPROLOL TARTRATE 100 MG: 50 TABLET ORAL at 10:01

## 2019-08-21 RX ADMIN — ACETAMINOPHEN 650 MG: 325 TABLET ORAL at 10:01

## 2019-08-21 RX ADMIN — ATORVASTATIN CALCIUM 80 MG: 40 TABLET, FILM COATED ORAL at 22:22

## 2019-08-21 RX ADMIN — FAMOTIDINE 20 MG: 20 TABLET ORAL at 10:01

## 2019-08-21 RX ADMIN — ACETAMINOPHEN 650 MG: 325 TABLET ORAL at 03:30

## 2019-08-21 RX ADMIN — ENOXAPARIN SODIUM 30 MG: 30 INJECTION SUBCUTANEOUS at 10:02

## 2019-08-21 RX ADMIN — ONDANSETRON 4 MG: 4 TABLET, ORALLY DISINTEGRATING ORAL at 17:33

## 2019-08-21 RX ADMIN — METOPROLOL TARTRATE 100 MG: 50 TABLET ORAL at 22:22

## 2019-08-21 RX ADMIN — FUROSEMIDE 20 MG: 20 TABLET ORAL at 10:01

## 2019-08-21 ASSESSMENT — PAIN SCALES - GENERAL
PAINLEVEL_OUTOF10: 0
PAINLEVEL_OUTOF10: 5
PAINLEVEL_OUTOF10: 8

## 2019-08-21 ASSESSMENT — PAIN DESCRIPTION - LOCATION: LOCATION: PENIS

## 2019-08-22 LAB
ANION GAP SERPL CALCULATED.3IONS-SCNC: 10 MMOL/L (ref 4–16)
BUN BLDV-MCNC: 51 MG/DL (ref 6–23)
CALCIUM SERPL-MCNC: 9.1 MG/DL (ref 8.3–10.6)
CHLORIDE BLD-SCNC: 105 MMOL/L (ref 99–110)
CO2: 28 MMOL/L (ref 21–32)
CREAT SERPL-MCNC: 2 MG/DL (ref 0.9–1.3)
GFR AFRICAN AMERICAN: 39 ML/MIN/1.73M2
GFR NON-AFRICAN AMERICAN: 32 ML/MIN/1.73M2
GLUCOSE BLD-MCNC: 97 MG/DL (ref 70–99)
POTASSIUM SERPL-SCNC: 3.5 MMOL/L (ref 3.5–5.1)
SODIUM BLD-SCNC: 143 MMOL/L (ref 135–145)

## 2019-08-22 PROCEDURE — 6370000000 HC RX 637 (ALT 250 FOR IP): Performed by: INTERNAL MEDICINE

## 2019-08-22 PROCEDURE — 1280000000 HC REHAB R&B

## 2019-08-22 PROCEDURE — 97116 GAIT TRAINING THERAPY: CPT

## 2019-08-22 PROCEDURE — 94761 N-INVAS EAR/PLS OXIMETRY MLT: CPT

## 2019-08-22 PROCEDURE — 51798 US URINE CAPACITY MEASURE: CPT

## 2019-08-22 PROCEDURE — 99233 SBSQ HOSP IP/OBS HIGH 50: CPT | Performed by: PHYSICAL MEDICINE & REHABILITATION

## 2019-08-22 PROCEDURE — 99221 1ST HOSP IP/OBS SF/LOW 40: CPT | Performed by: NURSE PRACTITIONER

## 2019-08-22 PROCEDURE — 36415 COLL VENOUS BLD VENIPUNCTURE: CPT

## 2019-08-22 PROCEDURE — 6370000000 HC RX 637 (ALT 250 FOR IP): Performed by: PHYSICAL MEDICINE & REHABILITATION

## 2019-08-22 PROCEDURE — 97530 THERAPEUTIC ACTIVITIES: CPT

## 2019-08-22 PROCEDURE — 97150 GROUP THERAPEUTIC PROCEDURES: CPT

## 2019-08-22 PROCEDURE — 51701 INSERT BLADDER CATHETER: CPT

## 2019-08-22 PROCEDURE — 6360000002 HC RX W HCPCS: Performed by: PHYSICAL MEDICINE & REHABILITATION

## 2019-08-22 PROCEDURE — 97535 SELF CARE MNGMENT TRAINING: CPT

## 2019-08-22 PROCEDURE — 97127 HC SP THER IVNTJ W/FOCUS COG FUNCJ: CPT

## 2019-08-22 PROCEDURE — 80048 BASIC METABOLIC PNL TOTAL CA: CPT

## 2019-08-22 RX ADMIN — LEVOTHYROXINE SODIUM 50 MCG: 50 TABLET ORAL at 05:51

## 2019-08-22 RX ADMIN — GUAIFENESIN 600 MG: 600 TABLET, EXTENDED RELEASE ORAL at 10:27

## 2019-08-22 RX ADMIN — METOPROLOL TARTRATE 100 MG: 50 TABLET ORAL at 10:27

## 2019-08-22 RX ADMIN — FUROSEMIDE 20 MG: 20 TABLET ORAL at 10:26

## 2019-08-22 RX ADMIN — FUROSEMIDE 20 MG: 20 TABLET ORAL at 17:32

## 2019-08-22 RX ADMIN — ACETAMINOPHEN 650 MG: 325 TABLET ORAL at 23:02

## 2019-08-22 RX ADMIN — GUAIFENESIN 600 MG: 600 TABLET, EXTENDED RELEASE ORAL at 23:05

## 2019-08-22 RX ADMIN — ASPIRIN 81 MG 81 MG: 81 TABLET ORAL at 10:26

## 2019-08-22 RX ADMIN — ENOXAPARIN SODIUM 30 MG: 30 INJECTION SUBCUTANEOUS at 10:26

## 2019-08-22 RX ADMIN — TAMSULOSIN HYDROCHLORIDE 0.4 MG: 0.4 CAPSULE ORAL at 10:26

## 2019-08-22 RX ADMIN — CLOPIDOGREL BISULFATE 75 MG: 75 TABLET ORAL at 10:27

## 2019-08-22 RX ADMIN — FAMOTIDINE 20 MG: 20 TABLET ORAL at 13:10

## 2019-08-22 RX ADMIN — ATORVASTATIN CALCIUM 80 MG: 40 TABLET, FILM COATED ORAL at 23:03

## 2019-08-22 RX ADMIN — METOPROLOL TARTRATE 100 MG: 50 TABLET ORAL at 23:03

## 2019-08-22 ASSESSMENT — PAIN SCALES - GENERAL
PAINLEVEL_OUTOF10: 4
PAINLEVEL_OUTOF10: 0

## 2019-08-22 ASSESSMENT — PAIN DESCRIPTION - LOCATION: LOCATION: PENIS

## 2019-08-23 PROCEDURE — 97530 THERAPEUTIC ACTIVITIES: CPT

## 2019-08-23 PROCEDURE — 99232 SBSQ HOSP IP/OBS MODERATE 35: CPT | Performed by: PHYSICAL MEDICINE & REHABILITATION

## 2019-08-23 PROCEDURE — 6360000002 HC RX W HCPCS: Performed by: PHYSICAL MEDICINE & REHABILITATION

## 2019-08-23 PROCEDURE — 94150 VITAL CAPACITY TEST: CPT

## 2019-08-23 PROCEDURE — 1280000000 HC REHAB R&B

## 2019-08-23 PROCEDURE — 51798 US URINE CAPACITY MEASURE: CPT

## 2019-08-23 PROCEDURE — 99231 SBSQ HOSP IP/OBS SF/LOW 25: CPT | Performed by: INTERNAL MEDICINE

## 2019-08-23 PROCEDURE — 97116 GAIT TRAINING THERAPY: CPT

## 2019-08-23 PROCEDURE — 97127 HC SP THER IVNTJ W/FOCUS COG FUNCJ: CPT

## 2019-08-23 PROCEDURE — 94761 N-INVAS EAR/PLS OXIMETRY MLT: CPT

## 2019-08-23 PROCEDURE — 51701 INSERT BLADDER CATHETER: CPT

## 2019-08-23 PROCEDURE — 97535 SELF CARE MNGMENT TRAINING: CPT

## 2019-08-23 PROCEDURE — 6370000000 HC RX 637 (ALT 250 FOR IP): Performed by: PHYSICAL MEDICINE & REHABILITATION

## 2019-08-23 PROCEDURE — 6370000000 HC RX 637 (ALT 250 FOR IP): Performed by: INTERNAL MEDICINE

## 2019-08-23 RX ORDER — AMLODIPINE BESYLATE 2.5 MG/1
2.5 TABLET ORAL DAILY
Status: DISCONTINUED | OUTPATIENT
Start: 2019-08-23 | End: 2019-08-24

## 2019-08-23 RX ADMIN — METOPROLOL TARTRATE 100 MG: 50 TABLET ORAL at 20:16

## 2019-08-23 RX ADMIN — LEVOTHYROXINE SODIUM 50 MCG: 50 TABLET ORAL at 06:15

## 2019-08-23 RX ADMIN — FUROSEMIDE 20 MG: 20 TABLET ORAL at 17:17

## 2019-08-23 RX ADMIN — FUROSEMIDE 20 MG: 20 TABLET ORAL at 08:17

## 2019-08-23 RX ADMIN — ASPIRIN 81 MG 81 MG: 81 TABLET ORAL at 08:17

## 2019-08-23 RX ADMIN — MELATONIN TAB 3 MG 3 MG: 3 TAB at 23:23

## 2019-08-23 RX ADMIN — ENOXAPARIN SODIUM 30 MG: 30 INJECTION SUBCUTANEOUS at 08:18

## 2019-08-23 RX ADMIN — FAMOTIDINE 20 MG: 20 TABLET ORAL at 08:17

## 2019-08-23 RX ADMIN — ONDANSETRON 4 MG: 4 TABLET, ORALLY DISINTEGRATING ORAL at 08:39

## 2019-08-23 RX ADMIN — CLOPIDOGREL BISULFATE 75 MG: 75 TABLET ORAL at 08:17

## 2019-08-23 RX ADMIN — ACETAMINOPHEN 650 MG: 325 TABLET ORAL at 23:23

## 2019-08-23 RX ADMIN — GUAIFENESIN 600 MG: 600 TABLET, EXTENDED RELEASE ORAL at 08:17

## 2019-08-23 RX ADMIN — TAMSULOSIN HYDROCHLORIDE 0.4 MG: 0.4 CAPSULE ORAL at 08:17

## 2019-08-23 RX ADMIN — ATORVASTATIN CALCIUM 80 MG: 40 TABLET, FILM COATED ORAL at 20:16

## 2019-08-23 RX ADMIN — GUAIFENESIN 600 MG: 600 TABLET, EXTENDED RELEASE ORAL at 20:16

## 2019-08-23 RX ADMIN — METOPROLOL TARTRATE 100 MG: 50 TABLET ORAL at 08:17

## 2019-08-23 ASSESSMENT — PAIN SCALES - GENERAL: PAINLEVEL_OUTOF10: 4

## 2019-08-24 LAB
ANION GAP SERPL CALCULATED.3IONS-SCNC: 11 MMOL/L (ref 4–16)
BUN BLDV-MCNC: 43 MG/DL (ref 6–23)
CALCIUM SERPL-MCNC: 8.9 MG/DL (ref 8.3–10.6)
CHLORIDE BLD-SCNC: 106 MMOL/L (ref 99–110)
CO2: 29 MMOL/L (ref 21–32)
CREAT SERPL-MCNC: 2 MG/DL (ref 0.9–1.3)
GFR AFRICAN AMERICAN: 39 ML/MIN/1.73M2
GFR NON-AFRICAN AMERICAN: 32 ML/MIN/1.73M2
GLUCOSE BLD-MCNC: 102 MG/DL (ref 70–99)
POTASSIUM SERPL-SCNC: 3.7 MMOL/L (ref 3.5–5.1)
SODIUM BLD-SCNC: 146 MMOL/L (ref 135–145)

## 2019-08-24 PROCEDURE — 6370000000 HC RX 637 (ALT 250 FOR IP): Performed by: INTERNAL MEDICINE

## 2019-08-24 PROCEDURE — 94150 VITAL CAPACITY TEST: CPT

## 2019-08-24 PROCEDURE — 6360000002 HC RX W HCPCS: Performed by: PHYSICAL MEDICINE & REHABILITATION

## 2019-08-24 PROCEDURE — 6370000000 HC RX 637 (ALT 250 FOR IP): Performed by: PHYSICAL MEDICINE & REHABILITATION

## 2019-08-24 PROCEDURE — 97530 THERAPEUTIC ACTIVITIES: CPT

## 2019-08-24 PROCEDURE — 36415 COLL VENOUS BLD VENIPUNCTURE: CPT

## 2019-08-24 PROCEDURE — 80048 BASIC METABOLIC PNL TOTAL CA: CPT

## 2019-08-24 PROCEDURE — 1280000000 HC REHAB R&B

## 2019-08-24 PROCEDURE — 97535 SELF CARE MNGMENT TRAINING: CPT

## 2019-08-24 PROCEDURE — 97116 GAIT TRAINING THERAPY: CPT

## 2019-08-24 PROCEDURE — 97110 THERAPEUTIC EXERCISES: CPT

## 2019-08-24 RX ADMIN — ASPIRIN 81 MG 81 MG: 81 TABLET ORAL at 08:27

## 2019-08-24 RX ADMIN — METOPROLOL TARTRATE 100 MG: 50 TABLET ORAL at 20:11

## 2019-08-24 RX ADMIN — FAMOTIDINE 20 MG: 20 TABLET ORAL at 08:27

## 2019-08-24 RX ADMIN — CLOPIDOGREL BISULFATE 75 MG: 75 TABLET ORAL at 08:27

## 2019-08-24 RX ADMIN — METOPROLOL TARTRATE 100 MG: 50 TABLET ORAL at 08:27

## 2019-08-24 RX ADMIN — ATORVASTATIN CALCIUM 80 MG: 40 TABLET, FILM COATED ORAL at 20:11

## 2019-08-24 RX ADMIN — GUAIFENESIN 600 MG: 600 TABLET, EXTENDED RELEASE ORAL at 08:27

## 2019-08-24 RX ADMIN — GUAIFENESIN 600 MG: 600 TABLET, EXTENDED RELEASE ORAL at 20:15

## 2019-08-24 RX ADMIN — LEVOTHYROXINE SODIUM 50 MCG: 50 TABLET ORAL at 06:13

## 2019-08-24 RX ADMIN — ONDANSETRON 4 MG: 4 TABLET, ORALLY DISINTEGRATING ORAL at 07:44

## 2019-08-24 RX ADMIN — AMLODIPINE BESYLATE 2.5 MG: 2.5 TABLET ORAL at 08:27

## 2019-08-24 RX ADMIN — ENOXAPARIN SODIUM 30 MG: 30 INJECTION SUBCUTANEOUS at 08:27

## 2019-08-24 RX ADMIN — FUROSEMIDE 20 MG: 20 TABLET ORAL at 16:58

## 2019-08-24 RX ADMIN — MELATONIN TAB 3 MG 3 MG: 3 TAB at 20:11

## 2019-08-24 RX ADMIN — FUROSEMIDE 20 MG: 20 TABLET ORAL at 08:27

## 2019-08-24 RX ADMIN — TAMSULOSIN HYDROCHLORIDE 0.4 MG: 0.4 CAPSULE ORAL at 08:27

## 2019-08-24 ASSESSMENT — PAIN SCALES - GENERAL: PAINLEVEL_OUTOF10: 0

## 2019-08-25 ENCOUNTER — APPOINTMENT (OUTPATIENT)
Dept: GENERAL RADIOLOGY | Age: 84
DRG: 070 | End: 2019-08-25
Attending: PHYSICAL MEDICINE & REHABILITATION
Payer: MEDICARE

## 2019-08-25 PROCEDURE — 71045 X-RAY EXAM CHEST 1 VIEW: CPT

## 2019-08-25 PROCEDURE — 94150 VITAL CAPACITY TEST: CPT

## 2019-08-25 PROCEDURE — 6370000000 HC RX 637 (ALT 250 FOR IP): Performed by: PHYSICAL MEDICINE & REHABILITATION

## 2019-08-25 PROCEDURE — 6360000002 HC RX W HCPCS: Performed by: PHYSICAL MEDICINE & REHABILITATION

## 2019-08-25 PROCEDURE — 6370000000 HC RX 637 (ALT 250 FOR IP): Performed by: INTERNAL MEDICINE

## 2019-08-25 PROCEDURE — 1280000000 HC REHAB R&B

## 2019-08-25 PROCEDURE — 99232 SBSQ HOSP IP/OBS MODERATE 35: CPT | Performed by: PHYSICAL MEDICINE & REHABILITATION

## 2019-08-25 RX ORDER — ONDANSETRON 4 MG/1
4 TABLET, ORALLY DISINTEGRATING ORAL
Status: DISCONTINUED | OUTPATIENT
Start: 2019-08-26 | End: 2019-08-30 | Stop reason: HOSPADM

## 2019-08-25 RX ADMIN — ATORVASTATIN CALCIUM 80 MG: 40 TABLET, FILM COATED ORAL at 20:35

## 2019-08-25 RX ADMIN — LEVOTHYROXINE SODIUM 50 MCG: 50 TABLET ORAL at 05:56

## 2019-08-25 RX ADMIN — METOPROLOL TARTRATE 100 MG: 50 TABLET ORAL at 08:52

## 2019-08-25 RX ADMIN — FAMOTIDINE 20 MG: 20 TABLET ORAL at 08:52

## 2019-08-25 RX ADMIN — ASPIRIN 81 MG 81 MG: 81 TABLET ORAL at 08:52

## 2019-08-25 RX ADMIN — TAMSULOSIN HYDROCHLORIDE 0.4 MG: 0.4 CAPSULE ORAL at 08:53

## 2019-08-25 RX ADMIN — POLYETHYLENE GLYCOL 3350 17 G: 17 POWDER, FOR SOLUTION ORAL at 08:53

## 2019-08-25 RX ADMIN — MELATONIN TAB 3 MG 3 MG: 3 TAB at 20:35

## 2019-08-25 RX ADMIN — FUROSEMIDE 20 MG: 20 TABLET ORAL at 17:52

## 2019-08-25 RX ADMIN — ONDANSETRON 4 MG: 4 TABLET, ORALLY DISINTEGRATING ORAL at 07:16

## 2019-08-25 RX ADMIN — METOPROLOL TARTRATE 100 MG: 50 TABLET ORAL at 20:34

## 2019-08-25 RX ADMIN — GUAIFENESIN 600 MG: 600 TABLET, EXTENDED RELEASE ORAL at 20:35

## 2019-08-25 RX ADMIN — FUROSEMIDE 20 MG: 20 TABLET ORAL at 08:52

## 2019-08-25 RX ADMIN — GUAIFENESIN 600 MG: 600 TABLET, EXTENDED RELEASE ORAL at 08:52

## 2019-08-25 RX ADMIN — CLOPIDOGREL BISULFATE 75 MG: 75 TABLET ORAL at 08:52

## 2019-08-25 RX ADMIN — ENOXAPARIN SODIUM 30 MG: 30 INJECTION SUBCUTANEOUS at 08:57

## 2019-08-25 ASSESSMENT — PAIN SCALES - GENERAL
PAINLEVEL_OUTOF10: 0
PAINLEVEL_OUTOF10: 0

## 2019-08-26 LAB
ANION GAP SERPL CALCULATED.3IONS-SCNC: 11 MMOL/L (ref 4–16)
BACTERIA: NEGATIVE /HPF
BILIRUBIN URINE: NEGATIVE MG/DL
BLOOD, URINE: ABNORMAL
BUN BLDV-MCNC: 40 MG/DL (ref 6–23)
CALCIUM SERPL-MCNC: 8.7 MG/DL (ref 8.3–10.6)
CHLORIDE BLD-SCNC: 102 MMOL/L (ref 99–110)
CLARITY: CLEAR
CO2: 29 MMOL/L (ref 21–32)
COLOR: ABNORMAL
CREAT SERPL-MCNC: 1.8 MG/DL (ref 0.9–1.3)
GFR AFRICAN AMERICAN: 44 ML/MIN/1.73M2
GFR NON-AFRICAN AMERICAN: 36 ML/MIN/1.73M2
GLUCOSE BLD-MCNC: 103 MG/DL (ref 70–99)
GLUCOSE, URINE: NEGATIVE MG/DL
KETONES, URINE: NEGATIVE MG/DL
LEUKOCYTE ESTERASE, URINE: NEGATIVE
NITRITE URINE, QUANTITATIVE: NEGATIVE
PH, URINE: 7 (ref 5–8)
POTASSIUM SERPL-SCNC: 3.8 MMOL/L (ref 3.5–5.1)
PRO-BNP: ABNORMAL PG/ML
PROTEIN UA: NEGATIVE MG/DL
RBC URINE: 1 /HPF (ref 0–3)
SODIUM BLD-SCNC: 142 MMOL/L (ref 135–145)
SPECIFIC GRAVITY UA: 1.01 (ref 1–1.03)
SQUAMOUS EPITHELIAL: <1 /HPF
TRICHOMONAS: ABNORMAL /HPF
UROBILINOGEN, URINE: 1 MG/DL (ref 0.2–1)
WBC UA: ABNORMAL /HPF (ref 0–2)

## 2019-08-26 PROCEDURE — 97530 THERAPEUTIC ACTIVITIES: CPT

## 2019-08-26 PROCEDURE — 99232 SBSQ HOSP IP/OBS MODERATE 35: CPT | Performed by: PHYSICAL MEDICINE & REHABILITATION

## 2019-08-26 PROCEDURE — 6370000000 HC RX 637 (ALT 250 FOR IP): Performed by: INTERNAL MEDICINE

## 2019-08-26 PROCEDURE — 6360000002 HC RX W HCPCS: Performed by: PHYSICAL MEDICINE & REHABILITATION

## 2019-08-26 PROCEDURE — 36415 COLL VENOUS BLD VENIPUNCTURE: CPT

## 2019-08-26 PROCEDURE — 83880 ASSAY OF NATRIURETIC PEPTIDE: CPT

## 2019-08-26 PROCEDURE — 6370000000 HC RX 637 (ALT 250 FOR IP): Performed by: PHYSICAL MEDICINE & REHABILITATION

## 2019-08-26 PROCEDURE — 80048 BASIC METABOLIC PNL TOTAL CA: CPT

## 2019-08-26 PROCEDURE — 97116 GAIT TRAINING THERAPY: CPT

## 2019-08-26 PROCEDURE — 81001 URINALYSIS AUTO W/SCOPE: CPT

## 2019-08-26 PROCEDURE — 97150 GROUP THERAPEUTIC PROCEDURES: CPT

## 2019-08-26 PROCEDURE — 97127 HC SP THER IVNTJ W/FOCUS COG FUNCJ: CPT

## 2019-08-26 PROCEDURE — 1280000000 HC REHAB R&B

## 2019-08-26 RX ADMIN — ASPIRIN 81 MG 81 MG: 81 TABLET ORAL at 08:59

## 2019-08-26 RX ADMIN — FUROSEMIDE 20 MG: 20 TABLET ORAL at 17:23

## 2019-08-26 RX ADMIN — GUAIFENESIN 600 MG: 600 TABLET, EXTENDED RELEASE ORAL at 21:56

## 2019-08-26 RX ADMIN — FUROSEMIDE 20 MG: 20 TABLET ORAL at 08:59

## 2019-08-26 RX ADMIN — ATORVASTATIN CALCIUM 80 MG: 40 TABLET, FILM COATED ORAL at 21:56

## 2019-08-26 RX ADMIN — CLOPIDOGREL BISULFATE 75 MG: 75 TABLET ORAL at 08:59

## 2019-08-26 RX ADMIN — METOPROLOL TARTRATE 100 MG: 50 TABLET ORAL at 21:56

## 2019-08-26 RX ADMIN — ONDANSETRON 4 MG: 4 TABLET, ORALLY DISINTEGRATING ORAL at 07:20

## 2019-08-26 RX ADMIN — TAMSULOSIN HYDROCHLORIDE 0.4 MG: 0.4 CAPSULE ORAL at 09:09

## 2019-08-26 RX ADMIN — METOPROLOL TARTRATE 100 MG: 50 TABLET ORAL at 09:00

## 2019-08-26 RX ADMIN — ENOXAPARIN SODIUM 30 MG: 30 INJECTION SUBCUTANEOUS at 09:08

## 2019-08-26 RX ADMIN — LEVOTHYROXINE SODIUM 50 MCG: 50 TABLET ORAL at 05:35

## 2019-08-26 RX ADMIN — GUAIFENESIN 600 MG: 600 TABLET, EXTENDED RELEASE ORAL at 09:00

## 2019-08-26 RX ADMIN — FAMOTIDINE 20 MG: 20 TABLET ORAL at 08:59

## 2019-08-26 ASSESSMENT — PAIN SCALES - GENERAL: PAINLEVEL_OUTOF10: 0

## 2019-08-27 PROCEDURE — 1280000000 HC REHAB R&B

## 2019-08-27 PROCEDURE — 97535 SELF CARE MNGMENT TRAINING: CPT

## 2019-08-27 PROCEDURE — 97530 THERAPEUTIC ACTIVITIES: CPT

## 2019-08-27 PROCEDURE — 94664 DEMO&/EVAL PT USE INHALER: CPT

## 2019-08-27 PROCEDURE — 6370000000 HC RX 637 (ALT 250 FOR IP): Performed by: INTERNAL MEDICINE

## 2019-08-27 PROCEDURE — 97127 HC SP THER IVNTJ W/FOCUS COG FUNCJ: CPT

## 2019-08-27 PROCEDURE — 6370000000 HC RX 637 (ALT 250 FOR IP): Performed by: PHYSICAL MEDICINE & REHABILITATION

## 2019-08-27 PROCEDURE — 6360000002 HC RX W HCPCS: Performed by: PHYSICAL MEDICINE & REHABILITATION

## 2019-08-27 PROCEDURE — 94761 N-INVAS EAR/PLS OXIMETRY MLT: CPT

## 2019-08-27 PROCEDURE — 97110 THERAPEUTIC EXERCISES: CPT

## 2019-08-27 PROCEDURE — 99232 SBSQ HOSP IP/OBS MODERATE 35: CPT | Performed by: PHYSICAL MEDICINE & REHABILITATION

## 2019-08-27 PROCEDURE — 97116 GAIT TRAINING THERAPY: CPT

## 2019-08-27 PROCEDURE — 94150 VITAL CAPACITY TEST: CPT

## 2019-08-27 RX ORDER — SENNA PLUS 8.6 MG/1
1 TABLET ORAL 2 TIMES DAILY
Status: DISCONTINUED | OUTPATIENT
Start: 2019-08-27 | End: 2019-08-30 | Stop reason: HOSPADM

## 2019-08-27 RX ORDER — DOCUSATE SODIUM 100 MG/1
100 CAPSULE, LIQUID FILLED ORAL 2 TIMES DAILY
Status: DISCONTINUED | OUTPATIENT
Start: 2019-08-27 | End: 2019-08-30 | Stop reason: HOSPADM

## 2019-08-27 RX ADMIN — GUAIFENESIN 600 MG: 600 TABLET, EXTENDED RELEASE ORAL at 20:27

## 2019-08-27 RX ADMIN — ONDANSETRON 4 MG: 4 TABLET, ORALLY DISINTEGRATING ORAL at 06:14

## 2019-08-27 RX ADMIN — DOCUSATE SODIUM 100 MG: 100 CAPSULE, LIQUID FILLED ORAL at 12:59

## 2019-08-27 RX ADMIN — ENOXAPARIN SODIUM 30 MG: 30 INJECTION SUBCUTANEOUS at 08:09

## 2019-08-27 RX ADMIN — SENNOSIDES 8.6 MG: 8.6 TABLET, FILM COATED ORAL at 12:59

## 2019-08-27 RX ADMIN — FUROSEMIDE 20 MG: 20 TABLET ORAL at 08:09

## 2019-08-27 RX ADMIN — TAMSULOSIN HYDROCHLORIDE 0.4 MG: 0.4 CAPSULE ORAL at 08:09

## 2019-08-27 RX ADMIN — DOCUSATE SODIUM 100 MG: 100 CAPSULE, LIQUID FILLED ORAL at 20:27

## 2019-08-27 RX ADMIN — SENNOSIDES 8.6 MG: 8.6 TABLET, FILM COATED ORAL at 20:27

## 2019-08-27 RX ADMIN — GUAIFENESIN 600 MG: 600 TABLET, EXTENDED RELEASE ORAL at 08:09

## 2019-08-27 RX ADMIN — LEVOTHYROXINE SODIUM 50 MCG: 50 TABLET ORAL at 06:14

## 2019-08-27 RX ADMIN — POLYETHYLENE GLYCOL 3350 17 G: 17 POWDER, FOR SOLUTION ORAL at 08:09

## 2019-08-27 RX ADMIN — CLOPIDOGREL BISULFATE 75 MG: 75 TABLET ORAL at 08:09

## 2019-08-27 RX ADMIN — MAGNESIUM HYDROXIDE 30 ML: 400 SUSPENSION ORAL at 09:59

## 2019-08-27 RX ADMIN — FUROSEMIDE 20 MG: 20 TABLET ORAL at 17:19

## 2019-08-27 RX ADMIN — ASPIRIN 81 MG 81 MG: 81 TABLET ORAL at 08:09

## 2019-08-27 RX ADMIN — FAMOTIDINE 20 MG: 20 TABLET ORAL at 08:09

## 2019-08-27 RX ADMIN — METOPROLOL TARTRATE 100 MG: 50 TABLET ORAL at 20:27

## 2019-08-27 RX ADMIN — ATORVASTATIN CALCIUM 80 MG: 40 TABLET, FILM COATED ORAL at 20:27

## 2019-08-27 RX ADMIN — METOPROLOL TARTRATE 100 MG: 50 TABLET ORAL at 08:09

## 2019-08-28 LAB
ANION GAP SERPL CALCULATED.3IONS-SCNC: 12 MMOL/L (ref 4–16)
BUN BLDV-MCNC: 41 MG/DL (ref 6–23)
CALCIUM SERPL-MCNC: 9 MG/DL (ref 8.3–10.6)
CHLORIDE BLD-SCNC: 100 MMOL/L (ref 99–110)
CO2: 27 MMOL/L (ref 21–32)
CREAT SERPL-MCNC: 2 MG/DL (ref 0.9–1.3)
GFR AFRICAN AMERICAN: 39 ML/MIN/1.73M2
GFR NON-AFRICAN AMERICAN: 32 ML/MIN/1.73M2
GLUCOSE BLD-MCNC: 117 MG/DL (ref 70–99)
HCT VFR BLD CALC: 36 % (ref 42–52)
HEMOGLOBIN: 11.1 GM/DL (ref 13.5–18)
MCH RBC QN AUTO: 29.2 PG (ref 27–31)
MCHC RBC AUTO-ENTMCNC: 30.8 % (ref 32–36)
MCV RBC AUTO: 94.7 FL (ref 78–100)
PDW BLD-RTO: 16.5 % (ref 11.7–14.9)
PLATELET # BLD: 224 K/CU MM (ref 140–440)
PMV BLD AUTO: 10.2 FL (ref 7.5–11.1)
POTASSIUM SERPL-SCNC: 4.5 MMOL/L (ref 3.5–5.1)
RBC # BLD: 3.8 M/CU MM (ref 4.6–6.2)
SODIUM BLD-SCNC: 139 MMOL/L (ref 135–145)
WBC # BLD: 6.1 K/CU MM (ref 4–10.5)

## 2019-08-28 PROCEDURE — 85027 COMPLETE CBC AUTOMATED: CPT

## 2019-08-28 PROCEDURE — 97127 HC SP THER IVNTJ W/FOCUS COG FUNCJ: CPT

## 2019-08-28 PROCEDURE — 36415 COLL VENOUS BLD VENIPUNCTURE: CPT

## 2019-08-28 PROCEDURE — 6370000000 HC RX 637 (ALT 250 FOR IP): Performed by: PHYSICAL MEDICINE & REHABILITATION

## 2019-08-28 PROCEDURE — 6360000002 HC RX W HCPCS: Performed by: PHYSICAL MEDICINE & REHABILITATION

## 2019-08-28 PROCEDURE — 80048 BASIC METABOLIC PNL TOTAL CA: CPT

## 2019-08-28 PROCEDURE — 97530 THERAPEUTIC ACTIVITIES: CPT

## 2019-08-28 PROCEDURE — 1280000000 HC REHAB R&B

## 2019-08-28 PROCEDURE — 97116 GAIT TRAINING THERAPY: CPT

## 2019-08-28 PROCEDURE — 6370000000 HC RX 637 (ALT 250 FOR IP): Performed by: INTERNAL MEDICINE

## 2019-08-28 PROCEDURE — 99232 SBSQ HOSP IP/OBS MODERATE 35: CPT | Performed by: PHYSICAL MEDICINE & REHABILITATION

## 2019-08-28 RX ORDER — FUROSEMIDE 20 MG/1
20 TABLET ORAL 2 TIMES DAILY
Status: DISCONTINUED | OUTPATIENT
Start: 2019-08-28 | End: 2019-08-30 | Stop reason: HOSPADM

## 2019-08-28 RX ADMIN — ONDANSETRON 4 MG: 4 TABLET, ORALLY DISINTEGRATING ORAL at 05:44

## 2019-08-28 RX ADMIN — DOCUSATE SODIUM 100 MG: 100 CAPSULE, LIQUID FILLED ORAL at 10:33

## 2019-08-28 RX ADMIN — ATORVASTATIN CALCIUM 80 MG: 40 TABLET, FILM COATED ORAL at 20:31

## 2019-08-28 RX ADMIN — GUAIFENESIN 600 MG: 600 TABLET, EXTENDED RELEASE ORAL at 10:33

## 2019-08-28 RX ADMIN — FAMOTIDINE 20 MG: 20 TABLET ORAL at 10:33

## 2019-08-28 RX ADMIN — GUAIFENESIN 600 MG: 600 TABLET, EXTENDED RELEASE ORAL at 20:30

## 2019-08-28 RX ADMIN — FUROSEMIDE 20 MG: 20 TABLET ORAL at 10:38

## 2019-08-28 RX ADMIN — CLOPIDOGREL BISULFATE 75 MG: 75 TABLET ORAL at 10:33

## 2019-08-28 RX ADMIN — ASPIRIN 81 MG 81 MG: 81 TABLET ORAL at 10:33

## 2019-08-28 RX ADMIN — METOPROLOL TARTRATE 100 MG: 50 TABLET ORAL at 20:31

## 2019-08-28 RX ADMIN — METOPROLOL TARTRATE 100 MG: 50 TABLET ORAL at 10:38

## 2019-08-28 RX ADMIN — SENNOSIDES 8.6 MG: 8.6 TABLET, FILM COATED ORAL at 20:30

## 2019-08-28 RX ADMIN — ENOXAPARIN SODIUM 30 MG: 30 INJECTION SUBCUTANEOUS at 10:34

## 2019-08-28 RX ADMIN — TAMSULOSIN HYDROCHLORIDE 0.4 MG: 0.4 CAPSULE ORAL at 10:33

## 2019-08-28 RX ADMIN — ONDANSETRON 4 MG: 4 TABLET, ORALLY DISINTEGRATING ORAL at 10:34

## 2019-08-28 RX ADMIN — LEVOTHYROXINE SODIUM 50 MCG: 50 TABLET ORAL at 05:44

## 2019-08-28 RX ADMIN — SENNOSIDES 8.6 MG: 8.6 TABLET, FILM COATED ORAL at 10:33

## 2019-08-28 RX ADMIN — DOCUSATE SODIUM 100 MG: 100 CAPSULE, LIQUID FILLED ORAL at 20:30

## 2019-08-28 RX ADMIN — FUROSEMIDE 20 MG: 20 TABLET ORAL at 19:10

## 2019-08-29 PROCEDURE — 6370000000 HC RX 637 (ALT 250 FOR IP): Performed by: PHYSICAL MEDICINE & REHABILITATION

## 2019-08-29 PROCEDURE — 6370000000 HC RX 637 (ALT 250 FOR IP): Performed by: INTERNAL MEDICINE

## 2019-08-29 PROCEDURE — 6360000002 HC RX W HCPCS: Performed by: PHYSICAL MEDICINE & REHABILITATION

## 2019-08-29 PROCEDURE — 1280000000 HC REHAB R&B

## 2019-08-29 PROCEDURE — 97530 THERAPEUTIC ACTIVITIES: CPT

## 2019-08-29 PROCEDURE — 99233 SBSQ HOSP IP/OBS HIGH 50: CPT | Performed by: PHYSICAL MEDICINE & REHABILITATION

## 2019-08-29 PROCEDURE — 97116 GAIT TRAINING THERAPY: CPT

## 2019-08-29 PROCEDURE — 97150 GROUP THERAPEUTIC PROCEDURES: CPT

## 2019-08-29 PROCEDURE — 97535 SELF CARE MNGMENT TRAINING: CPT

## 2019-08-29 PROCEDURE — 94761 N-INVAS EAR/PLS OXIMETRY MLT: CPT

## 2019-08-29 RX ADMIN — FUROSEMIDE 20 MG: 20 TABLET ORAL at 17:42

## 2019-08-29 RX ADMIN — FUROSEMIDE 20 MG: 20 TABLET ORAL at 08:32

## 2019-08-29 RX ADMIN — LEVOTHYROXINE SODIUM 50 MCG: 50 TABLET ORAL at 06:22

## 2019-08-29 RX ADMIN — CLOPIDOGREL BISULFATE 75 MG: 75 TABLET ORAL at 08:35

## 2019-08-29 RX ADMIN — ATORVASTATIN CALCIUM 80 MG: 40 TABLET, FILM COATED ORAL at 19:49

## 2019-08-29 RX ADMIN — SENNOSIDES 8.6 MG: 8.6 TABLET, FILM COATED ORAL at 19:52

## 2019-08-29 RX ADMIN — FAMOTIDINE 20 MG: 20 TABLET ORAL at 08:32

## 2019-08-29 RX ADMIN — METOPROLOL TARTRATE 100 MG: 50 TABLET ORAL at 08:34

## 2019-08-29 RX ADMIN — ENOXAPARIN SODIUM 30 MG: 30 INJECTION SUBCUTANEOUS at 08:37

## 2019-08-29 RX ADMIN — DOCUSATE SODIUM 100 MG: 100 CAPSULE, LIQUID FILLED ORAL at 19:51

## 2019-08-29 RX ADMIN — GUAIFENESIN 600 MG: 600 TABLET, EXTENDED RELEASE ORAL at 19:52

## 2019-08-29 RX ADMIN — TAMSULOSIN HYDROCHLORIDE 0.4 MG: 0.4 CAPSULE ORAL at 08:37

## 2019-08-29 RX ADMIN — GUAIFENESIN 600 MG: 600 TABLET, EXTENDED RELEASE ORAL at 08:34

## 2019-08-29 RX ADMIN — ASPIRIN 81 MG 81 MG: 81 TABLET ORAL at 08:32

## 2019-08-29 RX ADMIN — METOPROLOL TARTRATE 100 MG: 50 TABLET ORAL at 19:49

## 2019-08-29 RX ADMIN — DOCUSATE SODIUM 100 MG: 100 CAPSULE, LIQUID FILLED ORAL at 08:35

## 2019-08-29 RX ADMIN — ONDANSETRON 4 MG: 4 TABLET, ORALLY DISINTEGRATING ORAL at 06:22

## 2019-08-29 ASSESSMENT — PAIN SCALES - GENERAL: PAINLEVEL_OUTOF10: 10

## 2019-08-30 VITALS
WEIGHT: 183 LBS | DIASTOLIC BLOOD PRESSURE: 55 MMHG | HEIGHT: 69 IN | OXYGEN SATURATION: 97 % | RESPIRATION RATE: 16 BRPM | BODY MASS INDEX: 27.11 KG/M2 | TEMPERATURE: 97.9 F | SYSTOLIC BLOOD PRESSURE: 115 MMHG | HEART RATE: 75 BPM

## 2019-08-30 LAB
ANION GAP SERPL CALCULATED.3IONS-SCNC: 12 MMOL/L (ref 4–16)
BUN BLDV-MCNC: 43 MG/DL (ref 6–23)
CALCIUM SERPL-MCNC: 9.4 MG/DL (ref 8.3–10.6)
CHLORIDE BLD-SCNC: 99 MMOL/L (ref 99–110)
CO2: 28 MMOL/L (ref 21–32)
CREAT SERPL-MCNC: 2 MG/DL (ref 0.9–1.3)
GFR AFRICAN AMERICAN: 39 ML/MIN/1.73M2
GFR NON-AFRICAN AMERICAN: 32 ML/MIN/1.73M2
GLUCOSE BLD-MCNC: 144 MG/DL (ref 70–99)
POTASSIUM SERPL-SCNC: 4.3 MMOL/L (ref 3.5–5.1)
SODIUM BLD-SCNC: 139 MMOL/L (ref 135–145)

## 2019-08-30 PROCEDURE — 80048 BASIC METABOLIC PNL TOTAL CA: CPT

## 2019-08-30 PROCEDURE — 99239 HOSP IP/OBS DSCHRG MGMT >30: CPT | Performed by: PHYSICAL MEDICINE & REHABILITATION

## 2019-08-30 PROCEDURE — 6360000002 HC RX W HCPCS: Performed by: PHYSICAL MEDICINE & REHABILITATION

## 2019-08-30 PROCEDURE — 6370000000 HC RX 637 (ALT 250 FOR IP): Performed by: INTERNAL MEDICINE

## 2019-08-30 PROCEDURE — 36415 COLL VENOUS BLD VENIPUNCTURE: CPT

## 2019-08-30 PROCEDURE — 6370000000 HC RX 637 (ALT 250 FOR IP): Performed by: PHYSICAL MEDICINE & REHABILITATION

## 2019-08-30 RX ORDER — TAMSULOSIN HYDROCHLORIDE 0.4 MG/1
0.4 CAPSULE ORAL DAILY
Qty: 30 CAPSULE | Refills: 0 | Status: SHIPPED | OUTPATIENT
Start: 2019-08-31

## 2019-08-30 RX ORDER — METOPROLOL TARTRATE 50 MG/1
50 TABLET, FILM COATED ORAL 2 TIMES DAILY
Status: DISCONTINUED | OUTPATIENT
Start: 2019-08-30 | End: 2019-08-30 | Stop reason: HOSPADM

## 2019-08-30 RX ORDER — SENNA PLUS 8.6 MG/1
1 TABLET ORAL 2 TIMES DAILY PRN
Qty: 60 TABLET | Refills: 0 | COMMUNITY
Start: 2019-08-30 | End: 2019-09-29

## 2019-08-30 RX ORDER — ONDANSETRON 4 MG/1
4 TABLET, ORALLY DISINTEGRATING ORAL
Qty: 10 TABLET | Refills: 0 | Status: SHIPPED | OUTPATIENT
Start: 2019-08-31 | End: 2019-09-10

## 2019-08-30 RX ORDER — FUROSEMIDE 20 MG/1
20 TABLET ORAL 2 TIMES DAILY
Qty: 60 TABLET | Refills: 0 | Status: SHIPPED | OUTPATIENT
Start: 2019-08-30 | End: 2019-11-29 | Stop reason: ALTCHOICE

## 2019-08-30 RX ORDER — CLOPIDOGREL BISULFATE 75 MG/1
75 TABLET ORAL DAILY
Qty: 30 TABLET | Refills: 0 | Status: SHIPPED | OUTPATIENT
Start: 2019-08-31 | End: 2022-02-01

## 2019-08-30 RX ORDER — GUAIFENESIN 600 MG/1
600 TABLET, EXTENDED RELEASE ORAL 2 TIMES DAILY
Qty: 60 TABLET | Refills: 0 | Status: SHIPPED | OUTPATIENT
Start: 2019-08-30

## 2019-08-30 RX ORDER — METOPROLOL TARTRATE 50 MG/1
50 TABLET, FILM COATED ORAL 2 TIMES DAILY
Qty: 60 TABLET | Refills: 0 | Status: SHIPPED | OUTPATIENT
Start: 2019-08-30 | End: 2021-05-05

## 2019-08-30 RX ORDER — RANITIDINE 150 MG/1
150 TABLET ORAL DAILY
Qty: 30 TABLET | Refills: 0 | COMMUNITY
Start: 2019-08-30 | End: 2019-12-20

## 2019-08-30 RX ORDER — ATORVASTATIN CALCIUM 80 MG/1
80 TABLET, FILM COATED ORAL NIGHTLY
Qty: 30 TABLET | Refills: 0 | Status: SHIPPED | OUTPATIENT
Start: 2019-08-30

## 2019-08-30 RX ADMIN — MELATONIN TAB 3 MG 3 MG: 3 TAB at 00:20

## 2019-08-30 RX ADMIN — DOCUSATE SODIUM 100 MG: 100 CAPSULE, LIQUID FILLED ORAL at 09:35

## 2019-08-30 RX ADMIN — ENOXAPARIN SODIUM 30 MG: 30 INJECTION SUBCUTANEOUS at 09:35

## 2019-08-30 RX ADMIN — SENNOSIDES 8.6 MG: 8.6 TABLET, FILM COATED ORAL at 09:35

## 2019-08-30 RX ADMIN — GUAIFENESIN 600 MG: 600 TABLET, EXTENDED RELEASE ORAL at 09:35

## 2019-08-30 RX ADMIN — ONDANSETRON 4 MG: 4 TABLET, ORALLY DISINTEGRATING ORAL at 07:22

## 2019-08-30 RX ADMIN — TAMSULOSIN HYDROCHLORIDE 0.4 MG: 0.4 CAPSULE ORAL at 09:35

## 2019-08-30 RX ADMIN — ASPIRIN 81 MG 81 MG: 81 TABLET ORAL at 09:35

## 2019-08-30 RX ADMIN — FAMOTIDINE 20 MG: 20 TABLET ORAL at 09:35

## 2019-08-30 RX ADMIN — FUROSEMIDE 20 MG: 20 TABLET ORAL at 09:35

## 2019-08-30 RX ADMIN — CLOPIDOGREL BISULFATE 75 MG: 75 TABLET ORAL at 09:35

## 2019-08-30 RX ADMIN — METOPROLOL TARTRATE 100 MG: 50 TABLET ORAL at 09:35

## 2019-08-30 RX ADMIN — LEVOTHYROXINE SODIUM 50 MCG: 50 TABLET ORAL at 07:22

## 2019-08-31 ENCOUNTER — CARE COORDINATION (OUTPATIENT)
Dept: CASE MANAGEMENT | Age: 84
End: 2019-08-31

## 2019-09-09 ENCOUNTER — TELEPHONE (OUTPATIENT)
Dept: CARDIOLOGY CLINIC | Age: 84
End: 2019-09-09

## 2019-09-10 PROBLEM — I65.23 BILATERAL CAROTID ARTERY STENOSIS: Status: ACTIVE | Noted: 2019-09-10

## 2019-09-10 PROBLEM — Z95.0 S/P PLACEMENT OF CARDIAC PACEMAKER: Status: ACTIVE | Noted: 2019-09-10

## 2019-09-13 ENCOUNTER — OFFICE VISIT (OUTPATIENT)
Dept: CARDIOLOGY CLINIC | Age: 84
End: 2019-09-13
Payer: MEDICARE

## 2019-09-13 VITALS
WEIGHT: 187.8 LBS | HEART RATE: 72 BPM | DIASTOLIC BLOOD PRESSURE: 70 MMHG | SYSTOLIC BLOOD PRESSURE: 126 MMHG | BODY MASS INDEX: 27.81 KG/M2 | HEIGHT: 69 IN

## 2019-09-13 DIAGNOSIS — Z95.0 S/P PLACEMENT OF CARDIAC PACEMAKER: ICD-10-CM

## 2019-09-13 DIAGNOSIS — I44.2 AV BLOCK, 3RD DEGREE (HCC): Primary | ICD-10-CM

## 2019-09-13 DIAGNOSIS — I44.2 COMPLETE HEART BLOCK (HCC): ICD-10-CM

## 2019-09-13 PROCEDURE — G8598 ASA/ANTIPLAT THER USED: HCPCS | Performed by: NURSE PRACTITIONER

## 2019-09-13 PROCEDURE — 99212 OFFICE O/P EST SF 10 MIN: CPT | Performed by: NURSE PRACTITIONER

## 2019-09-13 PROCEDURE — 1036F TOBACCO NON-USER: CPT | Performed by: NURSE PRACTITIONER

## 2019-09-13 PROCEDURE — G8427 DOCREV CUR MEDS BY ELIG CLIN: HCPCS | Performed by: NURSE PRACTITIONER

## 2019-09-13 PROCEDURE — 1123F ACP DISCUSS/DSCN MKR DOCD: CPT | Performed by: NURSE PRACTITIONER

## 2019-09-13 PROCEDURE — G8417 CALC BMI ABV UP PARAM F/U: HCPCS | Performed by: NURSE PRACTITIONER

## 2019-09-13 PROCEDURE — 4040F PNEUMOC VAC/ADMIN/RCVD: CPT | Performed by: NURSE PRACTITIONER

## 2019-09-13 PROCEDURE — 1111F DSCHRG MED/CURRENT MED MERGE: CPT | Performed by: NURSE PRACTITIONER

## 2019-09-13 ASSESSMENT — ENCOUNTER SYMPTOMS
DIARRHEA: 0
SINUS PAIN: 0
CHEST TIGHTNESS: 0
ABDOMINAL DISTENTION: 0
EYE ITCHING: 0
SINUS PRESSURE: 0
CONSTIPATION: 0
EYE REDNESS: 0
BACK PAIN: 0
ABDOMINAL PAIN: 0
COLOR CHANGE: 0
NAUSEA: 0
SORE THROAT: 0
SHORTNESS OF BREATH: 0
VOMITING: 0
BLOOD IN STOOL: 0

## 2019-09-13 NOTE — PROGRESS NOTES
Electrophysiology  Note    Reason for consultation: complete heart block     Chief complaint: shortness of breath and fall     Referring physician:  Dr Laura Tanner        Primary care physician: Lara Krishnamurthy PA-C        History of Present Illness: This visit (9/13/2019)  Patient is here today for follow up on pacemaker. He reports that he is feeling well. He denies chest pain, palpitations, shortness of breath, edema, dizziness, or syncope. Daughter is here today. States he is doing well since ppm was placed. Previous visit  Jackelyn Raphael is an 80year old male who presented to the Emergency Room with complaints of shortness of breath and a fall. Patient is confused, has underlying dementia. He is unable to provide information. Daughter and son are at the bedside providing information. Daughter states that the patient has been in an out of the hospital since August. He has been treated for hyponatremia and metabolic encephalopathy 1 week ago. Per family, he was readmitted the same day of discharge for weakness, nausea, vomiting and abdominal pain. He has also been dealing with lethargy. His dementia medications were adjusted as it was thought that could be the cause. Yesterday Mr Taurus Post fell while in the shower. She also was short of breath at that time. Patient is unable to state if he was dizzy, had chest pain, lightheaded, or palpitations at the time of the fall. He was brought to the ER for further evaluation. In the ED he was found to be in a complete heart block. Additionally he had 2 positive Troponins. He was taken to the cath lab, where a stent was placed to the RCA. It was noted that he has triple vessel disease also. He has a past medical history of HTN, HLD, CKD, and dementia.        Pastmedical history:   Past Medical History:   Diagnosis Date    Anxiety     [facility] [facility]     Cancer ([facility] [address] [facility]     CHF (congestive heart failure) IMPRESSION / RECOMMENDATIONS:       Complete heart block- s/p PPM  CAD  Severe Mitral regurgitation  Moderate to severe LV systolic dysfunction    Patient doing well  Denies complaints  PPM placed by Dr Bere Soriano  Will continue to monitor transmission  PPM interrogated  Patient to follow with Dr Melvina Cast in 3 months    Device Assessment:      The device is Medtronic pacemaker - Dual Chamber chamber      MRI Compatible : yes    Device interrogation was performed. Mode: AAIR --- DDDR     Sensing is normal. Impedence is normal.  Threshold is normal.     There has not been interval changes. Estimated battery life is 11.3 years     The underlying rhythm is AP,.  97.4 % atrial paced; 0.1 % ventricular paced. Atrial Arrhythmia : No    Non sustained VT episodes : No    Sustained VT episodes : No    Patient activity reported 4.3 hrs/day    The patient is pacemaker dependent. Thanks again for allowing me to participate in care of this patient. Please call me if you have any questions. With best regards.       PRISCILLA Darby - CNP, 9/13/2019 11:14 AM

## 2019-09-23 ENCOUNTER — CARE COORDINATION (OUTPATIENT)
Dept: CASE MANAGEMENT | Age: 84
End: 2019-09-23

## 2019-09-24 ENCOUNTER — TELEPHONE (OUTPATIENT)
Dept: CARDIOLOGY CLINIC | Age: 84
End: 2019-09-24

## 2019-11-19 ENCOUNTER — PROCEDURE VISIT (OUTPATIENT)
Dept: CARDIOLOGY CLINIC | Age: 84
End: 2019-11-19
Payer: MEDICARE

## 2019-11-19 DIAGNOSIS — I49.5 SINUS NODE DYSFUNCTION (HCC): ICD-10-CM

## 2019-11-19 DIAGNOSIS — Z95.0 CARDIAC PACEMAKER IN SITU: Primary | ICD-10-CM

## 2019-11-19 PROCEDURE — 93296 REM INTERROG EVL PM/IDS: CPT | Performed by: INTERNAL MEDICINE

## 2019-11-19 PROCEDURE — 93294 REM INTERROG EVL PM/LDLS PM: CPT | Performed by: INTERNAL MEDICINE

## 2019-11-29 ENCOUNTER — APPOINTMENT (OUTPATIENT)
Dept: ULTRASOUND IMAGING | Age: 84
DRG: 602 | End: 2019-11-29
Payer: MEDICARE

## 2019-11-29 ENCOUNTER — HOSPITAL ENCOUNTER (EMERGENCY)
Age: 84
Discharge: HOME | DRG: 602 | End: 2019-11-29
Attending: EMERGENCY MEDICINE | Admitting: EMERGENCY MEDICINE
Payer: MEDICARE

## 2019-11-29 ENCOUNTER — APPOINTMENT (OUTPATIENT)
Dept: GENERAL RADIOLOGY | Age: 84
DRG: 602 | End: 2019-11-29
Payer: MEDICARE

## 2019-11-29 ENCOUNTER — APPOINTMENT (OUTPATIENT)
Dept: CT IMAGING | Age: 84
DRG: 602 | End: 2019-11-29
Payer: MEDICARE

## 2019-11-29 VITALS
DIASTOLIC BLOOD PRESSURE: 82 MMHG | OXYGEN SATURATION: 98 % | RESPIRATION RATE: 15 BRPM | HEART RATE: 66 BPM | HEIGHT: 69 IN | BODY MASS INDEX: 27.25 KG/M2 | SYSTOLIC BLOOD PRESSURE: 153 MMHG | WEIGHT: 184 LBS | TEMPERATURE: 98.2 F

## 2019-11-29 PROBLEM — E87.70 VOLUME OVERLOAD: Status: ACTIVE | Noted: 2019-11-29

## 2019-11-29 LAB
ANION GAP SERPL CALCULATED.3IONS-SCNC: 10 MMOL/L (ref 4–16)
BACTERIA: NEGATIVE /HPF
BASOPHILS ABSOLUTE: 0 K/CU MM
BASOPHILS RELATIVE PERCENT: 0.7 % (ref 0–1)
BILIRUBIN URINE: NEGATIVE MG/DL
BLOOD, URINE: NEGATIVE
BUN BLDV-MCNC: 26 MG/DL (ref 6–23)
CALCIUM SERPL-MCNC: 8.8 MG/DL (ref 8.3–10.6)
CHLORIDE BLD-SCNC: 102 MMOL/L (ref 99–110)
CLARITY: CLEAR
CO2: 24 MMOL/L (ref 21–32)
COLOR: YELLOW
CREAT SERPL-MCNC: 1.7 MG/DL (ref 0.9–1.3)
DIFFERENTIAL TYPE: ABNORMAL
EOSINOPHILS ABSOLUTE: 0 K/CU MM
EOSINOPHILS RELATIVE PERCENT: 0.5 % (ref 0–3)
ERYTHROCYTE SEDIMENTATION RATE: 22 MM/HR (ref 0–20)
GFR AFRICAN AMERICAN: 47 ML/MIN/1.73M2
GFR NON-AFRICAN AMERICAN: 39 ML/MIN/1.73M2
GLUCOSE BLD-MCNC: 103 MG/DL (ref 70–99)
GLUCOSE, URINE: NEGATIVE MG/DL
HCT VFR BLD CALC: 35.1 % (ref 42–52)
HEMOGLOBIN: 10.7 GM/DL (ref 13.5–18)
HIGH SENSITIVE C-REACTIVE PROTEIN: 35.1 MG/L
HYALINE CASTS: 0 /LPF
IMMATURE NEUTROPHIL %: 0.5 % (ref 0–0.43)
KETONES, URINE: NEGATIVE MG/DL
LEUKOCYTE ESTERASE, URINE: NEGATIVE
LYMPHOCYTES ABSOLUTE: 1.2 K/CU MM
LYMPHOCYTES RELATIVE PERCENT: 20 % (ref 24–44)
MCH RBC QN AUTO: 27.2 PG (ref 27–31)
MCHC RBC AUTO-ENTMCNC: 30.5 % (ref 32–36)
MCV RBC AUTO: 89.1 FL (ref 78–100)
MONOCYTES ABSOLUTE: 0.7 K/CU MM
MONOCYTES RELATIVE PERCENT: 11.3 % (ref 0–4)
MUCUS: ABNORMAL HPF
NITRITE URINE, QUANTITATIVE: NEGATIVE
NUCLEATED RBC %: 0 %
PDW BLD-RTO: 14.2 % (ref 11.7–14.9)
PH, URINE: 6 (ref 5–8)
PLATELET # BLD: 223 K/CU MM (ref 140–440)
PMV BLD AUTO: 10.3 FL (ref 7.5–11.1)
POTASSIUM SERPL-SCNC: 4.1 MMOL/L (ref 3.5–5.1)
PRO-BNP: ABNORMAL PG/ML
PROCALCITONIN: 0.09
PROTEIN UA: NEGATIVE MG/DL
RBC # BLD: 3.94 M/CU MM (ref 4.6–6.2)
RBC URINE: <1 /HPF (ref 0–3)
SEGMENTED NEUTROPHILS ABSOLUTE COUNT: 4 K/CU MM
SEGMENTED NEUTROPHILS RELATIVE PERCENT: 67 % (ref 36–66)
SODIUM BLD-SCNC: 136 MMOL/L (ref 135–145)
SPECIFIC GRAVITY UA: 1.01 (ref 1–1.03)
TOTAL IMMATURE NEUTOROPHIL: 0.03 K/CU MM
TOTAL NUCLEATED RBC: 0 K/CU MM
TRICHOMONAS: ABNORMAL /HPF
TROPONIN T: 0.02 NG/ML
TSH HIGH SENSITIVITY: 3.57 UIU/ML (ref 0.27–4.2)
UROBILINOGEN, URINE: NORMAL MG/DL (ref 0.2–1)
WBC # BLD: 6 K/CU MM (ref 4–10.5)
WBC UA: ABNORMAL /HPF (ref 0–2)

## 2019-11-29 PROCEDURE — 71250 CT THORAX DX C-: CPT

## 2019-11-29 PROCEDURE — 1200000000 HC SEMI PRIVATE

## 2019-11-29 PROCEDURE — 85025 COMPLETE CBC W/AUTO DIFF WBC: CPT

## 2019-11-29 PROCEDURE — 70450 CT HEAD/BRAIN W/O DYE: CPT

## 2019-11-29 PROCEDURE — 85652 RBC SED RATE AUTOMATED: CPT

## 2019-11-29 PROCEDURE — 80048 BASIC METABOLIC PNL TOTAL CA: CPT

## 2019-11-29 PROCEDURE — 93005 ELECTROCARDIOGRAM TRACING: CPT | Performed by: EMERGENCY MEDICINE

## 2019-11-29 PROCEDURE — 73080 X-RAY EXAM OF ELBOW: CPT

## 2019-11-29 PROCEDURE — 86141 C-REACTIVE PROTEIN HS: CPT

## 2019-11-29 PROCEDURE — 83880 ASSAY OF NATRIURETIC PEPTIDE: CPT

## 2019-11-29 PROCEDURE — 84443 ASSAY THYROID STIM HORMONE: CPT

## 2019-11-29 PROCEDURE — 71046 X-RAY EXAM CHEST 2 VIEWS: CPT

## 2019-11-29 PROCEDURE — 81001 URINALYSIS AUTO W/SCOPE: CPT

## 2019-11-29 PROCEDURE — 84484 ASSAY OF TROPONIN QUANT: CPT

## 2019-11-29 PROCEDURE — 73110 X-RAY EXAM OF WRIST: CPT

## 2019-11-29 PROCEDURE — 73130 X-RAY EXAM OF HAND: CPT

## 2019-11-29 PROCEDURE — 99284 EMERGENCY DEPT VISIT MOD MDM: CPT

## 2019-11-29 PROCEDURE — 84145 PROCALCITONIN (PCT): CPT

## 2019-11-29 RX ORDER — TORSEMIDE 20 MG/1
20 TABLET ORAL DAILY
Qty: 30 TABLET | Refills: 2 | Status: SHIPPED | OUTPATIENT
Start: 2019-11-29 | End: 2020-08-14 | Stop reason: DRUGHIGH

## 2019-11-29 RX ORDER — CEPHALEXIN 500 MG/1
500 CAPSULE ORAL 4 TIMES DAILY
Qty: 40 CAPSULE | Refills: 0 | Status: SHIPPED | OUTPATIENT
Start: 2019-11-29 | End: 2019-12-20 | Stop reason: ALTCHOICE

## 2019-11-29 ASSESSMENT — ENCOUNTER SYMPTOMS
VOICE CHANGE: 0
CONSTIPATION: 0
EYE ITCHING: 0
CHOKING: 0
STRIDOR: 0
SINUS PAIN: 0
DIARRHEA: 0
TROUBLE SWALLOWING: 0
EYE REDNESS: 0
EYE PAIN: 0
WHEEZING: 0
ABDOMINAL PAIN: 0
NAUSEA: 0
RESPIRATORY NEGATIVE: 1
SINUS PRESSURE: 0
EYE DISCHARGE: 0
EYES NEGATIVE: 1
APNEA: 0
CHEST TIGHTNESS: 0
PHOTOPHOBIA: 0
BLOOD IN STOOL: 0
BACK PAIN: 0
VOMITING: 0
RHINORRHEA: 0
FACIAL SWELLING: 0
GASTROINTESTINAL NEGATIVE: 1
COUGH: 0
RECTAL PAIN: 0
SHORTNESS OF BREATH: 0

## 2019-11-29 ASSESSMENT — PAIN DESCRIPTION - LOCATION: LOCATION: HAND

## 2019-11-29 ASSESSMENT — PAIN DESCRIPTION - ORIENTATION: ORIENTATION: RIGHT

## 2019-11-29 ASSESSMENT — PAIN SCALES - GENERAL: PAINLEVEL_OUTOF10: 10

## 2019-11-29 ASSESSMENT — PAIN DESCRIPTION - PAIN TYPE: TYPE: ACUTE PAIN

## 2019-11-29 NOTE — ED PROVIDER NOTES
nosebleeds, postnasal drip, rhinorrhea, sinus pressure, sinus pain, tinnitus, trouble swallowing and voice change. Eyes: Negative. Negative for photophobia, pain, discharge, redness, itching and visual disturbance. Respiratory: Negative. Negative for apnea, cough, choking, chest tightness, shortness of breath, wheezing and stridor. Cardiovascular: Negative. Negative for chest pain, palpitations and leg swelling. Gastrointestinal: Negative. Negative for abdominal pain, blood in stool, constipation, diarrhea, nausea, rectal pain and vomiting. Endocrine: Negative for polyuria. Genitourinary: Negative. Negative for dysuria, flank pain, frequency, hematuria and urgency. Musculoskeletal: Negative. Negative for arthralgias, back pain, gait problem, myalgias, neck pain, neck stiffness and stiffness. Skin: Positive for rash. Neurological: Negative. Negative for dizziness, speech difficulty, light-headedness, numbness and headaches. Psychiatric/Behavioral: Negative. Negative for agitation, confusion, self-injury, sleep disturbance and suicidal ideas. The patient is not nervous/anxious. All other systems reviewed and are negative. Except as noted above the remainder of the review of systems was reviewed and negative. PAST MEDICAL HISTORY     Past Medical History:   Diagnosis Date    Anxiety     Bladder cancer ([facility] [address]     Cancer ([facility] [address]     CHF (congestive heart failure) (HCC)     Chronic kidney disease     Chronic kidney disease, stage III (moderate) ([facility] [address] 7/17/2017    Coronary artery disease involving native coronary artery of native heart with unstable angina pectoris ([facility] [address] 8/11/2019    Dementia ([facility] [address]     Depression     GERD (gastroesophageal reflux disease)     Gout     Hyperlipidemia     Hypertension     Hyperthyroidism        Prior to Admission medications    Medication Sig Start Date End Date Taking?  Authorizing Provider   torsemide (DEMADEX) 20 MG tablet Take 1 tablet by mouth daily 11/29/19  Yes Agnieszka Adame, DO   cephALEXin [facility]) 500 MG capsule Take 1 capsule by mouth 4 times daily 11/29/19  Yes Agnieszka Adame, DO   ALPRAZolam [facility]acle) 0.5 MG tablet Take 0.25 mg by mouth nightly as needed for Sleep.     Historical Provider, MD   senna (SENOKOT) 8.6 MG tablet Take 1 tablet by mouth 2 times daily    Historical Provider, MD   ondansetron (ZOFRAN-ODT) 8 MG TBDP disintegrating tablet Place 8 mg under the tongue every 8 hours as needed for Nausea or Vomiting    Historical Provider, MD   FLUoxetine (PROZAC) 20 MG capsule Take 20 mg by mouth daily    Historical Provider, MD   rivastigmine (EXELON) 4.6 MG/24HR Place 1 patch onto the skin daily    Historical Provider, MD   atorvastatin (LIPITOR) 80 MG tablet Take 1 tablet by mouth nightly 8/30/19   C Adithya Little MD   metoprolol tartrate (LOPRESSOR) 50 MG tablet Take 1 tablet by mouth 2 times daily  Patient taking differently: Take 25 mg by mouth 2 times daily Changed per Dr Chay Flores 9/10/19 8/30/19   Marzena Low MD   guaiFENesin [facility]) 600 MG extended release tablet Take 1 tablet by mouth 2 times daily 8/30/19   C Adithya Little MD   tamsulosin [facility]) 0.4 MG capsule Take 1 capsule by mouth daily 8/31/19   C Adithya Little MD   clopidogrel (PLAVIX) 75 MG tablet Take 1 tablet by mouth daily 8/31/19   C Adithya Little MD   ranitidine (ZANTAC) 150 MG tablet Take 1 tablet by mouth daily 8/30/19   C Adithya Little MD   levothyroxine (SYNTHROID) 50 MCG tablet Take 50 mcg by mouth Daily    Historical Provider, MD   aspirin 81 MG tablet Take 81 mg by mouth daily     Historical Provider, MD   docusate sodium (COLACE) 100 MG capsule Take 1 capsule by mouth 2 times daily as needed for Constipation 9/1/16   Binu Marie MD        Patient Active Problem List   Diagnosis    HTN (hypertension)    Depression    PND (post-nasal drip)    BPH (benign prostatic hyperplasia)    Cold feet    Constipation    Prostate (LOPRESSOR) 50 MG TABLET    Take 1 tablet by mouth 2 times daily    ONDANSETRON (ZOFRAN-ODT) 8 MG TBDP DISINTEGRATING TABLET    Place 8 mg under the tongue every 8 hours as needed for Nausea or Vomiting    RANITIDINE (ZANTAC) 150 MG TABLET    Take 1 tablet by mouth daily    RIVASTIGMINE (EXELON) 4.6 MG/24HR    Place 1 patch onto the skin daily    SENNA (SENOKOT) 8.6 MG TABLET    Take 1 tablet by mouth 2 times daily    TAMSULOSIN (FLOMAX) 0.4 MG CAPSULE    Take 1 capsule by mouth daily       ALLERGIES     Patient has no known allergies. FAMILY HISTORY       Family History   Problem Relation Age of Onset    Cancer Sister     Cancer Brother     High Blood Pressure Brother     Heart Disease Other     Hypertension Other     Elevated Lipids Other     Other Other         gout          SOCIAL HISTORY       Social History     Socioeconomic History    Marital status:       Spouse name: None    Number of children: 5    Years of education: None    Highest education level: None   Occupational History    None   Social Needs    Financial resource strain: None    Food insecurity:     Worry: None     Inability: None    Transportation needs:     Medical: None     Non-medical: None   Tobacco Use    Smoking status: Former Smoker     Packs/day: 1.00     Years: 30.00     Pack years: 30.00     Types: Cigarettes    Smokeless tobacco: Never Used   Substance and Sexual Activity    Alcohol use: No    Drug use: No    Sexual activity: None   Lifestyle    Physical activity:     Days per week: None     Minutes per session: None    Stress: None   Relationships    Social connections:     Talks on phone: None     Gets together: None     Attends Sikh service: None     Active member of club or organization: None     Attends meetings of clubs or organizations: None     Relationship status: None    Intimate partner violence:     Fear of current or ex partner: None     Emotionally abused: None     Physically abused: normal  ST Segments: no acute change  T Waves: no acute change  Q Waves: none    Clinical Impression: no acute changes and non-specific EKG    Feliz Carry     RADIOLOGY:     Non-plain film images such as CT, Ultrasound and MRI are read by the radiologist. Plain radiographic images are visualized and preliminarily interpreted by the emergency physician. Interpretation per the Radiologist below, if available at the time of this note:    CT CHEST WO CONTRAST   Final Result   Parenchymal lung disease as described, predominantly ground-glass and   interstitial opacities. Finding likely represents edema or atypical   pneumonia, with small pleural effusions. This is occurring on a background   of subpleural emphysema/fibrosis in the lower lungs      Mild interval mediastinal lymph node enlargement almost certainly reactive or   congestive      Wall thickening of the distal esophagus suggests esophagitis      Stable adrenal adenomas         CT HEAD WO CONTRAST   Final Result   Stable CT scan of the head without acute intracranial abnormality. Chronic   generalized cerebral atrophy as well as microvascular ischemic changes. Similar appearing small volume of fluid seen in the left mastoid air cells,   without osseous destructive changes. Mild chronic paranasal sinus disease. XR ELBOW RIGHT (MIN 3 VIEWS)   Final Result   No acute osseous abnormality. Follow-up imaging recommended if pain persists or worsens following   conservative management. XR WRIST RIGHT (MIN 3 VIEWS)   Final Result   Negative for acute fracture         XR CHEST STANDARD (2 VW)   Final Result   Multifocal airspace disease with waxing and waning areas compared to prior   examination. Findings suggest multifocal pneumonia. Chest CT may be helpful   to evaluate for change from prior examination.   Pulmonary edema is also in   the differential.      Cardiomegaly         XR HAND RIGHT (MIN 3 VIEWS)   Final Result   Focal MDM  Number of Diagnoses or Management Options  Acute on chronic combined systolic and diastolic congestive heart failure ([address]): established and worsening  Cellulitis of hand:   Hand pain, right:   Diagnosis management comments: 49-year-old male presents emergency department with chief complaint of right hand swelling and mild pain. Family reports that they believe the patient fell a couple of days ago but patient does not recall this incident. Patient has a history of congestive heart failure. X-rays of the upper extremity were negative. Patient does have mild erythema to the hand without difficulty of flexion and extension of fingers or wrist.  Lab work showed a mild elevation in troponin and significant elevation of BNP. Chest x-ray shows pulmonary edema. CT scan of the chest was obtained due to possible pneumonia of the chest x-ray. CT of the chest was negative for pneumonia. Patient has capacity to make his own medical decisions and does not wish to be admitted to the hospital.  Discussed with the patient the risks and benefits of discharge. Patient understands that risk of leaving includes permanent disability and death. Based upon patient's medical history, advanced age and history, shared decision making with internal medicine, nephrology, the patient and the patient's family was made and the patient will be discharged. Will not make patient sign AMA. Return precautions given. Nephrology recommended discontinuation of furosemide and starting torsemide. Discharged home.  Keflex also given       Amount and/or Complexity of Data Reviewed  Clinical lab tests: ordered and reviewed  Tests in the radiology section of CPT®: ordered and reviewed  Tests in the medicine section of CPT®: ordered and reviewed    Risk of Complications, Morbidity, and/or Mortality  Presenting problems: moderate  Diagnostic procedures: moderate  Management options: moderate    Critical Care  Total time providing critical care: < 30 minutes    Patient Progress  Patient progress: improved        REASSESSMENT          CRITICAL CARE TIME   Total Critical Care time was 0 minutes, excluding separately reportable procedures. There was a high probability of clinically significant/life threatening deterioration in the patient's condition which required my urgent intervention. CONSULTS:  IP CONSULT TO HOSPITALIST  IP CONSULT TO CARDIOLOGY  IP CONSULT TO NEPHROLOGY    PROCEDURES:  None performed unless otherwise noted below     Procedures        FINAL IMPRESSION      1. Hand pain, right    2. Acute on chronic combined systolic and diastolic congestive heart failure (HCC)    3. Cellulitis of hand          DISPOSITION/PLAN   DISPOSITION Decision To Discharge 11/29/2019 04:16:35 PM      PATIENT REFERRED TO:  DANE Hannah 44  [phone]            DISCHARGE MEDICATIONS:  New Prescriptions    CEPHALEXIN (KEFLEX) 500 MG CAPSULE    Take 1 capsule by mouth 4 times daily    TORSEMIDE (DEMADEX) 20 MG TABLET    Take 1 tablet by mouth daily         The Patient was instructed to read the package inserts with any medication that was prescribed. Major potential reactions and medication interactions were discussed. The Patient understands that there are numerous possible adverse reactions not covered    Controlled Substances Monitoring:     No flowsheet data found.     (Please note that portions of this note were completed with a voice recognition program.  Efforts were made to edit the dictations but occasionally words are mis-transcribed.)    Pauly Wilcox DO (electronically signed)  Attending Emergency Physician            Pauly Wilcox DO  11/29/19 3479

## 2019-11-29 NOTE — ED NOTES
Pt given urinal but states he is unable to provide urine sample at this time.  Pt will alert nurse by call light when able     [address], RN  11/29/19 2503

## 2019-11-30 PROCEDURE — 93010 ELECTROCARDIOGRAM REPORT: CPT | Performed by: INTERNAL MEDICINE

## 2019-12-03 LAB
EKG ATRIAL RATE: 68 BPM
EKG DIAGNOSIS: NORMAL
EKG P AXIS: 98 DEGREES
EKG P-R INTERVAL: 230 MS
EKG Q-T INTERVAL: 432 MS
EKG QRS DURATION: 134 MS
EKG QTC CALCULATION (BAZETT): 459 MS
EKG R AXIS: 33 DEGREES
EKG T AXIS: 85 DEGREES
EKG VENTRICULAR RATE: 68 BPM

## 2019-12-12 ENCOUNTER — TELEPHONE (OUTPATIENT)
Dept: CARDIOLOGY CLINIC | Age: 84
End: 2019-12-12

## 2019-12-20 ENCOUNTER — OFFICE VISIT (OUTPATIENT)
Dept: CARDIOLOGY CLINIC | Age: 84
End: 2019-12-20
Payer: MEDICARE

## 2019-12-20 VITALS
HEART RATE: 70 BPM | SYSTOLIC BLOOD PRESSURE: 110 MMHG | WEIGHT: 185.6 LBS | HEIGHT: 69 IN | BODY MASS INDEX: 27.49 KG/M2 | DIASTOLIC BLOOD PRESSURE: 60 MMHG

## 2019-12-20 DIAGNOSIS — I25.110 CORONARY ARTERY DISEASE INVOLVING NATIVE CORONARY ARTERY OF NATIVE HEART WITH UNSTABLE ANGINA PECTORIS (HCC): ICD-10-CM

## 2019-12-20 DIAGNOSIS — Z95.0 S/P PLACEMENT OF CARDIAC PACEMAKER: ICD-10-CM

## 2019-12-20 DIAGNOSIS — I65.23 BILATERAL CAROTID ARTERY STENOSIS: Primary | ICD-10-CM

## 2019-12-20 DIAGNOSIS — I10 ESSENTIAL HYPERTENSION: ICD-10-CM

## 2019-12-20 PROCEDURE — 4040F PNEUMOC VAC/ADMIN/RCVD: CPT | Performed by: NURSE PRACTITIONER

## 2019-12-20 PROCEDURE — G8484 FLU IMMUNIZE NO ADMIN: HCPCS | Performed by: NURSE PRACTITIONER

## 2019-12-20 PROCEDURE — 1123F ACP DISCUSS/DSCN MKR DOCD: CPT | Performed by: NURSE PRACTITIONER

## 2019-12-20 PROCEDURE — G8427 DOCREV CUR MEDS BY ELIG CLIN: HCPCS | Performed by: NURSE PRACTITIONER

## 2019-12-20 PROCEDURE — G8417 CALC BMI ABV UP PARAM F/U: HCPCS | Performed by: NURSE PRACTITIONER

## 2019-12-20 PROCEDURE — G8598 ASA/ANTIPLAT THER USED: HCPCS | Performed by: NURSE PRACTITIONER

## 2019-12-20 PROCEDURE — 99214 OFFICE O/P EST MOD 30 MIN: CPT | Performed by: NURSE PRACTITIONER

## 2019-12-20 PROCEDURE — 1036F TOBACCO NON-USER: CPT | Performed by: NURSE PRACTITIONER

## 2019-12-20 RX ORDER — FAMOTIDINE 20 MG/1
20 TABLET, FILM COATED ORAL DAILY
COMMUNITY
End: 2020-11-12

## 2019-12-20 RX ORDER — CAPSAICIN 0.025 %
CREAM (GRAM) TOPICAL
Qty: 1 TUBE | Refills: 1 | Status: SHIPPED | OUTPATIENT
Start: 2019-12-20 | End: 2020-01-19

## 2019-12-20 RX ORDER — ACETAMINOPHEN 500 MG
500 TABLET ORAL EVERY 6 HOURS PRN
COMMUNITY
End: 2022-01-12

## 2020-02-24 ENCOUNTER — PROCEDURE VISIT (OUTPATIENT)
Dept: CARDIOLOGY CLINIC | Age: 85
End: 2020-02-24
Payer: MEDICARE

## 2020-02-24 PROCEDURE — 93294 REM INTERROG EVL PM/LDLS PM: CPT | Performed by: INTERNAL MEDICINE

## 2020-02-24 PROCEDURE — 93296 REM INTERROG EVL PM/IDS: CPT | Performed by: INTERNAL MEDICINE

## 2020-05-29 ENCOUNTER — HOSPITAL ENCOUNTER (EMERGENCY)
Age: 85
Discharge: HOME | End: 2020-05-29
Attending: EMERGENCY MEDICINE
Payer: MEDICARE

## 2020-05-29 ENCOUNTER — APPOINTMENT (OUTPATIENT)
Dept: GENERAL RADIOLOGY | Age: 85
End: 2020-05-29
Payer: MEDICARE

## 2020-05-29 VITALS
WEIGHT: 186 LBS | BODY MASS INDEX: 26.63 KG/M2 | HEIGHT: 70 IN | OXYGEN SATURATION: 97 % | DIASTOLIC BLOOD PRESSURE: 82 MMHG | TEMPERATURE: 97.5 F | RESPIRATION RATE: 15 BRPM | HEART RATE: 65 BPM | SYSTOLIC BLOOD PRESSURE: 175 MMHG

## 2020-05-29 PROCEDURE — 99283 EMERGENCY DEPT VISIT LOW MDM: CPT

## 2020-05-29 PROCEDURE — 6370000000 HC RX 637 (ALT 250 FOR IP): Performed by: EMERGENCY MEDICINE

## 2020-05-29 PROCEDURE — 73120 X-RAY EXAM OF HAND: CPT

## 2020-05-29 RX ORDER — ACETAMINOPHEN 500 MG
1000 TABLET ORAL ONCE
Status: COMPLETED | OUTPATIENT
Start: 2020-05-29 | End: 2020-05-29

## 2020-05-29 RX ADMIN — ACETAMINOPHEN 1000 MG: 500 TABLET ORAL at 07:22

## 2020-05-29 ASSESSMENT — PAIN DESCRIPTION - ORIENTATION: ORIENTATION: LEFT

## 2020-05-29 ASSESSMENT — PAIN DESCRIPTION - PAIN TYPE: TYPE: ACUTE PAIN

## 2020-05-29 ASSESSMENT — PAIN SCALES - GENERAL: PAINLEVEL_OUTOF10: 5

## 2020-05-29 ASSESSMENT — PAIN DESCRIPTION - LOCATION: LOCATION: HAND

## 2020-05-29 NOTE — ED PROVIDER NOTES
limitation of range of motion of the wrist.  SKIN: Warm and dry. NEUROLOGICAL: Alert and oriented. Strength is 5/5 in all extremities and sensation is intact. LABS  No results found for this visit on 05/29/20. I ordered and interpreted the x-ray of his left hand. He has extensive arthritic changes, but no fracture. RADIOLOGY  XR HAND LEFT (2 VIEWS)   Preliminary Result   No acute abnormalities. Degenerative changes to the hand, some of which demonstrate some subchondral   cyst formation versus marginal erosive change. Findings may reflect   osteoarthritis but a superimposed inflammatory arthritis is not excluded such   as rheumatoid arthritis. Clinical and laboratory correlation suggested. Diffuse bone demineralization. ED COURSE/MDM  Patient seen and evaluated. Patient was evaluated and found to have a contusion or sprain of the wrist.  No fracture is seen on x-ray, and none palpable on exam.  He does have extensive arthritis. Radiology also found no fracture. I considered the potential for him having a fracture, and it is not present. I did a detailed explanation for the daughter who is his caregiver. Patient was given:   Medications   acetaminophen (TYLENOL) tablet 1,000 mg (1,000 mg Oral Given 5/29/20 4611)        Patient was given scripts for the following medications. I counseled patient how to take these medications. Current Discharge Medication List          PATIENT REFERRED TO:  Reno Keen PA-C  [address] [id number]    In 3 days  If symptoms worsen       CLINICAL IMPRESSION  1. Contusion of left hand, initial encounter    2. Sprain of left hand, initial encounter        Blood pressure (!) 175/82, pulse 65, temperature 97.5 °F (36.4 °C), temperature source Infrared, resp. rate 15, height 5' 10\" (1.778 m), weight 186 lb (84.4 kg), SpO2 97 %.     DISPOSITION        Comment: Please note this report has been produced

## 2020-06-01 ENCOUNTER — PROCEDURE VISIT (OUTPATIENT)
Dept: CARDIOLOGY CLINIC | Age: 85
End: 2020-06-01
Payer: MEDICARE

## 2020-06-01 PROCEDURE — 93294 REM INTERROG EVL PM/LDLS PM: CPT | Performed by: INTERNAL MEDICINE

## 2020-06-01 PROCEDURE — 93296 REM INTERROG EVL PM/IDS: CPT | Performed by: INTERNAL MEDICINE

## 2020-06-22 ENCOUNTER — VIRTUAL VISIT (OUTPATIENT)
Dept: CARDIOLOGY CLINIC | Age: 85
End: 2020-06-22
Payer: MEDICARE

## 2020-06-22 PROBLEM — I35.1 NONRHEUMATIC AORTIC VALVE INSUFFICIENCY: Status: ACTIVE | Noted: 2020-06-22

## 2020-06-22 PROCEDURE — 1123F ACP DISCUSS/DSCN MKR DOCD: CPT | Performed by: INTERNAL MEDICINE

## 2020-06-22 PROCEDURE — 99214 OFFICE O/P EST MOD 30 MIN: CPT | Performed by: INTERNAL MEDICINE

## 2020-06-22 PROCEDURE — G8427 DOCREV CUR MEDS BY ELIG CLIN: HCPCS | Performed by: INTERNAL MEDICINE

## 2020-06-22 PROCEDURE — 4040F PNEUMOC VAC/ADMIN/RCVD: CPT | Performed by: INTERNAL MEDICINE

## 2020-06-22 PROCEDURE — G8417 CALC BMI ABV UP PARAM F/U: HCPCS | Performed by: INTERNAL MEDICINE

## 2020-06-22 PROCEDURE — 1036F TOBACCO NON-USER: CPT | Performed by: INTERNAL MEDICINE

## 2020-06-22 RX ORDER — MIDODRINE HYDROCHLORIDE 5 MG/1
10 TABLET ORAL 2 TIMES DAILY
COMMUNITY
End: 2021-05-05 | Stop reason: DRUGHIGH

## 2020-06-22 NOTE — PROGRESS NOTES
 Chronic kidney disease     Chronic kidney disease, stage III (moderate) ([facility] 7/17/2017    Coronary artery disease involving native coronary artery of native heart with unstable angina pectoris ([facility] 8/11/2019    Dementia ([facility]     Depression     GERD (gastroesophageal reflux disease)     Gout     Hyperlipidemia     Hypertension     Hyperthyroidism     Mitral valve insufficiency      Past Surgical History:   Procedure Laterality Date    COLONOSCOPY      CORONARY ANGIOPLASTY WITH STENT PLACEMENT      PACEMAKER INSERTION N/A 8/15/2019    PACEMAKER INSERTION PERMANENT performed by Dora Rhodes MD at [address] Right 08/14/2019     ml      As reviewed   Family History   Problem Relation Age of Onset    Cancer Sister     Cancer Brother     High Blood Pressure Brother     Heart Disease Other     Hypertension Other     Elevated Lipids Other     Other Other         gout     Social History     Tobacco Use    Smoking status: Former Smoker     Packs/day: 1.00     Years: 30.00     Pack years: 30.00     Types: Cigarettes    Smokeless tobacco: Never Used   Substance Use Topics    Alcohol use: No      Review of Systems:    Constitutional: Negative for diaphoresis and fatigue  Psychological:Negative for anxiety or depression  HENT: Negative for headaches, nasal congestion, sinus pain or vertigo  Eyes: Negative for visual disturbance.    Endocrine: Negative for polydipsia/polyuria  Respiratory: Negative for shortness of breath  Cardiovascular: Negative for chest pain, dyspnea on exertion, claudication, edema, irregular heartbeat, murmur, palpitations or shortness of breath  Gastrointestinal: Negative for abdominal pain or heartburn  Genito-Urinary: Negative for urinary frequency/urgency  Musculoskeletal: Negative for muscle pain, muscular weakness, negative for pain in arm and leg or swelling in foot and leg  Neurological: Negative for dizziness,

## 2020-06-22 NOTE — PATIENT INSTRUCTIONS
CAD:Yes   clinically stable. Patient is on optimal medical regimen ( see medication list above )  -     CORONARY ARTERY DISEASE: Patient is currently  asymptomatic from CAD. - changes in  treatment:   no           - Testing ordered:  no  [facility] classification: 1  [place] RISK SCORE:  [place] RISK SCORE:  HTN:well controlled on current medical regimen, see list above. - changes in  treatment:   no   CARDIOMYOPATHY: None known   CONGESTIVE HEART FAILURE: NO KNOWN HISTORY.      VHD: Moderate to sever MR & Moderate MR  DYSLIPIDEMIA: Patient's profile is at / near Goal.yes,                                 HDL is low                                Tolerating current medical regimen wellyes,                                                               See most recent Lab values in Labs section above. CAROTID ARTERY DISEASE:.yes,                No symptoms described pertaining to Carotid artery disease               Up to date on non invasive testing .yes,  follows. Patient is on Guidelines recommended therapy. yes,   ARRHYTHMIAS:  Known H/O CHB                                S/P PPM  OTHER RELEVANT DIAGNOSIS:as noted in patient's active problem list:  TESTS ORDERED: None this visit                                    All previously ordered tests reviewed. MEDICATIONS: CPM   Office f/u in six months. Device check per protocol. Primary/secondary prevention is the goal by aggressive risk modification, healthy and therapeutic life style changes for cardiovascular risk reduction. Various goals are discussed and multiple questions answered.

## 2020-06-22 NOTE — LETTER
Chi Fragoso  1934  [id number]    Have you had any Chest Pain - No      Have you had any Shortness of Breath - Yes on exertion       Have you had any dizziness - No      Have you had any palpitations - No    Do you have any edema -NO    Do you have a surgery or procedure scheduled in the near future - No

## 2020-07-05 ENCOUNTER — APPOINTMENT (OUTPATIENT)
Dept: GENERAL RADIOLOGY | Age: 85
End: 2020-07-05
Payer: MEDICARE

## 2020-07-05 ENCOUNTER — HOSPITAL ENCOUNTER (OUTPATIENT)
Age: 85
Setting detail: OBSERVATION
LOS: 1 days | Discharge: HOME | End: 2020-07-05
Attending: EMERGENCY MEDICINE | Admitting: INTERNAL MEDICINE
Payer: MEDICARE

## 2020-07-05 VITALS
BODY MASS INDEX: 27.23 KG/M2 | HEART RATE: 65 BPM | SYSTOLIC BLOOD PRESSURE: 164 MMHG | HEIGHT: 70 IN | TEMPERATURE: 98 F | DIASTOLIC BLOOD PRESSURE: 76 MMHG | OXYGEN SATURATION: 98 % | WEIGHT: 190.2 LBS | RESPIRATION RATE: 18 BRPM

## 2020-07-05 PROBLEM — M25.572 ACUTE BILATERAL ANKLE PAIN: Status: ACTIVE | Noted: 2020-07-05

## 2020-07-05 PROBLEM — M25.571 ACUTE BILATERAL ANKLE PAIN: Status: ACTIVE | Noted: 2020-07-05

## 2020-07-05 PROBLEM — I50.1: Status: ACTIVE | Noted: 2020-07-05

## 2020-07-05 LAB
ALBUMIN SERPL-MCNC: 3.8 GM/DL (ref 3.4–5)
ALP BLD-CCNC: 74 IU/L (ref 40–129)
ALT SERPL-CCNC: 9 U/L (ref 10–40)
ANION GAP SERPL CALCULATED.3IONS-SCNC: 9 MMOL/L (ref 4–16)
AST SERPL-CCNC: 12 IU/L (ref 15–37)
BASOPHILS ABSOLUTE: 0 K/CU MM
BASOPHILS RELATIVE PERCENT: 0.4 % (ref 0–1)
BILIRUB SERPL-MCNC: 0.7 MG/DL (ref 0–1)
BUN BLDV-MCNC: 22 MG/DL (ref 6–23)
CALCIUM SERPL-MCNC: 8.8 MG/DL (ref 8.3–10.6)
CHLORIDE BLD-SCNC: 99 MMOL/L (ref 99–110)
CO2: 29 MMOL/L (ref 21–32)
CREAT SERPL-MCNC: 1.7 MG/DL (ref 0.9–1.3)
DIFFERENTIAL TYPE: ABNORMAL
EOSINOPHILS ABSOLUTE: 0 K/CU MM
EOSINOPHILS RELATIVE PERCENT: 0.6 % (ref 0–3)
GFR AFRICAN AMERICAN: 46 ML/MIN/1.73M2
GFR NON-AFRICAN AMERICAN: 38 ML/MIN/1.73M2
GLUCOSE BLD-MCNC: 96 MG/DL (ref 70–99)
HCT VFR BLD CALC: 37.7 % (ref 42–52)
HEMOGLOBIN: 11.8 GM/DL (ref 13.5–18)
IMMATURE NEUTROPHIL %: 0.3 % (ref 0–0.43)
LYMPHOCYTES ABSOLUTE: 1.1 K/CU MM
LYMPHOCYTES RELATIVE PERCENT: 15.8 % (ref 24–44)
MCH RBC QN AUTO: 27.2 PG (ref 27–31)
MCHC RBC AUTO-ENTMCNC: 31.3 % (ref 32–36)
MCV RBC AUTO: 86.9 FL (ref 78–100)
MONOCYTES ABSOLUTE: 0.9 K/CU MM
MONOCYTES RELATIVE PERCENT: 12.9 % (ref 0–4)
PDW BLD-RTO: 15 % (ref 11.7–14.9)
PLATELET # BLD: 215 K/CU MM (ref 140–440)
PMV BLD AUTO: 9.5 FL (ref 7.5–11.1)
POTASSIUM SERPL-SCNC: 4.8 MMOL/L (ref 3.5–5.1)
PRO-BNP: 5116 PG/ML
RBC # BLD: 4.34 M/CU MM (ref 4.6–6.2)
SEGMENTED NEUTROPHILS ABSOLUTE COUNT: 4.7 K/CU MM
SEGMENTED NEUTROPHILS RELATIVE PERCENT: 70 % (ref 36–66)
SODIUM BLD-SCNC: 137 MMOL/L (ref 135–145)
TOTAL IMMATURE NEUTOROPHIL: 0.02 K/CU MM
TOTAL PROTEIN: 6.6 GM/DL (ref 6.4–8.2)
TROPONIN T: 0.01 NG/ML
URIC ACID: 0.4 MG/DL (ref 3.5–7.2)
WBC # BLD: 6.7 K/CU MM (ref 4–10.5)

## 2020-07-05 PROCEDURE — 96374 THER/PROPH/DIAG INJ IV PUSH: CPT

## 2020-07-05 PROCEDURE — 73620 X-RAY EXAM OF FOOT: CPT

## 2020-07-05 PROCEDURE — 80053 COMPREHEN METABOLIC PANEL: CPT

## 2020-07-05 PROCEDURE — G0378 HOSPITAL OBSERVATION PER HR: HCPCS

## 2020-07-05 PROCEDURE — 73610 X-RAY EXAM OF ANKLE: CPT

## 2020-07-05 PROCEDURE — 84550 ASSAY OF BLOOD/URIC ACID: CPT

## 2020-07-05 PROCEDURE — 93005 ELECTROCARDIOGRAM TRACING: CPT | Performed by: EMERGENCY MEDICINE

## 2020-07-05 PROCEDURE — 71046 X-RAY EXAM CHEST 2 VIEWS: CPT

## 2020-07-05 PROCEDURE — 94761 N-INVAS EAR/PLS OXIMETRY MLT: CPT

## 2020-07-05 PROCEDURE — 83880 ASSAY OF NATRIURETIC PEPTIDE: CPT

## 2020-07-05 PROCEDURE — 84484 ASSAY OF TROPONIN QUANT: CPT

## 2020-07-05 PROCEDURE — 6360000002 HC RX W HCPCS: Performed by: EMERGENCY MEDICINE

## 2020-07-05 PROCEDURE — 6370000000 HC RX 637 (ALT 250 FOR IP): Performed by: EMERGENCY MEDICINE

## 2020-07-05 PROCEDURE — 99284 EMERGENCY DEPT VISIT MOD MDM: CPT

## 2020-07-05 PROCEDURE — 85025 COMPLETE CBC W/AUTO DIFF WBC: CPT

## 2020-07-05 PROCEDURE — 93010 ELECTROCARDIOGRAM REPORT: CPT | Performed by: INTERNAL MEDICINE

## 2020-07-05 RX ORDER — ONDANSETRON 2 MG/ML
4 INJECTION INTRAMUSCULAR; INTRAVENOUS EVERY 6 HOURS PRN
Status: DISCONTINUED | OUTPATIENT
Start: 2020-07-05 | End: 2020-07-05 | Stop reason: HOSPADM

## 2020-07-05 RX ORDER — PREDNISONE 20 MG/1
20 TABLET ORAL DAILY
Status: DISCONTINUED | OUTPATIENT
Start: 2020-07-05 | End: 2020-07-05 | Stop reason: HOSPADM

## 2020-07-05 RX ORDER — MIDODRINE HYDROCHLORIDE 5 MG/1
5 TABLET ORAL 2 TIMES DAILY
Status: DISCONTINUED | OUTPATIENT
Start: 2020-07-05 | End: 2020-07-05

## 2020-07-05 RX ORDER — RIVASTIGMINE 4.6 MG/24H
1 PATCH, EXTENDED RELEASE TRANSDERMAL DAILY
Status: DISCONTINUED | OUTPATIENT
Start: 2020-07-05 | End: 2020-07-05 | Stop reason: HOSPADM

## 2020-07-05 RX ORDER — ACETAMINOPHEN 650 MG/1
650 SUPPOSITORY RECTAL EVERY 6 HOURS PRN
Status: DISCONTINUED | OUTPATIENT
Start: 2020-07-05 | End: 2020-07-05 | Stop reason: HOSPADM

## 2020-07-05 RX ORDER — CLOPIDOGREL BISULFATE 75 MG/1
75 TABLET ORAL DAILY
Status: DISCONTINUED | OUTPATIENT
Start: 2020-07-05 | End: 2020-07-05 | Stop reason: HOSPADM

## 2020-07-05 RX ORDER — TAMSULOSIN HYDROCHLORIDE 0.4 MG/1
0.4 CAPSULE ORAL DAILY
Status: DISCONTINUED | OUTPATIENT
Start: 2020-07-05 | End: 2020-07-05 | Stop reason: HOSPADM

## 2020-07-05 RX ORDER — SODIUM CHLORIDE 0.9 % (FLUSH) 0.9 %
10 SYRINGE (ML) INJECTION PRN
Status: DISCONTINUED | OUTPATIENT
Start: 2020-07-05 | End: 2020-07-05 | Stop reason: HOSPADM

## 2020-07-05 RX ORDER — PANTOPRAZOLE SODIUM 40 MG/1
40 TABLET, DELAYED RELEASE ORAL
Status: DISCONTINUED | OUTPATIENT
Start: 2020-07-06 | End: 2020-07-05 | Stop reason: HOSPADM

## 2020-07-05 RX ORDER — LEVOTHYROXINE SODIUM 0.05 MG/1
50 TABLET ORAL DAILY
Status: DISCONTINUED | OUTPATIENT
Start: 2020-07-05 | End: 2020-07-05 | Stop reason: HOSPADM

## 2020-07-05 RX ORDER — NITROGLYCERIN 0.4 MG/1
0.4 TABLET SUBLINGUAL EVERY 5 MIN PRN
Status: DISCONTINUED | OUTPATIENT
Start: 2020-07-05 | End: 2020-07-05 | Stop reason: HOSPADM

## 2020-07-05 RX ORDER — DOCUSATE SODIUM 100 MG/1
100 CAPSULE, LIQUID FILLED ORAL 2 TIMES DAILY PRN
Status: DISCONTINUED | OUTPATIENT
Start: 2020-07-05 | End: 2020-07-05 | Stop reason: HOSPADM

## 2020-07-05 RX ORDER — ACETAMINOPHEN 325 MG/1
650 TABLET ORAL EVERY 6 HOURS PRN
Status: DISCONTINUED | OUTPATIENT
Start: 2020-07-05 | End: 2020-07-05 | Stop reason: HOSPADM

## 2020-07-05 RX ORDER — SENNA PLUS 8.6 MG/1
1 TABLET ORAL 2 TIMES DAILY
Status: DISCONTINUED | OUTPATIENT
Start: 2020-07-05 | End: 2020-07-05 | Stop reason: HOSPADM

## 2020-07-05 RX ORDER — HYDRALAZINE HYDROCHLORIDE 20 MG/ML
10 INJECTION INTRAMUSCULAR; INTRAVENOUS EVERY 6 HOURS PRN
Status: DISCONTINUED | OUTPATIENT
Start: 2020-07-05 | End: 2020-07-05 | Stop reason: HOSPADM

## 2020-07-05 RX ORDER — ASPIRIN 81 MG/1
81 TABLET ORAL DAILY
Status: DISCONTINUED | OUTPATIENT
Start: 2020-07-05 | End: 2020-07-05 | Stop reason: HOSPADM

## 2020-07-05 RX ORDER — ALPRAZOLAM 0.25 MG/1
0.25 TABLET ORAL DAILY
Status: DISCONTINUED | OUTPATIENT
Start: 2020-07-05 | End: 2020-07-05 | Stop reason: HOSPADM

## 2020-07-05 RX ORDER — ATORVASTATIN CALCIUM 80 MG/1
80 TABLET, FILM COATED ORAL NIGHTLY
Status: DISCONTINUED | OUTPATIENT
Start: 2020-07-05 | End: 2020-07-05 | Stop reason: HOSPADM

## 2020-07-05 RX ORDER — POLYETHYLENE GLYCOL 3350 17 G/17G
17 POWDER, FOR SOLUTION ORAL DAILY PRN
Status: DISCONTINUED | OUTPATIENT
Start: 2020-07-05 | End: 2020-07-05 | Stop reason: HOSPADM

## 2020-07-05 RX ORDER — FLUOXETINE HYDROCHLORIDE 20 MG/1
20 CAPSULE ORAL DAILY
Status: DISCONTINUED | OUTPATIENT
Start: 2020-07-05 | End: 2020-07-05 | Stop reason: HOSPADM

## 2020-07-05 RX ORDER — SODIUM CHLORIDE 0.9 % (FLUSH) 0.9 %
10 SYRINGE (ML) INJECTION EVERY 12 HOURS SCHEDULED
Status: DISCONTINUED | OUTPATIENT
Start: 2020-07-05 | End: 2020-07-05 | Stop reason: HOSPADM

## 2020-07-05 RX ORDER — GUAIFENESIN 600 MG/1
600 TABLET, EXTENDED RELEASE ORAL 2 TIMES DAILY
Status: DISCONTINUED | OUTPATIENT
Start: 2020-07-05 | End: 2020-07-05 | Stop reason: HOSPADM

## 2020-07-05 RX ORDER — HEPARIN SODIUM 5000 [USP'U]/ML
5000 INJECTION, SOLUTION INTRAVENOUS; SUBCUTANEOUS EVERY 8 HOURS SCHEDULED
Status: DISCONTINUED | OUTPATIENT
Start: 2020-07-05 | End: 2020-07-05 | Stop reason: HOSPADM

## 2020-07-05 RX ORDER — PREDNISONE 20 MG/1
20 TABLET ORAL DAILY
Qty: 5 TABLET | Refills: 0 | Status: SHIPPED | OUTPATIENT
Start: 2020-07-05 | End: 2020-07-10

## 2020-07-05 RX ORDER — PROMETHAZINE HYDROCHLORIDE 25 MG/1
12.5 TABLET ORAL EVERY 6 HOURS PRN
Status: DISCONTINUED | OUTPATIENT
Start: 2020-07-05 | End: 2020-07-05 | Stop reason: HOSPADM

## 2020-07-05 RX ORDER — FAMOTIDINE 20 MG/1
20 TABLET, FILM COATED ORAL DAILY
Status: DISCONTINUED | OUTPATIENT
Start: 2020-07-05 | End: 2020-07-05

## 2020-07-05 RX ORDER — HYDROCODONE BITARTRATE AND ACETAMINOPHEN 5; 325 MG/1; MG/1
1 TABLET ORAL ONCE
Status: COMPLETED | OUTPATIENT
Start: 2020-07-05 | End: 2020-07-05

## 2020-07-05 RX ORDER — FUROSEMIDE 10 MG/ML
40 INJECTION INTRAMUSCULAR; INTRAVENOUS ONCE
Status: COMPLETED | OUTPATIENT
Start: 2020-07-05 | End: 2020-07-05

## 2020-07-05 RX ORDER — TORSEMIDE 20 MG/1
10 TABLET ORAL DAILY
Status: DISCONTINUED | OUTPATIENT
Start: 2020-07-05 | End: 2020-07-05 | Stop reason: HOSPADM

## 2020-07-05 RX ADMIN — FUROSEMIDE 40 MG: 10 INJECTION, SOLUTION INTRAVENOUS at 11:24

## 2020-07-05 RX ADMIN — HYDROCODONE BITARTRATE AND ACETAMINOPHEN 1 TABLET: 5; 325 TABLET ORAL at 08:19

## 2020-07-05 ASSESSMENT — ENCOUNTER SYMPTOMS
EYE REDNESS: 0
ABDOMINAL DISTENTION: 0
WHEEZING: 0
ANAL BLEEDING: 0
SORE THROAT: 0
EYE ITCHING: 0
ABDOMINAL PAIN: 0
RHINORRHEA: 0
BLOOD IN STOOL: 0
FACIAL SWELLING: 0
DIARRHEA: 0
NAUSEA: 0
EYE PAIN: 0
EYE DISCHARGE: 0
CONSTIPATION: 0
PHOTOPHOBIA: 0
SHORTNESS OF BREATH: 1
VOICE CHANGE: 0
STRIDOR: 0
TROUBLE SWALLOWING: 0
SINUS PRESSURE: 0
COUGH: 0
VOMITING: 0
BACK PAIN: 0
CHEST TIGHTNESS: 0

## 2020-07-05 ASSESSMENT — PAIN DESCRIPTION - ORIENTATION
ORIENTATION: LEFT
ORIENTATION: LEFT

## 2020-07-05 ASSESSMENT — PAIN SCALES - GENERAL
PAINLEVEL_OUTOF10: 7
PAINLEVEL_OUTOF10: 7
PAINLEVEL_OUTOF10: 5

## 2020-07-05 ASSESSMENT — PAIN DESCRIPTION - PAIN TYPE
TYPE: ACUTE PAIN
TYPE: ACUTE PAIN

## 2020-07-05 ASSESSMENT — PAIN DESCRIPTION - DESCRIPTORS
DESCRIPTORS: ACHING;NUMBNESS
DESCRIPTORS: ACHING

## 2020-07-05 ASSESSMENT — PAIN DESCRIPTION - FREQUENCY
FREQUENCY: CONTINUOUS
FREQUENCY: INTERMITTENT

## 2020-07-05 NOTE — ED PROVIDER NOTES
Yasmine Vines is an 80year old male who presents to the ED with his daughter with complaints off left foot and ankle pain for the last 2-3 days. He denies known injury or trauma. He has dementia and lives independently. He has a history of CHF and gout. He states his pain is 7/10 on arrival, is worse with weight bearing bur 4/10 when sitting on the bed. He denies SOB but he appears to be mildly dyspneic, and daughter appreciated the same. He denies chest pain. He also reports some pain in the right foot diffusely, worse with ambulation and weight bearing, but is unable to localize the pain. He has no history of diabetes or peripheral neuropathy. Analgesia offered. BP (!) 168/69   Pulse 81   Temp 97.5 °F (36.4 °C) (Oral)   Resp 16   Ht 5' 10\" (1.778 m)   Wt 183 lb (83 kg)   SpO2 99%   BMI 26.26 kg/m²     I have reviewed the following from the nursing documentation:      Prior to Admission medications    Medication Sig Start Date End Date Taking? Authorizing Provider   midodrine (PROAMATINE) 5 MG tablet Take 5 mg by mouth 2 times daily    Historical Provider, MD   famotidine (PEPCID) 20 MG tablet Take 20 mg by mouth daily     Historical Provider, MD   acetaminophen (TYLENOL) 500 MG tablet Take 500 mg by mouth every 6 hours as needed for Pain PRN    Historical Provider, MD   torsemide (DEMADEX) 20 MG tablet Take 1 tablet by mouth daily  Patient taking differently: Take 10 mg by mouth daily  11/29/19   Kiko Matthews DO   ALPRAZolam Mohsen Santana) 0.5 MG tablet Take 0.25 mg by mouth daily.      Historical Provider, MD   senna (SENOKOT) 8.6 MG tablet Take 1 tablet by mouth 2 times daily    Historical Provider, MD   ondansetron (ZOFRAN-ODT) 8 MG TBDP disintegrating tablet Place 8 mg under the tongue every 8 hours as needed for Nausea or Vomiting    Historical Provider, MD   FLUoxetine (PROZAC) 20 MG capsule Take 20 mg by mouth daily    Historical Provider, MD   rivastigmine (EXELON) 4.6 MG/24HR Place 1 patch heart sounds. No murmur. No friction rub. No gallop. Pulmonary:      Effort: Pulmonary effort is normal. No respiratory distress. Breath sounds: Rales present. No wheezing. Abdominal:      General: Bowel sounds are normal. There is no distension. Palpations: Abdomen is soft. There is no mass. Tenderness: There is no abdominal tenderness. There is no guarding or rebound. Musculoskeletal: Normal range of motion. General: No tenderness. Right lower leg: Edema present. Left lower leg: Edema present. Comments: 1+ peripheral edema bilaterally at the ankles, slightly worse left than right. Pain and tenderness left lateral malleolus and first MCP joint. There is mild warmth and erythema of the first, second and third MCP joints. No evidence of cellulitis. DP/PT pulses equal bilaterally. Cap refill brisk in all toes. No focal sensory or motor deficits appreciated. All sensory and motor components of the lumbosacral plexus tested are symmetric and intact. Lymphadenopathy:      Cervical: No cervical adenopathy. Skin:     General: Skin is warm and dry. Coloration: Skin is not pale. Findings: No erythema or rash. Neurological:      Mental Status: He is alert and oriented to person, place, and time. Cranial Nerves: No cranial nerve deficit. Motor: No abnormal muscle tone. Coordination: Coordination normal.      Deep Tendon Reflexes: Reflexes are normal and symmetric. Reflexes normal.   Psychiatric:         Behavior: Behavior normal.         Thought Content:  Thought content normal.         Judgment: Judgment normal.          Procedures     MDM   Results for orders placed or performed during the hospital encounter of 07/05/20   CBC auto diff   Result Value Ref Range    WBC 6.7 4.0 - 10.5 K/CU MM    RBC 4.34 (L) 4.6 - 6.2 M/CU MM    Hemoglobin 11.8 (L) 13.5 - 18.0 GM/DL    Hematocrit 37.7 (L) 42 - 52 %    MCV 86.9 78 - 100 FL    MCH 27.2 27 - 31 PG MCHC 31.3 (L) 32.0 - 36.0 %    RDW 15.0 (H) 11.7 - 14.9 %    Platelets 017 715 - 876 K/CU MM    MPV 9.5 7.5 - 11.1 FL    Differential Type AUTOMATED DIFFERENTIAL     Segs Relative 70.0 (H) 36 - 66 %    Lymphocytes % 15.8 (L) 24 - 44 %    Monocytes % 12.9 (H) 0 - 4 %    Eosinophils % 0.6 0 - 3 %    Basophils % 0.4 0 - 1 %    Segs Absolute 4.7 K/CU MM    Lymphocytes Absolute 1.1 K/CU MM    Monocytes Absolute 0.9 K/CU MM    Eosinophils Absolute 0.0 K/CU MM    Basophils Absolute 0.0 K/CU MM    Immature Neutrophil % 0.3 0 - 0.43 %    Total Immature Neutrophil 0.02 K/CU MM   Comprehensive Metabolic Panel   Result Value Ref Range    Sodium 137 135 - 145 MMOL/L    Potassium 4.8 3.5 - 5.1 MMOL/L    Chloride 99 99 - 110 mMol/L    CO2 29 21 - 32 MMOL/L    BUN 22 6 - 23 MG/DL    CREATININE 1.7 (H) 0.9 - 1.3 MG/DL    Glucose 96 70 - 99 MG/DL    Calcium 8.8 8.3 - 10.6 MG/DL    Alb 3.8 3.4 - 5.0 GM/DL    Total Protein 6.6 6.4 - 8.2 GM/DL    Total Bilirubin 0.7 0.0 - 1.0 MG/DL    ALT 9 (L) 10 - 40 U/L    AST 12 (L) 15 - 37 IU/L    Alkaline Phosphatase 74 40 - 129 IU/L    GFR Non- 38 (L) >60 mL/min/1.73m2    GFR  46 (L) >60 mL/min/1.73m2    Anion Gap 9 4 - 16   Troponin   Result Value Ref Range    Troponin T 0.015 (H) <0.01 NG/ML   Brain Natriuretic Peptide   Result Value Ref Range    Pro-BNP 5,116 (H) <300 PG/ML   Uric Acid   Result Value Ref Range    Uric Acid 0.4 (L) 3.5 - 7.2 MG/DL       I spoke with Dr. Jin Garcia, hospitalist at [facility]. We thoroughly discussed the history, physical exam, laboratory and imaging studies, as well as, emergency department course. Based upon that discussion, we've decided to admit Tiffani Nguyễn for further observation and evaluation of Romel Antunez's hypervolemia with peripheral edema, chronic renal insufficiency and possible early CHF.   As I have deemed necessary from their history, physical, and studies, I have considered and evaluated Eveline Rosado for the following diagnoses:        FINAL IMPRESSION  1. Bilateral foot pain    2. Peripheral edema    3. Congestive heart failure, unspecified HF chronicity, unspecified heart failure type ([address]    4. Chronic renal impairment, unspecified CKD stage    5. Elevated troponin    6. Elevated brain natriuretic peptide (BNP) level        Vitals:  Blood pressure (!) 149/67, pulse 66, temperature 97.5 °F (36.4 °C), temperature source Oral, resp. rate 16, height 5' 10\" (1.778 m), weight 183 lb (83 kg), SpO2 98 %. Radiology  Xr Chest Standard (2 Vw)    Result Date: 7/5/2020  Emphysema. No new acute process. Chronic bilateral lower lobe interstitial fibrotic changes. Xr Ankle Left (min 3 Views)    Result Date: 7/5/2020  No acute osseous abnormality. Nonspecific soft tissue edema. Xr Foot Left (2 Views)    Result Date: 7/5/2020  No acute osseous abnormality. EKG Interpretation. The Ekg interpreted by me in the absence of a cardiologist shows. normal sinus rhythm with a rate of 73 with first degree AV block  Axis is   Normal  QTc is  within an acceptable range  Intervals and Durations are unremarkable. No specific ST-T wave changes appreciated. No evidence of acute ischemia. No significant change from prior EKG dated EKG dated 16 August 2019.           Ana Gamboa MD  07/05/20 1125

## 2020-07-05 NOTE — H&P
History and Physical  [facility]   Internal Medicine Hospitalist      Name:  Saul Bray /Age/Sex: 1934  (80 y.o. male)   MRN & CSN:  [id number] & [id number] Admission Date/Time: 2020  7:43 AM   Location:  [facility] PCP: [address] Day: 1      Supervising Physician: Dr. Toma Abbasi     Chief Complaint: Foot Pain     Assessment and Plan:   Saul Bray is a 80 y.o. male who presents with Acute bilateral ankle pain     Acute bilateral ankle pain, likely gout flare up - known h/o gout. - admit for observation, telemetry monitoring         - XR foot/ankle shows no acute osseous abnormality       - start Prednisone for 5 days       - control pain     CHB - stable, denied SOB, BNP below baseline, no edema, Last Echo EF 40-45%.  Elevated trop - known h/o CAD, denied chest pain, could be r/t CKD, initial trop 0.015 (BL 0.017)  CAD       - CXR in [place] shows no new acute process       - trend trop       - c/w home dose Demadex, DAPT, Lipitor, Lopressor       - daily weights/monitor I/O        - Low sodium diet       - cont to monitor for clinical symptoms of hypervolemia       - consider cardio consult if no improvement     Hypertension, uncontrolled       - cont Lopressor + prn hydralazine with BP parameters       - hold midodrine       - monitor BP trends     CKD stage 3 - stable, Crea on baseline.        - monitor I/O        - monitor Crea level, BMP in AM     Chronic Illnesses: will continue current home medications unless contraindicated by above plan and assessment. - Alzheimer's disease        - Depression        - hyperlipidemia        - hypertension        - hyperthyroidism     Current diagnosis and plan of management discussed with the patient at the time of admission in lay language who agree to the above plan and disposition of admission for further care. All concerns and questions addressed.       Patient assessment and plan in data in the 24 hours ending 07/05/20 1444     Vitals:   Vitals:    07/05/20 1005 07/05/20 1128 07/05/20 1345 07/05/20 1353   BP: (!) 149/67 (!) 156/74 (!) 164/76    Pulse: 66 66 67 65   Resp: 16 16 18    Temp:   98 °F (36.7 °C)    TempSrc:   Oral    SpO2: 98% 100% 100%    Weight:   190 lb 3.2 oz (86.3 kg)    Height:   5' 10\" (1.778 m)      Physical Exam: 07/05/20    GEN  -Awake, alert, appearing Frail, elderly male, sitting upright in bed, cooperative, able to give adequate history. Appears given age. EYES -Pupils are equally round. No vision changes. No scleral erythema, discharge, or conjunctivitis. HENT -MM are moist. Oral pharynx without exudates, no evidence of thrush. NECK -Supple, no apparent thyromegaly or masses. RESP -LS CTA equal bilat, no wheezes, rales or rhonchi. Symmetric chest movement. No respiratory distress noted. C/V  -S1/S2 auscultated. RRR without appreciable M/R/G. No JVD or carotid bruits. Peripheral pulses equal bilaterally and palpable. Peripheral pulses equal bilaterally and palpable. No peripheral edema. No reproducible chest wall tenderness. GI  -Abdomen is soft, round, non-distended, no significant tenderness. No masses or guarding. + BS in all quadrants. Rectal exam deferred.   -Shaw catheter is not present. LYMPH  -No palpable cervical lymphadenopathy and no hepatosplenomegaly. No petechiae or ecchymoses. MS  -B/L extremities strong muscles strength. Full movements. No gross joint deformities. No swelling, intact sensation symmetrical.   SKIN  -Normal coloration, warm, dry. No open wounds or ulcers. NEURO  -CN 2-12 appear grossly intact, normal speech, no lateralizing weakness. PSYC  -Awake, alert, oriented x 4. Appropriate affect.      Past Medical History:      Past Medical History:   Diagnosis Date    Anxiety     Bladder [facility]     Cancer ([facility]     CHF (congestive heart failure) (HCC)     Chronic kidney disease     Chronic kidney disease, stage III (moderate) ([facility] 7/17/2017    Coronary artery disease involving native coronary artery of native heart with unstable angina pectoris [facility] 8/11/2019    Dementia ([facility]     Depression     GERD (gastroesophageal reflux disease)     Gout     Hyperlipidemia     Hypertension     Hyperthyroidism     Mitral valve insufficiency      Past Surgery History:  Patient  has a past surgical history that includes thoracentesis (Right, 08/14/2019); Pacemaker insertion (N/A, 8/15/2019); Colonoscopy; pacemaker placement; and Coronary angioplasty with stent. Social History:    FAM HX: Assessed: family history includes Cancer in his brother and sister; Elevated Lipids in an other family member; Heart Disease in an other family member; High Blood Pressure in his brother; Hypertension in an other family member; Other in an other family member. Soc HX:   Social History     Socioeconomic History    Marital status:       Spouse name: None    Number of children: 5    Years of education: None    Highest education level: None   Occupational History    None   Social Needs    Financial resource strain: None    Food insecurity     Worry: None     Inability: None    Transportation needs     Medical: None     Non-medical: None   Tobacco Use    Smoking status: Former Smoker     Packs/day: 1.00     Years: 30.00     Pack years: 30.00     Types: Cigarettes    Smokeless tobacco: Never Used   Substance and Sexual Activity    Alcohol use: No    Drug use: No    Sexual activity: None   Lifestyle    Physical activity     Days per week: None     Minutes per session: None    Stress: None   Relationships    Social connections     Talks on phone: None     Gets together: None     Attends Jewish service: None     Active member of club or organization: None     Attends meetings of clubs or organizations: None     Relationship status: None    Intimate partner violence     Fear of current or ex partner: None     Emotionally abused: None     Physically abused: None     Forced sexual activity: None   Other Topics Concern    None   Social History Narrative    None     TOBACCO:   reports that he has quit smoking. His smoking use included cigarettes. He has a 30.00 pack-year smoking history. He has never used smokeless tobacco.  ETOH:   reports no history of alcohol use. Drugs:  reports no history of drug use. Allergies: No Known Allergies  Medications:   Medications:    ALPRAZolam  0.25 mg Oral Daily    aspirin  81 mg Oral Daily    atorvastatin  80 mg Oral Nightly    clopidogrel  75 mg Oral Daily    FLUoxetine  20 mg Oral Daily    guaiFENesin  600 mg Oral BID    levothyroxine  50 mcg Oral Daily    metoprolol tartrate  12.5 mg Oral BID    rivastigmine  1 patch Transdermal Daily    senna  1 tablet Oral BID    tamsulosin  0.4 mg Oral Daily    torsemide  10 mg Oral Daily    sodium chloride flush  10 mL Intravenous 2 times per day    [START ON 7/6/2020] pantoprazole  40 mg Oral QAM AC    heparin (porcine)  5,000 Units Subcutaneous 3 times per day    predniSONE  20 mg Oral Daily      Infusions:   PRN Meds: docusate sodium, 100 mg, BID PRN  sodium chloride flush, 10 mL, PRN  acetaminophen, 650 mg, Q6H PRN    Or  acetaminophen, 650 mg, Q6H PRN  polyethylene glycol, 17 g, Daily PRN  promethazine, 12.5 mg, Q6H PRN    Or  ondansetron, 4 mg, Q6H PRN  nitroGLYCERIN, 0.4 mg, Q5 Min PRN  hydrALAZINE, 10 mg, Q6H PRN      Prior to Admission Meds:  Prior to Admission medications    Medication Sig Start Date End Date Taking?  Authorizing Provider   midodrine (PROAMATINE) 5 MG tablet Take 5 mg by mouth 2 times daily    Historical Provider, MD   famotidine (PEPCID) 20 MG tablet Take 20 mg by mouth daily     Historical Provider, MD   acetaminophen (TYLENOL) 500 MG tablet Take 500 mg by mouth every 6 hours as needed for Pain PRN    Historical Provider, MD   torsemide (DEMADEX) 20 MG tablet Take 1 tablet by mouth daily  Patient taking differently: Take 10 mg by mouth daily  11/29/19   Jacob Navarro DO   ALPRAZolam [facility] Tong) 0.5 MG tablet Take 0.25 mg by mouth daily. Historical Provider, MD   senna (SENOKOT) 8.6 MG tablet Take 1 tablet by mouth 2 times daily    Historical Provider, MD   ondansetron (ZOFRAN-ODT) 8 MG TBDP disintegrating tablet Place 8 mg under the tongue every 8 hours as needed for Nausea or Vomiting    Historical Provider, MD   FLUoxetine (PROZAC) 20 MG capsule Take 20 mg by mouth daily    Historical Provider, MD   rivastigmine (EXELON) 4.6 MG/24HR Place 1 patch onto the skin daily    Historical Provider, MD   atorvastatin (LIPITOR) 80 MG tablet Take 1 tablet by mouth nightly 8/30/19   YARIEL Clayton MD   metoprolol tartrate (LOPRESSOR) 50 MG tablet Take 1 tablet by mouth 2 times daily  Patient taking differently: Take 12.5 mg by mouth 2 times daily Changed per Dr Cl Ramon 9/10/19 8/30/19   Rodriguez Fisher MD   guaiFENesin [facility]) 600 MG extended release tablet Take 1 tablet by mouth 2 times daily 8/30/19   YARIEL Clayton MD   tamsulosin [facility]) 0.4 MG capsule Take 1 capsule by mouth daily 8/31/19   YARIEL Clayton MD   clopidogrel (PLAVIX) 75 MG tablet Take 1 tablet by mouth daily 8/31/19   YARIEL Clayton MD   levothyroxine (SYNTHROID) 50 MCG tablet Take 50 mcg by mouth Daily    Historical Provider, MD   aspirin 81 MG tablet Take 81 mg by mouth daily     Historical Provider, MD   docusate sodium (COLACE) 100 MG capsule Take 1 capsule by mouth 2 times daily as needed for Constipation 9/1/16   Tianna Hernandez MD     Data:     Laboratory this visit:  Reviewed  Recent Labs     07/05/20  0810   WBC 6.7   HGB 11.8*   HCT 37.7*         Recent Labs     07/05/20  0810      K 4.8   CL 99   CO2 29   BUN 22   CREATININE 1.7*     Recent Labs     07/05/20  0810   AST 12*   ALT 9*   BILITOT 0.7   ALKPHOS 74     No results for input(s): INR in the last 72 hours.   No results for input(s): CKTOTAL, CKMB, CKMBINDEX in the last 72 hours. Invalid input(s): Estrella Bailon input(s): PRO-BNP    Radiology this visit:  Reviewed. Xr Chest Standard (2 Vw)    Result Date: 7/5/2020  EXAMINATION: TWO XRAY VIEWS OF THE CHEST 7/5/2020 8:13 am COMPARISON: 11/29/2019 HISTORY: ORDERING SYSTEM PROVIDED HISTORY: SOB TECHNOLOGIST PROVIDED HISTORY: Reason for exam:->SOB Initial exam FINDINGS: The left-sided pacemaker device is stable in position. Lungs are emphysematous. Chronic appearing interstitial lung changes throughout the lung bases left greater than right. Suspect small effusions versus pleural adhesion. No edema or pneumothorax. Stable cardiomediastinal silhouette. Emphysema. No new acute process. Chronic bilateral lower lobe interstitial fibrotic changes. Xr Ankle Left (min 3 Views)    Result Date: 7/5/2020  EXAMINATION: THREE XRAY VIEWS OF THE LEFT ANKLE 7/5/2020 8:13 am COMPARISON: None. HISTORY: ORDERING SYSTEM PROVIDED HISTORY: pain and swelling; no injury TECHNOLOGIST PROVIDED HISTORY: Reason for exam:->pain and swelling; no injury Initial exam FINDINGS: Nonspecific soft tissue edema circumferentially. No acute fracture, dislocation, or bony destructive process. There is a small tibiotalar joint effusion. No acute osseous abnormality. Nonspecific soft tissue edema. Xr Foot Left (2 Views)    Result Date: 7/5/2020  EXAMINATION: TWO XRAY VIEWS OF THE LEFT FOOT 7/5/2020 8:13 am COMPARISON: None. HISTORY: ORDERING SYSTEM PROVIDED HISTORY: pain and swelling; no injury TECHNOLOGIST PROVIDED HISTORY: Reason for exam:->pain and swelling; no injury Initial exam FINDINGS: Lisfranc articulation is preserved. No acute fracture, dislocation, or bony destructive process. No erosive changes. Mild to moderate narrowing of the 1st MTP joint. No retained radiopaque foreign body. No acute osseous abnormality. EKG this visit:   EKG: No available ECG to review during assessment/interview. Please see ED notes.     Current Treatment Team:  Treatment Team: Attending Provider: Uday Diaz MD; Registered Nurse: ZECHARIAH Caballero APRN-BC [facility]  7/5/2020 2:44 PM      Electronically signed by PRISCILLA Lewis CNP on 7/5/2020 at 2:44 PM

## 2020-07-05 NOTE — ED NOTES
[facility] [facility] and they will be here in 30-45 minutes.                         Marcin Silva RN  07/05/20 2334

## 2020-07-05 NOTE — ED NOTES
Contacted the access center to begin the transfer process.                  Ab Newsome RN  07/05/20 6511

## 2020-07-05 NOTE — ED TRIAGE NOTES
Pt arrives with complaints of left foot pain for about two -three days, unaware of any recent injury, he states that the pain is worse with ambulation, also says the both feet are slightly numb, he states that the pain to his left foot is more on the lateral aspect of ankle. Denies any fever or chills.

## 2020-07-05 NOTE — ED NOTES
Bed: E01  Expected date:   Expected time:   Means of arrival:   Comments:  Next patient     Ed Shaffer RN  07/05/20 7939

## 2020-07-06 NOTE — DISCHARGE SUMMARY
Discharge Summary    Name:  Phani Valera /Age/Sex: 1934  (80 y.o. male)   MRN & CSN:  [id number] & [id number] Admission Date/Time: 2020  7:43 AM   Attending:  No att. providers found Discharging Physician: Linden Mustafa MD     Hospital Course:   Phani Valera is a 80 y.o.  male  who presents with Acute bilateral ankle pain    This is a case of a 59-year-old male with past medical history significant for complete heart block status post pacemaker, hypertension, chronic kidney disease, Alzheimer's dementia who presented to the hospital because of left ankle pain. He denied chest pain, shortness of breath,  paroxysmal left neural dyspnea or orthopnea. Significant laboratories were the following: Elevated BNP, detectable troponin. X-ray of the left foot with no acute process. Chest x-ray with no congestion. He does not look fluid overload. He denied chest pain, shortness of breath. He was discharged stable. Discussed with daughters. The patient expressed appropriate understanding of and agreement with the discharge recommendations, medications, and plan. Consults this admission:  IP CONSULT TO HOSPITALIST    Discharge Instruction:   Follow up appointments:   Primary care physician:  within 2 weeks    Diet:  regular diet   Activity: activity as tolerated  Disposition: Discharged to:   [x]Home, []HHC, []SNF, []Acute Rehab, []Hospice   Condition on discharge: Stable    Discharge Medications:      Prakash Stewart   Home Medication Instructions [id number]    Printed on:20 0897   Medication Information                      acetaminophen (TYLENOL) 500 MG tablet  Take 500 mg by mouth every 6 hours as needed for Pain PRN             ALPRAZolam (XANAX) 0.5 MG tablet  Take 0.25 mg by mouth daily.  Indications: takes in the pm              aspirin 81 MG tablet  Take 81 mg by mouth daily              atorvastatin (LIPITOR) 80 MG tablet  Take 1 tablet by mouth nightly clopidogrel (PLAVIX) 75 MG tablet  Take 1 tablet by mouth daily             docusate sodium (COLACE) 100 MG capsule  Take 1 capsule by mouth 2 times daily as needed for Constipation             famotidine (PEPCID) 20 MG tablet  Take 20 mg by mouth daily              FLUoxetine (PROZAC) 20 MG capsule  Take 20 mg by mouth daily Indications: takes at night Takes a night             guaiFENesin (MUCINEX) 600 MG extended release tablet  Take 1 tablet by mouth 2 times daily             levothyroxine (SYNTHROID) 50 MCG tablet  Take 50 mcg by mouth Daily             metoprolol tartrate (LOPRESSOR) 50 MG tablet  Take 1 tablet by mouth 2 times daily             midodrine (PROAMATINE) 5 MG tablet  Take 5 mg by mouth 2 times daily             ondansetron (ZOFRAN-ODT) 8 MG TBDP disintegrating tablet  Place 8 mg under the tongue every 8 hours as needed for Nausea or Vomiting             predniSONE (DELTASONE) 20 MG tablet  Take 1 tablet by mouth daily for 5 days             rivastigmine (EXELON) 4.6 MG/24HR  Place 1 patch onto the skin daily             senna (SENOKOT) 8.6 MG tablet  Take 1 tablet by mouth 2 times daily             tamsulosin (FLOMAX) 0.4 MG capsule  Take 1 capsule by mouth daily             torsemide (DEMADEX) 20 MG tablet  Take 1 tablet by mouth daily                 Objective Findings at Discharge:   BP (!) 164/76   Pulse 65   Temp 98 °F (36.7 °C) (Oral)   Resp 18   Ht 5' 10\" (1.778 m)   Wt 190 lb 3.2 oz (86.3 kg)   SpO2 98%   BMI 27.29 kg/m²            PHYSICAL EXAM   GEN Awake male, sitting upright in bed in no apparent distress. Appears given age. EYES Pupils are equally round. No scleral erythema, discharge, or conjunctivitis. HENT Mucous membranes are moist. Oral pharynx without exudates, no evidence of thrush. NECK Supple, no apparent thyromegaly or masses. RESP Clear to auscultation, no wheezes, rales or rhonchi. Symmetric chest movement while on room air. CARDIO/VASC S1/S2 auscultated. Regular rate without appreciable murmurs, rubs, or gallops. No JVD or carotid bruits. Peripheral pulses equal bilaterally and palpable. No peripheral edema. GI Abdomen is soft without significant tenderness, masses, or guarding. Bowel sounds are normoactive. Rectal exam deferred. MSK No gross joint deformities. SKIN Normal coloration, warm, dry. NEURO Cranial nerves appear grossly intact, normal speech, no lateralizing weakness. PSYCH Awake, alert, oriented x 4. Affect appropriate. BMP/CBC  Recent Labs     07/05/20  0810      K 4.8   CL 99   CO2 29   BUN 22   CREATININE 1.7*   WBC 6.7   HCT 37.7*          IMAGING:  Xr Chest Standard (2 Vw)    Result Date: 7/5/2020  EXAMINATION: TWO XRAY VIEWS OF THE CHEST 7/5/2020 8:13 am COMPARISON: 11/29/2019 HISTORY: ORDERING SYSTEM PROVIDED HISTORY: SOB TECHNOLOGIST PROVIDED HISTORY: Reason for exam:->SOB Initial exam FINDINGS: The left-sided pacemaker device is stable in position. Lungs are emphysematous. Chronic appearing interstitial lung changes throughout the lung bases left greater than right. Suspect small effusions versus pleural adhesion. No edema or pneumothorax. Stable cardiomediastinal silhouette. Emphysema. No new acute process. Chronic bilateral lower lobe interstitial fibrotic changes. Xr Ankle Left (min 3 Views)    Result Date: 7/5/2020  EXAMINATION: THREE XRAY VIEWS OF THE LEFT ANKLE 7/5/2020 8:13 am COMPARISON: None. HISTORY: ORDERING SYSTEM PROVIDED HISTORY: pain and swelling; no injury TECHNOLOGIST PROVIDED HISTORY: Reason for exam:->pain and swelling; no injury Initial exam FINDINGS: Nonspecific soft tissue edema circumferentially. No acute fracture, dislocation, or bony destructive process. There is a small tibiotalar joint effusion. No acute osseous abnormality. Nonspecific soft tissue edema.      Xr Foot Left (2 Views)    Result Date: 7/5/2020  EXAMINATION: TWO XRAY VIEWS OF THE LEFT FOOT 7/5/2020 8:13 am

## 2020-07-08 LAB
EKG ATRIAL RATE: 73 BPM
EKG DIAGNOSIS: NORMAL
EKG P AXIS: -22 DEGREES
EKG P-R INTERVAL: 230 MS
EKG Q-T INTERVAL: 420 MS
EKG QRS DURATION: 140 MS
EKG QTC CALCULATION (BAZETT): 462 MS
EKG R AXIS: 7 DEGREES
EKG T AXIS: 80 DEGREES
EKG VENTRICULAR RATE: 73 BPM

## 2020-08-14 PROBLEM — R60.1 GENERALIZED EDEMA: Status: ACTIVE | Noted: 2020-08-14

## 2020-09-09 ENCOUNTER — PROCEDURE VISIT (OUTPATIENT)
Dept: CARDIOLOGY CLINIC | Age: 85
End: 2020-09-09
Payer: MEDICARE

## 2020-09-09 PROCEDURE — 93296 REM INTERROG EVL PM/IDS: CPT | Performed by: INTERNAL MEDICINE

## 2020-09-09 PROCEDURE — 93294 REM INTERROG EVL PM/LDLS PM: CPT | Performed by: INTERNAL MEDICINE

## 2020-12-30 ENCOUNTER — TELEPHONE (OUTPATIENT)
Dept: CARDIOLOGY CLINIC | Age: 85
End: 2020-12-30

## 2020-12-30 ENCOUNTER — PROCEDURE VISIT (OUTPATIENT)
Dept: CARDIOLOGY CLINIC | Age: 85
End: 2020-12-30
Payer: MEDICARE

## 2020-12-30 PROCEDURE — 93294 REM INTERROG EVL PM/LDLS PM: CPT | Performed by: INTERNAL MEDICINE

## 2020-12-30 PROCEDURE — 93296 REM INTERROG EVL PM/IDS: CPT | Performed by: INTERNAL MEDICINE

## 2020-12-30 NOTE — LETTER
Cardiology [address]  Phone: [phone]  Fax: [phone]    12/30/2020        [address] [place] [address]            Dear Hortencia Marroquin: This is your [facility] schedule. You can ryan your calendar with these dates. Remember that your device is wireless and should automatically do these checks while you are sleeping. If for any reason I do not get your transmission then I will call you and ask that you send a manual transmission. If you have any questions or concerns, please call and ask for Tre Rosenberg at [phone]. Thank you.

## 2021-01-04 ENCOUNTER — OFFICE VISIT (OUTPATIENT)
Dept: CARDIOLOGY CLINIC | Age: 86
End: 2021-01-04
Payer: MEDICARE

## 2021-01-04 VITALS
WEIGHT: 203 LBS | SYSTOLIC BLOOD PRESSURE: 130 MMHG | DIASTOLIC BLOOD PRESSURE: 82 MMHG | HEART RATE: 68 BPM | BODY MASS INDEX: 30.07 KG/M2 | HEIGHT: 69 IN

## 2021-01-04 DIAGNOSIS — I21.4 NSTEMI (NON-ST ELEVATED MYOCARDIAL INFARCTION) (HCC): ICD-10-CM

## 2021-01-04 DIAGNOSIS — I35.1 NONRHEUMATIC AORTIC VALVE INSUFFICIENCY: ICD-10-CM

## 2021-01-04 DIAGNOSIS — I65.21 CAROTID STENOSIS, ASYMPTOMATIC, RIGHT: ICD-10-CM

## 2021-01-04 DIAGNOSIS — T81.718S POSTOPERATIVE PULMONARY EMBOLISM, SEQUELA (HCC): ICD-10-CM

## 2021-01-04 DIAGNOSIS — Z95.0 S/P PLACEMENT OF CARDIAC PACEMAKER: ICD-10-CM

## 2021-01-04 DIAGNOSIS — I26.99 POSTOPERATIVE PULMONARY EMBOLISM, SEQUELA (HCC): ICD-10-CM

## 2021-01-04 DIAGNOSIS — I44.2 COMPLETE HEART BLOCK (HCC): ICD-10-CM

## 2021-01-04 DIAGNOSIS — I10 ESSENTIAL HYPERTENSION: Chronic | ICD-10-CM

## 2021-01-04 DIAGNOSIS — I34.0 NONRHEUMATIC MITRAL VALVE REGURGITATION: ICD-10-CM

## 2021-01-04 DIAGNOSIS — I25.110 CORONARY ARTERY DISEASE INVOLVING NATIVE CORONARY ARTERY OF NATIVE HEART WITH UNSTABLE ANGINA PECTORIS (HCC): Primary | ICD-10-CM

## 2021-01-04 DIAGNOSIS — N18.32 STAGE 3B CHRONIC KIDNEY DISEASE (HCC): ICD-10-CM

## 2021-01-04 PROCEDURE — G8482 FLU IMMUNIZE ORDER/ADMIN: HCPCS | Performed by: INTERNAL MEDICINE

## 2021-01-04 PROCEDURE — G8417 CALC BMI ABV UP PARAM F/U: HCPCS | Performed by: INTERNAL MEDICINE

## 2021-01-04 PROCEDURE — 1123F ACP DISCUSS/DSCN MKR DOCD: CPT | Performed by: INTERNAL MEDICINE

## 2021-01-04 PROCEDURE — 99214 OFFICE O/P EST MOD 30 MIN: CPT | Performed by: INTERNAL MEDICINE

## 2021-01-04 PROCEDURE — 4040F PNEUMOC VAC/ADMIN/RCVD: CPT | Performed by: INTERNAL MEDICINE

## 2021-01-04 PROCEDURE — G8427 DOCREV CUR MEDS BY ELIG CLIN: HCPCS | Performed by: INTERNAL MEDICINE

## 2021-01-04 PROCEDURE — 1036F TOBACCO NON-USER: CPT | Performed by: INTERNAL MEDICINE

## 2021-01-04 NOTE — PROGRESS NOTES
OFFICE PROGRESS NOTES      Luke Moreno is a 80 y.o. male who has    CHIEF COMPLAINT AS FOLLOWS:  CHEST PAIN: Patient denies any C/O chest pains at this time. SOB: No C/O SOB at this time. LEG EDEMA: No leg edema   PALPITATIONS: Denies any C/O Palpitations   DIZZINESS: No C/O Dizziness   SYNCOPE: None   OTHER:                                     HPI: Patient is here for F/U on his CAD, HTN, CHB & Dyslipidemia problems. He does not have any complaints at this time.     Maricel Kim has the following history recorded in care path:  Patient Active Problem List    Diagnosis Date Noted    Generalized edema 08/14/2020    Left-sided heart failure ([address] 07/05/2020    Acute bilateral ankle pain 07/05/2020    Nonrheumatic aortic valve insufficiency 06/22/2020    Volume overload 11/29/2019    S/P placement of cardiac pacemaker 09/10/2019    Bilateral carotid artery stenosis 09/10/2019    Rash and nonspecific skin eruption     Acute renal failure with tubular necrosis (HCC)     Ataxia     Impaired cognition     HAP (hospital-acquired pneumonia)     Carotid stenosis, asymptomatic, right     Mitral valve insufficiency     Metabolic encephalopathy 62/40/7396    CHB (complete heart block) ([address]     Coronary artery disease involving native coronary artery of native heart with unstable angina pectoris ([address] 08/11/2019    NSTEMI (non-ST elevated myocardial infarction) ([address] 08/11/2019    Complete heart block (HCC)     AV block, 3rd degree ([address]     Acute metabolic encephalopathy 70/40/3069    Hyponatremia 08/03/2019    Acute hypokalemia 08/03/2019    Cancer ([address]     Alzheimer's disease ([address] 03/21/2019    Postoperative pulmonary embolism ([address] 11/19/2018    Chronic kidney disease, stage III (moderate) ([address] 07/17/2017    Peripheral neuropathy 09/03/2015    Prostate cancer ([address] 03/12/2014    Cold feet 09/12/2013  Constipation 09/12/2013    BPH (benign prostatic hyperplasia) 09/21/2012    PND (post-nasal drip) 12/15/2011    HTN (hypertension) 06/24/2011    Depression 06/24/2011     Current Outpatient Medications   Medication Sig Dispense Refill    pantoprazole (PROTONIX) 40 MG tablet Take 40 mg by mouth daily      Folinic Acid-Vit B6-Vit B12 (FOLINIC-PLUS PO) Take 1 tablet by mouth daily      allopurinol (ZYLOPRIM) 100 MG tablet Take 100 mg by mouth daily      torsemide (DEMADEX) 20 MG tablet Take 1 tablet by mouth daily 90 tablet 3    midodrine (PROAMATINE) 5 MG tablet Take 10 mg by mouth 2 times daily       acetaminophen (TYLENOL) 500 MG tablet Take 500 mg by mouth every 6 hours as needed for Pain PRN      ALPRAZolam (XANAX) 0.5 MG tablet Take 0.25 mg by mouth daily.  Indications: takes in the pm       senna (SENOKOT) 8.6 MG tablet Take 1 tablet by mouth 2 times daily      ondansetron (ZOFRAN-ODT) 8 MG TBDP disintegrating tablet Place 8 mg under the tongue every 8 hours as needed for Nausea or Vomiting      FLUoxetine (PROZAC) 20 MG capsule Take 20 mg by mouth daily Indications: takes at night Takes a night      atorvastatin (LIPITOR) 80 MG tablet Take 1 tablet by mouth nightly 30 tablet 0    metoprolol tartrate (LOPRESSOR) 50 MG tablet Take 1 tablet by mouth 2 times daily (Patient taking differently: Take 12.5 mg by mouth daily Changed per Dr Christina Rodriguez 9/10/19) 60 tablet 0    guaiFENesin (MUCINEX) 600 MG extended release tablet Take 1 tablet by mouth 2 times daily (Patient taking differently: Take 600 mg by mouth 2 times daily as needed ) 60 tablet 0    tamsulosin (FLOMAX) 0.4 MG capsule Take 1 capsule by mouth daily 30 capsule 0    clopidogrel (PLAVIX) 75 MG tablet Take 1 tablet by mouth daily 30 tablet 0    levothyroxine (SYNTHROID) 50 MCG tablet Take 50 mcg by mouth Daily      aspirin 81 MG tablet Take 81 mg by mouth daily  docusate sodium (COLACE) 100 MG capsule Take 1 capsule by mouth 2 times daily as needed for Constipation 90 capsule 1     No current facility-administered medications for this visit. Allergies: Patient has no known allergies. Past Medical History:   Diagnosis Date    Anxiety     [facility]     Cancer ([facility]     CHF (congestive heart failure) (HCC)     Chronic kidney disease     Chronic kidney disease, stage III (moderate) 07/17/2017    Coronary artery disease involving native coronary artery of native heart with unstable angina pectoris ([facility] 08/11/2019    Dementia ([facility]     Depression     GERD (gastroesophageal reflux disease)     Gout     Hx of Doppler echocardiogram 11/19/2018    EF 50% mod LV hypertrophy    Hx of exercise stress test 11/19/2018    Treadmill normal study    Hyperlipidemia     Hypertension     Hyperthyroidism     Mitral valve insufficiency      Past Surgical History:   Procedure Laterality Date    COLONOSCOPY      CORONARY ANGIOPLASTY WITH STENT PLACEMENT      PACEMAKER INSERTION N/A 8/15/2019    PACEMAKER INSERTION PERMANENT performed by Spencer Fuentes MD at [address] Right 08/14/2019     ml      As reviewed   Family History   Problem Relation Age of Onset    Cancer Sister     Cancer Brother     High Blood Pressure Brother     Heart Disease Other     Hypertension Other     Elevated Lipids Other     Other Other         gout     Social History     Tobacco Use    Smoking status: Former Smoker     Packs/day: 1.00     Years: 30.00     Pack years: 30.00     Types: Cigarettes    Smokeless tobacco: Never Used   Substance Use Topics    Alcohol use: No      Review of Systems:    Constitutional: Negative for diaphoresis and fatigue  Psychological:Negative for anxiety or depression  HENT: Negative for headaches, nasal congestion, sinus pain or vertigo  Eyes: Negative for visual disturbance. Endocrine: Negative for polydipsia/polyuria  Respiratory: Negative for shortness of breath  Cardiovascular: Negative for chest pain, dyspnea on exertion, claudication, edema, irregular heartbeat, murmur, palpitations or shortness of breath  Gastrointestinal: Negative for abdominal pain or heartburn  Genito-Urinary: Negative for urinary frequency/urgency  Musculoskeletal: Negative for muscle pain, muscular weakness, negative for pain in arm and leg or swelling in foot and leg  Neurological: Negative for dizziness, headaches, memory loss, numbness/tingling, visual changes, syncope  Dermatological: Negative for rash    Objective:  /82   Pulse 68   Ht 5' 9\" (1.753 m)   Wt 203 lb (92.1 kg)   BMI 29.98 kg/m²   Wt Readings from Last 3 Encounters:   01/04/21 203 lb (92.1 kg)   11/12/20 197 lb 3.2 oz (89.4 kg)   08/14/20 199 lb 12.8 oz (90.6 kg)     Body mass index is 29.98 kg/m². Patient-Reported Vitals 6/22/2020   Patient-Reported Weight 186 lbs   Patient-Reported Height 5 10   Patient-Reported Systolic 436   Patient-Reported Diastolic 68   Patient-Reported Pulse 67       Vitals:    01/04/21 0902   BP: 130/82   Pulse: 68   Weight: 203 lb (92.1 kg)   Height: 5' 9\" (1.753 m)      GENERAL - Alert, oriented, pleasant, in no apparent distress. EYES: No jaundice, no conjunctival pallor. SKIN: It is warm & dry. No rashes. No Echhymosis    HEENT  No clinically significant abnormalities seen. Neck - Supple. No jugular venous distention noted. No carotid bruits. Cardiovascular  Normal S1 and S2 without obvious murmur or gallop. Extremities - No cyanosis, clubbing, or significant edema. Pulmonary  No respiratory distress. No wheezes or rales. Abdomen  No masses, tenderness, or organomegaly. Musculoskeletal  No significant edema. No joint deformities. No muscle wasting. Neurologic  Cranial nerves II through XII are grossly intact. There were no gross focal neurologic abnormalities.     Lab Review Lab Results   Component Value Date    TROPONINT 0.015 07/05/2020    TROPONINT 0.017 11/29/2019     BNP:    Lab Results   Component Value Date    PROBNP 5,116 07/05/2020    PROBNP 00,034 11/29/2019     PT/INR:    Lab Results   Component Value Date    INR 1.16 08/11/2019     No results found for: LABA1C  Lab Results   Component Value Date    WBC 6.7 07/05/2020    WBC 6.0 11/29/2019    HCT 37.7 (L) 07/05/2020    HCT 35.1 (L) 11/29/2019    MCV 86.9 07/05/2020    MCV 89.1 11/29/2019     07/05/2020     11/29/2019     Lab Results   Component Value Date    CHOL 115 08/11/2019    CHOL 190 09/01/2016    TRIG 55 08/11/2019    TRIG 155 (H) 09/01/2016    HDL 50 08/11/2019    HDL 37 (L) 09/01/2016    LDLDIRECT 58 08/11/2019     Lab Results   Component Value Date    ALT 9 (L) 07/05/2020    ALT 35 08/11/2019    AST 12 (L) 07/05/2020     (H) 08/11/2019     BMP:    Lab Results   Component Value Date     07/05/2020     03/09/2020    K 4.8 07/05/2020    K 4.7 03/09/2020    CL 99 07/05/2020    CL 96 03/09/2020    CO2 29 07/05/2020    CO2 26 03/09/2020    BUN 22 07/05/2020    BUN 22 03/09/2020    CREATININE 1.7 07/05/2020    CREATININE 1.65 03/09/2020     CMP:   Lab Results   Component Value Date     07/05/2020     03/09/2020    K 4.8 07/05/2020    K 4.7 03/09/2020    CL 99 07/05/2020    CL 96 03/09/2020    CO2 29 07/05/2020    CO2 26 03/09/2020    BUN 22 07/05/2020    BUN 22 03/09/2020    CREATININE 1.7 07/05/2020    CREATININE 1.65 03/09/2020    PROT 6.6 07/05/2020    PROT 5.9 08/11/2019    PROT 6.6 03/15/2013     TSH:    Lab Results   Component Value Date    TSHHS 3.570 11/29/2019    TSHHS 4.450 08/11/2019       QUALITY MEASURES REVIEWED:  1.CAD:Patient is taking anti platelet agent:Yes  2. DYSLIPIDEMIA: Patient is on cholesterol lowering medication:Yes  3. Beta-Blocker therapy for CAD, if prior Myocardial Infarction:Yes  4.  Atrial fibrillation & anticoagulation therapy No 5.Discussed weight management strategies. Impression:    1. Coronary artery disease involving native coronary artery of native heart with unstable angina pectoris ([facility]    2. Postoperative pulmonary embolism, sequela ([facility])    3. Stage 3b chronic kidney disease    4. Complete heart block ([facility]    5. NSTEMI (non-ST elevated myocardial infarction) ([facility]    6. Carotid stenosis, asymptomatic, right    7. Nonrheumatic mitral valve regurgitation    8. S/P placement of cardiac pacemaker    9. Nonrheumatic aortic valve insufficiency    10.  Essential hypertension       Patient Active Problem List   Diagnosis Code    HTN (hypertension) I10    Depression F32.9    PND (post-nasal drip) R09.82    BPH (benign prostatic hyperplasia) N40.0    Cold feet R20.9    Constipation K59.00    Prostate cancer ([facility] C61    Peripheral neuropathy G62.9    Chronic kidney disease, stage III (moderate) ([facility]) N18.30    Postoperative pulmonary embolism ([facility] T81.718A, I26.99    Alzheimer's disease ([facility] G30.9, F02.80    Acute metabolic encephalopathy Y59.85    Cancer ([facility] C80.1    Hyponatremia E87.1    Acute hypokalemia E87.6    Complete heart block (HCC) I44.2    Coronary artery disease involving native coronary artery of native heart with unstable angina pectoris ([facility]) I25.110    NSTEMI (non-ST elevated myocardial infarction) ([facility]) I21.4    AV block, 3rd degree ([facility]) I44.2    CHB (complete heart block) ([facility]) V15.3    Metabolic encephalopathy Y48.32    Acute renal failure with tubular necrosis ([facility]) N17.0    Ataxia R27.0    Impaired cognition R41.89    HAP (hospital-acquired pneumonia) J18.9, Y95    Carotid stenosis, asymptomatic, right I65.21    Mitral valve insufficiency I34.0    Rash and nonspecific skin eruption R21    S/P placement of cardiac pacemaker Z95.0    Bilateral carotid artery stenosis I65.23    Volume overload E87.70    Nonrheumatic aortic valve insufficiency I35.1  Left-sided heart failure (HCC) I50.1    Acute bilateral ankle pain M25.571, M25.572    Generalized edema R60.1       Assessment & Plan:    CAD:Yes   clinically stable. Patient is on optimal medical regimen ( see medication list above )  -     CORONARY ARTERY DISEASE: Patient is currently  asymptomatic from CAD. - changes in  treatment:   no           - Testing ordered:  no  [facility] classification: 1  [place] RISK SCORE:  [place] RISK SCORE:  HTN:well controlled on current medical regimen, see list above. - changes in  treatment:   no   CARDIOMYOPATHY: None known   CONGESTIVE HEART FAILURE: NO KNOWN HISTORY.      VHD: Moderate to sever MR & Moderate MR  DYSLIPIDEMIA: Patient's profile is at / near Goal.yes,                                 HDL is low                                Tolerating current medical regimen wellyes,                                                               See most recent Lab values in Labs section above. CAROTID ARTERY DISEASE:.yes,                No symptoms described pertaining to Carotid artery disease               Up to date on non invasive testing . No.               Patient is on Guidelines recommended therapy. yes,   ARRHYTHMIAS:  Known H/O CHB                                S/P PPM  OTHER RELEVANT DIAGNOSIS:as noted in patient's active problem list:  TESTS ORDERED: Carotid US                                    All previously ordered tests reviewed. MEDICATIONS: CPM   Office f/u in six months. Device check per protocol. Primary/secondary prevention is the goal by aggressive risk modification, healthy and therapeutic life style changes for cardiovascular risk reduction. Various goals are discussed and multiple questions answered.

## 2021-01-04 NOTE — PATIENT INSTRUCTIONS
CAD:Yes   clinically stable. Patient is on optimal medical regimen ( see medication list above )  -     CORONARY ARTERY DISEASE: Patient is currently  asymptomatic from CAD. - changes in  treatment:   no           - Testing ordered:  no  [facility] classification: 1  [place] RISK SCORE:  [place]AN RISK SCORE:  HTN:well controlled on current medical regimen, see list above. - changes in  treatment:   no   CARDIOMYOPATHY: None known   CONGESTIVE HEART FAILURE: NO KNOWN HISTORY.      VHD: Moderate to sever MR & Moderate MR  DYSLIPIDEMIA: Patient's profile is at / near Goal.yes,                                 HDL is low                                Tolerating current medical regimen wellyes,                                                               See most recent Lab values in Labs section above. CAROTID ARTERY DISEASE:.yes,                No symptoms described pertaining to Carotid artery disease               Up to date on non invasive testing . No.               Patient is on Guidelines recommended therapy. yes,   ARRHYTHMIAS:  Known H/O CHB                                S/P PPM  OTHER RELEVANT DIAGNOSIS:as noted in patient's active problem list:  TESTS ORDERED: Carotid US                                    All previously ordered tests reviewed. MEDICATIONS: CPM   Office f/u in six months. Device check per protocol. Primary/secondary prevention is the goal by aggressive risk modification, healthy and therapeutic life style changes for cardiovascular risk reduction. Various goals are discussed and multiple questions answered.

## 2021-01-04 NOTE — LETTER
[address]  [address] [address]  Phone: [phone]  Fax: [phone]    Luigi Monique MD        January 4, 2021     [address]    Patient: Aye Koch  MR Number: [id number]  YOB: 1934  Date of Visit: 1/4/2021    Dear Dr. Nitza Noland: Thank you for the request for consultation for Aye Koch to me for the evaluation of CAD. Below are the relevant portions of my assessment and plan of care. If you have questions, please do not hesitate to call me. I look forward to following Rmoel along with you.     Sincerely,        Luigi Monique MD

## 2021-01-05 ENCOUNTER — PROCEDURE VISIT (OUTPATIENT)
Dept: CARDIOLOGY CLINIC | Age: 86
End: 2021-01-05
Payer: MEDICARE

## 2021-01-05 DIAGNOSIS — I26.99 POSTOPERATIVE PULMONARY EMBOLISM, SEQUELA (HCC): ICD-10-CM

## 2021-01-05 DIAGNOSIS — Z95.0 S/P PLACEMENT OF CARDIAC PACEMAKER: ICD-10-CM

## 2021-01-05 DIAGNOSIS — I65.21 CAROTID STENOSIS, ASYMPTOMATIC, RIGHT: Primary | ICD-10-CM

## 2021-01-05 DIAGNOSIS — I21.4 NSTEMI (NON-ST ELEVATED MYOCARDIAL INFARCTION) (HCC): ICD-10-CM

## 2021-01-05 DIAGNOSIS — T81.718S POSTOPERATIVE PULMONARY EMBOLISM, SEQUELA (HCC): ICD-10-CM

## 2021-01-05 DIAGNOSIS — I25.110 CORONARY ARTERY DISEASE INVOLVING NATIVE CORONARY ARTERY OF NATIVE HEART WITH UNSTABLE ANGINA PECTORIS (HCC): ICD-10-CM

## 2021-01-05 DIAGNOSIS — I34.0 NONRHEUMATIC MITRAL VALVE REGURGITATION: ICD-10-CM

## 2021-01-05 DIAGNOSIS — I44.2 COMPLETE HEART BLOCK (HCC): ICD-10-CM

## 2021-01-05 PROCEDURE — 93880 EXTRACRANIAL BILAT STUDY: CPT | Performed by: INTERNAL MEDICINE

## 2021-01-06 ENCOUNTER — TELEPHONE (OUTPATIENT)
Dept: CARDIOLOGY CLINIC | Age: 86
End: 2021-01-06

## 2021-01-06 NOTE — TELEPHONE ENCOUNTER
I called the patient for results. Patient voice understanding.                  Carotid, VL CAROTID BILATERAL.       Indications for Study:Stenosis.       Conclusions        Summary        Mild (0-49%) disease of the Bilateral proximal Internal carotid artery.    Normal vertebral flow.

## 2021-02-22 LAB
ALBUMIN SERPL-MCNC: 3.8 G/DL
ALP BLD-CCNC: 124 U/L
ALT SERPL-CCNC: 11 U/L
ANION GAP SERPL CALCULATED.3IONS-SCNC: NORMAL MMOL/L
AST SERPL-CCNC: 13 U/L
BILIRUB SERPL-MCNC: 0.6 MG/DL (ref 0.1–1.4)
BUN BLDV-MCNC: 24 MG/DL
CALCIUM SERPL-MCNC: 9.3 MG/DL
CHLORIDE BLD-SCNC: 105 MMOL/L
CHOLESTEROL, TOTAL: 118 MG/DL
CHOLESTEROL/HDL RATIO: NORMAL
CO2: NORMAL
CREAT SERPL-MCNC: 1.67 MG/DL
GFR CALCULATED: NORMAL
GLUCOSE BLD-MCNC: 92 MG/DL
HDLC SERPL-MCNC: 40 MG/DL (ref 35–70)
LDL CHOLESTEROL CALCULATED: 59 MG/DL (ref 0–160)
NONHDLC SERPL-MCNC: NORMAL MG/DL
POTASSIUM SERPL-SCNC: 4.6 MMOL/L
SODIUM BLD-SCNC: 144 MMOL/L
TOTAL PROTEIN: NORMAL
TRIGL SERPL-MCNC: 104 MG/DL
VLDLC SERPL CALC-MCNC: NORMAL MG/DL

## 2021-04-03 ENCOUNTER — HOSPITAL ENCOUNTER (EMERGENCY)
Age: 86
Discharge: HOME | End: 2021-04-04
Attending: EMERGENCY MEDICINE
Payer: MEDICARE

## 2021-04-03 DIAGNOSIS — R00.2 PALPITATIONS: ICD-10-CM

## 2021-04-03 DIAGNOSIS — R07.9 CHEST PAIN, UNSPECIFIED TYPE: Primary | ICD-10-CM

## 2021-04-03 LAB
ALBUMIN SERPL-MCNC: 3.7 GM/DL (ref 3.4–5)
ALP BLD-CCNC: 111 IU/L (ref 40–128)
ALT SERPL-CCNC: 13 U/L (ref 10–40)
ANION GAP SERPL CALCULATED.3IONS-SCNC: 9 MMOL/L (ref 4–16)
APTT: 25.4 SECONDS (ref 25.1–37.1)
AST SERPL-CCNC: 16 IU/L (ref 15–37)
BASOPHILS ABSOLUTE: 0.1 K/CU MM
BASOPHILS RELATIVE PERCENT: 0.9 % (ref 0–1)
BILIRUB SERPL-MCNC: 0.3 MG/DL (ref 0–1)
BUN BLDV-MCNC: 24 MG/DL (ref 6–23)
CALCIUM SERPL-MCNC: 9.1 MG/DL (ref 8.3–10.6)
CHLORIDE BLD-SCNC: 102 MMOL/L (ref 99–110)
CO2: 27 MMOL/L (ref 21–32)
CREAT SERPL-MCNC: 1.8 MG/DL (ref 0.9–1.3)
DIFFERENTIAL TYPE: ABNORMAL
EOSINOPHILS ABSOLUTE: 0.1 K/CU MM
EOSINOPHILS RELATIVE PERCENT: 2.1 % (ref 0–3)
GFR AFRICAN AMERICAN: 44 ML/MIN/1.73M2
GFR NON-AFRICAN AMERICAN: 36 ML/MIN/1.73M2
GLUCOSE BLD-MCNC: 103 MG/DL (ref 70–99)
HCT VFR BLD CALC: 40.7 % (ref 42–52)
HEMOGLOBIN: 12.7 GM/DL (ref 13.5–18)
IMMATURE NEUTROPHIL %: 0.3 % (ref 0–0.43)
INR BLD: 1.01 INDEX
LYMPHOCYTES ABSOLUTE: 1.3 K/CU MM
LYMPHOCYTES RELATIVE PERCENT: 22 % (ref 24–44)
MAGNESIUM: 1.9 MG/DL (ref 1.8–2.4)
MCH RBC QN AUTO: 27.1 PG (ref 27–31)
MCHC RBC AUTO-ENTMCNC: 31.2 % (ref 32–36)
MCV RBC AUTO: 86.8 FL (ref 78–100)
MONOCYTES ABSOLUTE: 0.5 K/CU MM
MONOCYTES RELATIVE PERCENT: 8.1 % (ref 0–4)
NUCLEATED RBC %: 0 %
PDW BLD-RTO: 15.4 % (ref 11.7–14.9)
PLATELET # BLD: 246 K/CU MM (ref 140–440)
PMV BLD AUTO: 9.8 FL (ref 7.5–11.1)
POTASSIUM SERPL-SCNC: 4.5 MMOL/L (ref 3.5–5.1)
PROTHROMBIN TIME: 12.2 SECONDS (ref 11.7–14.5)
RBC # BLD: 4.69 M/CU MM (ref 4.6–6.2)
SEGMENTED NEUTROPHILS ABSOLUTE COUNT: 3.9 K/CU MM
SEGMENTED NEUTROPHILS RELATIVE PERCENT: 66.6 % (ref 36–66)
SODIUM BLD-SCNC: 138 MMOL/L (ref 135–145)
TOTAL IMMATURE NEUTOROPHIL: 0.02 K/CU MM
TOTAL NUCLEATED RBC: 0 K/CU MM
TOTAL PROTEIN: 6.7 GM/DL (ref 6.4–8.2)
TROPONIN T: 0.02 NG/ML
WBC # BLD: 5.8 K/CU MM (ref 4–10.5)

## 2021-04-03 PROCEDURE — 96374 THER/PROPH/DIAG INJ IV PUSH: CPT

## 2021-04-03 PROCEDURE — 83735 ASSAY OF MAGNESIUM: CPT

## 2021-04-03 PROCEDURE — 93005 ELECTROCARDIOGRAM TRACING: CPT | Performed by: EMERGENCY MEDICINE

## 2021-04-03 PROCEDURE — 99284 EMERGENCY DEPT VISIT MOD MDM: CPT

## 2021-04-03 PROCEDURE — 85730 THROMBOPLASTIN TIME PARTIAL: CPT

## 2021-04-03 PROCEDURE — 84484 ASSAY OF TROPONIN QUANT: CPT

## 2021-04-03 PROCEDURE — 85610 PROTHROMBIN TIME: CPT

## 2021-04-03 PROCEDURE — 85025 COMPLETE CBC W/AUTO DIFF WBC: CPT

## 2021-04-03 PROCEDURE — 80053 COMPREHEN METABOLIC PANEL: CPT

## 2021-04-04 VITALS
BODY MASS INDEX: 27.16 KG/M2 | RESPIRATION RATE: 18 BRPM | HEIGHT: 71 IN | WEIGHT: 194 LBS | HEART RATE: 82 BPM | TEMPERATURE: 99.3 F | OXYGEN SATURATION: 96 % | SYSTOLIC BLOOD PRESSURE: 136 MMHG | DIASTOLIC BLOOD PRESSURE: 98 MMHG

## 2021-04-04 LAB — TROPONIN T: 0.02 NG/ML

## 2021-04-04 PROCEDURE — 6360000002 HC RX W HCPCS: Performed by: EMERGENCY MEDICINE

## 2021-04-04 PROCEDURE — 93010 ELECTROCARDIOGRAM REPORT: CPT | Performed by: INTERNAL MEDICINE

## 2021-04-04 PROCEDURE — 84484 ASSAY OF TROPONIN QUANT: CPT

## 2021-04-04 PROCEDURE — 6370000000 HC RX 637 (ALT 250 FOR IP): Performed by: EMERGENCY MEDICINE

## 2021-04-04 RX ORDER — FENTANYL CITRATE 50 UG/ML
25 INJECTION, SOLUTION INTRAMUSCULAR; INTRAVENOUS ONCE
Status: COMPLETED | OUTPATIENT
Start: 2021-04-04 | End: 2021-04-04

## 2021-04-04 RX ORDER — ACETAMINOPHEN 500 MG
1000 TABLET ORAL ONCE
Status: COMPLETED | OUTPATIENT
Start: 2021-04-04 | End: 2021-04-04

## 2021-04-04 RX ORDER — ASPIRIN 81 MG/1
324 TABLET, CHEWABLE ORAL ONCE
Status: COMPLETED | OUTPATIENT
Start: 2021-04-04 | End: 2021-04-04

## 2021-04-04 RX ADMIN — FENTANYL CITRATE 25 MCG: 50 INJECTION INTRAMUSCULAR; INTRAVENOUS at 01:03

## 2021-04-04 RX ADMIN — ACETAMINOPHEN 1000 MG: 500 TABLET, FILM COATED ORAL at 01:02

## 2021-04-04 RX ADMIN — ASPIRIN 324 MG: 81 TABLET, CHEWABLE ORAL at 01:02

## 2021-04-04 ASSESSMENT — PAIN SCALES - GENERAL
PAINLEVEL_OUTOF10: 4
PAINLEVEL_OUTOF10: 4

## 2021-04-04 NOTE — ED TRIAGE NOTES
79 Y/o presented to the ED via EMS for palpitations. states possibly a pacemaker malfunction.       Denies SOB , Chest pain or syncope

## 2021-04-04 NOTE — ED NOTES
Bed: ED-27  Expected date:   Expected time:   Means of arrival:   Comments:  EMS     Araceli Cat RN  04/03/21 2131

## 2021-04-04 NOTE — ED NOTES
[employer] rep called no problems noted     Bharati Jennings [place], [place]  04/03/21 3361

## 2021-04-04 NOTE — ED PROVIDER NOTES
Emergency Department Encounter    Patient: Will Parnell  MRN: [id number]  : 1934  Date of Evaluation: 2021  ED Provider:  Basim Fontanez    Triage Chief Complaint:   Palpitations    [place]:  Will Parnell is a 80 y.o. male with CKD, CAD, Demetia, hyperlipidemia, HTN, Gout that presents via EMS due to concern for pacemaker malfunction. Patient reports he was sleeping when he felt his heart beating fast.  Patient reports he has had a history of pacemaker malfunction and he became concerned. no chest pain. No shortness of breath. He denies any recent weight gain. No headache, cough, congestion or runny nose. No abdominal pain nausea, vomiting or diarrhea. No pain with urination no increase in urgency or frequency nation. Patient initially denied any chest pain; however, on further questioning he did report chest pressure. No radiation. Patient reports symptoms are now resolved. Patient reports multiple episodes of intermittent chest pressure over the last week. Patient is a poor historian.     ROS - see HPI, below listed is current ROS at time of my eval:  General:  No fevers, no chills, no weakness  Eyes:  No recent vison changes, no discharge  ENT:  No sore throat, no nasal congestion, no hearing changes  Cardiovascular:  + chest pain, +palpitations  Respiratory:  No shortness of breath, no cough, no wheezing  Gastrointestinal:  No pain, no nausea, no vomiting, no diarrhea  Musculoskeletal:  No muscle pain, no joint pain  Skin:  No rash, no pruritis, no easy bruising  Neurologic:  No speech problems, no headache, no extremity numbness, no extremity tingling, no extremity weakness  Psychiatric:  No anxiety  Genitourinary:  No dysuria, no hematuria  Endocrine:  No unexpected weight gain, no unexpected weight loss  Extremities:  no edema, no pain    Past Medical History:   Diagnosis Date    Anxiety     Bladder [facility] ([facility]     Cancer ([facility]     CHF (congestive heart failure) (HCC)     Chronic session: Not on file    Stress: Not on file   Relationships    Social connections     Talks on phone: Not on file     Gets together: Not on file     Attends Worship service: Not on file     Active member of club or organization: Not on file     Attends meetings of clubs or organizations: Not on file     Relationship status: Not on file    Intimate partner violence     Fear of current or ex partner: Not on file     Emotionally abused: Not on file     Physically abused: Not on file     Forced sexual activity: Not on file   Other Topics Concern    Not on file   Social History Narrative    Not on file     No current facility-administered medications for this encounter. Current Outpatient Medications   Medication Sig Dispense Refill    pantoprazole (PROTONIX) 40 MG tablet Take 40 mg by mouth daily      Folinic Acid-Vit B6-Vit B12 (FOLINIC-PLUS PO) Take 1 tablet by mouth daily      allopurinol (ZYLOPRIM) 100 MG tablet Take 100 mg by mouth daily      torsemide (DEMADEX) 20 MG tablet Take 1 tablet by mouth daily 90 tablet 3    midodrine (PROAMATINE) 5 MG tablet Take 10 mg by mouth 2 times daily       acetaminophen (TYLENOL) 500 MG tablet Take 500 mg by mouth every 6 hours as needed for Pain PRN      ALPRAZolam (XANAX) 0.5 MG tablet Take 0.25 mg by mouth daily.  Indications: takes in the pm       senna (SENOKOT) 8.6 MG tablet Take 1 tablet by mouth 2 times daily      ondansetron (ZOFRAN-ODT) 8 MG TBDP disintegrating tablet Place 8 mg under the tongue every 8 hours as needed for Nausea or Vomiting      FLUoxetine (PROZAC) 20 MG capsule Take 20 mg by mouth daily Indications: takes at night Takes a night      atorvastatin (LIPITOR) 80 MG tablet Take 1 tablet by mouth nightly 30 tablet 0    metoprolol tartrate (LOPRESSOR) 50 MG tablet Take 1 tablet by mouth 2 times daily (Patient taking differently: Take 12.5 mg by mouth daily Changed per Dr Latasha Street 9/10/19) 60 tablet 0    guaiFENesin (MUCINEX) 600 MG extended release tablet Take 1 tablet by mouth 2 times daily (Patient taking differently: Take 600 mg by mouth 2 times daily as needed ) 60 tablet 0    tamsulosin (FLOMAX) 0.4 MG capsule Take 1 capsule by mouth daily 30 capsule 0    clopidogrel (PLAVIX) 75 MG tablet Take 1 tablet by mouth daily 30 tablet 0    levothyroxine (SYNTHROID) 50 MCG tablet Take 50 mcg by mouth Daily      aspirin 81 MG tablet Take 81 mg by mouth daily       docusate sodium (COLACE) 100 MG capsule Take 1 capsule by mouth 2 times daily as needed for Constipation 90 capsule 1     No Known Allergies    Nursing Notes Reviewed    Physical Exam:  Triage VS:    ED Triage Vitals   Enc Vitals Group      BP 04/03/21 2137 (!) 172/78      Pulse 04/03/21 2123 77      Resp 04/03/21 2137 18      Temp 04/03/21 2123 99.3 °F (37.4 °C)      Temp Source 04/03/21 2123 Oral      SpO2 04/03/21 2137 96 %      Weight 04/03/21 2123 194 lb (88 kg)      Height 04/03/21 2123 5' 11\" (1.803 m)      Head Circumference --       Peak Flow --       Pain Score --       Pain Loc --       Pain Edu? --       Excl. in [address]? --        My pulse ox interpretation is - normal    General appearance: Elderly male in no acute distress. Skin:  Warm. Dry. Eye:  Extraocular movements intact. Ears, nose, mouth and throat:  Oral mucosa moist   Neck:  Trachea midline. Extremity:  No swelling. Normal ROM     Heart:  Regular rate and rhythm, normal S1 & S2, no extra heart sounds. Perfusion:  intact  Respiratory:  Lungs clear to auscultation bilaterally. Respirations nonlabored. Abdominal:  Normal bowel sounds. Soft. Nontender. Non distended. Back:  No CVA tenderness to palpation     Neurological:  Alert and oriented times 3. No focal neuro deficits.              Psychiatric:  Appropriate    I have reviewed and interpreted all of the currently available lab results from this visit (if applicable):  Results for orders placed or performed during the hospital encounter of 04/03/21   CBC Auto Differential   Result Value Ref Range    WBC 5.8 4.0 - 10.5 K/CU MM    RBC 4.69 4.6 - 6.2 M/CU MM    Hemoglobin 12.7 (L) 13.5 - 18.0 GM/DL    Hematocrit 40.7 (L) 42 - 52 %    MCV 86.8 78 - 100 FL    MCH 27.1 27 - 31 PG    MCHC 31.2 (L) 32.0 - 36.0 %    RDW 15.4 (H) 11.7 - 14.9 %    Platelets 886 958 - 542 K/CU MM    MPV 9.8 7.5 - 11.1 FL    Differential Type AUTOMATED DIFFERENTIAL     Segs Relative 66.6 (H) 36 - 66 %    Lymphocytes % 22.0 (L) 24 - 44 %    Monocytes % 8.1 (H) 0 - 4 %    Eosinophils % 2.1 0 - 3 %    Basophils % 0.9 0 - 1 %    Segs Absolute 3.9 K/CU MM    Lymphocytes Absolute 1.3 K/CU MM    Monocytes Absolute 0.5 K/CU MM    Eosinophils Absolute 0.1 K/CU MM    Basophils Absolute 0.1 K/CU MM    Nucleated RBC % 0.0 %    Total Nucleated RBC 0.0 K/CU MM    Total Immature Neutrophil 0.02 K/CU MM    Immature Neutrophil % 0.3 0 - 0.43 %   Comprehensive Metabolic Panel w/ Reflex to MG   Result Value Ref Range    Sodium 138 135 - 145 MMOL/L    Potassium 4.5 3.5 - 5.1 MMOL/L    Chloride 102 99 - 110 mMol/L    CO2 27 21 - 32 MMOL/L    BUN 24 (H) 6 - 23 MG/DL    CREATININE 1.8 (H) 0.9 - 1.3 MG/DL    Glucose 103 (H) 70 - 99 MG/DL    Calcium 9.1 8.3 - 10.6 MG/DL    Albumin 3.7 3.4 - 5.0 GM/DL    Total Protein 6.7 6.4 - 8.2 GM/DL    Total Bilirubin 0.3 0.0 - 1.0 MG/DL    ALT 13 10 - 40 U/L    AST 16 15 - 37 IU/L    Alkaline Phosphatase 111 40 - 128 IU/L    GFR Non- 36 (L) >60 mL/min/1.73m2    GFR  44 (L) >60 mL/min/1.73m2    Anion Gap 9 4 - 16   Troponin   Result Value Ref Range    Troponin T 0.018 (H) <0.01 NG/ML   Protime-INR   Result Value Ref Range    Protime 12.2 11.7 - 14.5 SECONDS    INR 1.01 INDEX   APTT   Result Value Ref Range    aPTT 25.4 25.1 - 37.1 SECONDS   Magnesium   Result Value Ref Range    Magnesium 1.9 1.8 - 2.4 mg/dl   Troponin   Result Value Ref Range    Troponin T 0.016 (H) <0.01 NG/ML   EKG 12 Lead   Result Value Ref Range    Ventricular Rate recognition software and may contain errors related to that system including errors in grammar, punctuation, and spelling, as well as words and phrases that may be inappropriate. Efforts were made to edit the dictations.         Carole Alexis MD  04/08/21 [id number], MD  04/08/21 [id number]

## 2021-04-07 LAB
EKG ATRIAL RATE: 72 BPM
EKG DIAGNOSIS: NORMAL
EKG Q-T INTERVAL: 412 MS
EKG QRS DURATION: 126 MS
EKG QTC CALCULATION (BAZETT): 447 MS
EKG R AXIS: 20 DEGREES
EKG T AXIS: 75 DEGREES
EKG VENTRICULAR RATE: 71 BPM

## 2021-04-08 ENCOUNTER — PROCEDURE VISIT (OUTPATIENT)
Dept: CARDIOLOGY CLINIC | Age: 86
End: 2021-04-08
Payer: MEDICARE

## 2021-04-08 ENCOUNTER — PROCEDURE VISIT (OUTPATIENT)
Dept: CARDIOLOGY CLINIC | Age: 86
End: 2021-04-08

## 2021-04-08 DIAGNOSIS — Z95.0 CARDIAC PACEMAKER IN SITU: Primary | ICD-10-CM

## 2021-04-08 DIAGNOSIS — I49.5 SINUS NODE DYSFUNCTION (HCC): ICD-10-CM

## 2021-04-09 PROCEDURE — 93296 REM INTERROG EVL PM/IDS: CPT | Performed by: INTERNAL MEDICINE

## 2021-04-09 PROCEDURE — 93294 REM INTERROG EVL PM/LDLS PM: CPT | Performed by: INTERNAL MEDICINE

## 2021-04-30 ENCOUNTER — TELEPHONE (OUTPATIENT)
Dept: CARDIOLOGY CLINIC | Age: 86
End: 2021-04-30

## 2021-04-30 NOTE — TELEPHONE ENCOUNTER
Per Dr. Paramjit Fragoso, patient needs a[[t for assessment for arrhythmia management. Message to EP team to schedule.

## 2021-05-05 ENCOUNTER — OFFICE VISIT (OUTPATIENT)
Dept: CARDIOLOGY CLINIC | Age: 86
End: 2021-05-05
Payer: MEDICARE

## 2021-05-05 VITALS
BODY MASS INDEX: 28.63 KG/M2 | DIASTOLIC BLOOD PRESSURE: 58 MMHG | RESPIRATION RATE: 16 BRPM | HEART RATE: 64 BPM | SYSTOLIC BLOOD PRESSURE: 120 MMHG | WEIGHT: 200 LBS | HEIGHT: 70 IN

## 2021-05-05 DIAGNOSIS — I47.1 SVT (SUPRAVENTRICULAR TACHYCARDIA) (HCC): Primary | ICD-10-CM

## 2021-05-05 DIAGNOSIS — R06.02 SOB (SHORTNESS OF BREATH): ICD-10-CM

## 2021-05-05 PROCEDURE — 4040F PNEUMOC VAC/ADMIN/RCVD: CPT | Performed by: INTERNAL MEDICINE

## 2021-05-05 PROCEDURE — 99214 OFFICE O/P EST MOD 30 MIN: CPT | Performed by: INTERNAL MEDICINE

## 2021-05-05 PROCEDURE — 1123F ACP DISCUSS/DSCN MKR DOCD: CPT | Performed by: INTERNAL MEDICINE

## 2021-05-05 PROCEDURE — 1036F TOBACCO NON-USER: CPT | Performed by: INTERNAL MEDICINE

## 2021-05-05 PROCEDURE — G8417 CALC BMI ABV UP PARAM F/U: HCPCS | Performed by: INTERNAL MEDICINE

## 2021-05-05 PROCEDURE — G8427 DOCREV CUR MEDS BY ELIG CLIN: HCPCS | Performed by: INTERNAL MEDICINE

## 2021-05-05 RX ORDER — MIDODRINE HYDROCHLORIDE 10 MG/1
10 TABLET ORAL 2 TIMES DAILY
Qty: 60 TABLET | Refills: 11 | Status: SHIPPED | OUTPATIENT
Start: 2021-05-05 | End: 2022-09-13

## 2021-05-05 RX ORDER — MIDODRINE HYDROCHLORIDE 10 MG/1
10 TABLET ORAL DAILY
COMMUNITY
Start: 2020-11-04 | End: 2021-05-05 | Stop reason: SDUPTHER

## 2021-05-05 ASSESSMENT — ENCOUNTER SYMPTOMS
CONSTIPATION: 0
BLOOD IN STOOL: 0
NAUSEA: 0
EYE PAIN: 0
SHORTNESS OF BREATH: 1
BACK PAIN: 0
ABDOMINAL PAIN: 0
VOMITING: 0
DIARRHEA: 0
COUGH: 0
PHOTOPHOBIA: 0
COLOR CHANGE: 0
CHEST TIGHTNESS: 0
WHEEZING: 0

## 2021-05-05 NOTE — PROGRESS NOTES
heart failure) (HCC)     Chronic kidney disease     Chronic kidney disease, stage III (moderate) 07/17/2017    Coronary artery disease involving native coronary artery of native heart with unstable angina pectoris ([facility] 08/11/2019    Dementia ([facility]     Depression     GERD (gastroesophageal reflux disease)     Gout     Hx of Carotid Doppler ultrasound 01/05/2021    mild 0-49% disease and normal flow    Hx of Doppler echocardiogram 11/19/2018    EF 50% mod LV hypertrophy    Hx of exercise stress test 11/19/2018    Treadmill normal study    Hyperlipidemia     Hypertension     Hyperthyroidism     Mitral valve insufficiency        Surgical history :   Past Surgical History:   Procedure Laterality Date    COLONOSCOPY      CORONARY ANGIOPLASTY WITH STENT PLACEMENT      PACEMAKER INSERTION N/A 8/15/2019    PACEMAKER INSERTION PERMANENT performed by Cece Jacobson MD at [address] Right 08/14/2019     ml       Family history:   Family History   Problem Relation Age of Onset    Cancer Sister     Cancer Brother     High Blood Pressure Brother     Heart Disease Other     Hypertension Other     Elevated Lipids Other     Other Other         gout       Social history :  reports that he has quit smoking. His smoking use included cigarettes. He has a 30.00 pack-year smoking history. He has never used smokeless tobacco. He reports that he does not drink alcohol or use drugs.     No Known Allergies    Current Outpatient Medications on File Prior to Visit   Medication Sig Dispense Refill    midodrine (PROAMATINE) 10 MG tablet Take 10 mg by mouth daily       pantoprazole (PROTONIX) 40 MG tablet Take 40 mg by mouth daily      Folinic Acid-Vit B6-Vit B12 (FOLINIC-PLUS PO) Take 1 tablet by mouth daily      allopurinol (ZYLOPRIM) 100 MG tablet Take 100 mg by mouth daily      torsemide (DEMADEX) 20 MG tablet Take 1 tablet by mouth daily (Patient taking differently: Take 10 mg by mouth daily ) 90 tablet 3    acetaminophen (TYLENOL) 500 MG tablet Take 500 mg by mouth every 6 hours as needed for Pain PRN      ALPRAZolam (XANAX) 0.5 MG tablet Take 0.25 mg by mouth daily. Indications: takes in the pm       senna (SENOKOT) 8.6 MG tablet Take 1 tablet by mouth 2 times daily      ondansetron (ZOFRAN-ODT) 8 MG TBDP disintegrating tablet Place 8 mg under the tongue every 8 hours as needed for Nausea or Vomiting      FLUoxetine (PROZAC) 20 MG capsule Take 20 mg by mouth daily Indications: takes at night Takes a night      atorvastatin (LIPITOR) 80 MG tablet Take 1 tablet by mouth nightly 30 tablet 0    metoprolol tartrate (LOPRESSOR) 50 MG tablet Take 1 tablet by mouth 2 times daily (Patient taking differently: Take 12.5 mg by mouth daily Changed per Dr Jan Brewer 9/10/19) 60 tablet 0    guaiFENesin (MUCINEX) 600 MG extended release tablet Take 1 tablet by mouth 2 times daily (Patient taking differently: Take 600 mg by mouth 2 times daily as needed ) 60 tablet 0    tamsulosin (FLOMAX) 0.4 MG capsule Take 1 capsule by mouth daily 30 capsule 0    clopidogrel (PLAVIX) 75 MG tablet Take 1 tablet by mouth daily 30 tablet 0    levothyroxine (SYNTHROID) 50 MCG tablet Take 50 mcg by mouth Daily      aspirin 81 MG tablet Take 81 mg by mouth daily       docusate sodium (COLACE) 100 MG capsule Take 1 capsule by mouth 2 times daily as needed for Constipation 90 capsule 1     No current facility-administered medications on file prior to visit. Review of Systems:   Review of Systems   Constitutional: Negative for activity change, chills, fatigue and fever. HENT: Negative for congestion, ear pain and tinnitus. Eyes: Negative for photophobia, pain and visual disturbance. Respiratory: Positive for shortness of breath. Negative for cough, chest tightness and wheezing. Cardiovascular: Positive for palpitations. Negative for chest pain and leg swelling. Gastrointestinal: Negative for abdominal pain, blood in stool, constipation, diarrhea, nausea and vomiting. Endocrine: Negative for cold intolerance and heat intolerance. Genitourinary: Negative for dysuria, flank pain and hematuria. Musculoskeletal: Negative for arthralgias, back pain, myalgias and neck stiffness. Skin: Negative for color change and rash. Allergic/Immunologic: Negative for food allergies. Neurological: Negative for dizziness, light-headedness, numbness and headaches. Hematological: Does not bruise/bleed easily. Psychiatric/Behavioral: Positive for confusion (when in hospital). Negative for agitation and behavioral problems. Examination:    BP (!) 120/58   Pulse 64   Resp 16   Ht 5' 10\" (1.778 m)   Wt 200 lb (90.7 kg)   BMI 28.70 kg/m²    Wt Readings from Last 3 Encounters:   05/05/21 200 lb (90.7 kg)   04/03/21 194 lb (88 kg)   01/04/21 203 lb (92.1 kg)     Body mass index is 28.7 kg/m². Physical Exam  Constitutional:       General: He is not in acute distress. Appearance: He is well-developed. HENT:      Head: Normocephalic and atraumatic. Eyes:      Pupils: Pupils are equal, round, and reactive to light. Neck:      Musculoskeletal: Normal range of motion. Vascular: No JVD. Cardiovascular:      Rate and Rhythm: Normal rate and regular rhythm. Heart sounds: No murmur. No friction rub. Pulmonary:      Effort: Pulmonary effort is normal. No respiratory distress. Breath sounds: Normal breath sounds. No wheezing or rales. Abdominal:      General: Bowel sounds are normal. There is no distension. Palpations: Abdomen is soft. Tenderness: There is no abdominal tenderness. Musculoskeletal:         General: No tenderness. Skin:     General: Skin is warm and dry. Neurological:      General: No focal deficit present. Mental Status: He is alert and oriented to person, place, and time.          CBC:   Lab Results   Component have any questions. With best regards.       Jerad Carter MD, 5/5/2021 12:12 PM

## 2021-05-15 ENCOUNTER — APPOINTMENT (OUTPATIENT)
Dept: GENERAL RADIOLOGY | Age: 86
End: 2021-05-15
Payer: MEDICARE

## 2021-05-15 ENCOUNTER — HOSPITAL ENCOUNTER (EMERGENCY)
Age: 86
Discharge: HOME | End: 2021-05-16
Attending: EMERGENCY MEDICINE
Payer: MEDICARE

## 2021-05-15 VITALS
SYSTOLIC BLOOD PRESSURE: 162 MMHG | TEMPERATURE: 98.4 F | WEIGHT: 201 LBS | HEART RATE: 71 BPM | BODY MASS INDEX: 28.77 KG/M2 | RESPIRATION RATE: 18 BRPM | HEIGHT: 70 IN | DIASTOLIC BLOOD PRESSURE: 73 MMHG | OXYGEN SATURATION: 96 %

## 2021-05-15 DIAGNOSIS — R11.11 NON-INTRACTABLE VOMITING WITHOUT NAUSEA, UNSPECIFIED VOMITING TYPE: ICD-10-CM

## 2021-05-15 DIAGNOSIS — R05.9 COUGH: ICD-10-CM

## 2021-05-15 DIAGNOSIS — J18.9 PNEUMONIA DUE TO ORGANISM: Primary | ICD-10-CM

## 2021-05-15 PROCEDURE — 83605 ASSAY OF LACTIC ACID: CPT

## 2021-05-15 PROCEDURE — 85025 COMPLETE CBC W/AUTO DIFF WBC: CPT

## 2021-05-15 PROCEDURE — 99284 EMERGENCY DEPT VISIT MOD MDM: CPT

## 2021-05-15 PROCEDURE — 71045 X-RAY EXAM CHEST 1 VIEW: CPT

## 2021-05-15 PROCEDURE — 83880 ASSAY OF NATRIURETIC PEPTIDE: CPT

## 2021-05-15 PROCEDURE — 93005 ELECTROCARDIOGRAM TRACING: CPT | Performed by: EMERGENCY MEDICINE

## 2021-05-15 PROCEDURE — 87040 BLOOD CULTURE FOR BACTERIA: CPT

## 2021-05-15 PROCEDURE — 84484 ASSAY OF TROPONIN QUANT: CPT

## 2021-05-15 PROCEDURE — 73030 X-RAY EXAM OF SHOULDER: CPT

## 2021-05-15 PROCEDURE — 80048 BASIC METABOLIC PNL TOTAL CA: CPT

## 2021-05-16 LAB
ANION GAP SERPL CALCULATED.3IONS-SCNC: 8 MMOL/L (ref 4–16)
BACTERIA: ABNORMAL /HPF
BASOPHILS ABSOLUTE: 0 K/CU MM
BASOPHILS RELATIVE PERCENT: 0.4 % (ref 0–1)
BILIRUBIN URINE: NEGATIVE MG/DL
BLOOD, URINE: NEGATIVE
BUN BLDV-MCNC: 22 MG/DL (ref 6–23)
CALCIUM SERPL-MCNC: 9 MG/DL (ref 8.3–10.6)
CHLORIDE BLD-SCNC: 102 MMOL/L (ref 99–110)
CLARITY: CLEAR
CO2: 27 MMOL/L (ref 21–32)
COLOR: YELLOW
CREAT SERPL-MCNC: 1.6 MG/DL (ref 0.9–1.3)
DIFFERENTIAL TYPE: ABNORMAL
EKG ATRIAL RATE: 60 BPM
EKG DIAGNOSIS: NORMAL
EKG P AXIS: 64 DEGREES
EKG P-R INTERVAL: 274 MS
EKG Q-T INTERVAL: 472 MS
EKG QRS DURATION: 136 MS
EKG QTC CALCULATION (BAZETT): 472 MS
EKG R AXIS: 6 DEGREES
EKG T AXIS: 63 DEGREES
EKG VENTRICULAR RATE: 60 BPM
EOSINOPHILS ABSOLUTE: 0.1 K/CU MM
EOSINOPHILS RELATIVE PERCENT: 1.1 % (ref 0–3)
GFR AFRICAN AMERICAN: 50 ML/MIN/1.73M2
GFR NON-AFRICAN AMERICAN: 41 ML/MIN/1.73M2
GLUCOSE BLD-MCNC: 102 MG/DL (ref 70–99)
GLUCOSE, URINE: NEGATIVE MG/DL
HCT VFR BLD CALC: 36.1 % (ref 42–52)
HEMOGLOBIN: 11.4 GM/DL (ref 13.5–18)
IMMATURE NEUTROPHIL %: 0.5 % (ref 0–0.43)
KETONES, URINE: NEGATIVE MG/DL
LACTATE: 0.8 MMOL/L (ref 0.4–2)
LEUKOCYTE ESTERASE, URINE: ABNORMAL
LYMPHOCYTES ABSOLUTE: 1.2 K/CU MM
LYMPHOCYTES RELATIVE PERCENT: 16.6 % (ref 24–44)
MCH RBC QN AUTO: 27.3 PG (ref 27–31)
MCHC RBC AUTO-ENTMCNC: 31.6 % (ref 32–36)
MCV RBC AUTO: 86.6 FL (ref 78–100)
MONOCYTES ABSOLUTE: 0.7 K/CU MM
MONOCYTES RELATIVE PERCENT: 9.6 % (ref 0–4)
NITRITE URINE, QUANTITATIVE: NEGATIVE
NUCLEATED RBC %: 0 %
PDW BLD-RTO: 16.2 % (ref 11.7–14.9)
PH, URINE: 7 (ref 5–8)
PLATELET # BLD: 199 K/CU MM (ref 140–440)
PMV BLD AUTO: 10 FL (ref 7.5–11.1)
POTASSIUM SERPL-SCNC: 4.4 MMOL/L (ref 3.5–5.1)
PRO-BNP: 4563 PG/ML
PROTEIN UA: NEGATIVE MG/DL
RBC # BLD: 4.17 M/CU MM (ref 4.6–6.2)
RBC URINE: 1 /HPF (ref 0–3)
SARS-COV-2, NAAT: NOT DETECTED
SEGMENTED NEUTROPHILS ABSOLUTE COUNT: 5.3 K/CU MM
SEGMENTED NEUTROPHILS RELATIVE PERCENT: 71.8 % (ref 36–66)
SODIUM BLD-SCNC: 137 MMOL/L (ref 135–145)
SOURCE: NORMAL
SPECIFIC GRAVITY UA: 1.02 (ref 1–1.03)
SQUAMOUS EPITHELIAL: <1 /HPF
TOTAL IMMATURE NEUTOROPHIL: 0.04 K/CU MM
TOTAL NUCLEATED RBC: 0 K/CU MM
TRICHOMONAS: ABNORMAL /HPF
TROPONIN T: 0.01 NG/ML
UROBILINOGEN, URINE: 2 MG/DL (ref 0.2–1)
WBC # BLD: 7.4 K/CU MM (ref 4–10.5)
WBC UA: 12 /HPF (ref 0–2)

## 2021-05-16 PROCEDURE — 81001 URINALYSIS AUTO W/SCOPE: CPT

## 2021-05-16 PROCEDURE — 87635 SARS-COV-2 COVID-19 AMP PRB: CPT

## 2021-05-16 PROCEDURE — 6370000000 HC RX 637 (ALT 250 FOR IP): Performed by: EMERGENCY MEDICINE

## 2021-05-16 PROCEDURE — 93010 ELECTROCARDIOGRAM REPORT: CPT | Performed by: INTERNAL MEDICINE

## 2021-05-16 PROCEDURE — 87040 BLOOD CULTURE FOR BACTERIA: CPT

## 2021-05-16 RX ORDER — CEFDINIR 300 MG/1
300 CAPSULE ORAL 2 TIMES DAILY
Qty: 14 CAPSULE | Refills: 0 | Status: SHIPPED | OUTPATIENT
Start: 2021-05-16 | End: 2021-05-23

## 2021-05-16 RX ORDER — DOXYCYCLINE HYCLATE 100 MG
100 TABLET ORAL ONCE
Status: COMPLETED | OUTPATIENT
Start: 2021-05-16 | End: 2021-05-16

## 2021-05-16 RX ORDER — CEFDINIR 300 MG/1
300 CAPSULE ORAL ONCE
Status: COMPLETED | OUTPATIENT
Start: 2021-05-16 | End: 2021-05-16

## 2021-05-16 RX ORDER — DOXYCYCLINE HYCLATE 100 MG
100 TABLET ORAL 2 TIMES DAILY
Qty: 14 TABLET | Refills: 0 | Status: SHIPPED | OUTPATIENT
Start: 2021-05-16 | End: 2021-05-23

## 2021-05-16 RX ADMIN — CEFDINIR 300 MG: 300 CAPSULE ORAL at 03:06

## 2021-05-16 RX ADMIN — DOXYCYCLINE HYCLATE 100 MG: 100 TABLET ORAL at 03:06

## 2021-05-16 NOTE — ED NOTES
Bed: ED-15  Expected date: 5/15/21  Expected time:   Means of arrival:   Comments:  EMS     Adriana Valero RN  05/15/21 [id number]

## 2021-05-16 NOTE — ED PROVIDER NOTES
CHIEF COMPLAINT    Chief Complaint   Patient presents with    Shoulder Pain     cough      HPI  Jake Mejia is a 80 y.o. male with history of bladder cancer, CHF, chronic kidney disease, depression, GERD, hyperlipidemia, hypertension, pacemaker placement who presents to the ED with complaints of shoulder pain, cough, and some chest discomfort. Patient states for the last 2 to 3 days has been experiencing pain in the right shoulder region describes a throbbing stabbing pain rated as moderate severity that does radiate to the right upper chest.  Pain is constant exacerbated with certain movements and positions. During the day had some persistent coughing with some pink/frothy productive cough and one episode of vomiting. His son-in-law is at bedside and states that when they arrived to the house this evening he did have a fever of 101.2 °F as well. Provided with 1000 mg of Tylenol proximally 1 hour prior to arrival.  No known sick contacts. He did receive full Covid vaccination course. Denies dizziness, lightheadedness, abdominal pain, dysuria. REVIEW OF SYSTEMS  Constitutional: Complains of fevers  Eye: No visual changes  HENT: No earache or sore throat. Resp: Complains of cough. Shortness of breath  Cardio: No palpitations. GI: No abdominal pain, nausea, vomiting, constipation or diarrhea. No melena. : No dysuria, urgency or frequency. Endocrine: No heat intolerance, no cold intolerance, no polydipsia   Lymphatics: No adenopathy  Musculoskeletal: No new muscle aches or joint pain. Neuro: No headaches. Psych: No homicidal or suicidal thoughts  Skin: No rash, No itching. ?  ?   PAST MEDICAL HISTORY  Past Medical History:   Diagnosis Date    Anxiety     [facility]     Cancer ([facility]     CHF (congestive heart failure) (HCC)     Chronic kidney disease     Chronic kidney disease, stage III (moderate) 07/17/2017    Coronary artery disease involving native coronary artery of native heart with unstable angina pectoris ([facility] 08/11/2019    Dementia ([facility]     Depression     GERD (gastroesophageal reflux disease)     Gout     Hx of Carotid Doppler ultrasound 01/05/2021    mild 0-49% disease and normal flow    Hx of Doppler echocardiogram 11/19/2018    EF 50% mod LV hypertrophy    Hx of exercise stress test 11/19/2018    Treadmill normal study    Hyperlipidemia     Hypertension     Hyperthyroidism     Mitral valve insufficiency      FAMILY HISTORY  Family History   Problem Relation Age of Onset    Cancer Sister     Cancer Brother     High Blood Pressure Brother     Heart Disease Other     Hypertension Other     Elevated Lipids Other     Other Other         gout     SOCIAL HISTORY  Social History     Socioeconomic History    Marital status:      Spouse name: None    Number of children: 5    Years of education: None    Highest education level: None   Occupational History    None   Tobacco Use    Smoking status: Former Smoker     Packs/day: 1.00     Years: 30.00     Pack years: 30.00     Types: Cigarettes    Smokeless tobacco: Never Used   Vaping Use    Vaping Use: Never used   Substance and Sexual Activity    Alcohol use: No    Drug use: No    Sexual activity: None   Other Topics Concern    None   Social History Narrative    None     Social Determinants of Health     Financial Resource Strain:     Difficulty of Paying Living Expenses:    Food Insecurity:     Worried About Running Out of Food in the Last Year:     Ran Out of Food in the Last Year:    Transportation Needs:     Lack of Transportation (Medical):      Lack of Transportation (Non-Medical):    Physical Activity:     Days of Exercise per Week:     Minutes of Exercise per Session:    Stress:     Feeling of Stress :    Social Connections:     Frequency of Communication with Friends and Family:     Frequency of Social Gatherings with Friends and Family:     Attends Buddhist Services:     Active Member of Clubs or Organizations:     Attends Club or Organization Meetings:     Marital Status:    Intimate Partner Violence:     Fear of Current or Ex-Partner:     Emotionally Abused:     Physically Abused:     Sexually Abused:        SURGICAL HISTORY  Past Surgical History:   Procedure Laterality Date    COLONOSCOPY      CORONARY ANGIOPLASTY WITH STENT PLACEMENT      PACEMAKER INSERTION N/A 8/15/2019    PACEMAKER INSERTION PERMANENT performed by Johnson Morris MD at [address] Right 08/14/2019     ml     CURRENT MEDICATIONS  Previous Medications    ACETAMINOPHEN (TYLENOL) 500 MG TABLET    Take 500 mg by mouth every 6 hours as needed for Pain PRN    ALLOPURINOL (ZYLOPRIM) 100 MG TABLET    Take 100 mg by mouth daily    ALPRAZOLAM (XANAX) 0.5 MG TABLET    Take 0.25 mg by mouth daily.  Indications: takes in the pm     ASPIRIN 81 MG TABLET    Take 81 mg by mouth daily     ATORVASTATIN (LIPITOR) 80 MG TABLET    Take 1 tablet by mouth nightly    CLOPIDOGREL (PLAVIX) 75 MG TABLET    Take 1 tablet by mouth daily    DOCUSATE SODIUM (COLACE) 100 MG CAPSULE    Take 1 capsule by mouth 2 times daily as needed for Constipation    FLUOXETINE (PROZAC) 20 MG CAPSULE    Take 20 mg by mouth daily Indications: takes at night Takes a night    FOLINIC ACID-VIT B6-VIT B12 (FOLINIC-PLUS PO)    Take 1 tablet by mouth daily    GUAIFENESIN (MUCINEX) 600 MG EXTENDED RELEASE TABLET    Take 1 tablet by mouth 2 times daily    LEVOTHYROXINE (SYNTHROID) 50 MCG TABLET    Take 50 mcg by mouth Daily    METOPROLOL TARTRATE (LOPRESSOR) 25 MG TABLET    Take 1 tablet by mouth 2 times daily Changed per Dr Nickie Wright 9/10/19    MIDODRINE (PROAMATINE) 10 MG TABLET    Take 1 tablet by mouth 2 times daily AM and afternoon and avoid taking at night    ONDANSETRON (ZOFRAN-ODT) 8 MG TBDP DISINTEGRATING TABLET    Place 8 mg under the tongue every 8 hours as needed for Nausea or Vomiting    PANTOPRAZOLE (PROTONIX) 40 MG TABLET    Take 40 mg by mouth daily    SENNA (SENOKOT) 8.6 MG TABLET    Take 1 tablet by mouth 2 times daily    TAMSULOSIN (FLOMAX) 0.4 MG CAPSULE    Take 1 capsule by mouth daily    TORSEMIDE (DEMADEX) 20 MG TABLET    Take 1 tablet by mouth daily     ALLERGIES  No Known Allergies    Nursing notes reviewed by myself for past medical history, family history, social history, surgical history, current medications, and allergies. PHYSICAL EXAM  VITAL SIGNS: Triage VS:    ED Triage Vitals [05/15/21 2239]   Enc Vitals Group      BP (!) 162/73      Pulse 71      Resp 18      Temp 98.4 °F (36.9 °C)      Temp Source Oral      SpO2 96 %      Weight 201 lb (91.2 kg)      Height 5' 10\" (1.778 m)      Head Circumference       Peak Flow       Pain Score       Pain Loc       Pain Edu? Excl. in [address]? Constitutional: Well developed, Well nourished, nontoxic appearing  HENT: Normocephalic, Atraumatic, Bilateral external ears normal, Oropharynx moist, No oral exudates, Nose normal.   Eyes: PERRL, EOMI, Conjunctiva normal, No discharge. No scleral icterus. Neck: Normal range of motion, No tenderness, Supple. Lymphatic: No lymphadenopathy noted. Cardiovascular: Normal heart rate, Normal rhythm, No murmurs, gallops or rubs. Thorax & Lungs: Diminished breath sounds bilaterally without definitive wheezing, rhonchi, or rales  Abdomen: Soft, No tenderness, No masses, No pulsatile masses, No distention, Normal bowel sounds  Skin: Warm, Dry, Pink, No mottling, No erythema, No rash. Back: No tenderness, No CVA tenderness. Extremities:  No cyanosis, Normal perfusion, No clubbing. Musculoskeletal: Tenderness palpation of proximal right humerus without any overlying skin changes, good range of motion in all major joints as observed. No major deformities noted. Neurologic: Alert & oriented x 3, Normal motor function, Normal sensory function, CN II-XII grossly intact as tested, No focal deficits noted. Psychiatric: Affect normal, Judgment normal, Mood normal.   EKG  Per my interpretation demonstrates normal sinus rhythm at a rate of 60 bpm.  Normal axis. Prolonged FL interval of 274 ms. Prolonged QTc interval of 472. No acute ST segment changes. Appears similar compared to previous EKG. RADIOLOGY  Labs Reviewed   CBC WITH AUTO DIFFERENTIAL - Abnormal; Notable for the following components:       Result Value    RBC 4.17 (*)     Hemoglobin 11.4 (*)     Hematocrit 36.1 (*)     MCHC 31.6 (*)     RDW 16.2 (*)     Segs Relative 71.8 (*)     Lymphocytes % 16.6 (*)     Monocytes % 9.6 (*)     Immature Neutrophil % 0.5 (*)     All other components within normal limits   BASIC METABOLIC PANEL - Abnormal; Notable for the following components:    CREATININE 1.6 (*)     Glucose 102 (*)     GFR Non- 41 (*)     GFR  50 (*)     All other components within normal limits   TROPONIN - Abnormal; Notable for the following components:    Troponin T 0.013 (*)     All other components within normal limits   BRAIN NATRIURETIC PEPTIDE - Abnormal; Notable for the following components:    Pro-BNP 4,563 (*)     All other components within normal limits   URINALYSIS WITH MICROSCOPIC - Abnormal; Notable for the following components:    Urobilinogen, Urine 2.0 (*)     Leukocyte Esterase, Urine MODERATE (*)     WBC, UA 12 (*)     Bacteria, UA RARE (*)     All other components within normal limits   COVID-19, RAPID   CULTURE, BLOOD 1   CULTURE, BLOOD 2   LACTIC ACID, PLASMA     I personally reviewed the images. The radiologist's interpretation reveals:  Last Imaging results   XR SHOULDER RIGHT (MIN 2 VIEWS)   Final Result   No acute bony abnormality of the shoulder. XR CHEST PORTABLE   Final Result   1. Left basilar ill-defined consolidation consistent with pneumonia. 2. Mild bilateral pleural effusions. 3. Mild-to-moderate cardiomegaly.            Procedures  NA  MEDS GIVEN IN ED:  Medications cefdinir (OMNICEF) capsule 300 mg (300 mg Oral Given 5/16/21 0306)   doxycycline hyclate (VIBRA-TABS) tablet 100 mg (100 mg Oral Given 5/16/21 0306)     COURSE & MEDICAL DECISION MAKING  80-year-old male presents to the emergency department with complaints of some right shoulder pain, cough, nausea, vomiting, and some chest tightness. Initial vital signs are reassuring. On exam he has diminished breath sounds bilaterally without definitive wheezing, rhonchi, rales. Does have some tenderness palpation of proximal humerus as well. Right upper extremity is neurovascularly intact. At this time we will obtain CBC, BMP, EKG, troponin, BMP, lactic acid, blood cultures, COVID-19 testing, x-ray of the chest, and x-ray of the right shoulder as well as urinalysis. Patient's EKG is without acute ischemic changes. His troponin is mildly detectable at 0.013 although this looks to be below the patient's baseline. Lactic acid is nonelevated. His BMP shows creatinine of 1.6 which is baseline for him. CBC is without leukocytosis. Lactic acid is nonelevated. X-ray of the right shoulder is without acute normality. His chest x-ray shows evidence of a left lower lobe pneumonia. At this time patient was ambulated without oxygen saturation. He has a curb 65 score of 1 and I feels appropriate for outpatient management. Provided a dose of Omnicef and doxycycline here and discharged home with a prescription for the same. Given strict return precautions and instructed to follow-up with primary care provider. Return precautions provided. Appropriate PPE utilized as indicated for entire patient encounter? Time of Disposition: See timeline  ? New Prescriptions    CEFDINIR (OMNICEF) 300 MG CAPSULE    Take 1 capsule by mouth 2 times daily for 7 days    DOXYCYCLINE HYCLATE (VIBRA-TABS) 100 MG TABLET    Take 1 tablet by mouth 2 times daily for 7 days     FINAL IMPRESSION  1. Pneumonia due to organism    2. Cough    3.

## 2021-05-16 NOTE — ED TRIAGE NOTES
Pt was brought in by EMS from home for right shoulder pain, fever and cough. Pt took Tylenol at home and is afebrile at this time. Pt denies pain upon arrival, but states that he has been having some shoulder pain and palpitations.

## 2021-05-17 ENCOUNTER — TELEPHONE (OUTPATIENT)
Dept: CARDIOLOGY CLINIC | Age: 86
End: 2021-05-17

## 2021-05-21 LAB
CULTURE: NORMAL
CULTURE: NORMAL
Lab: NORMAL
Lab: NORMAL
SPECIMEN: NORMAL
SPECIMEN: NORMAL

## 2021-05-24 ENCOUNTER — PROCEDURE VISIT (OUTPATIENT)
Dept: CARDIOLOGY CLINIC | Age: 86
End: 2021-05-24
Payer: MEDICARE

## 2021-05-24 DIAGNOSIS — R06.02 SOB (SHORTNESS OF BREATH): Primary | ICD-10-CM

## 2021-05-24 LAB
LV EF: 48 %
LVEF MODALITY: NORMAL

## 2021-05-24 PROCEDURE — 93306 TTE W/DOPPLER COMPLETE: CPT | Performed by: INTERNAL MEDICINE

## 2021-06-04 ENCOUNTER — OFFICE VISIT (OUTPATIENT)
Dept: CARDIOLOGY CLINIC | Age: 86
End: 2021-06-04
Payer: MEDICARE

## 2021-06-04 VITALS
SYSTOLIC BLOOD PRESSURE: 110 MMHG | RESPIRATION RATE: 13 BRPM | BODY MASS INDEX: 29.77 KG/M2 | WEIGHT: 201 LBS | HEIGHT: 69 IN | HEART RATE: 60 BPM | OXYGEN SATURATION: 98 % | DIASTOLIC BLOOD PRESSURE: 70 MMHG

## 2021-06-04 DIAGNOSIS — I47.1 SVT (SUPRAVENTRICULAR TACHYCARDIA) (HCC): Primary | ICD-10-CM

## 2021-06-04 PROCEDURE — 4040F PNEUMOC VAC/ADMIN/RCVD: CPT | Performed by: INTERNAL MEDICINE

## 2021-06-04 PROCEDURE — G8427 DOCREV CUR MEDS BY ELIG CLIN: HCPCS | Performed by: INTERNAL MEDICINE

## 2021-06-04 PROCEDURE — 1036F TOBACCO NON-USER: CPT | Performed by: INTERNAL MEDICINE

## 2021-06-04 PROCEDURE — 1123F ACP DISCUSS/DSCN MKR DOCD: CPT | Performed by: INTERNAL MEDICINE

## 2021-06-04 PROCEDURE — G8417 CALC BMI ABV UP PARAM F/U: HCPCS | Performed by: INTERNAL MEDICINE

## 2021-06-04 PROCEDURE — 99213 OFFICE O/P EST LOW 20 MIN: CPT | Performed by: INTERNAL MEDICINE

## 2021-06-04 RX ORDER — ALPRAZOLAM 0.25 MG/1
TABLET ORAL
COMMUNITY
Start: 2021-03-05 | End: 2021-06-04

## 2021-06-04 ASSESSMENT — ENCOUNTER SYMPTOMS
COUGH: 0
VOMITING: 0
SHORTNESS OF BREATH: 0
BLOOD IN STOOL: 0
PHOTOPHOBIA: 0
ABDOMINAL PAIN: 0
NAUSEA: 0
CONSTIPATION: 0
WHEEZING: 0
DIARRHEA: 0
CHEST TIGHTNESS: 0
BACK PAIN: 0
EYE PAIN: 0
COLOR CHANGE: 0

## 2021-06-04 NOTE — PROGRESS NOTES
Electrophysiology FU Note    Reason for consultation: complete heart block     Chief complaint: FU     Referring physician:  Dr Guero Larkin        Primary care physician: Xander Starks PA-C        History of Present Illness: This visit (6/4/2021)      Chief Complaint   Patient presents with    Follow-up     1 month follow echo results, denies chest pain, SOB, dizziness or paplitations, edema, former smoker 1997, no alcohol since 1997, 1 cup of coffee in morning           Previous visit: (5/5/2021)      Chief Complaint   Patient presents with    Coronary Artery Disease     Pt states he has episodes of palpitations with shortness of breath through the night time. Palpitations present. Better in the morning.  Hypertension    Hyperlipidemia           Previous visit:        Rodo Stewart is an 80year old male who presented to the Emergency Room with complaints of shortness of breath and a fall. Patient is confused, has underlying dementia. He is unable to provide information. Daughter and son are at the bedside providing information. Daughter states that the patient has been in an out of the hospital since August. He has been treated for hyponatremia and metabolic encephalopathy 1 week ago. Per family, he was readmitted the same day of discharge for weakness, nausea, vomiting and abdominal pain. He has also been dealing with lethargy. His dementia medications were adjusted as it was thought that could be the cause. Yesterday Mr Tino Bell fell while in the shower. She also was short of breath at that time. Patient is unable to state if he was dizzy, had chest pain, lightheaded, or palpitations at the time of the fall. He was brought to the ER for further evaluation. In the ED he was found to be in a complete heart block. Additionally he had 2 positive Troponins. He was taken to the cath lab, where a stent was placed to the RCA.  It was noted that he has triple vessel disease also. He has a past medical history of HTN, HLD, CKD, and dementia. Pastmedical history:   Past Medical History:   Diagnosis Date    Anxiety     Bladder cancer ([facility]     Cancer ([facility]     CHF (congestive heart failure) (HCC)     Chronic kidney disease     Chronic kidney disease, stage III (moderate) 07/17/2017    Coronary artery disease involving native coronary artery of native heart with unstable angina pectoris ([facility] 08/11/2019    Dementia ([facility]     Depression     GERD (gastroesophageal reflux disease)     Gout     Hx of Carotid Doppler ultrasound 01/05/2021    mild 0-49% disease and normal flow    Hx of Doppler echocardiogram 11/19/2018    EF 50% mod LV hypertrophy    Hx of exercise stress test 11/19/2018    Treadmill normal study    Hyperlipidemia     Hypertension     Hyperthyroidism     Mitral valve insufficiency        Surgical history :   Past Surgical History:   Procedure Laterality Date    COLONOSCOPY      CORONARY ANGIOPLASTY WITH STENT PLACEMENT      PACEMAKER INSERTION N/A 8/15/2019    PACEMAKER INSERTION PERMANENT performed by Sanchez Douglass MD at [address] Right 08/14/2019     ml       Family history:   Family History   Problem Relation Age of Onset    Cancer Sister     Cancer Brother     High Blood Pressure Brother     Heart Disease Other     Hypertension Other     Elevated Lipids Other     Other Other         gout       Social history :  reports that he has quit smoking. His smoking use included cigarettes. He has a 30.00 pack-year smoking history. He has never used smokeless tobacco. He reports that he does not drink alcohol and does not use drugs.     No Known Allergies    Current Outpatient Medications on File Prior to Visit   Medication Sig Dispense Refill    metoprolol tartrate (LOPRESSOR) 25 MG tablet Take 1 tablet by mouth 2 times daily Changed per Dr Giovanna Maria 9/10/19 60 tablet 3    midodrine (PROAMATINE) 10 MG tablet Take 1 tablet by mouth 2 times daily AM and afternoon and avoid taking at night 60 tablet 11    pantoprazole (PROTONIX) 40 MG tablet Take 40 mg by mouth daily      Folinic Acid-Vit B6-Vit B12 (FOLINIC-PLUS PO) Take 1 tablet by mouth daily      allopurinol (ZYLOPRIM) 100 MG tablet Take 100 mg by mouth daily      torsemide (DEMADEX) 20 MG tablet Take 1 tablet by mouth daily (Patient taking differently: Take 10 mg by mouth daily ) 90 tablet 3    acetaminophen (TYLENOL) 500 MG tablet Take 500 mg by mouth every 6 hours as needed for Pain PRN      ALPRAZolam (XANAX) 0.5 MG tablet Take 0.25 mg by mouth daily. Indications: takes in the pm       senna (SENOKOT) 8.6 MG tablet Take 1 tablet by mouth 2 times daily      ondansetron (ZOFRAN-ODT) 8 MG TBDP disintegrating tablet Place 8 mg under the tongue every 8 hours as needed for Nausea or Vomiting      FLUoxetine (PROZAC) 20 MG capsule Take 20 mg by mouth daily Indications: takes at night Takes a night      atorvastatin (LIPITOR) 80 MG tablet Take 1 tablet by mouth nightly 30 tablet 0    guaiFENesin (MUCINEX) 600 MG extended release tablet Take 1 tablet by mouth 2 times daily (Patient taking differently: Take 600 mg by mouth 2 times daily as needed ) 60 tablet 0    tamsulosin (FLOMAX) 0.4 MG capsule Take 1 capsule by mouth daily 30 capsule 0    clopidogrel (PLAVIX) 75 MG tablet Take 1 tablet by mouth daily 30 tablet 0    levothyroxine (SYNTHROID) 50 MCG tablet Take 50 mcg by mouth Daily      aspirin 81 MG tablet Take 81 mg by mouth daily       docusate sodium (COLACE) 100 MG capsule Take 1 capsule by mouth 2 times daily as needed for Constipation 90 capsule 1     No current facility-administered medications on file prior to visit. Review of Systems:   Review of Systems   Constitutional: Negative for activity change, chills, fatigue and fever. HENT: Negative for congestion, ear pain and tinnitus.     Eyes: Negative for range of motion. Skin:     General: Skin is warm and dry. Neurological:      General: No focal deficit present. Mental Status: He is alert and oriented to person, place, and time. CBC:   Lab Results   Component Value Date    WBC 7.4 05/15/2021    HGB 11.4 05/15/2021    HCT 36.1 05/15/2021     05/15/2021     Lipids:  Lab Results   Component Value Date    CHOL 118 02/22/2021    TRIG 104 02/22/2021    HDL 40 02/22/2021    LDLCALC 59 02/22/2021    LDLDIRECT 58 08/11/2019     PT/INR:   Lab Results   Component Value Date    INR 1.01 04/03/2021        BMP:    Lab Results   Component Value Date     05/15/2021    K 4.4 05/15/2021     05/15/2021    CO2 27 05/15/2021    BUN 22 05/15/2021     CMP:   Lab Results   Component Value Date    AST 16 04/03/2021    PROT 6.7 04/03/2021    BILITOT 0.3 04/03/2021    ALKPHOS 111 04/03/2021     TSH:  No results found for: TSH    EKGINTERPRETATION - EKG Interpretation:        IMPRESSION / RECOMMENDATIONS:     1. SVT - possible AVNRT  2. CAD sp CABG  3. Moderate LV systolic dysfunction  4. Confusion when in hospital    Feels much better on metoprolol according to patient and son and he has no complaints at this time. Since the last visit no arrhythmia were noted. Continue metoprolol. Patient is admitted for pneumonia and he is recovered from it and shortness of breath is also resolved. Echo shows improvement LVEF to 45 to 50%  No changes at this time    Follow-up 6 months    Thanks again for allowing me to participate in care of this patient. Please call me if you have any questions. With best regards.       Nora Lim MD, 6/4/2021 11:42 AM

## 2021-06-04 NOTE — PATIENT INSTRUCTIONS
Please be informed that if you contact our office outside of normal business hours the physician on call cannot help with any scheduling or rescheduling issues, procedure instruction questions or any type of medication issue. We advise you for any urgent/emergency that you go to the nearest emergency room!     PLEASE CALL OUR OFFICE DURING NORMAL BUSINESS HOURS    Monday - Friday   8 am to 5 pm    [place]: [phone]    [place]:  [phone]

## 2021-07-11 PROCEDURE — 93294 REM INTERROG EVL PM/LDLS PM: CPT | Performed by: INTERNAL MEDICINE

## 2021-07-11 PROCEDURE — 93296 REM INTERROG EVL PM/IDS: CPT | Performed by: INTERNAL MEDICINE

## 2021-07-12 ENCOUNTER — PROCEDURE VISIT (OUTPATIENT)
Dept: CARDIOLOGY CLINIC | Age: 86
End: 2021-07-12
Payer: MEDICARE

## 2021-07-12 DIAGNOSIS — I49.5 SINUS NODE DYSFUNCTION (HCC): ICD-10-CM

## 2021-07-12 DIAGNOSIS — Z95.0 CARDIAC PACEMAKER IN SITU: Primary | ICD-10-CM

## 2021-07-16 ENCOUNTER — OFFICE VISIT (OUTPATIENT)
Dept: CARDIOLOGY CLINIC | Age: 86
End: 2021-07-16
Payer: MEDICARE

## 2021-07-16 VITALS
HEART RATE: 76 BPM | DIASTOLIC BLOOD PRESSURE: 80 MMHG | HEIGHT: 69 IN | WEIGHT: 198.4 LBS | BODY MASS INDEX: 29.38 KG/M2 | SYSTOLIC BLOOD PRESSURE: 130 MMHG

## 2021-07-16 DIAGNOSIS — I65.21 CAROTID STENOSIS, ASYMPTOMATIC, RIGHT: ICD-10-CM

## 2021-07-16 DIAGNOSIS — I21.4 NSTEMI (NON-ST ELEVATED MYOCARDIAL INFARCTION) (HCC): ICD-10-CM

## 2021-07-16 DIAGNOSIS — I44.2 COMPLETE HEART BLOCK (HCC): ICD-10-CM

## 2021-07-16 DIAGNOSIS — I26.99 POSTOPERATIVE PULMONARY EMBOLISM, SEQUELA (HCC): ICD-10-CM

## 2021-07-16 DIAGNOSIS — I10 ESSENTIAL HYPERTENSION: Chronic | ICD-10-CM

## 2021-07-16 DIAGNOSIS — I34.0 NONRHEUMATIC MITRAL VALVE REGURGITATION: ICD-10-CM

## 2021-07-16 DIAGNOSIS — I25.110 CORONARY ARTERY DISEASE INVOLVING NATIVE CORONARY ARTERY OF NATIVE HEART WITH UNSTABLE ANGINA PECTORIS (HCC): Primary | ICD-10-CM

## 2021-07-16 DIAGNOSIS — T81.718S POSTOPERATIVE PULMONARY EMBOLISM, SEQUELA (HCC): ICD-10-CM

## 2021-07-16 PROCEDURE — 1123F ACP DISCUSS/DSCN MKR DOCD: CPT | Performed by: INTERNAL MEDICINE

## 2021-07-16 PROCEDURE — 1036F TOBACCO NON-USER: CPT | Performed by: INTERNAL MEDICINE

## 2021-07-16 PROCEDURE — G8417 CALC BMI ABV UP PARAM F/U: HCPCS | Performed by: INTERNAL MEDICINE

## 2021-07-16 PROCEDURE — G8427 DOCREV CUR MEDS BY ELIG CLIN: HCPCS | Performed by: INTERNAL MEDICINE

## 2021-07-16 PROCEDURE — 4040F PNEUMOC VAC/ADMIN/RCVD: CPT | Performed by: INTERNAL MEDICINE

## 2021-07-16 PROCEDURE — 99214 OFFICE O/P EST MOD 30 MIN: CPT | Performed by: INTERNAL MEDICINE

## 2021-07-16 NOTE — PROGRESS NOTES
OFFICE PROGRESS NOTES      Torres Sears is a 80 y.o. male who has    CHIEF COMPLAINT AS FOLLOWS:                                      HPI: Patient is here for F/U on his CAD, VHD, CMP, CHF,  Arrhythmia, HTN & Dyslipidemia problems. CHEST PAIN: Patient denies any C/O chest pains at this time.      SOB: No C/O SOB at this time.                LEG EDEMA: No leg edema   PALPITATIONS: Denies any C/O Palpitations   DIZZINESS: No C/O Dizziness   SYNCOPE: None   OTHER:                  He does not have any new complaints at this time. Current Outpatient Medications   Medication Sig Dispense Refill    metoprolol tartrate (LOPRESSOR) 25 MG tablet Take 1 tablet by mouth 2 times daily Changed per Dr Chiquita Lyons 9/10/19 60 tablet 3    midodrine (PROAMATINE) 10 MG tablet Take 1 tablet by mouth 2 times daily AM and afternoon and avoid taking at night 60 tablet 11    pantoprazole (PROTONIX) 40 MG tablet Take 40 mg by mouth daily      Folinic Acid-Vit B6-Vit B12 (FOLINIC-PLUS PO) Take 1 tablet by mouth daily      allopurinol (ZYLOPRIM) 100 MG tablet Take 100 mg by mouth daily      torsemide (DEMADEX) 20 MG tablet Take 1 tablet by mouth daily (Patient taking differently: Take 10 mg by mouth daily ) 90 tablet 3    acetaminophen (TYLENOL) 500 MG tablet Take 500 mg by mouth every 6 hours as needed for Pain PRN      ALPRAZolam (XANAX) 0.5 MG tablet Take 0.25 mg by mouth daily.  Indications: takes in the pm       senna (SENOKOT) 8.6 MG tablet Take 1 tablet by mouth 2 times daily      ondansetron (ZOFRAN-ODT) 8 MG TBDP disintegrating tablet Place 8 mg under the tongue every 8 hours as needed for Nausea or Vomiting      FLUoxetine (PROZAC) 20 MG capsule Take 20 mg by mouth daily Indications: takes at night Takes a night      atorvastatin (LIPITOR) 80 MG tablet Take 1 tablet by mouth nightly 30 tablet 0    guaiFENesin (MUCINEX) 600 MG extended release tablet Take 1 tablet by mouth 2 times daily (Patient taking differently: Take 600 mg by mouth 2 times daily as needed ) 60 tablet 0    tamsulosin (FLOMAX) 0.4 MG capsule Take 1 capsule by mouth daily 30 capsule 0    clopidogrel (PLAVIX) 75 MG tablet Take 1 tablet by mouth daily 30 tablet 0    levothyroxine (SYNTHROID) 50 MCG tablet Take 50 mcg by mouth Daily      aspirin 81 MG tablet Take 81 mg by mouth daily       docusate sodium (COLACE) 100 MG capsule Take 1 capsule by mouth 2 times daily as needed for Constipation 90 capsule 1     No current facility-administered medications for this visit. Allergies: Patient has no known allergies. Review of Systems:    Constitutional: Negative for diaphoresis and fatigue  Respiratory: Negative for shortness of breath  Cardiovascular: Negative for chest pain, dyspnea on exertion, claudication, edema, irregular heartbeat, murmur, palpitations or shortness of breath  Musculoskeletal: Negative for muscle pain, muscular weakness, negative for pain in arm and leg or swelling in foot and leg    Objective:  /80   Pulse 76   Ht 5' 9\" (1.753 m)   Wt 198 lb 6.4 oz (90 kg)   BMI 29.30 kg/m²   Wt Readings from Last 3 Encounters:   07/16/21 198 lb 6.4 oz (90 kg)   06/04/21 201 lb (91.2 kg)   05/15/21 201 lb (91.2 kg)     Body mass index is 29.3 kg/m². GENERAL - Alert, oriented, pleasant, in no apparent distress. EYES: No jaundice, no conjunctival pallor. Neck - Supple. No jugular venous distention noted. No carotid bruits. Cardiovascular  Normal S1 and S2 without obvious murmur or gallop. Extremities - No cyanosis, clubbing, or significant edema. Pulmonary  No respiratory distress. No wheezes or rales.       Lab Review   Lab Results   Component Value Date    TROPONINT 0.013 05/15/2021    TROPONINT 0.016 04/04/2021     Lab Results   Component Value Date    PROBNP 4,563 05/15/2021    PROBNP 5,116 07/05/2020     Lab Results   Component Value Date    INR 1.01 04/03/2021    INR 1.16 08/11/2019     No results found for: LABA1C  Lab Results Component Value Date    WBC 7.4 05/15/2021    WBC 5.8 04/03/2021    HCT 36.1 (L) 05/15/2021    HCT 40.7 (L) 04/03/2021    MCV 86.6 05/15/2021    MCV 86.8 04/03/2021     05/15/2021     04/03/2021     Lab Results   Component Value Date    CHOL 118 02/22/2021    CHOL 115 08/11/2019    TRIG 104 02/22/2021    TRIG 55 08/11/2019    HDL 40 02/22/2021    HDL 50 08/11/2019    LDLCALC 59 02/22/2021    LDLDIRECT 58 08/11/2019     Lab Results   Component Value Date    ALT 13 04/03/2021    ALT 11 02/22/2021    AST 16 04/03/2021    AST 13 02/22/2021     BMP:    Lab Results   Component Value Date     05/15/2021     04/26/2021    K 4.4 05/15/2021    K 4.3 04/26/2021     05/15/2021     04/26/2021    CO2 27 05/15/2021    CO2 23 04/26/2021    BUN 22 05/15/2021    BUN 24 04/26/2021    CREATININE 1.6 05/15/2021    CREATININE 1.67 04/26/2021     CMP:   Lab Results   Component Value Date     05/15/2021     04/26/2021    K 4.4 05/15/2021    K 4.3 04/26/2021     05/15/2021     04/26/2021    CO2 27 05/15/2021    CO2 23 04/26/2021    BUN 22 05/15/2021    BUN 24 04/26/2021    CREATININE 1.6 05/15/2021    CREATININE 1.67 04/26/2021    PROT 6.7 04/03/2021    PROT 6.6 07/05/2020    PROT 6.6 03/15/2013     Lab Results   Component Value Date    TSHHS 3.570 11/29/2019    TSHHS 4.450 08/11/2019     ECHO 5/2021   Left ventricular systolic function is mildly depressed with an ejection   fraction of 45-50%. Grade I diastolic dysfunction. The left atrium is mildly dilated. Dilatation of the aortic root measuring 3.8 cm. Mildly enlarged right heart. Sclerotic, but non-stenotic aortic valve. Mitral annular calcification is present. Doppler evaluation reveals moderate aortic and mild mitral and tricuspid   regurgitation. Right ventricular systolic pressure of 38 mmHg consistent with mild   pulmonary hypertension. No evidence of pericardial effusion.     QUALITY MEASURES REVIEWED:  1.CAD:Patient is taking anti platelet agent:Yes  2. DYSLIPIDEMIA: Patient is on cholesterol lowering medication:Yes   3. Beta-Blocker therapy for CAD, if prior Myocardial Infarction:Yes   4. Counselled regarding smoking cessation. No   Patient does not Smoke. 5.Anticoagulation therapy (for A.Fib) No   Does Not have A.Fib.  6.Discussed weight management strategies. Assessment & Plan:  Primary / Secondary prevention is the goal by aggressive risk modification, healthy and therapeutic life style changes for cardiovascular risk reduction. Various goals are discussed and multiple questions answered. CAD:Yes   clinically stable. Patient is on optimal medical regimen ( see medication list above )  -  Patient is currently  asymptomatic from CAD.          - changes in  treatment:   no           - Testing ordered:  no  Fajardo classification: 1  [place] RISK SCORE:  ROMAN RISK SCORE:  HTN:well controlled on current medical regimen, see list above.             - changes in  treatment:   no   CARDIOMYOPATHY: None known   CONGESTIVE HEART FAILURE: NO KNOWN HISTORY.      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 is low                                Tolerating current medical regimen wellyes,                                                               See most recent Lab values in Labs section above. CAROTID ARTERY DISEASE:.yes,                No symptoms described pertaining to Carotid artery disease               Up to date on non invasive testing . No.               Patient is on Guidelines recommended therapy. yes,   ARRHYTHMIAS:  Known H/O CHB                                S/P PPM  OTHER RELEVANT DIAGNOSIS:as noted in patient's active problem list:  TESTS ORDERED: none this visit                                    All previously ordered tests reviewed.      MEDICATIONS: CPM   Office f/u in six months.  Device check per protocol.

## 2021-07-16 NOTE — PATIENT INSTRUCTIONS
**It is YOUR responsibilty to bring medication bottles and/or updated medication list to [address] [place] [address]. This will allow us to better serve you and all your healthcare needs**    Please be informed that if you contact our office outside of normal business hours the physician on call cannot help with any scheduling or rescheduling issues, procedure instruction questions or any type of medication issue. We advise you for any urgent/emergency that you go to the nearest emergency room! PLEASE CALL OUR OFFICE DURING NORMAL BUSINESS HOURS    Monday - Friday   8 am to 5 pm    [place]: [phone]    [place]: [phone]    [place]:  [phone]  CAD:Yes   clinically stable. Patient is on optimal medical regimen ( see medication list above )  -  Patient is currently  asymptomatic from CAD.          - changes in  treatment:   no           - Testing ordered:  no  Barber classification: 1  [place] RISK SCORE:  ROMAN RISK SCORE:  HTN:well controlled on current medical regimen, see list above.             - changes in  treatment:   no   CARDIOMYOPATHY: None known   CONGESTIVE HEART FAILURE: NO KNOWN HISTORY.      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 CAROTID ARTERY DISEASE:.yes,                No symptoms described pertaining to Carotid artery disease               Up to date on non invasive testing . No.               Patient is on Guidelines recommended therapy. yes,   ARRHYTHMIAS:  Known H/O CHB                                S/P PPM  OTHER RELEVANT DIAGNOSIS:as noted in patient's active problem list:  TESTS ORDERED: none this visit                                    All previously ordered tests reviewed.      MEDICATIONS: CPM   Office f/u in six months. Device check per protocol.

## 2021-07-16 NOTE — LETTER
[address]  [address] [address]  Phone: [phone]  Fax: [phone]    Bernie Simon MD    July 16, 2021     Abdi Martinez PA-C  [address] [address]    Patient: Nell Nguyen   MR Number: [id number]   YOB: 1934   Date of Visit: 7/16/2021       Dear Annie Alex Nourse: Thank you for referring Nell Nguyen to me for evaluation/treatment. Below are the relevant portions of my assessment and plan of care. If you have questions, please do not hesitate to call me. I look forward to following Romel along with you.     Sincerely,        Bernie Simon MD

## 2021-10-19 ENCOUNTER — TELEPHONE (OUTPATIENT)
Dept: CARDIOLOGY CLINIC | Age: 86
End: 2021-10-19

## 2021-10-19 ENCOUNTER — PROCEDURE VISIT (OUTPATIENT)
Dept: CARDIOLOGY CLINIC | Age: 86
End: 2021-10-19
Payer: MEDICARE

## 2021-10-19 DIAGNOSIS — I49.5 SINUS NODE DYSFUNCTION (HCC): ICD-10-CM

## 2021-10-19 DIAGNOSIS — Z95.0 CARDIAC PACEMAKER IN SITU: Primary | ICD-10-CM

## 2021-10-19 NOTE — LETTER
Cardiology [address]  Phone: [phone]  Fax: [phone]    10/19/2021        Bita Colin  [address]            Dear Mariano Wallis: This is your [facility] schedule. You can ryan your calendar with these dates. Remember that your device is wireless and should automatically do these checks while you are sleeping. If for any reason I do not get your transmission then I will call you and ask that you send a manual transmission. If you have any questions or concerns, please call and ask for Genna Araya at [phone]. Thank you.

## 2021-11-04 PROCEDURE — 93296 REM INTERROG EVL PM/IDS: CPT | Performed by: INTERNAL MEDICINE

## 2021-11-04 PROCEDURE — 93294 REM INTERROG EVL PM/LDLS PM: CPT | Performed by: INTERNAL MEDICINE

## 2021-12-09 ENCOUNTER — OFFICE VISIT (OUTPATIENT)
Dept: CARDIOLOGY CLINIC | Age: 86
End: 2021-12-09
Payer: MEDICARE

## 2021-12-09 VITALS
WEIGHT: 204.8 LBS | BODY MASS INDEX: 29.32 KG/M2 | HEART RATE: 77 BPM | HEIGHT: 70 IN | DIASTOLIC BLOOD PRESSURE: 70 MMHG | SYSTOLIC BLOOD PRESSURE: 136 MMHG

## 2021-12-09 DIAGNOSIS — Z95.0 S/P PLACEMENT OF CARDIAC PACEMAKER: ICD-10-CM

## 2021-12-09 DIAGNOSIS — I47.1 SVT (SUPRAVENTRICULAR TACHYCARDIA) (HCC): Primary | ICD-10-CM

## 2021-12-09 PROCEDURE — 99213 OFFICE O/P EST LOW 20 MIN: CPT | Performed by: NURSE PRACTITIONER

## 2021-12-09 PROCEDURE — G8484 FLU IMMUNIZE NO ADMIN: HCPCS | Performed by: NURSE PRACTITIONER

## 2021-12-09 PROCEDURE — 1036F TOBACCO NON-USER: CPT | Performed by: NURSE PRACTITIONER

## 2021-12-09 PROCEDURE — G8427 DOCREV CUR MEDS BY ELIG CLIN: HCPCS | Performed by: NURSE PRACTITIONER

## 2021-12-09 PROCEDURE — 1123F ACP DISCUSS/DSCN MKR DOCD: CPT | Performed by: NURSE PRACTITIONER

## 2021-12-09 PROCEDURE — 93288 INTERROG EVL PM/LDLS PM IP: CPT | Performed by: NURSE PRACTITIONER

## 2021-12-09 PROCEDURE — 4040F PNEUMOC VAC/ADMIN/RCVD: CPT | Performed by: NURSE PRACTITIONER

## 2021-12-09 PROCEDURE — G8417 CALC BMI ABV UP PARAM F/U: HCPCS | Performed by: NURSE PRACTITIONER

## 2021-12-09 ASSESSMENT — ENCOUNTER SYMPTOMS
SINUS PAIN: 0
ABDOMINAL DISTENTION: 0
SHORTNESS OF BREATH: 0
DIARRHEA: 0
NAUSEA: 0
BACK PAIN: 0
COUGH: 0
EYE ITCHING: 0
VOMITING: 0
EYE REDNESS: 0
CHEST TIGHTNESS: 0
CONSTIPATION: 0
WHEEZING: 0
COLOR CHANGE: 0
BLOOD IN STOOL: 0
ABDOMINAL PAIN: 0
SINUS PRESSURE: 0

## 2021-12-09 NOTE — PROGRESS NOTES
Electrophysiology FU Note    Reason for consultation: complete heart block     Chief complaint: follow up on SVT . Denies cardiac complaints     Referring physician:  Dr Marlyn Bhat        Primary care physician: James Short PA-C        History of Present Illness: This visit 12/9/2021  Patient here for 6-month follow-up on SVT versus AVNRT. Patient reports he has been feeling well. He denies chest pain, palpitations, shortness of breath, lightheadedness, dizziness, edema or syncope. Previous visit (6/4/2021)      Chief Complaint   Patient presents with    Follow-up     1 month follow echo results, denies chest pain, SOB, dizziness or paplitations, edema, former smoker 1997, no alcohol since 1997, 1 cup of coffee in morning           Previous visit: (5/5/2021)      Chief Complaint   Patient presents with    Coronary Artery Disease     Pt states he has episodes of palpitations with shortness of breath through the night time. Palpitations present. Better in the morning.  Hypertension    Hyperlipidemia           Previous visit:        Clearance Ek is an 80year old male who presented to the Emergency Room with complaints of shortness of breath and a fall. Patient is confused, has underlying dementia. He is unable to provide information. Daughter and son are at the bedside providing information. Daughter states that the patient has been in an out of the hospital since August. He has been treated for hyponatremia and metabolic encephalopathy 1 week ago. Per family, he was readmitted the same day of discharge for weakness, nausea, vomiting and abdominal pain. He has also been dealing with lethargy. His dementia medications were adjusted as it was thought that could be the cause. Yesterday Mr Dena Khanna fell while in the shower. She also was short of breath at that time.  Patient is unable to state if he was dizzy, had chest pain, lightheaded, or palpitations at the time of the fall. He was brought to the ER for further evaluation. In the ED he was found to be in a complete heart block. Additionally he had 2 positive Troponins. He was taken to the cath lab, where a stent was placed to the RCA. It was noted that he has triple vessel disease also. He has a past medical history of HTN, HLD, CKD, and dementia. Pastmedical history:   Past Medical History:   Diagnosis Date    Anxiety     [facility]     Cancer ([facility]     [facility]congestive [facility] failure) ([facility])     Chronic kidney disease     Chronic kidney disease, stage III (moderate) ([facility]) 07/17/2017    Coronary artery disease involving native coronary artery of native heart with unstable angina pectoris ([facility] 08/11/2019    Dementia ([facility]     Depression     GERD (gastroesophageal reflux disease)     Gout     Hx of Carotid Doppler ultrasound 01/05/2021    mild 0-49% disease and normal flow    Hx of Doppler echocardiogram 11/19/2018    EF 50% mod LV hypertrophy    Hx of exercise stress test 11/19/2018    Treadmill normal study    Hyperlipidemia     Hypertension     Hyperthyroidism     Mitral valve insufficiency        Surgical history :   Past Surgical History:   Procedure Laterality Date    COLONOSCOPY      CORONARY ANGIOPLASTY WITH STENT PLACEMENT      PACEMAKER INSERTION N/A 8/15/2019    PACEMAKER INSERTION PERMANENT performed by Ana Jean-Baptiste MD at [address] Right 08/14/2019     ml       Family history:   Family History   Problem Relation Age of Onset    Cancer Sister     Cancer Brother     High Blood Pressure Brother     Heart Disease Other     Hypertension Other     Elevated Lipids Other     Other Other         gout       Social history :  reports that he has quit smoking. His smoking use included cigarettes. He has a 30.00 pack-year smoking history.  He has never used smokeless tobacco. He reports that he does not drink alcohol and does not use drugs. No Known Allergies    Current Outpatient Medications on File Prior to Visit   Medication Sig Dispense Refill    metoprolol tartrate (LOPRESSOR) 25 MG tablet Take 1 tablet by mouth 2 times daily Changed per Dr Bill Galaviz 9/10/19 60 tablet 3    midodrine (PROAMATINE) 10 MG tablet Take 1 tablet by mouth 2 times daily AM and afternoon and avoid taking at night 60 tablet 11    pantoprazole (PROTONIX) 40 MG tablet Take 40 mg by mouth daily      Folinic Acid-Vit B6-Vit B12 (FOLINIC-PLUS PO) Take 1 tablet by mouth daily      allopurinol (ZYLOPRIM) 100 MG tablet Take 100 mg by mouth daily      torsemide (DEMADEX) 20 MG tablet Take 1 tablet by mouth daily (Patient taking differently: Take 10 mg by mouth daily ) 90 tablet 3    acetaminophen (TYLENOL) 500 MG tablet Take 500 mg by mouth every 6 hours as needed for Pain PRN      ALPRAZolam (XANAX) 0.5 MG tablet Take 0.25 mg by mouth daily.  Indications: takes in the pm       senna (SENOKOT) 8.6 MG tablet Take 1 tablet by mouth 2 times daily      ondansetron (ZOFRAN-ODT) 8 MG TBDP disintegrating tablet Place 8 mg under the tongue every 8 hours as needed for Nausea or Vomiting      FLUoxetine (PROZAC) 20 MG capsule Take 20 mg by mouth daily Indications: takes at night Takes a night      atorvastatin (LIPITOR) 80 MG tablet Take 1 tablet by mouth nightly 30 tablet 0    guaiFENesin (MUCINEX) 600 MG extended release tablet Take 1 tablet by mouth 2 times daily (Patient taking differently: Take 600 mg by mouth 2 times daily as needed ) 60 tablet 0    tamsulosin (FLOMAX) 0.4 MG capsule Take 1 capsule by mouth daily 30 capsule 0    clopidogrel (PLAVIX) 75 MG tablet Take 1 tablet by mouth daily 30 tablet 0    levothyroxine (SYNTHROID) 50 MCG tablet Take 50 mcg by mouth Daily      aspirin 81 MG tablet Take 81 mg by mouth daily       docusate sodium (COLACE) 100 MG capsule Take 1 capsule by mouth 2 times daily as needed for Constipation 90 capsule 1     No current facility-administered medications on file prior to visit. Review of Systems:   Review of Systems   Constitutional: Negative for activity change, chills, fatigue and fever. HENT: Negative for congestion, sinus pressure and sinus pain. Eyes: Negative for redness, itching and visual disturbance. Respiratory: Negative for cough, chest tightness, shortness of breath and wheezing. Cardiovascular: Negative for chest pain, palpitations and leg swelling. Gastrointestinal: Negative for abdominal distention, abdominal pain, blood in stool, constipation, diarrhea, nausea and vomiting. Endocrine: Negative for cold intolerance and heat intolerance. Genitourinary: Negative for difficulty urinating, dysuria and hematuria. Musculoskeletal: Positive for gait problem. Negative for arthralgias, back pain and neck stiffness. Skin: Negative for color change, rash and wound. Allergic/Immunologic: Negative for food allergies. Neurological: Negative for dizziness, syncope, light-headedness, numbness and headaches. Hematological: Does not bruise/bleed easily. Psychiatric/Behavioral: Negative for agitation, behavioral problems and confusion. The patient is not nervous/anxious. Examination:    /70   Pulse 77   Ht 5' 10\" (1.778 m)   Wt 204 lb 12.8 oz (92.9 kg)   BMI 29.39 kg/m²    Wt Readings from Last 3 Encounters:   12/09/21 204 lb 12.8 oz (92.9 kg)   07/16/21 198 lb 6.4 oz (90 kg)   06/04/21 201 lb (91.2 kg)     Body mass index is 29.39 kg/m². Physical Exam  Constitutional:       Appearance: He is well-developed. HENT:      Head: Normocephalic and atraumatic. Right Ear: External ear normal.      Left Ear: External ear normal.      Mouth/Throat:      Mouth: Mucous membranes are moist.      Pharynx: No oropharyngeal exudate or posterior oropharyngeal erythema. Eyes:      General:         Right eye: No discharge. Left eye: No discharge. Conjunctiva/sclera: Conjunctivae normal.   Neck:      Vascular: No JVD. Cardiovascular:      Rate and Rhythm: Normal rate and regular rhythm. Heart sounds: No murmur heard. No friction rub. Pulmonary:      Effort: Pulmonary effort is normal.      Breath sounds: Normal breath sounds. No wheezing or rhonchi. Abdominal:      General: Bowel sounds are normal.      Palpations: Abdomen is soft. Musculoskeletal:      Cervical back: Normal range of motion. Right lower leg: No edema. Left lower leg: No edema. Skin:     General: Skin is warm and dry. Neurological:      General: No focal deficit present. Mental Status: He is alert and oriented to person, place, and time. Psychiatric:         Mood and Affect: Mood normal.         Thought Content:  Thought content normal.         CBC:   Lab Results   Component Value Date    WBC 7.4 05/15/2021    HGB 11.4 05/15/2021    HCT 36.1 05/15/2021     05/15/2021     Lipids:  Lab Results   Component Value Date    CHOL 118 02/22/2021    TRIG 104 02/22/2021    HDL 40 02/22/2021    LDLCALC 59 02/22/2021    LDLDIRECT 58 08/11/2019     PT/INR:   Lab Results   Component Value Date    INR 1.01 04/03/2021        BMP:    Lab Results   Component Value Date     05/15/2021    K 4.4 05/15/2021     05/15/2021    CO2 27 05/15/2021    BUN 22 05/15/2021     CMP:   Lab Results   Component Value Date    AST 16 04/03/2021    PROT 6.7 04/03/2021    BILITOT 0.3 04/03/2021    ALKPHOS 111 04/03/2021     TSH:  No results found for: TSH     EKG Interpretation: Sinus rhythm with right bundle branch block      IMPRESSION / RECOMMENDATIONS:     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 He denies palpitations or tachycardia  EKG obtained patient noted to be in sinus rhythm with right bundle branch block    Pacemaker interrogated  Device Assessment:    The device is Medtronic pacemaker - Dual Chamber chamber      MRI Compatible : yes    Device interrogation was performed. Mode: AAIR --- DDDR     Sensing is normal. Impedence is normal.  Threshold is normal.     There has not been interval changes. Estimated battery life is 11.4 years     The underlying rhythm is AP,VS.  94.3 % atrial paced; 0.7 % ventricular paced. Atrial Arrhythmia : No    Fast A and V: 4 episodes- short in duration    Non sustained VT episodes : No    Sustained VT episodes : No    Patient activity reported 4.6 hrs/day    The patient is atrial pacemaker dependent. Patient has had 2 episodes of fast a and V since last office visit. Overall patient is asymptomatic. We will continue metoprolol 25 mg twice daily  No changes in current medical management. Patient to follow-up with Dr. Portillo Angelo as scheduled  Patient to follow-up with EP as needed            Thanks again for allowing me to participate in care of this patient. Please call me if you have any questions. With best regards.       Fernando Chen, PRISCILLA - CNP, 12/9/2021 10:07 AM

## 2021-12-09 NOTE — PATIENT INSTRUCTIONS
Please be informed that if you contact our office outside of normal business hours the physician on call cannot help with any scheduling or rescheduling issues, procedure instruction questions or any type of medication issue. We advise you for any urgent/emergency that you go to the nearest emergency room! PLEASE CALL OUR OFFICE DURING NORMAL BUSINESS HOURS    Monday - Friday   8 am to 5 pm    [place]: [address]: [phone]    [place]:  [phone]    **It is YOUR responsibilty to bring medication bottles and/or updated medication list to [address] [place] [address].  This will allow us to better serve you and all your healthcare needs**

## 2022-01-01 ENCOUNTER — HOSPITAL ENCOUNTER (INPATIENT)
Age: 87
LOS: 6 days | DRG: 951 | End: 2022-12-07
Attending: FAMILY MEDICINE | Admitting: FAMILY MEDICINE
Payer: MEDICARE

## 2022-01-01 VITALS
BODY MASS INDEX: 26.09 KG/M2 | TEMPERATURE: 100.5 F | HEART RATE: 82 BPM | RESPIRATION RATE: 20 BRPM | OXYGEN SATURATION: 90 % | DIASTOLIC BLOOD PRESSURE: 72 MMHG | SYSTOLIC BLOOD PRESSURE: 141 MMHG | WEIGHT: 181.8 LBS

## 2022-01-01 PROCEDURE — 1250000000 HC SEMI PRIVATE HOSPICE R&B

## 2022-01-01 PROCEDURE — 2580000003 HC RX 258: Performed by: FAMILY MEDICINE

## 2022-01-01 PROCEDURE — 94761 N-INVAS EAR/PLS OXIMETRY MLT: CPT

## 2022-01-01 PROCEDURE — 51702 INSERT TEMP BLADDER CATH: CPT

## 2022-01-01 PROCEDURE — 6360000002 HC RX W HCPCS: Performed by: FAMILY MEDICINE

## 2022-01-01 PROCEDURE — 2500000003 HC RX 250 WO HCPCS: Performed by: FAMILY MEDICINE

## 2022-01-01 RX ORDER — LORAZEPAM 2 MG/ML
0.5 INJECTION INTRAMUSCULAR
Status: DISCONTINUED | OUTPATIENT
Start: 2022-01-01 | End: 2022-01-01 | Stop reason: HOSPADM

## 2022-01-01 RX ORDER — GLYCOPYRROLATE 0.2 MG/ML
0.2 INJECTION INTRAMUSCULAR; INTRAVENOUS 4 TIMES DAILY PRN
Status: DISCONTINUED | OUTPATIENT
Start: 2022-01-01 | End: 2022-01-01

## 2022-01-01 RX ORDER — HALOPERIDOL 5 MG/ML
1 INJECTION INTRAMUSCULAR
Status: DISCONTINUED | OUTPATIENT
Start: 2022-01-01 | End: 2022-01-01

## 2022-01-01 RX ORDER — MORPHINE SULFATE 4 MG/ML
4 INJECTION, SOLUTION INTRAMUSCULAR; INTRAVENOUS
Status: DISCONTINUED | OUTPATIENT
Start: 2022-01-01 | End: 2022-01-01 | Stop reason: HOSPADM

## 2022-01-01 RX ORDER — HALOPERIDOL 5 MG/ML
1 INJECTION INTRAMUSCULAR EVERY 8 HOURS SCHEDULED
Status: DISCONTINUED | OUTPATIENT
Start: 2022-01-01 | End: 2022-01-01

## 2022-01-01 RX ORDER — MORPHINE SULFATE 4 MG/ML
4 INJECTION, SOLUTION INTRAMUSCULAR; INTRAVENOUS
Status: DISCONTINUED | OUTPATIENT
Start: 2022-01-01 | End: 2022-01-01

## 2022-01-01 RX ORDER — MORPHINE SULFATE 2 MG/ML
2 INJECTION, SOLUTION INTRAMUSCULAR; INTRAVENOUS EVERY 6 HOURS SCHEDULED
Status: DISCONTINUED | OUTPATIENT
Start: 2022-01-01 | End: 2022-01-01

## 2022-01-01 RX ORDER — PHENOBARBITAL SODIUM 65 MG/ML
32.5 INJECTION INTRAMUSCULAR EVERY 4 HOURS PRN
Status: DISCONTINUED | OUTPATIENT
Start: 2022-01-01 | End: 2022-01-01 | Stop reason: HOSPADM

## 2022-01-01 RX ORDER — HALOPERIDOL 5 MG/ML
1 INJECTION INTRAMUSCULAR
Status: DISCONTINUED | OUTPATIENT
Start: 2022-01-01 | End: 2022-01-01 | Stop reason: HOSPADM

## 2022-01-01 RX ORDER — BISACODYL 10 MG
10 SUPPOSITORY, RECTAL RECTAL DAILY PRN
Status: DISCONTINUED | OUTPATIENT
Start: 2022-01-01 | End: 2022-01-01 | Stop reason: HOSPADM

## 2022-01-01 RX ORDER — LORAZEPAM 2 MG/ML
0.5 INJECTION INTRAMUSCULAR EVERY 6 HOURS
Status: DISCONTINUED | OUTPATIENT
Start: 2022-01-01 | End: 2022-01-01 | Stop reason: HOSPADM

## 2022-01-01 RX ORDER — MORPHINE SULFATE 2 MG/ML
2 INJECTION, SOLUTION INTRAMUSCULAR; INTRAVENOUS
Status: DISCONTINUED | OUTPATIENT
Start: 2022-01-01 | End: 2022-01-01

## 2022-01-01 RX ORDER — SODIUM CHLORIDE 0.9 % (FLUSH) 0.9 %
10 SYRINGE (ML) INJECTION PRN
Status: DISCONTINUED | OUTPATIENT
Start: 2022-01-01 | End: 2022-01-01 | Stop reason: HOSPADM

## 2022-01-01 RX ORDER — ACETAMINOPHEN 650 MG/1
650 SUPPOSITORY RECTAL EVERY 4 HOURS PRN
Status: DISCONTINUED | OUTPATIENT
Start: 2022-01-01 | End: 2022-01-01 | Stop reason: HOSPADM

## 2022-01-01 RX ORDER — SODIUM CHLORIDE 0.9 % (FLUSH) 0.9 %
5-40 SYRINGE (ML) INJECTION EVERY 12 HOURS SCHEDULED
Status: DISCONTINUED | OUTPATIENT
Start: 2022-01-01 | End: 2022-01-01 | Stop reason: HOSPADM

## 2022-01-01 RX ORDER — HALOPERIDOL 5 MG/ML
1 INJECTION INTRAMUSCULAR EVERY 6 HOURS SCHEDULED
Status: DISCONTINUED | OUTPATIENT
Start: 2022-01-01 | End: 2022-01-01 | Stop reason: HOSPADM

## 2022-01-01 RX ORDER — GLYCOPYRROLATE 0.2 MG/ML
0.2 INJECTION INTRAMUSCULAR; INTRAVENOUS
Status: DISCONTINUED | OUTPATIENT
Start: 2022-01-01 | End: 2022-01-01 | Stop reason: HOSPADM

## 2022-01-01 RX ORDER — FUROSEMIDE 10 MG/ML
40 INJECTION INTRAMUSCULAR; INTRAVENOUS ONCE
Status: DISCONTINUED | OUTPATIENT
Start: 2022-01-01 | End: 2022-01-01 | Stop reason: HOSPADM

## 2022-01-01 RX ORDER — MORPHINE SULFATE 4 MG/ML
3 INJECTION, SOLUTION INTRAMUSCULAR; INTRAVENOUS
Status: DISCONTINUED | OUTPATIENT
Start: 2022-01-01 | End: 2022-01-01 | Stop reason: HOSPADM

## 2022-01-01 RX ORDER — GLYCOPYRROLATE 0.2 MG/ML
0.2 INJECTION INTRAMUSCULAR; INTRAVENOUS EVERY 4 HOURS PRN
Status: DISCONTINUED | OUTPATIENT
Start: 2022-01-01 | End: 2022-01-01

## 2022-01-01 RX ADMIN — LORAZEPAM 0.5 MG: 2 INJECTION INTRAMUSCULAR at 20:40

## 2022-01-01 RX ADMIN — SODIUM CHLORIDE, PRESERVATIVE FREE 10 ML: 5 INJECTION INTRAVENOUS at 19:51

## 2022-01-01 RX ADMIN — SODIUM CHLORIDE, PRESERVATIVE FREE 10 ML: 5 INJECTION INTRAVENOUS at 20:35

## 2022-01-01 RX ADMIN — MORPHINE SULFATE 3 MG: 4 INJECTION, SOLUTION INTRAMUSCULAR; INTRAVENOUS at 12:42

## 2022-01-01 RX ADMIN — MORPHINE SULFATE 4 MG: 4 INJECTION, SOLUTION INTRAMUSCULAR; INTRAVENOUS at 21:58

## 2022-01-01 RX ADMIN — HALOPERIDOL LACTATE 1 MG: 5 INJECTION, SOLUTION INTRAMUSCULAR at 12:42

## 2022-01-01 RX ADMIN — LORAZEPAM 0.5 MG: 2 INJECTION INTRAMUSCULAR at 14:33

## 2022-01-01 RX ADMIN — MORPHINE SULFATE 3 MG: 4 INJECTION, SOLUTION INTRAMUSCULAR; INTRAVENOUS at 19:51

## 2022-01-01 RX ADMIN — HALOPERIDOL LACTATE 1 MG: 5 INJECTION, SOLUTION INTRAMUSCULAR at 22:18

## 2022-01-01 RX ADMIN — SODIUM CHLORIDE, PRESERVATIVE FREE 10 ML: 5 INJECTION INTRAVENOUS at 23:42

## 2022-01-01 RX ADMIN — SODIUM CHLORIDE, PRESERVATIVE FREE 10 ML: 5 INJECTION INTRAVENOUS at 23:24

## 2022-01-01 RX ADMIN — SODIUM CHLORIDE, PRESERVATIVE FREE 10 ML: 5 INJECTION INTRAVENOUS at 10:29

## 2022-01-01 RX ADMIN — HALOPERIDOL LACTATE 1 MG: 5 INJECTION, SOLUTION INTRAMUSCULAR at 20:32

## 2022-01-01 RX ADMIN — HALOPERIDOL LACTATE 1 MG: 5 INJECTION, SOLUTION INTRAMUSCULAR at 08:39

## 2022-01-01 RX ADMIN — HALOPERIDOL LACTATE 1 MG: 5 INJECTION, SOLUTION INTRAMUSCULAR at 21:36

## 2022-01-01 RX ADMIN — MORPHINE SULFATE 4 MG: 4 INJECTION, SOLUTION INTRAMUSCULAR; INTRAVENOUS at 12:12

## 2022-01-01 RX ADMIN — LORAZEPAM 0.5 MG: 2 INJECTION INTRAMUSCULAR at 04:03

## 2022-01-01 RX ADMIN — LORAZEPAM 0.5 MG: 2 INJECTION INTRAMUSCULAR at 19:51

## 2022-01-01 RX ADMIN — MORPHINE SULFATE 4 MG: 4 INJECTION, SOLUTION INTRAMUSCULAR; INTRAVENOUS at 14:05

## 2022-01-01 RX ADMIN — GLYCOPYRROLATE 0.2 MG: 1 INJECTION INTRAMUSCULAR; INTRAVENOUS at 00:12

## 2022-01-01 RX ADMIN — MORPHINE SULFATE 3 MG: 4 INJECTION, SOLUTION INTRAMUSCULAR; INTRAVENOUS at 04:41

## 2022-01-01 RX ADMIN — MORPHINE SULFATE 3 MG: 4 INJECTION, SOLUTION INTRAMUSCULAR; INTRAVENOUS at 00:07

## 2022-01-01 RX ADMIN — MORPHINE SULFATE 4 MG: 4 INJECTION, SOLUTION INTRAMUSCULAR; INTRAVENOUS at 06:42

## 2022-01-01 RX ADMIN — SODIUM CHLORIDE, PRESERVATIVE FREE 10 ML: 5 INJECTION INTRAVENOUS at 09:01

## 2022-01-01 RX ADMIN — MORPHINE SULFATE 2 MG: 2 INJECTION, SOLUTION INTRAMUSCULAR; INTRAVENOUS at 05:42

## 2022-01-01 RX ADMIN — MORPHINE SULFATE 3 MG: 4 INJECTION, SOLUTION INTRAMUSCULAR; INTRAVENOUS at 16:09

## 2022-01-01 RX ADMIN — HALOPERIDOL LACTATE 1 MG: 5 INJECTION, SOLUTION INTRAMUSCULAR at 09:01

## 2022-01-01 RX ADMIN — MORPHINE SULFATE 3 MG: 4 INJECTION, SOLUTION INTRAMUSCULAR; INTRAVENOUS at 15:43

## 2022-01-01 RX ADMIN — MORPHINE SULFATE 4 MG: 4 INJECTION, SOLUTION INTRAMUSCULAR; INTRAVENOUS at 20:40

## 2022-01-01 RX ADMIN — HALOPERIDOL LACTATE 1 MG: 5 INJECTION, SOLUTION INTRAMUSCULAR at 18:33

## 2022-01-01 RX ADMIN — HALOPERIDOL LACTATE 1 MG: 5 INJECTION, SOLUTION INTRAMUSCULAR at 06:30

## 2022-01-01 RX ADMIN — SODIUM CHLORIDE, PRESERVATIVE FREE 10 ML: 5 INJECTION INTRAVENOUS at 21:37

## 2022-01-01 RX ADMIN — HALOPERIDOL LACTATE 1 MG: 5 INJECTION, SOLUTION INTRAMUSCULAR at 00:12

## 2022-01-01 RX ADMIN — HALOPERIDOL LACTATE 1 MG: 5 INJECTION, SOLUTION INTRAMUSCULAR at 05:08

## 2022-01-01 RX ADMIN — LORAZEPAM 0.5 MG: 2 INJECTION INTRAMUSCULAR at 12:42

## 2022-01-01 RX ADMIN — LORAZEPAM 0.5 MG: 2 INJECTION INTRAMUSCULAR at 20:34

## 2022-01-01 RX ADMIN — MORPHINE SULFATE 3 MG: 4 INJECTION, SOLUTION INTRAMUSCULAR; INTRAVENOUS at 16:13

## 2022-01-01 RX ADMIN — MORPHINE SULFATE 4 MG: 4 INJECTION, SOLUTION INTRAMUSCULAR; INTRAVENOUS at 19:47

## 2022-01-01 RX ADMIN — HALOPERIDOL LACTATE 1 MG: 5 INJECTION, SOLUTION INTRAMUSCULAR at 17:20

## 2022-01-01 RX ADMIN — MORPHINE SULFATE 3 MG: 4 INJECTION, SOLUTION INTRAMUSCULAR; INTRAVENOUS at 01:42

## 2022-01-01 RX ADMIN — MORPHINE SULFATE 3 MG: 4 INJECTION, SOLUTION INTRAMUSCULAR; INTRAVENOUS at 13:03

## 2022-01-01 RX ADMIN — HALOPERIDOL LACTATE 1 MG: 5 INJECTION, SOLUTION INTRAMUSCULAR at 12:22

## 2022-01-01 RX ADMIN — MORPHINE SULFATE 3 MG: 4 INJECTION, SOLUTION INTRAMUSCULAR; INTRAVENOUS at 20:48

## 2022-01-01 RX ADMIN — MORPHINE SULFATE 3 MG: 4 INJECTION, SOLUTION INTRAMUSCULAR; INTRAVENOUS at 00:12

## 2022-01-01 RX ADMIN — SODIUM CHLORIDE, PRESERVATIVE FREE 10 ML: 5 INJECTION INTRAVENOUS at 22:18

## 2022-01-01 RX ADMIN — HALOPERIDOL LACTATE 1 MG: 5 INJECTION, SOLUTION INTRAMUSCULAR at 20:34

## 2022-01-01 RX ADMIN — MORPHINE SULFATE 3 MG: 4 INJECTION, SOLUTION INTRAMUSCULAR; INTRAVENOUS at 09:00

## 2022-01-01 RX ADMIN — HALOPERIDOL LACTATE 1 MG: 5 INJECTION, SOLUTION INTRAMUSCULAR at 02:20

## 2022-01-01 RX ADMIN — LORAZEPAM 0.5 MG: 2 INJECTION INTRAMUSCULAR at 02:55

## 2022-01-01 RX ADMIN — MORPHINE SULFATE 4 MG: 4 INJECTION, SOLUTION INTRAMUSCULAR; INTRAVENOUS at 22:21

## 2022-01-01 RX ADMIN — MORPHINE SULFATE 4 MG: 4 INJECTION, SOLUTION INTRAMUSCULAR; INTRAVENOUS at 02:40

## 2022-01-01 RX ADMIN — HALOPERIDOL LACTATE 1 MG: 5 INJECTION, SOLUTION INTRAMUSCULAR at 05:42

## 2022-01-01 RX ADMIN — MORPHINE SULFATE 2 MG: 2 INJECTION, SOLUTION INTRAMUSCULAR; INTRAVENOUS at 11:30

## 2022-01-01 RX ADMIN — HALOPERIDOL LACTATE 1 MG: 5 INJECTION, SOLUTION INTRAMUSCULAR at 15:44

## 2022-01-01 RX ADMIN — HALOPERIDOL LACTATE 1 MG: 5 INJECTION, SOLUTION INTRAMUSCULAR at 04:41

## 2022-01-01 RX ADMIN — LORAZEPAM 0.5 MG: 2 INJECTION INTRAMUSCULAR at 10:26

## 2022-01-01 RX ADMIN — MORPHINE SULFATE 4 MG: 4 INJECTION, SOLUTION INTRAMUSCULAR; INTRAVENOUS at 09:01

## 2022-01-01 RX ADMIN — LORAZEPAM 0.5 MG: 2 INJECTION INTRAMUSCULAR at 14:25

## 2022-01-01 RX ADMIN — HALOPERIDOL LACTATE 1 MG: 5 INJECTION, SOLUTION INTRAMUSCULAR at 00:23

## 2022-01-01 RX ADMIN — HALOPERIDOL LACTATE 1 MG: 5 INJECTION, SOLUTION INTRAMUSCULAR at 09:00

## 2022-01-01 RX ADMIN — LORAZEPAM 0.5 MG: 2 INJECTION INTRAMUSCULAR at 02:39

## 2022-01-01 RX ADMIN — MORPHINE SULFATE 4 MG: 4 INJECTION, SOLUTION INTRAMUSCULAR; INTRAVENOUS at 13:38

## 2022-01-01 RX ADMIN — SODIUM CHLORIDE, PRESERVATIVE FREE 10 ML: 5 INJECTION INTRAVENOUS at 14:36

## 2022-01-01 RX ADMIN — MORPHINE SULFATE 2 MG: 2 INJECTION, SOLUTION INTRAMUSCULAR; INTRAVENOUS at 04:03

## 2022-01-01 RX ADMIN — MORPHINE SULFATE 3 MG: 4 INJECTION, SOLUTION INTRAMUSCULAR; INTRAVENOUS at 04:53

## 2022-01-01 RX ADMIN — LORAZEPAM 0.5 MG: 2 INJECTION INTRAMUSCULAR at 13:39

## 2022-01-01 RX ADMIN — LORAZEPAM 0.5 MG: 2 INJECTION INTRAMUSCULAR at 20:47

## 2022-01-01 RX ADMIN — SODIUM CHLORIDE, PRESERVATIVE FREE 10 ML: 5 INJECTION INTRAVENOUS at 13:40

## 2022-01-01 RX ADMIN — LORAZEPAM 0.5 MG: 2 INJECTION INTRAMUSCULAR at 13:38

## 2022-01-01 RX ADMIN — MORPHINE SULFATE 3 MG: 4 INJECTION, SOLUTION INTRAMUSCULAR; INTRAVENOUS at 16:56

## 2022-01-01 RX ADMIN — LORAZEPAM 0.5 MG: 2 INJECTION INTRAMUSCULAR at 09:00

## 2022-01-01 RX ADMIN — SODIUM CHLORIDE, PRESERVATIVE FREE 10 ML: 5 INJECTION INTRAVENOUS at 12:20

## 2022-01-01 RX ADMIN — MORPHINE SULFATE 4 MG: 4 INJECTION, SOLUTION INTRAMUSCULAR; INTRAVENOUS at 01:35

## 2022-01-01 RX ADMIN — HALOPERIDOL LACTATE 1 MG: 5 INJECTION, SOLUTION INTRAMUSCULAR at 12:12

## 2022-01-01 RX ADMIN — GLYCOPYRROLATE 0.2 MG: 1 INJECTION INTRAMUSCULAR; INTRAVENOUS at 21:49

## 2022-01-01 RX ADMIN — SODIUM CHLORIDE, PRESERVATIVE FREE 10 ML: 5 INJECTION INTRAVENOUS at 12:03

## 2022-01-01 RX ADMIN — HALOPERIDOL LACTATE 1 MG: 5 INJECTION, SOLUTION INTRAMUSCULAR at 00:06

## 2022-01-01 RX ADMIN — LORAZEPAM 0.5 MG: 2 INJECTION INTRAMUSCULAR at 15:44

## 2022-01-01 RX ADMIN — LORAZEPAM 0.5 MG: 2 INJECTION INTRAMUSCULAR at 21:36

## 2022-01-01 RX ADMIN — HALOPERIDOL LACTATE 1 MG: 5 INJECTION, SOLUTION INTRAMUSCULAR at 12:54

## 2022-01-01 RX ADMIN — LORAZEPAM 0.5 MG: 2 INJECTION INTRAMUSCULAR at 08:09

## 2022-01-01 RX ADMIN — SODIUM CHLORIDE, PRESERVATIVE FREE 10 ML: 5 INJECTION INTRAVENOUS at 08:39

## 2022-01-01 RX ADMIN — HALOPERIDOL LACTATE 1 MG: 5 INJECTION, SOLUTION INTRAMUSCULAR at 14:05

## 2022-01-01 RX ADMIN — HALOPERIDOL LACTATE 1 MG: 5 INJECTION, SOLUTION INTRAMUSCULAR at 13:40

## 2022-01-01 RX ADMIN — MORPHINE SULFATE 3 MG: 4 INJECTION, SOLUTION INTRAMUSCULAR; INTRAVENOUS at 05:08

## 2022-01-01 RX ADMIN — MORPHINE SULFATE 3 MG: 4 INJECTION, SOLUTION INTRAMUSCULAR; INTRAVENOUS at 21:36

## 2022-01-01 RX ADMIN — MORPHINE SULFATE 2 MG: 2 INJECTION, SOLUTION INTRAMUSCULAR; INTRAVENOUS at 17:20

## 2022-01-01 RX ADMIN — MORPHINE SULFATE 4 MG: 4 INJECTION, SOLUTION INTRAMUSCULAR; INTRAVENOUS at 08:39

## 2022-01-01 RX ADMIN — HALOPERIDOL LACTATE 1 MG: 5 INJECTION, SOLUTION INTRAMUSCULAR at 01:38

## 2022-01-01 RX ADMIN — HALOPERIDOL LACTATE 1 MG: 5 INJECTION, SOLUTION INTRAMUSCULAR at 13:02

## 2022-01-01 RX ADMIN — LORAZEPAM 0.5 MG: 2 INJECTION INTRAMUSCULAR at 00:12

## 2022-01-01 RX ADMIN — MORPHINE SULFATE 3 MG: 4 INJECTION, SOLUTION INTRAMUSCULAR; INTRAVENOUS at 00:23

## 2022-01-01 RX ADMIN — HALOPERIDOL LACTATE 1 MG: 5 INJECTION, SOLUTION INTRAMUSCULAR at 21:49

## 2022-01-01 RX ADMIN — MORPHINE SULFATE 3 MG: 4 INJECTION, SOLUTION INTRAMUSCULAR; INTRAVENOUS at 08:09

## 2022-01-01 RX ADMIN — MORPHINE SULFATE 3 MG: 4 INJECTION, SOLUTION INTRAMUSCULAR; INTRAVENOUS at 10:27

## 2022-01-01 RX ADMIN — LORAZEPAM 0.5 MG: 2 INJECTION INTRAMUSCULAR at 02:28

## 2022-01-01 RX ADMIN — MORPHINE SULFATE 3 MG: 4 INJECTION, SOLUTION INTRAMUSCULAR; INTRAVENOUS at 20:34

## 2022-01-01 RX ADMIN — MORPHINE SULFATE 2 MG: 2 INJECTION, SOLUTION INTRAMUSCULAR; INTRAVENOUS at 01:51

## 2022-01-01 RX ADMIN — MORPHINE SULFATE 2 MG: 2 INJECTION, SOLUTION INTRAMUSCULAR; INTRAVENOUS at 06:27

## 2022-01-01 RX ADMIN — MORPHINE SULFATE 3 MG: 4 INJECTION, SOLUTION INTRAMUSCULAR; INTRAVENOUS at 12:21

## 2022-01-01 RX ADMIN — LORAZEPAM 0.5 MG: 2 INJECTION INTRAMUSCULAR at 02:21

## 2022-01-01 RX ADMIN — MORPHINE SULFATE 4 MG: 4 INJECTION, SOLUTION INTRAMUSCULAR; INTRAVENOUS at 02:21

## 2022-01-01 RX ADMIN — GLYCOPYRROLATE 0.2 MG: 1 INJECTION INTRAMUSCULAR; INTRAVENOUS at 14:32

## 2022-01-01 RX ADMIN — HALOPERIDOL LACTATE 1 MG: 5 INJECTION, SOLUTION INTRAMUSCULAR at 11:30

## 2022-01-01 RX ADMIN — HALOPERIDOL LACTATE 1 MG: 5 INJECTION, SOLUTION INTRAMUSCULAR at 16:09

## 2022-01-01 ASSESSMENT — PAIN SCALES - GENERAL
PAINLEVEL_OUTOF10: 0
PAINLEVEL_OUTOF10: 7
PAINLEVEL_OUTOF10: 0
PAINLEVEL_OUTOF10: 10
PAINLEVEL_OUTOF10: 0
PAINLEVEL_OUTOF10: 0
PAINLEVEL_OUTOF10: 6
PAINLEVEL_OUTOF10: 0
PAINLEVEL_OUTOF10: 10

## 2022-01-01 ASSESSMENT — PAIN DESCRIPTION - ORIENTATION: ORIENTATION: OTHER (COMMENT)

## 2022-01-01 ASSESSMENT — PAIN SCALES - WONG BAKER
WONGBAKER_NUMERICALRESPONSE: 0
WONGBAKER_NUMERICALRESPONSE: 6
WONGBAKER_NUMERICALRESPONSE: 0
WONGBAKER_NUMERICALRESPONSE: 0
WONGBAKER_NUMERICALRESPONSE: 6
WONGBAKER_NUMERICALRESPONSE: 0
WONGBAKER_NUMERICALRESPONSE: 6
WONGBAKER_NUMERICALRESPONSE: 0
WONGBAKER_NUMERICALRESPONSE: 6
WONGBAKER_NUMERICALRESPONSE: 0

## 2022-01-01 ASSESSMENT — PAIN DESCRIPTION - DESCRIPTORS
DESCRIPTORS: DISCOMFORT;ACHING;OTHER (COMMENT)
DESCRIPTORS: OTHER (COMMENT)

## 2022-01-01 ASSESSMENT — PAIN DESCRIPTION - LOCATION
LOCATION: OTHER (COMMENT)
LOCATION: OTHER (COMMENT)

## 2022-01-26 ENCOUNTER — PROCEDURE VISIT (OUTPATIENT)
Dept: CARDIOLOGY CLINIC | Age: 87
End: 2022-01-26

## 2022-01-26 DIAGNOSIS — Z95.0 CARDIAC PACEMAKER IN SITU: Primary | ICD-10-CM

## 2022-01-26 DIAGNOSIS — I49.5 SINUS NODE DYSFUNCTION (HCC): ICD-10-CM

## 2022-01-26 PROCEDURE — 93296 REM INTERROG EVL PM/IDS: CPT | Performed by: NURSE PRACTITIONER

## 2022-01-26 PROCEDURE — 93294 REM INTERROG EVL PM/LDLS PM: CPT | Performed by: NURSE PRACTITIONER

## 2022-02-01 ENCOUNTER — OFFICE VISIT (OUTPATIENT)
Dept: CARDIOLOGY CLINIC | Age: 87
End: 2022-02-01
Payer: MEDICARE

## 2022-02-01 VITALS
SYSTOLIC BLOOD PRESSURE: 120 MMHG | BODY MASS INDEX: 29.06 KG/M2 | HEART RATE: 68 BPM | HEIGHT: 70 IN | DIASTOLIC BLOOD PRESSURE: 70 MMHG | WEIGHT: 203 LBS

## 2022-02-01 DIAGNOSIS — N18.32 STAGE 3B CHRONIC KIDNEY DISEASE (HCC): ICD-10-CM

## 2022-02-01 DIAGNOSIS — I44.2 AV BLOCK, 3RD DEGREE (HCC): ICD-10-CM

## 2022-02-01 DIAGNOSIS — I21.4 NSTEMI (NON-ST ELEVATED MYOCARDIAL INFARCTION) (HCC): ICD-10-CM

## 2022-02-01 DIAGNOSIS — I25.110 CORONARY ARTERY DISEASE INVOLVING NATIVE CORONARY ARTERY OF NATIVE HEART WITH UNSTABLE ANGINA PECTORIS (HCC): Primary | ICD-10-CM

## 2022-02-01 DIAGNOSIS — T81.718S POSTOPERATIVE PULMONARY EMBOLISM, SEQUELA (HCC): ICD-10-CM

## 2022-02-01 DIAGNOSIS — I10 PRIMARY HYPERTENSION: Chronic | ICD-10-CM

## 2022-02-01 DIAGNOSIS — I44.2 COMPLETE HEART BLOCK (HCC): ICD-10-CM

## 2022-02-01 DIAGNOSIS — Z95.0 S/P PLACEMENT OF CARDIAC PACEMAKER: ICD-10-CM

## 2022-02-01 DIAGNOSIS — I35.1 NONRHEUMATIC AORTIC VALVE INSUFFICIENCY: ICD-10-CM

## 2022-02-01 DIAGNOSIS — I26.99 POSTOPERATIVE PULMONARY EMBOLISM, SEQUELA (HCC): ICD-10-CM

## 2022-02-01 DIAGNOSIS — I65.21 CAROTID STENOSIS, ASYMPTOMATIC, RIGHT: ICD-10-CM

## 2022-02-01 PROCEDURE — G8482 FLU IMMUNIZE ORDER/ADMIN: HCPCS | Performed by: INTERNAL MEDICINE

## 2022-02-01 PROCEDURE — 1123F ACP DISCUSS/DSCN MKR DOCD: CPT | Performed by: INTERNAL MEDICINE

## 2022-02-01 PROCEDURE — G8417 CALC BMI ABV UP PARAM F/U: HCPCS | Performed by: INTERNAL MEDICINE

## 2022-02-01 PROCEDURE — 99214 OFFICE O/P EST MOD 30 MIN: CPT | Performed by: INTERNAL MEDICINE

## 2022-02-01 PROCEDURE — G8427 DOCREV CUR MEDS BY ELIG CLIN: HCPCS | Performed by: INTERNAL MEDICINE

## 2022-02-01 PROCEDURE — 4040F PNEUMOC VAC/ADMIN/RCVD: CPT | Performed by: INTERNAL MEDICINE

## 2022-02-01 PROCEDURE — 1036F TOBACCO NON-USER: CPT | Performed by: INTERNAL MEDICINE

## 2022-02-01 NOTE — LETTER
[facility]  [address] [address]  Phone: [phone]  Fax: [phone]    Magalis Dozier MD    February 1, 2022     Jeremias Blas PA-C  [address] [address]    Patient: Dillan Perry   MR Number: [id number]   YOB: 1934   Date of Visit: 2/1/2022       Dear Lisette Aguilar Nourse: Thank you for referring Dillan Perry to me for evaluation/treatment. Below are the relevant portions of my assessment and plan of care. If you have questions, please do not hesitate to call me. I look forward to following Romel along with you.     Sincerely,      Magalis Dozier MD

## 2022-02-01 NOTE — PROGRESS NOTES
OFFICE PROGRESS NOTES      Ricky Borden is a 80 y.o. male who has    CHIEF COMPLAINT AS FOLLOWS:  CHEST PAIN: Patient denies any C/O chest pains at this time.      SOB: No C/O SOB at this time.                LEG EDEMA: No leg edema   PALPITATIONS: Denies any C/O Palpitations   DIZZINESS: No C/O Dizziness   SYNCOPE: None   OTHER/ ADDITIONAL COMPLAINTS:                                     HPI: Patient is here for F/U on his CAD, VHD, Arrhythmia, HTN & Dyslipidemia problems. CAD: Patient has known CAD. Had angioplasty in the past.  VHD: Patient has known Aortic, mitral Tricuspid valve disease. Arrhythmia: Patient has known  CHB. HTN: Patient has known essential HTN. Has been treated with guideline recommended medical / physical/ diet therapy as stated below. Dyslipidemia: Patient has known mixed dyslipidemia. Has been treated with guideline recommended medical / physical/ diet therapy as stated below. Current Outpatient Medications   Medication Sig Dispense Refill    acetaminophen (TYLENOL) 325 MG tablet Take 325 mg by mouth as needed for Pain      metoprolol tartrate (LOPRESSOR) 25 MG tablet Take 1 tablet by mouth 2 times daily Changed per Dr Alfaro Pulse 9/10/19 60 tablet 3    midodrine (PROAMATINE) 10 MG tablet Take 1 tablet by mouth 2 times daily AM and afternoon and avoid taking at night 60 tablet 11    pantoprazole (PROTONIX) 40 MG tablet Take 40 mg by mouth daily      Folinic Acid-Vit B6-Vit B12 (FOLINIC-PLUS PO) Take 1 tablet by mouth daily      allopurinol (ZYLOPRIM) 100 MG tablet Take 100 mg by mouth daily      torsemide (DEMADEX) 20 MG tablet Take 1 tablet by mouth daily (Patient taking differently: Take 10 mg by mouth daily ) 90 tablet 3    ALPRAZolam (XANAX) 0.5 MG tablet Take 0.25 mg by mouth daily.  Indications: takes in the pm       senna (SENOKOT) 8.6 MG tablet Take 1 tablet by mouth 2 times daily      ondansetron (ZOFRAN-ODT) 8 MG TBDP disintegrating tablet Place 8 mg under the tongue every 8 hours as needed for Nausea or Vomiting      FLUoxetine (PROZAC) 20 MG capsule Take 20 mg by mouth daily Indications: takes at night Takes a night      atorvastatin (LIPITOR) 80 MG tablet Take 1 tablet by mouth nightly 30 tablet 0    guaiFENesin (MUCINEX) 600 MG extended release tablet Take 1 tablet by mouth 2 times daily (Patient taking differently: Take 600 mg by mouth 2 times daily as needed ) 60 tablet 0    tamsulosin (FLOMAX) 0.4 MG capsule Take 1 capsule by mouth daily 30 capsule 0    clopidogrel (PLAVIX) 75 MG tablet Take 1 tablet by mouth daily 30 tablet 0    levothyroxine (SYNTHROID) 50 MCG tablet Take 50 mcg by mouth Daily      aspirin 81 MG tablet Take 81 mg by mouth daily       docusate sodium (COLACE) 100 MG capsule Take 1 capsule by mouth 2 times daily as needed for Constipation 90 capsule 1     No current facility-administered medications for this visit. Allergies: Patient has no known allergies. Review of Systems:    Constitutional: Negative for diaphoresis and fatigue  Respiratory: Negative for shortness of breath  Cardiovascular: Negative for chest pain, dyspnea on exertion, claudication, edema, irregular heartbeat, murmur, palpitations or shortness of breath  Musculoskeletal: Negative for muscle pain, muscular weakness, negative for pain in arm and leg or swelling in foot and leg    Objective:  /70   Pulse 68   Ht 5' 10\" (1.778 m)   Wt 203 lb (92.1 kg)   BMI 29.13 kg/m²   Wt Readings from Last 3 Encounters:   02/01/22 203 lb (92.1 kg)   01/12/22 198 lb 6.4 oz (90 kg)   12/09/21 204 lb 12.8 oz (92.9 kg)     Body mass index is 29.13 kg/m². GENERAL - Alert, oriented, pleasant, in no apparent distress. EYES: No jaundice, no conjunctival pallor. Neck - Supple. No jugular venous distention noted. No carotid bruits. Cardiovascular - Normal S1 and S2 without obvious murmur or gallop. Extremities - No cyanosis, clubbing, or significant edema.     Pulmonary - No respiratory distress. No wheezes or rales. MEDICAL DECISION MAKING & DATA REVIEW:    Lab Review   Lab Results   Component Value Date    TROPONINT 0.013 05/15/2021    TROPONINT 0.016 04/04/2021     Lab Results   Component Value Date    PROBNP 4,563 05/15/2021    PROBNP 5,116 07/05/2020     Lab Results   Component Value Date    INR 1.01 04/03/2021    INR 1.16 08/11/2019     No results found for: LABA1C  Lab Results   Component Value Date    WBC 7.4 05/15/2021    WBC 5.8 04/03/2021    HCT 36.1 (L) 05/15/2021    HCT 40.7 (L) 04/03/2021    MCV 86.6 05/15/2021    MCV 86.8 04/03/2021     05/15/2021     04/03/2021     Lab Results   Component Value Date    CHOL 118 02/22/2021    CHOL 115 08/11/2019    TRIG 104 02/22/2021    TRIG 55 08/11/2019    HDL 40 02/22/2021    HDL 50 08/11/2019    LDLCALC 59 02/22/2021    LDLDIRECT 58 08/11/2019     Lab Results   Component Value Date    ALT 13 04/03/2021    ALT 11 02/22/2021    AST 16 04/03/2021    AST 13 02/22/2021     BMP:    Lab Results   Component Value Date     01/07/2022     05/15/2021    K 4.5 01/07/2022    K 4.4 05/15/2021     01/07/2022     05/15/2021    CO2 25 01/07/2022    CO2 27 05/15/2021    BUN 26 01/07/2022    BUN 22 05/15/2021    CREATININE 1.95 01/07/2022    CREATININE 1.6 05/15/2021     CMP:   Lab Results   Component Value Date     01/07/2022     05/15/2021    K 4.5 01/07/2022    K 4.4 05/15/2021     01/07/2022     05/15/2021    CO2 25 01/07/2022    CO2 27 05/15/2021    BUN 26 01/07/2022    BUN 22 05/15/2021    CREATININE 1.95 01/07/2022    CREATININE 1.6 05/15/2021    PROT 6.7 04/03/2021    PROT 6.6 07/05/2020    PROT 6.6 03/15/2013     Lab Results   Component Value Date    [facility] 3.570 11/29/2019    [facility] 4.450 08/11/2019       QUALITY MEASURES REVIEWED:  1.CAD:Patient is taking anti platelet agent:Yes  2. DYSLIPIDEMIA: Patient is on cholesterol lowering medication:Yes   3. Beta-Blocker therapy for CAD, if prior Myocardial Infarction:Yes   4. Counselled regarding smoking cessation. No   Patient does not Smoke. 5.Anticoagulation therapy (for A.Fib) No   Does Not have A.Fib.  6.Discussed weight management strategies. Assessment & Plan:  Primary / Secondary prevention is the goal by aggressive risk modification, healthy and therapeutic life style changes for cardiovascular risk reduction. CORONARY ARTERY DISEASE:Yes   clinically stable. Patient is on optimal medical regimen ( see medication list above )  -  Patient is currently  asymptomatic from CAD.          - changes in  treatment:   no, ASA, Plavix& Metoprolol           - Testing ordered:  no  [place] classification: 1  8/2019 CATH   3 vessel CAD including Left main which is moderate. LAD & RCA   disease. Left ventriculography not done due to acute on chronic renal   failure. LVEDP checked is high at 30. Successful stenting of RCA with excellent results. Patient had asystole followed by V-fib arrest with 1 min CPR &   one injection of Epi. Came back quickly. HTN:well controlled on current medical regimen, see list above.             - changes in  treatment:   no, on Metoprolol   CARDIOMYOPATHY: None known   CONGESTIVE HEART FAILURE: NO KNOWN HISTORY.      VHD: MODERATE AI  ECHO 5/2021   Left ventricular systolic function is mildly depressed with an ejection   fraction of 45-50%.  Grade I diastolic dysfunction.   The left atrium is mildly dilated.   Dilatation of the aortic root measuring 3.8 cm.   Mildly enlarged right heart.   Sclerotic, but non-stenotic aortic valve.   Mitral annular calcification is present.   Doppler evaluation reveals moderate aortic and mild mitral and tricuspid   regurgitation.   Right ventricular systolic pressure of 38 mmHg consistent with mild   pulmonary hypertension.   No evidence of pericardial effusion.     DYSLIPIDEMIA: Patient's profile is at / near [facility] is low                                Tolerating current medical regimen wellyes, takes Lipitor                                                              See most recent Lab values in Labs section above. CAROTID ARTERY DISEASE:.yes,                No symptoms described pertaining to Carotid artery disease               Up to date on non invasive testing . No.               Patient is on Guidelines recommended therapy. 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 TESTS ORDERED: NONE THIS VISIT     PREVIOUSLY ORDERED TESTS REVIEWED & DISCUSSED WITH THE PATIENT:     I personally reviewed & interpreted, all previously ordered tests as copied above. Latest Labs are pulled in to the note with dates. Labs, specially in Reference to Lipid profile, Cardiac testing in the form of Echo ( dated: ), stress tests ( dated: ) & other relevant cardiac testing reviewed with patient & recommendations made based on assessment of the results. Discussed role of Cardiac risk factors & effects + treatment of co morbidities with patient & advised accordingly. MEDICATIONS: List of medications patient is currently taking is reviewed in detail with the patient & family member present. Discussed any side effects or problems taking the medication. Recommend Continue present management & medications as listed EXCEPT STOP PLAVIX. AFFIRMATION: I  reviewed patient's history, previous & current medical problems & all Labs + testing. This includes chart prep even prior to the vosit. Various goals are discussed and multiple questions answered. Relevant concelling performed. Office follow up in six months. Device check per protocol.

## 2022-02-01 NOTE — PATIENT INSTRUCTIONS
recommended therapy. 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 TESTS ORDERED: NONE THIS VISIT     PREVIOUSLY ORDERED TESTS REVIEWED & DISCUSSED WITH THE PATIENT:     I personally reviewed & interpreted, all previously ordered tests as copied above. Latest Labs are pulled in to the note with dates. Labs, specially in Reference to Lipid profile, Cardiac testing in the form of Echo ( dated: ), stress tests ( dated: ) & other relevant cardiac testing reviewed with patient & recommendations made based on assessment of the results. Discussed role of Cardiac risk factors & effects + treatment of co morbidities with patient & advised accordingly. MEDICATIONS: List of medications patient is currently taking is reviewed in detail with the patient & family member present. Discussed any side effects or problems taking the medication. Recommend Continue present management & medications as listed EXCEPT STOP PLAVIX. AFFIRMATION: I  reviewed patient's history, previous & current medical problems & all Labs + testing. This includes chart prep even prior to the vosit. Various goals are discussed and multiple questions answered. Relevant concelling performed. Office follow up in six months. Device check per protocol.

## 2022-04-01 ENCOUNTER — HOSPITAL ENCOUNTER (EMERGENCY)
Age: 87
Discharge: HOME | End: 2022-04-01
Attending: EMERGENCY MEDICINE
Payer: MEDICARE

## 2022-04-01 ENCOUNTER — APPOINTMENT (OUTPATIENT)
Dept: GENERAL RADIOLOGY | Age: 87
End: 2022-04-01
Payer: MEDICARE

## 2022-04-01 VITALS
TEMPERATURE: 98.6 F | OXYGEN SATURATION: 100 % | SYSTOLIC BLOOD PRESSURE: 171 MMHG | HEART RATE: 61 BPM | RESPIRATION RATE: 16 BRPM | DIASTOLIC BLOOD PRESSURE: 67 MMHG

## 2022-04-01 DIAGNOSIS — M25.532 LEFT WRIST PAIN: Primary | ICD-10-CM

## 2022-04-01 PROCEDURE — 29125 APPL SHORT ARM SPLINT STATIC: CPT

## 2022-04-01 PROCEDURE — 6370000000 HC RX 637 (ALT 250 FOR IP): Performed by: EMERGENCY MEDICINE

## 2022-04-01 PROCEDURE — 99284 EMERGENCY DEPT VISIT MOD MDM: CPT

## 2022-04-01 PROCEDURE — 73110 X-RAY EXAM OF WRIST: CPT

## 2022-04-01 RX ORDER — HYDROCODONE BITARTRATE AND ACETAMINOPHEN 5; 325 MG/1; MG/1
1 TABLET ORAL ONCE
Status: COMPLETED | OUTPATIENT
Start: 2022-04-01 | End: 2022-04-01

## 2022-04-01 RX ADMIN — HYDROCODONE BITARTRATE AND ACETAMINOPHEN 1 TABLET: 5; 325 TABLET ORAL at 15:46

## 2022-04-01 ASSESSMENT — PAIN SCALES - GENERAL: PAINLEVEL_OUTOF10: 7

## 2022-04-01 NOTE — ED NOTES
Verbal and written discharge instructions  provided with education. Patient verbalized understanding and denies further questions or concerns. Thumb spica applied with demonstration and education.      Mel Polanco RN  04/01/22 [id number]

## 2022-04-01 NOTE — ED PROVIDER NOTES
Emergency Department Encounter    Patient: Elinor Mcneil  MRN: [id number]  : 1934  Date of Evaluation: 2022  ED Provider:  Mary Pino MD    Triage Chief Complaint:   Wrist Pain (left, nki )    Eklutna:  Elinor Mcneil is a 80 y.o. male that presents with complaint of left wrist pain. Has a small area of redness, he called his family and was having some pain with range of motion, they were concerned he may have struck it on something. He does not recall falling, does have dementia. He has daily health care in the home, family that checks on him frequently. He does not have any headache or neck pain or other complaints. Only complaint is some left wrist pain. No fevers. He is able to range it.   No finger pain or weakness or numbness in the hand      ROS - see HPI, below listed is current ROS at time of my eval:  4 systems reviewed negative except as above      Past Medical History:   Diagnosis Date    Anxiety     Bladder cancer ([address]     Cancer ([address]     CHF (congestive heart failure) ([address]     Chronic kidney disease     Chronic kidney disease, stage III (moderate) ([address] 2017    Coronary artery disease involving native coronary artery of native heart with unstable angina pectoris ([address] 2019    Dementia ([address]     Depression     GERD (gastroesophageal reflux disease)     Gout     Hx of Carotid Doppler ultrasound 2021    mild 0-49% disease and normal flow    Hx of Doppler echocardiogram 2018    EF 50% mod LV hypertrophy    Hx of exercise stress test 2018    Treadmill normal study    Hyperlipidemia     Hypertension     Hyperthyroidism     Mitral valve insufficiency      Past Surgical History:   Procedure Laterality Date    COLONOSCOPY      CORONARY ANGIOPLASTY WITH STENT PLACEMENT      PACEMAKER INSERTION N/A 8/15/2019    PACEMAKER INSERTION PERMANENT performed by Vilma Sanchez MD at [facility] Right 08/14/2019     ml     Family History   Problem Relation Age of Onset    Cancer Sister     Cancer Brother     High Blood Pressure Brother     Heart Disease Other     Hypertension Other     Elevated Lipids Other     Other Other         gout     Social History     Socioeconomic History    Marital status:      Spouse name: Not on file    Number of children: 11    Years of education: Not on file    Highest education level: Not on file   Occupational History    Not on file   Tobacco Use    Smoking status: Former Smoker     Packs/day: 1.00     Years: 30.00     Pack years: 30.00     Types: Cigarettes    Smokeless tobacco: Never Used   Vaping Use    Vaping Use: Never used   Substance and Sexual Activity    Alcohol use: No    Drug use: No    Sexual activity: Not on file   Other Topics Concern    Not on file   Social History Narrative    Not on file     Social Determinants of Health     Financial Resource Strain:     Difficulty of Paying Living Expenses: Not on file   Food Insecurity:     Worried About [address] in the Last Year: Not on file    Sejal of Food in the Last Year: Not on file   Transportation Needs:     Lack of Transportation (Medical): Not on file    Lack of Transportation (Non-Medical):  Not on file   Physical Activity:     Days of Exercise per Week: Not on file    Minutes of Exercise per Session: Not on file   Stress:     Feeling of Stress : Not on file   Social Connections:     Frequency of Communication with Friends and Family: Not on file    Frequency of Social Gatherings with Friends and Family: Not on file    Attends Jewish Services: Not on file    Active Member of Clubs or Organizations: Not on file    Attends Club or Organization Meetings: Not on file    Marital Status: Not on file   Intimate Partner Violence:     Fear of Current or Ex-Partner: Not on file    Emotionally Abused: Not on file    Physically Abused: Not on file   Carney Sexually Abused: Not on file   Housing Stability:     Unable to Pay for Housing in the Last Year: Not on file    Number of Bob in the Last Year: Not on file    Unstable Housing in the Last Year: Not on file     No current facility-administered medications for this encounter. Current Outpatient Medications   Medication Sig Dispense Refill    acetaminophen (TYLENOL) 325 MG tablet Take 325 mg by mouth as needed for Pain      metoprolol tartrate (LOPRESSOR) 25 MG tablet Take 1 tablet by mouth 2 times daily Changed per Dr Jose De Jesus Newman 9/10/19 60 tablet 3    midodrine (PROAMATINE) 10 MG tablet Take 1 tablet by mouth 2 times daily AM and afternoon and avoid taking at night 60 tablet 11    pantoprazole (PROTONIX) 40 MG tablet Take 40 mg by mouth daily      Folinic Acid-Vit B6-Vit B12 (FOLINIC-PLUS PO) Take 1 tablet by mouth daily      allopurinol (ZYLOPRIM) 100 MG tablet Take 100 mg by mouth daily      torsemide (DEMADEX) 20 MG tablet Take 1 tablet by mouth daily (Patient taking differently: Take 10 mg by mouth daily ) 90 tablet 3    ALPRAZolam (XANAX) 0.5 MG tablet Take 0.25 mg by mouth daily.  Indications: takes in the pm       senna (SENOKOT) 8.6 MG tablet Take 1 tablet by mouth 2 times daily      ondansetron (ZOFRAN-ODT) 8 MG TBDP disintegrating tablet Place 8 mg under the tongue every 8 hours as needed for Nausea or Vomiting      FLUoxetine (PROZAC) 20 MG capsule Take 20 mg by mouth daily Indications: takes at night Takes a night      atorvastatin (LIPITOR) 80 MG tablet Take 1 tablet by mouth nightly 30 tablet 0    guaiFENesin (MUCINEX) 600 MG extended release tablet Take 1 tablet by mouth 2 times daily (Patient taking differently: Take 600 mg by mouth 2 times daily as needed ) 60 tablet 0    tamsulosin (FLOMAX) 0.4 MG capsule Take 1 capsule by mouth daily 30 capsule 0    levothyroxine (SYNTHROID) 50 MCG tablet Take 50 mcg by mouth Daily      aspirin 81 MG tablet Take 81 mg by mouth daily       docusate sodium (COLACE) 100 MG capsule Take 1 capsule by mouth 2 times daily as needed for Constipation 90 capsule 1     No Known Allergies    Nursing Notes Reviewed    Physical Exam:  Triage VS:    ED Triage Vitals [04/01/22 1422]   Enc Vitals Group      BP (!) 174/76      Pulse 85      Resp 18      Temp 98.6 °F (37 °C)      Temp src       SpO2 100 %      Weight       Height       Head Circumference       Peak Flow       Pain Score       Pain Loc       Pain Edu? Excl. in [address]? My pulse ox interpretation is - normal    General appearance:  No acute distress. Skin:  Warm. Dry. Eye:  Extraocular movements intact. Ears, nose, mouth and throat:  Oral mucosa moist   Neck:  Trachea midline. Extremity:  No swelling. Mild tenderness to palpation primarily over the dorsal ulnar wrist, with small area of redness, about quarter size. No significant swelling. He can flex and extend the wrist.  He is able to ulnar and radially deviate the wrist without difficulty but does have some pain. 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 Some difficulty with range of motion of his second and third fingers due to arthritic changes. This is his baseline. Nontender over the hand itself, over the radial wrist, and over the elbow. Nontender over the volar forearm. Compartments are all soft, sensation intact distally. Heart:  Regular rate and rhythm    Perfusion:  intact  Respiratory: Respirations nonlabored. Abdominal:  Non distended. Neurological:  Alert           Psychiatric:  Appropriate    I have reviewed and interpreted all of the currently available lab results from this visit (if applicable):  No results found for this visit on 04/01/22. Radiographs (if obtained):  Radiologist's Report Reviewed:  No results found.     EKG (if obtained): (All EKG's are interpreted by myself in the absence of a cardiologist)      MDM:  80-year-old male with history as above presents with left wrist pain. Not clear if he had may have struck it or not. On x-ray questionable lucency, no obvious fracture, questionable scapholunate ligament injury, placed in a thumb spica, plan for follow-up with PCP and/or orthopedics. He and family are comfortable with this. Given return precautions. No swelling of the joint, no evidence of any infection at this time. Given return cautions. Clinical Impression:  1. Left wrist pain      Disposition referral (if applicable):  DANE Payne   [phone]    Schedule an appointment as soon as possible for a visit       [address]  [phone]      for orthopedic follow up    Disposition medications (if applicable):  Discharge Medication List as of 4/1/2022  4:11 PM        ED Provider Disposition Time  DISPOSITION Decision To Discharge 04/01/2022 04:38:20 PM      Comment: Please note this report has been produced using speech recognition software and may contain errors related to that system including errors in grammar, punctuation, and spelling, as well as words and phrases that may be inappropriate. Efforts were made to edit the dictations.         Prakash Sagastume MD  04/01/22 0189

## 2022-05-02 ENCOUNTER — PROCEDURE VISIT (OUTPATIENT)
Dept: CARDIOLOGY CLINIC | Age: 87
End: 2022-05-02
Payer: MEDICARE

## 2022-05-02 DIAGNOSIS — I49.5 SINUS NODE DYSFUNCTION (HCC): ICD-10-CM

## 2022-05-02 DIAGNOSIS — Z95.0 CARDIAC PACEMAKER IN SITU: Primary | ICD-10-CM

## 2022-05-30 PROCEDURE — 93296 REM INTERROG EVL PM/IDS: CPT | Performed by: INTERNAL MEDICINE

## 2022-05-30 PROCEDURE — 93294 REM INTERROG EVL PM/LDLS PM: CPT | Performed by: INTERNAL MEDICINE

## 2022-07-15 PROBLEM — I50.22 CHRONIC SYSTOLIC (CONGESTIVE) HEART FAILURE (HCC): Status: ACTIVE | Noted: 2022-07-15

## 2022-08-07 PROCEDURE — 93294 REM INTERROG EVL PM/LDLS PM: CPT | Performed by: NURSE PRACTITIONER

## 2022-08-07 PROCEDURE — 93296 REM INTERROG EVL PM/IDS: CPT | Performed by: NURSE PRACTITIONER

## 2022-08-09 ENCOUNTER — PROCEDURE VISIT (OUTPATIENT)
Dept: CARDIOLOGY CLINIC | Age: 87
End: 2022-08-09
Payer: MEDICARE

## 2022-08-09 DIAGNOSIS — Z95.0 CARDIAC PACEMAKER IN SITU: Primary | ICD-10-CM

## 2022-08-09 DIAGNOSIS — I49.5 SINUS NODE DYSFUNCTION (HCC): ICD-10-CM

## 2022-08-10 ENCOUNTER — OFFICE VISIT (OUTPATIENT)
Dept: CARDIOLOGY CLINIC | Age: 87
End: 2022-08-10
Payer: MEDICARE

## 2022-08-10 VITALS
HEART RATE: 68 BPM | HEIGHT: 70 IN | DIASTOLIC BLOOD PRESSURE: 60 MMHG | WEIGHT: 194.6 LBS | SYSTOLIC BLOOD PRESSURE: 104 MMHG | BODY MASS INDEX: 27.86 KG/M2

## 2022-08-10 DIAGNOSIS — I10 PRIMARY HYPERTENSION: Chronic | ICD-10-CM

## 2022-08-10 DIAGNOSIS — Z95.0 S/P PLACEMENT OF CARDIAC PACEMAKER: ICD-10-CM

## 2022-08-10 DIAGNOSIS — I35.1 NONRHEUMATIC AORTIC VALVE INSUFFICIENCY: ICD-10-CM

## 2022-08-10 DIAGNOSIS — I21.4 NSTEMI (NON-ST ELEVATED MYOCARDIAL INFARCTION) (HCC): ICD-10-CM

## 2022-08-10 DIAGNOSIS — I25.110 CORONARY ARTERY DISEASE INVOLVING NATIVE CORONARY ARTERY OF NATIVE HEART WITH UNSTABLE ANGINA PECTORIS (HCC): Primary | ICD-10-CM

## 2022-08-10 DIAGNOSIS — I44.2 AV BLOCK, 3RD DEGREE (HCC): ICD-10-CM

## 2022-08-10 DIAGNOSIS — I65.23 BILATERAL CAROTID ARTERY STENOSIS: ICD-10-CM

## 2022-08-10 DIAGNOSIS — I34.0 NONRHEUMATIC MITRAL VALVE REGURGITATION: ICD-10-CM

## 2022-08-10 PROCEDURE — 99213 OFFICE O/P EST LOW 20 MIN: CPT | Performed by: INTERNAL MEDICINE

## 2022-08-10 PROCEDURE — G8417 CALC BMI ABV UP PARAM F/U: HCPCS | Performed by: INTERNAL MEDICINE

## 2022-08-10 PROCEDURE — 1036F TOBACCO NON-USER: CPT | Performed by: INTERNAL MEDICINE

## 2022-08-10 PROCEDURE — 1123F ACP DISCUSS/DSCN MKR DOCD: CPT | Performed by: INTERNAL MEDICINE

## 2022-08-10 PROCEDURE — G8427 DOCREV CUR MEDS BY ELIG CLIN: HCPCS | Performed by: INTERNAL MEDICINE

## 2022-08-10 NOTE — PATIENT INSTRUCTIONS
CORONARY ARTERY DISEASE:Yes   clinically stable. Patient is on optimal medical regimen ( see medication list above )  -  Patient is currently  asymptomatic from CAD. - changes in  treatment:   no, ASA, Plavix& Metoprolol           - Testing ordered:  no  [facility] classification: 1 8/2019 CATH   3 vessel CAD including Left main which is moderate. LAD & RCA   disease. Left ventriculography not done due to acute on chronic renal   failure. LVEDP checked is high at 30. Successful stenting of RCA with excellent results. Patient had asystole followed by V-fib arrest with 1 min CPR &   one injection of Epi. Came back quickly. HTN:well controlled on current medical regimen, see list above. - changes in  treatment:   no, on Metoprolol   CARDIOMYOPATHY: None known   CONGESTIVE HEART FAILURE: NO KNOWN HISTORY.      VHD: MODERATE AI  ECHO 5/2021   Left ventricular systolic function is mildly depressed with an ejection   fraction of 45-50%. Grade I diastolic dysfunction. The left atrium is mildly dilated. Dilatation of the aortic root measuring 3.8 cm. Mildly enlarged right heart. Sclerotic, but non-stenotic aortic valve. Mitral annular calcification is present. Doppler evaluation reveals moderate aortic and mild mitral and tricuspid   regurgitation. Right ventricular systolic pressure of 38 mmHg consistent with mild   pulmonary hypertension. No evidence of pericardial effusion. DYSLIPIDEMIA: Patient's profile is at / near [place].yes,                                 HDL is low                                Tolerating current medical regimen wellyes, takes Lipitor                                                              See most recent Lab values in Labs section above. CAROTID ARTERY DISEASE:.yes,                No symptoms described pertaining to Carotid artery disease               Up to date on non invasive testing . No.               Patient is on Guidelines recommended therapy. yes,   ARRHYTHMIAS:  Known H/O CHB                                S/P PPM, STILL HAS 10.6 YEAR OF BATERY. TESTS ORDERED: NONE THIS VISIT     PREVIOUSLY ORDERED TESTS REVIEWED & DISCUSSED WITH THE PATIENT:     I personally reviewed & interpreted, all previously ordered tests as copied above. Latest Labs are pulled in to the note with dates. Labs, specially in Reference to Lipid profile, Cardiac testing in the form of Echo ( dated: ), stress tests ( dated: ) & other relevant cardiac testing reviewed with patient & recommendations made based on assessment of the results. Discussed role of Cardiac risk factors & effects + treatment of co morbidities with patient & advised accordingly. MEDICATIONS: List of medications patient is currently taking is reviewed in detail with the patient & family member present. Discussed any side effects or problems taking the medication. Recommend Continue present management & medications as listed. AFFIRMATION: I spent at least 20 minutes of time reviewing patient's history, previous & current medical problems & all Labs + testing. This includes chart prep even prior to the vosit. Various goals are discussed and multiple questions answered. Relevant concelling performed. Office follow up in six months. Device check per protocol.

## 2022-08-10 NOTE — LETTER
8/10/2022 10:15 AM    Patient Name: Becky Viera  : 1934  MRN# <[id number]>    REASON FOR VISIT: 6 month     Patient Active Problem List    Diagnosis Date Noted    Chronic systolic (congestive) heart failure 07/15/2022    SVT (supraventricular tachycardia) ([facility] 2021    Generalized edema 2020    Left-sided heart failure ([address] 2020    Acute bilateral ankle pain 2020    Nonrheumatic aortic valve insufficiency 2020    Volume overload 2019    S/P placement of cardiac pacemaker 09/10/2019    Bilateral carotid artery stenosis 09/10/2019    Rash and nonspecific skin eruption     Acute renal failure with tubular necrosis (HCC)     Ataxia     Impaired cognition     HAP (hospital-acquired pneumonia)     Carotid stenosis, asymptomatic, right     Mitral valve insufficiency     Metabolic encephalopathy     CHB (complete heart block) ([facility] [address]     Coronary artery disease involving native coronary artery of native heart with unstable angina pectoris ([address] 2019    NSTEMI (non-ST elevated myocardial infarction) ([address] 2019    Complete heart block (HCC)     AV block, 3rd degree ([address]     Acute metabolic encephalopathy     Hyponatremia 2019    Acute hypokalemia 2019    Cancer ([address]     Alzheimer's disease ([address] 2019    Postoperative pulmonary embolism ([facility] [address] 2018    Chronic kidney disease, stage III (moderate) ([address] 2017    Peripheral neuropathy 2015    Prostate cancer ([address] 2014    Cold feet 2013    Constipation 2013    BPH (benign prostatic hyperplasia) 2012    PND (post-nasal drip) 12/15/2011    HTN (hypertension) 2011    Depression 2011       Allergies: Patient has no known allergies.       Current Outpatient Medications   Medication Sig Dispense Refill    B Complex Vitamins (VITAMIN B COMPLEX PO) Take 1 tablet by mouth daily With electrolytes      acetaminophen (TYLENOL) 325 MG tablet Take 325 mg by mouth as needed for Pain      metoprolol tartrate (LOPRESSOR) 25 MG tablet Take 1 tablet by mouth 2 times daily Changed per Dr Olinda Argueta 9/10/19 60 tablet 3    midodrine (PROAMATINE) 10 MG tablet Take 1 tablet by mouth 2 times daily AM and afternoon and avoid taking at night 60 tablet 11    pantoprazole (PROTONIX) 40 MG tablet Take 40 mg by mouth daily      allopurinol (ZYLOPRIM) 100 MG tablet Take 100 mg by mouth daily      torsemide (DEMADEX) 20 MG tablet Take 1 tablet by mouth daily (Patient taking differently: Take 10 mg by mouth in the morning.) 90 tablet 3    ALPRAZolam (XANAX) 0.5 MG tablet Take 0.25 mg by mouth in the morning. Indications: takes in the pm.      senna (SENOKOT) 8.6 MG tablet Take 1 tablet by mouth 2 times daily      ondansetron (ZOFRAN-ODT) 8 MG TBDP disintegrating tablet Place 8 mg under the tongue every 8 hours as needed for Nausea or Vomiting      FLUoxetine (PROZAC) 20 MG capsule Take 20 mg by mouth daily Indications: takes at night Takes a night      atorvastatin (LIPITOR) 80 MG tablet Take 1 tablet by mouth nightly 30 tablet 0    guaiFENesin (MUCINEX) 600 MG extended release tablet Take 1 tablet by mouth 2 times daily (Patient taking differently: Take 600 mg by mouth 2 times daily as needed) 60 tablet 0    tamsulosin (FLOMAX) 0.4 MG capsule Take 1 capsule by mouth daily 30 capsule 0    levothyroxine (SYNTHROID) 50 MCG tablet Take 50 mcg by mouth Daily      aspirin 81 MG tablet Take 81 mg by mouth daily       docusate sodium (COLACE) 100 MG capsule Take 1 capsule by mouth 2 times daily as needed for Constipation 90 capsule 1     No current facility-administered medications for this visit.          Lab Review   Lab Results   Component Value Date/Time    TROPONINT 0.013 05/15/2021 11:55 PM    TROPONINT 0.016 04/04/2021 01:55 AM     BNP:    Lab Results   Component Value Date/Time    PROBNP 4,563 05/15/2021 11:55 PM    PROBNP 5,116 07/05/2020 08:10 AM     PT/INR:    Lab Results   Component Value Date    INR 1.01 04/03/2021    INR 1.16 08/11/2019     No results found for: LABA1C  Lab Results   Component Value Date    WBC 7.4 05/15/2021    WBC 5.8 04/03/2021    HCT 36.1 (L) 05/15/2021    HCT 40.7 (L) 04/03/2021    MCV 86.6 05/15/2021    MCV 86.8 04/03/2021     05/15/2021     04/03/2021     Lab Results   Component Value Date    CHOL 118 02/22/2021    CHOL 115 08/11/2019    TRIG 104 02/22/2021    TRIG 55 08/11/2019    HDL 40 02/22/2021    HDL 50 08/11/2019    LDLCALC 59 02/22/2021    LDLDIRECT 58 08/11/2019     Lab Results   Component Value Date    ALT 13 04/03/2021    ALT 11 02/22/2021    AST 16 04/03/2021    AST 13 02/22/2021     BMP:    Lab Results   Component Value Date/Time     07/06/2022 07:10 AM     01/07/2022 08:21 AM    K 4.5 07/06/2022 07:10 AM    K 4.5 01/07/2022 08:21 AM     07/06/2022 07:10 AM     01/07/2022 08:21 AM    CO2 22 07/06/2022 07:10 AM    CO2 25 01/07/2022 08:21 AM    BUN 23 07/06/2022 07:10 AM    BUN 26 01/07/2022 08:21 AM    CREATININE 1.86 07/06/2022 07:10 AM    CREATININE 1.95 01/07/2022 08:21 AM     CMP:   Lab Results   Component Value Date/Time     07/06/2022 07:10 AM     01/07/2022 08:21 AM    K 4.5 07/06/2022 07:10 AM    K 4.5 01/07/2022 08:21 AM     07/06/2022 07:10 AM     01/07/2022 08:21 AM    CO2 22 07/06/2022 07:10 AM    CO2 25 01/07/2022 08:21 AM    BUN 23 07/06/2022 07:10 AM    BUN 26 01/07/2022 08:21 AM    CREATININE 1.86 07/06/2022 07:10 AM    CREATININE 1.95 01/07/2022 08:21 AM    PROT 6.7 04/03/2021 09:50 PM    PROT 6.6 07/05/2020 08:10 AM    PROT 6.6 03/15/2013 04:43 PM     TSH:    Lab Results   Component Value Date/Time    [facility] 3.570 11/29/2019 01:00 PM    TSH 4.450 08/11/2019 08:30 AM       Smoke: What:                           How much:    Alcohol:                                      How Much:     Caffeine: Pop:         Tea: Coffee:                Chocolate:    Exercise:    Last Visit:2022  Complaints:None  Changes:NONE THIS VISIT    LAST EK2021    VENOUS DOPPLER: NONE    HOLTER/ EVENT MONITOR: NONE    STRESS TEST:  2018  Normal LV function.   Ethel Kush is normal isotope uptake following exercise and at rest. There is no    evidence of exercise induced ischemia.    This is a normal study       ECHO: 2021   Left ventricular systolic function is mildly depressed with an ejection   fraction of 45-50%. Grade I diastolic dysfunction. The left atrium is mildly dilated. Dilatation of the aortic root measuring 3.8 cm. Mildly enlarged right heart. Sclerotic, but non-stenotic aortic valve. Mitral annular calcification is present. Doppler evaluation reveals moderate aortic and mild mitral and tricuspid   regurgitation. Right ventricular systolic pressure of 38 mmHg consistent with mild   pulmonary hypertension.    No evidence of pericardial effusion    CAROTID: 2021  Mild (0-49%) disease of the Bilateral proximal Internal carotid artery.    Normal vertebral flow     MUGA: NONE    LAST PACER CHECK: 2022  Device Assessment:   The device is Medtronic pacemaker - Dual Chamber chamber     MRI Compatible : yes   Device interrogation was performed.   Mode: AAIR --- DDDR   Sensing is normal. Impedence is normal.  Threshold is normal.    There has not been interval changes.    Estimated battery life is 10.6 years   The underlying rhythm is AP,VS.  94.0 % atrial paced; 0.6 % ventricular paced.    Atrial Arrhythmia : No   Fast A and V: 1 episode heart rate 165 bpm   Non sustained VT episodes : 1 episode lasting 2 seconds   Sustained VT episodes : No   Patient activity reported 3.4 hrs/day   The patient is pacemaker dependent.      CARDIAC CATH: 2019  :[employer] Pacemaker    [facility] Protocol:    CHADS: [id number] Score for Atrial Fibrillation Stroke Risk   Risk   Factors  Component Value   C CHF Yes 1   H HTN Yes 1   A2 Age >= 76 Yes,  (80 y.o.) 2   D DM No 0   S2 Prior Stroke/TIA No 0   V Vascular Disease Yes 1   A Age 74-69 No,  (80 y.o.) 0   Sc Sex male 0    [id number]  Score  5   Score last updated 8/10/22 6:72 AM EDT    Click here for a link to the UpToDate guideline \"Atrial Fibrillation: Anticoagulation therapy to prevent embolization    Disclaimer: Risk Score calculation is dependent on accuracy of patient problem list and past encounter diagnosis.

## 2022-08-10 NOTE — PROGRESS NOTES
OFFICE PROGRESS NOTES      Carine Knowles is a 80 y.o. male who has    CHIEF COMPLAINT AS FOLLOWS:  CHEST PAIN: Patient denies any C/O chest pains at this time. SOB: No C/O SOB at this time. LEG EDEMA: No leg edema   PALPITATIONS: Denies any C/O Palpitations   DIZZINESS: No C/O Dizziness   SYNCOPE: None   OTHER/ ADDITIONAL COMPLAINTS:                                     HPI: Patient is here for F/U on his CAD, VHD, Arrhythmia, HTN & Dyslipidemia problems. CAD: Patient has known CAD. Had angioplasty in the past.  VHD: Patient has known mitral / aortic & tricuspid valve disease. Arrhythmia: Patient has known  CHB. HTN: Patient has known essential HTN. Has been treated with guideline recommended medical / physical/ diet therapy as stated below. Dyslipidemia: Patient has known mixed dyslipidemia. Has been treated with guideline recommended medical / physical/ diet therapy as stated below. Current Outpatient Medications   Medication Sig Dispense Refill    B Complex Vitamins (VITAMIN B COMPLEX PO) Take 1 tablet by mouth daily With electrolytes      acetaminophen (TYLENOL) 325 MG tablet Take 325 mg by mouth as needed for Pain      metoprolol tartrate (LOPRESSOR) 25 MG tablet Take 1 tablet by mouth 2 times daily Changed per Dr Volodymyr Green 9/10/19 60 tablet 3    midodrine (PROAMATINE) 10 MG tablet Take 1 tablet by mouth 2 times daily AM and afternoon and avoid taking at night 60 tablet 11    pantoprazole (PROTONIX) 40 MG tablet Take 40 mg by mouth daily      allopurinol (ZYLOPRIM) 100 MG tablet Take 100 mg by mouth daily      torsemide (DEMADEX) 20 MG tablet Take 1 tablet by mouth daily (Patient taking differently: Take 10 mg by mouth in the morning.) 90 tablet 3    ALPRAZolam (XANAX) 0.5 MG tablet Take 0.25 mg by mouth in the morning.  Indications: takes in the pm.      senna (SENOKOT) 8.6 MG tablet Take 1 tablet by mouth 2 times daily      ondansetron (ZOFRAN-ODT) 8 MG TBDP disintegrating tablet Place 8 mg under the tongue every 8 hours as needed for Nausea or Vomiting      FLUoxetine (PROZAC) 20 MG capsule Take 20 mg by mouth daily Indications: takes at night Takes a night      atorvastatin (LIPITOR) 80 MG tablet Take 1 tablet by mouth nightly 30 tablet 0    guaiFENesin (MUCINEX) 600 MG extended release tablet Take 1 tablet by mouth 2 times daily (Patient taking differently: Take 600 mg by mouth 2 times daily as needed) 60 tablet 0    tamsulosin (FLOMAX) 0.4 MG capsule Take 1 capsule by mouth daily 30 capsule 0    levothyroxine (SYNTHROID) 50 MCG tablet Take 50 mcg by mouth Daily      aspirin 81 MG tablet Take 81 mg by mouth daily       docusate sodium (COLACE) 100 MG capsule Take 1 capsule by mouth 2 times daily as needed for Constipation 90 capsule 1     No current facility-administered medications for this visit. Allergies: Patient has no known allergies. Review of Systems:    Constitutional: Negative for diaphoresis and fatigue  Respiratory: Negative for shortness of breath  Cardiovascular: Negative for chest pain, dyspnea on exertion, claudication, edema, irregular heartbeat, murmur, palpitations or shortness of breath  Musculoskeletal: Negative for muscle pain, muscular weakness, negative for pain in arm and leg or swelling in foot and leg    Objective:  /60   Pulse 68   Ht 5' 10\" (1.778 m)   Wt 194 lb 9.6 oz (88.3 kg)   BMI 27.92 kg/m²   Wt Readings from Last 3 Encounters:   08/10/22 194 lb 9.6 oz (88.3 kg)   07/15/22 194 lb 6.4 oz (88.2 kg)   02/01/22 203 lb (92.1 kg)     Body mass index is 27.92 kg/m². GENERAL - Alert, oriented, pleasant, in no apparent distress. EYES: No jaundice, no conjunctival pallor. Neck - Supple. No jugular venous distention noted. No carotid bruits. Cardiovascular - Normal S1 and S2 without obvious murmur or gallop. Extremities - No cyanosis, clubbing, or significant edema. Pulmonary - No respiratory distress. No wheezes or rales. MEDICAL DECISION MAKING & DATA REVIEW:    Lab Review   Lab Results   Component Value Date/Time    TROPONINT 0.013 05/15/2021 11:55 PM    TROPONINT 0.016 04/04/2021 01:55 AM     Lab Results   Component Value Date/Time    PROBNP 4,563 05/15/2021 11:55 PM    PROBNP 5,116 07/05/2020 08:10 AM     Lab Results   Component Value Date    INR 1.01 04/03/2021    INR 1.16 08/11/2019     No results found for: LABA1C  Lab Results   Component Value Date    WBC 7.4 05/15/2021    WBC 5.8 04/03/2021    HCT 36.1 (L) 05/15/2021    HCT 40.7 (L) 04/03/2021    MCV 86.6 05/15/2021    MCV 86.8 04/03/2021     05/15/2021     04/03/2021     Lab Results   Component Value Date    CHOL 118 02/22/2021    CHOL 115 08/11/2019    TRIG 104 02/22/2021    TRIG 55 08/11/2019    HDL 40 02/22/2021    HDL 50 08/11/2019    LDLCALC 59 02/22/2021    LDLDIRECT 58 08/11/2019     Lab Results   Component Value Date    ALT 13 04/03/2021    ALT 11 02/22/2021    AST 16 04/03/2021    AST 13 02/22/2021     BMP:    Lab Results   Component Value Date/Time     07/06/2022 07:10 AM     01/07/2022 08:21 AM    K 4.5 07/06/2022 07:10 AM    K 4.5 01/07/2022 08:21 AM     07/06/2022 07:10 AM     01/07/2022 08:21 AM    CO2 22 07/06/2022 07:10 AM    CO2 25 01/07/2022 08:21 AM    BUN 23 07/06/2022 07:10 AM    BUN 26 01/07/2022 08:21 AM    CREATININE 1.86 07/06/2022 07:10 AM    CREATININE 1.95 01/07/2022 08:21 AM     CMP:   Lab Results   Component Value Date/Time     07/06/2022 07:10 AM     01/07/2022 08:21 AM    K 4.5 07/06/2022 07:10 AM    K 4.5 01/07/2022 08:21 AM     07/06/2022 07:10 AM     01/07/2022 08:21 AM    CO2 22 07/06/2022 07:10 AM    CO2 25 01/07/2022 08:21 AM    BUN 23 07/06/2022 07:10 AM    BUN 26 01/07/2022 08:21 AM    CREATININE 1.86 07/06/2022 07:10 AM    CREATININE 1.95 01/07/2022 08:21 AM    PROT 6.7 04/03/2021 09:50 PM    PROT 6.6 07/05/2020 08:10 AM    PROT 6.6 03/15/2013 04:43 PM     Lab Results Component Value Date/Time    [facility] 3.570 11/29/2019 01:00 PM    [facility] 4.450 08/11/2019 08:30 AM       QUALITY MEASURES REVIEWED:  1.CAD:Patient is taking anti platelet agent:Yes  2. DYSLIPIDEMIA: Patient is on cholesterol lowering medication:Yes   3. Beta-Blocker therapy for CAD, if prior Myocardial Infarction:Yes   4. Counselled regarding smoking cessation. No   5. Anticoagulation therapy (for A.Fib) No   Does Not have A.Fib.  6.Discussed weight management strategies. Assessment & Plan:  Primary / Secondary prevention is the goal by aggressive risk modification, healthy and therapeutic life style changes for cardiovascular risk reduction. CORONARY ARTERY DISEASE:Yes   clinically stable. Patient is on optimal medical regimen ( see medication list above )  -  Patient is currently  asymptomatic from CAD. - changes in  treatment:   no, ASA, Plavix& Metoprolol           - Testing ordered:  no  [facility] classification: 1 8/2019 CATH   3 vessel CAD including Left main which is moderate. LAD & RCA   disease. Left ventriculography not done due to acute on chronic renal   failure. LVEDP checked is high at 30. Successful stenting of RCA with excellent results. Patient had asystole followed by V-fib arrest with 1 min CPR &   one injection of Epi. Came back quickly. HTN:well controlled on current medical regimen, see list above. - changes in  treatment:   no, on Metoprolol   CARDIOMYOPATHY: None known   CONGESTIVE HEART FAILURE: NO KNOWN HISTORY.      VHD: MODERATE AI  ECHO 5/2021   Left ventricular systolic function is mildly depressed with an ejection   fraction of 45-50%. Grade I diastolic dysfunction. The left atrium is mildly dilated. Dilatation of the aortic root measuring 3.8 cm. Mildly enlarged right heart. Sclerotic, but non-stenotic aortic valve. Mitral annular calcification is present.    Doppler evaluation reveals moderate aortic and mild mitral and tricuspid regurgitation. Right ventricular systolic pressure of 38 mmHg consistent with mild   pulmonary hypertension. No evidence of pericardial effusion. DYSLIPIDEMIA: Patient's profile is at / near Goal.yes,                                 HDL is low                                Tolerating current medical regimen wellyes, takes Lipitor                                                              See most recent Lab values in Labs section above. CAROTID ARTERY DISEASE:.yes,                No symptoms described pertaining to Carotid artery disease               Up to date on non invasive testing . No.               Patient is on Guidelines recommended therapy. yes,   ARRHYTHMIAS:  Known H/O CHB                                S/P PPM, STILL HAS 10.6 YEAR OF BATERY. TESTS ORDERED: NONE THIS VISIT     PREVIOUSLY ORDERED TESTS REVIEWED & DISCUSSED WITH THE PATIENT:     I personally reviewed & interpreted, all previously ordered tests as copied above. Latest Labs are pulled in to the note with dates. Labs, specially in Reference to Lipid profile, Cardiac testing in the form of Echo ( dated: ), stress tests ( dated: ) & other relevant cardiac testing reviewed with patient & recommendations made based on assessment of the results. Discussed role of Cardiac risk factors & effects + treatment of co morbidities with patient & advised accordingly. MEDICATIONS: List of medications patient is currently taking is reviewed in detail with the patient & family member present. Discussed any side effects or problems taking the medication. Recommend Continue present management & medications as listed. AFFIRMATION: I spent at least 20 minutes of time reviewing patient's history, previous & current medical problems & all Labs + testing. This includes chart prep even prior to the vosit. Various goals are discussed and multiple questions answered. Relevant concelling performed. Office follow up in six months.  Device check per protocol.

## 2022-08-10 NOTE — LETTER
[address]  [address] [address]  Phone: [phone]  Fax: [phone]    Tanya Bloom MD    August 10, 2022     [address]    Patient: Ksenia Nicole   MR Number: [id number]   YOB: 1934   Date of Visit: 8/10/2022       Dear Richard Aly Nourse: Thank you for referring Ksenia Nicole to me for evaluation/treatment. Below are the relevant portions of my assessment and plan of care. If you have questions, please do not hesitate to call me. I look forward to following Romel along with you.     Sincerely,      Tanya Bloom MD

## 2022-08-31 ENCOUNTER — TELEPHONE (OUTPATIENT)
Dept: CARDIOLOGY CLINIC | Age: 87
End: 2022-08-31

## 2022-08-31 ENCOUNTER — APPOINTMENT (OUTPATIENT)
Dept: GENERAL RADIOLOGY | Age: 87
End: 2022-08-31
Payer: MEDICARE

## 2022-08-31 ENCOUNTER — HOSPITAL ENCOUNTER (OUTPATIENT)
Age: 87
Setting detail: OBSERVATION
Discharge: HOME | End: 2022-09-01
Attending: EMERGENCY MEDICINE | Admitting: HOSPITALIST
Payer: MEDICARE

## 2022-08-31 DIAGNOSIS — R77.8 ELEVATED TROPONIN: ICD-10-CM

## 2022-08-31 DIAGNOSIS — R07.9 CHEST PAIN, UNSPECIFIED TYPE: Primary | ICD-10-CM

## 2022-08-31 DIAGNOSIS — N18.30 STAGE 3 CHRONIC KIDNEY DISEASE, UNSPECIFIED WHETHER STAGE 3A OR 3B CKD (HCC): ICD-10-CM

## 2022-08-31 LAB
ALBUMIN SERPL-MCNC: 4.4 GM/DL (ref 3.4–5)
ALP BLD-CCNC: 119 IU/L (ref 40–129)
ALT SERPL-CCNC: 12 U/L (ref 10–40)
ANION GAP SERPL CALCULATED.3IONS-SCNC: 12 MMOL/L (ref 4–16)
AST SERPL-CCNC: 21 IU/L (ref 15–37)
BILIRUB SERPL-MCNC: 0.7 MG/DL (ref 0–1)
BUN BLDV-MCNC: 27 MG/DL (ref 6–23)
CALCIUM SERPL-MCNC: 9.9 MG/DL (ref 8.3–10.6)
CHLORIDE BLD-SCNC: 102 MMOL/L (ref 99–110)
CO2: 26 MMOL/L (ref 21–32)
CREAT SERPL-MCNC: 2 MG/DL (ref 0.9–1.3)
EKG ATRIAL RATE: 80 BPM
EKG DIAGNOSIS: NORMAL
EKG P-R INTERVAL: 304 MS
EKG Q-T INTERVAL: 416 MS
EKG QRS DURATION: 148 MS
EKG QTC CALCULATION (BAZETT): 479 MS
EKG R AXIS: 6 DEGREES
EKG T AXIS: -10 DEGREES
EKG VENTRICULAR RATE: 80 BPM
GFR AFRICAN AMERICAN: 38 ML/MIN/1.73M2
GFR NON-AFRICAN AMERICAN: 32 ML/MIN/1.73M2
GLUCOSE BLD-MCNC: 133 MG/DL (ref 70–99)
HCT VFR BLD CALC: 45.2 % (ref 42–52)
HEMOGLOBIN: 14.2 GM/DL (ref 13.5–18)
LIPASE: 72 IU/L (ref 13–60)
LV EF: 55 %
LVEF MODALITY: NORMAL
MAGNESIUM: 2.2 MG/DL (ref 1.8–2.4)
MCH RBC QN AUTO: 27.9 PG (ref 27–31)
MCHC RBC AUTO-ENTMCNC: 31.4 % (ref 32–36)
MCV RBC AUTO: 88.8 FL (ref 78–100)
PDW BLD-RTO: 15.5 % (ref 11.7–14.9)
PLATELET # BLD: 188 K/CU MM (ref 140–440)
PMV BLD AUTO: 10.1 FL (ref 7.5–11.1)
POTASSIUM SERPL-SCNC: 4.1 MMOL/L (ref 3.5–5.1)
PRO-BNP: 2485 PG/ML
RBC # BLD: 5.09 M/CU MM (ref 4.6–6.2)
SODIUM BLD-SCNC: 140 MMOL/L (ref 135–145)
TOTAL PROTEIN: 7.2 GM/DL (ref 6.4–8.2)
TROPONIN T: 0.02 NG/ML
TROPONIN T: 0.03 NG/ML
WBC # BLD: 8.9 K/CU MM (ref 4–10.5)

## 2022-08-31 PROCEDURE — 83735 ASSAY OF MAGNESIUM: CPT

## 2022-08-31 PROCEDURE — G0378 HOSPITAL OBSERVATION PER HR: HCPCS

## 2022-08-31 PROCEDURE — 6370000000 HC RX 637 (ALT 250 FOR IP): Performed by: NURSE PRACTITIONER

## 2022-08-31 PROCEDURE — 96374 THER/PROPH/DIAG INJ IV PUSH: CPT

## 2022-08-31 PROCEDURE — 93005 ELECTROCARDIOGRAM TRACING: CPT | Performed by: EMERGENCY MEDICINE

## 2022-08-31 PROCEDURE — 6360000002 HC RX W HCPCS: Performed by: EMERGENCY MEDICINE

## 2022-08-31 PROCEDURE — 84484 ASSAY OF TROPONIN QUANT: CPT

## 2022-08-31 PROCEDURE — 2580000003 HC RX 258: Performed by: NURSE PRACTITIONER

## 2022-08-31 PROCEDURE — 93010 ELECTROCARDIOGRAM REPORT: CPT | Performed by: INTERNAL MEDICINE

## 2022-08-31 PROCEDURE — 83690 ASSAY OF LIPASE: CPT

## 2022-08-31 PROCEDURE — 71045 X-RAY EXAM CHEST 1 VIEW: CPT

## 2022-08-31 PROCEDURE — 83880 ASSAY OF NATRIURETIC PEPTIDE: CPT

## 2022-08-31 PROCEDURE — 96372 THER/PROPH/DIAG INJ SC/IM: CPT

## 2022-08-31 PROCEDURE — 96375 TX/PRO/DX INJ NEW DRUG ADDON: CPT

## 2022-08-31 PROCEDURE — 6370000000 HC RX 637 (ALT 250 FOR IP): Performed by: EMERGENCY MEDICINE

## 2022-08-31 PROCEDURE — 99285 EMERGENCY DEPT VISIT HI MDM: CPT

## 2022-08-31 PROCEDURE — 93306 TTE W/DOPPLER COMPLETE: CPT

## 2022-08-31 PROCEDURE — 84439 ASSAY OF FREE THYROXINE: CPT

## 2022-08-31 PROCEDURE — 80053 COMPREHEN METABOLIC PANEL: CPT

## 2022-08-31 PROCEDURE — 85027 COMPLETE CBC AUTOMATED: CPT

## 2022-08-31 PROCEDURE — 94010 BREATHING CAPACITY TEST: CPT

## 2022-08-31 PROCEDURE — 1200000000 HC SEMI PRIVATE

## 2022-08-31 PROCEDURE — 84443 ASSAY THYROID STIM HORMONE: CPT

## 2022-08-31 PROCEDURE — 6360000002 HC RX W HCPCS: Performed by: NURSE PRACTITIONER

## 2022-08-31 PROCEDURE — 99219 PR INITIAL OBSERVATION CARE/DAY 50 MINUTES: CPT | Performed by: INTERNAL MEDICINE

## 2022-08-31 RX ORDER — ONDANSETRON 2 MG/ML
4 INJECTION INTRAMUSCULAR; INTRAVENOUS EVERY 6 HOURS PRN
Status: DISCONTINUED | OUTPATIENT
Start: 2022-08-31 | End: 2022-09-01 | Stop reason: HOSPADM

## 2022-08-31 RX ORDER — FLUOXETINE HYDROCHLORIDE 20 MG/1
20 CAPSULE ORAL DAILY
Status: DISCONTINUED | OUTPATIENT
Start: 2022-08-31 | End: 2022-09-01 | Stop reason: HOSPADM

## 2022-08-31 RX ORDER — SODIUM CHLORIDE 0.9 % (FLUSH) 0.9 %
5-40 SYRINGE (ML) INJECTION EVERY 12 HOURS SCHEDULED
Status: DISCONTINUED | OUTPATIENT
Start: 2022-08-31 | End: 2022-09-01 | Stop reason: HOSPADM

## 2022-08-31 RX ORDER — PANTOPRAZOLE SODIUM 40 MG/1
40 TABLET, DELAYED RELEASE ORAL DAILY
Status: DISCONTINUED | OUTPATIENT
Start: 2022-08-31 | End: 2022-09-01 | Stop reason: HOSPADM

## 2022-08-31 RX ORDER — ASPIRIN 81 MG/1
81 TABLET ORAL DAILY
Status: DISCONTINUED | OUTPATIENT
Start: 2022-09-01 | End: 2022-09-01 | Stop reason: HOSPADM

## 2022-08-31 RX ORDER — ASPIRIN 81 MG/1
324 TABLET, CHEWABLE ORAL ONCE
Status: COMPLETED | OUTPATIENT
Start: 2022-08-31 | End: 2022-08-31

## 2022-08-31 RX ORDER — SODIUM CHLORIDE 0.9 % (FLUSH) 0.9 %
10 SYRINGE (ML) INJECTION PRN
Status: DISCONTINUED | OUTPATIENT
Start: 2022-08-31 | End: 2022-09-01 | Stop reason: HOSPADM

## 2022-08-31 RX ORDER — ENOXAPARIN SODIUM 100 MG/ML
30 INJECTION SUBCUTANEOUS DAILY
Status: DISCONTINUED | OUTPATIENT
Start: 2022-08-31 | End: 2022-09-01 | Stop reason: HOSPADM

## 2022-08-31 RX ORDER — ONDANSETRON 4 MG/1
4 TABLET, ORALLY DISINTEGRATING ORAL EVERY 8 HOURS PRN
Status: DISCONTINUED | OUTPATIENT
Start: 2022-08-31 | End: 2022-09-01 | Stop reason: HOSPADM

## 2022-08-31 RX ORDER — SODIUM CHLORIDE 9 MG/ML
INJECTION, SOLUTION INTRAVENOUS PRN
Status: DISCONTINUED | OUTPATIENT
Start: 2022-08-31 | End: 2022-09-01 | Stop reason: HOSPADM

## 2022-08-31 RX ORDER — ACETAMINOPHEN 325 MG/1
650 TABLET ORAL EVERY 6 HOURS PRN
Status: DISCONTINUED | OUTPATIENT
Start: 2022-08-31 | End: 2022-09-01 | Stop reason: HOSPADM

## 2022-08-31 RX ORDER — TAMSULOSIN HYDROCHLORIDE 0.4 MG/1
0.4 CAPSULE ORAL DAILY
Status: DISCONTINUED | OUTPATIENT
Start: 2022-08-31 | End: 2022-09-01 | Stop reason: HOSPADM

## 2022-08-31 RX ORDER — ONDANSETRON 2 MG/ML
4 INJECTION INTRAMUSCULAR; INTRAVENOUS ONCE
Status: COMPLETED | OUTPATIENT
Start: 2022-08-31 | End: 2022-08-31

## 2022-08-31 RX ORDER — LEVOTHYROXINE SODIUM 0.05 MG/1
50 TABLET ORAL DAILY
Status: DISCONTINUED | OUTPATIENT
Start: 2022-08-31 | End: 2022-09-01 | Stop reason: HOSPADM

## 2022-08-31 RX ORDER — ALLOPURINOL 100 MG/1
100 TABLET ORAL DAILY
Status: DISCONTINUED | OUTPATIENT
Start: 2022-08-31 | End: 2022-09-01 | Stop reason: HOSPADM

## 2022-08-31 RX ORDER — POLYETHYLENE GLYCOL 3350 17 G
2 POWDER IN PACKET (EA) ORAL
Status: DISCONTINUED | OUTPATIENT
Start: 2022-08-31 | End: 2022-09-01 | Stop reason: HOSPADM

## 2022-08-31 RX ORDER — FENTANYL CITRATE 50 UG/ML
25 INJECTION, SOLUTION INTRAMUSCULAR; INTRAVENOUS ONCE
Status: COMPLETED | OUTPATIENT
Start: 2022-08-31 | End: 2022-08-31

## 2022-08-31 RX ORDER — TORSEMIDE 20 MG/1
10 TABLET ORAL DAILY
Status: DISCONTINUED | OUTPATIENT
Start: 2022-08-31 | End: 2022-09-01 | Stop reason: HOSPADM

## 2022-08-31 RX ORDER — ATORVASTATIN CALCIUM 40 MG/1
80 TABLET, FILM COATED ORAL NIGHTLY
Status: DISCONTINUED | OUTPATIENT
Start: 2022-08-31 | End: 2022-09-01 | Stop reason: HOSPADM

## 2022-08-31 RX ORDER — POLYETHYLENE GLYCOL 3350 17 G/17G
17 POWDER, FOR SOLUTION ORAL DAILY PRN
Status: DISCONTINUED | OUTPATIENT
Start: 2022-08-31 | End: 2022-09-01 | Stop reason: HOSPADM

## 2022-08-31 RX ADMIN — ENOXAPARIN SODIUM 30 MG: 100 INJECTION SUBCUTANEOUS at 17:24

## 2022-08-31 RX ADMIN — ASPIRIN 81 MG CHEWABLE TABLET 324 MG: 81 TABLET CHEWABLE at 12:03

## 2022-08-31 RX ADMIN — FLUOXETINE 20 MG: 20 CAPSULE ORAL at 20:45

## 2022-08-31 RX ADMIN — METOPROLOL TARTRATE 25 MG: 25 TABLET, FILM COATED ORAL at 20:45

## 2022-08-31 RX ADMIN — ONDANSETRON 4 MG: 2 INJECTION INTRAMUSCULAR; INTRAVENOUS at 12:03

## 2022-08-31 RX ADMIN — FENTANYL CITRATE 25 MCG: 50 INJECTION, SOLUTION INTRAMUSCULAR; INTRAVENOUS at 12:03

## 2022-08-31 RX ADMIN — SODIUM CHLORIDE, PRESERVATIVE FREE 10 ML: 5 INJECTION INTRAVENOUS at 20:45

## 2022-08-31 RX ADMIN — ACETAMINOPHEN 650 MG: 325 TABLET ORAL at 17:20

## 2022-08-31 RX ADMIN — ATORVASTATIN CALCIUM 80 MG: 40 TABLET, FILM COATED ORAL at 20:45

## 2022-08-31 ASSESSMENT — HEART SCORE: ECG: 1

## 2022-08-31 ASSESSMENT — PAIN - FUNCTIONAL ASSESSMENT: PAIN_FUNCTIONAL_ASSESSMENT: ACTIVITIES ARE NOT PREVENTED

## 2022-08-31 ASSESSMENT — PAIN SCALES - GENERAL
PAINLEVEL_OUTOF10: 3
PAINLEVEL_OUTOF10: 9
PAINLEVEL_OUTOF10: 9

## 2022-08-31 ASSESSMENT — ENCOUNTER SYMPTOMS
SHORTNESS OF BREATH: 1
GASTROINTESTINAL NEGATIVE: 1
EYES NEGATIVE: 1
ALLERGIC/IMMUNOLOGIC NEGATIVE: 1

## 2022-08-31 ASSESSMENT — PAIN DESCRIPTION - LOCATION
LOCATION: CHEST
LOCATION: ARM

## 2022-08-31 ASSESSMENT — PAIN DESCRIPTION - ORIENTATION: ORIENTATION: LEFT;MID

## 2022-08-31 ASSESSMENT — PAIN DESCRIPTION - DESCRIPTORS
DESCRIPTORS: ACHING
DESCRIPTORS: STABBING

## 2022-08-31 NOTE — H&P
V2.0  History and Physical      Name:  Ani Parekh /Age/Sex: 1934  (80 y.o. male)   MRN & CSN:  [id number] & [id number] Encounter Date/Time: 2022 12:07 PM EDT   Location:  ED31/ED-31 PCP: Ishaan Srivastava PA-C       Hospital Day: 1    Assessment and Plan:   Ani Parekh is a 80 y.o. male with a pmh of hypertension, CAD, SP stent, congestive heart failure, CKD stage III who presents with Chest pain at rest    Hospital Problems             Last Modified POA    * (Principal) Chest pain at rest 2022 Yes    Chest pain 2022 Yes     Chest pain at rest  History of CAD s/p stent in 2019  -Reported intermittent sharp pain in bilateral shoulders and radiate to back since yesterday  -EKG: No ST elevation, first-degree AV block, no significant change comparing to  EKG  -Admit to observation with telemetry  -Appreciate cardiology input: Serial troponin, update echocardiogram  -Continue aspirin and atorvastatin    Elevated troponin  -0.03 today, baseline was 0.013-0.018  -Possible related to worsening kidney function  -Serial troponin  -Cardiology follow-up    CKD stage III  -Creatinine 2.0, baseline was 1.8  -Encourage oral hydration  -Repeat BMP in a.m.     Systolic congestive heart failure  -Echocardiogram in May 2021 showed mild systolic function diminished, LVEF 45%  -Continue torsemide 10 Mg daily  -Pending echocardiogram    Hypothyroidism  -Continue Synthroid 50 MCG daily  -Update TSH and T4    History of hypertension  -Continue metoprolol and torsemide        DVT prophylaxis  -Lovenox      Disposition:   Current Living situation: Home  Expected Disposition: Home  Estimated D/C: 1 to 2 days    Diet No diet orders on file   DVT Prophylaxis [x] Lovenox, []  Heparin, [] SCDs, [] Ambulation,  [] Eliquis, [] Xarelto   Code Status Prior   Surrogate Decision Maker/ Fifi Arreguin (child)     History from:     patient    History of Present Illness:     Chief Complaint: Chest pain at rest  Romel Coral Roach is a 80 y.o. male with pmh of hypertension, CAD, SP stents,  hypothyroidism, congestive heart failure who presents with chest pain. Patient stated he had intermittent sharp pain in bilateral shoulders and radiated to back since yesterday. Patient described the pain like needle poking sensation. Patient reported shortness of breath associated with chest pain. No sweating. Patient's EKG no ST elevation, first degree AV block noted. His troponin level slightly elevated at 0.03. Cardiology consulted in ED. Patient will be admitted to hospital for cardiac work-up. Patient reported slightly improved pain today. Review of Systems: Need 10 Elements   Review of Systems   Constitutional: Negative. HENT: Negative. Eyes: Negative. Respiratory:  Positive for shortness of breath. Cardiovascular:  Positive for chest pain. Gastrointestinal: Negative. Endocrine: Negative. Genitourinary: Negative. Musculoskeletal: Negative. Skin: Negative. Allergic/Immunologic: Negative. Neurological: Negative. Hematological: Negative. Psychiatric/Behavioral: Negative. Objective:   No intake or output data in the 24 hours ending 08/31/22 1207   Vitals:   Vitals:    08/31/22 1017 08/31/22 1039 08/31/22 1103 08/31/22 1203   BP:  136/72 125/66 (!) 143/73   Pulse:  76 71 71   Resp:  17 13    Temp: 98.1 °F (36.7 °C)      TempSrc: Oral      SpO2:  99% 99%    Weight:       Height:           Medications Prior to Admission     Prior to Admission medications    Medication Sig Start Date End Date Taking?  Authorizing Provider   B Complex Vitamins (VITAMIN B COMPLEX PO) Take 1 tablet by mouth daily With electrolytes    Historical Provider, MD   acetaminophen (TYLENOL) 325 MG tablet Take 325 mg by mouth as needed for Pain    Historical Provider, MD   metoprolol tartrate (LOPRESSOR) 25 MG tablet Take 1 tablet by mouth 2 times daily Changed per Dr Aman Warner 9/10/19 5/5/21   Charles Valdez MD midodrine (PROAMATINE) 10 MG tablet Take 1 tablet by mouth 2 times daily AM and afternoon and avoid taking at night 5/5/21 8/10/22  Charles Valdez MD   pantoprazole (PROTONIX) 40 MG tablet Take 40 mg by mouth daily    Historical Provider, MD   allopurinol (ZYLOPRIM) 100 MG tablet Take 100 mg by mouth daily    Historical Provider, MD   torsemide (DEMADEX) 20 MG tablet Take 1 tablet by mouth daily  Patient taking differently: Take 10 mg by mouth in the morning. 8/14/20   Nelson Duenas MD   ALPRAZolam Valaria Paling) 0.5 MG tablet Take 0.25 mg by mouth in the morning. Indications: takes in the pm.    Historical Provider, MD   senna (SENOKOT) 8.6 MG tablet Take 1 tablet by mouth 2 times daily    Historical Provider, MD   ondansetron (ZOFRAN-ODT) 8 MG TBDP disintegrating tablet Place 8 mg under the tongue every 8 hours as needed for Nausea or Vomiting    Historical Provider, MD   FLUoxetine (PROZAC) 20 MG capsule Take 20 mg by mouth daily Indications: takes at night Takes a night    Historical Provider, MD   atorvastatin (LIPITOR) 80 MG tablet Take 1 tablet by mouth nightly 8/30/19   C Clinton Reyna MD   guaiFENesin (MUCINEX) 600 MG extended release tablet Take 1 tablet by mouth 2 times daily  Patient taking differently: Take 600 mg by mouth 2 times daily as needed 8/30/19   YARIEL Reyna MD   tamsulosin [facility]) 0.4 MG capsule Take 1 capsule by mouth daily 8/31/19   C Clinton Reyna MD   levothyroxine (SYNTHROID) 50 MCG tablet Take 50 mcg by mouth Daily    Historical Provider, MD   aspirin 81 MG tablet Take 81 mg by mouth daily     Historical Provider, MD   docusate sodium (COLACE) 100 MG capsule Take 1 capsule by mouth 2 times daily as needed for Constipation 9/1/16   Misha Michel MD       Physical Exam: Need 8 Elements   Physical Exam  HENT:      Head: Normocephalic.       Nose: Nose normal.      Mouth/Throat:      Mouth: Mucous membranes are moist.   Eyes:      Pupils: Pupils are equal, round, and reactive to light. Cardiovascular:      Rate and Rhythm: Normal rate. Pulses: Normal pulses. Pulmonary:      Effort: Pulmonary effort is normal.   Abdominal:      General: Abdomen is flat. Musculoskeletal:         General: Normal range of motion. Cervical back: Normal range of motion. Skin:     General: Skin is warm. Capillary Refill: Capillary refill takes less than 2 seconds. Neurological:      General: No focal deficit present. Mental Status: He is alert and oriented to person, place, and time. Psychiatric:         Mood and Affect: Mood normal.            Past Medical History:   PMHx   Past Medical History:   Diagnosis Date    Anxiety     Bladder cancer ([facility]     Cancer ([facility]     CHF (congestive heart failure) ([facility]     Chronic kidney disease     Chronic kidney disease, stage III (moderate) ([facility] 07/17/2017    Coronary artery disease involving native coronary artery of native heart with unstable angina pectoris ([facility] 08/11/2019    Dementia ([facility]     Depression     GERD (gastroesophageal reflux disease)     Gout     Hx of Carotid Doppler ultrasound 01/05/2021    mild 0-49% disease and normal flow    Hx of Doppler echocardiogram 11/19/2018    EF 50% mod LV hypertrophy    Hx of exercise stress test 11/19/2018    Treadmill normal study    Hyperlipidemia     Hypertension     Hyperthyroidism     Mitral valve insufficiency      PSHX:  has a past surgical history that includes thoracentesis (Right, 08/14/2019); Pacemaker insertion (N/A, 8/15/2019); Colonoscopy; pacemaker placement; and Coronary angioplasty with stent. Allergies: No Known Allergies  Fam HX: family history includes Cancer in his brother and sister; Elevated Lipids in an other family member; Heart Disease in an other family member; High Blood Pressure in his brother; Hypertension in an other family member; Other in an other family member. Soc HX:   Social History     Socioeconomic History    Marital status:   Spouse name: None    Number of children: 5    Years of education: None    Highest education level: None   Tobacco Use    Smoking status: Former     Packs/day: 1.00     Years: 30.00     Pack years: 30.00     Types: Cigarettes    Smokeless tobacco: Never   Vaping Use    Vaping Use: Never used   Substance and Sexual Activity    Alcohol use: No    Drug use: No       Medications:   Medications:    Infusions:   PRN Meds:     Labs      CBC:   Recent Labs     08/31/22  1034   WBC 8.9   HGB 14.2        BMP:    Recent Labs     08/31/22  1034      K 4.1      CO2 26   BUN 27*   CREATININE 2.0*   GLUCOSE 133*     Hepatic:   Recent Labs     08/31/22  1034   AST 21   ALT 12   BILITOT 0.7   ALKPHOS 119     Lipids:   Lab Results   Component Value Date/Time    CHOL 118 02/22/2021 12:00 AM    HDL 40 02/22/2021 12:00 AM    TRIG 104 02/22/2021 12:00 AM     Hemoglobin A1C: No results found for: LABA1C  TSH: No results found for: TSH  Troponin:   Lab Results   Component Value Date/Time    TROPONINT 0.030 08/31/2022 10:34 AM    TROPONINT 0.013 05/15/2021 11:55 PM    TROPONINT 0.016 04/04/2021 01:55 AM     Lactic Acid: No results for input(s): LACTA in the last 72 hours. BNP: No results for input(s): PROBNP in the last 72 hours.   UA:  Lab Results   Component Value Date/Time    NITRU Negative 07/06/2022 07:10 AM    COLORU Yellow 07/06/2022 07:10 AM    PHUR 6.5 07/06/2022 07:10 AM    LABCAST None seen 07/06/2022 07:10 AM    LABCAST CANCELED 07/06/2022 07:10 AM    WBCUA 12 05/16/2021 01:00 AM    RBCUA 0-2 07/06/2022 07:10 AM    RBCUA 1 05/16/2021 01:00 AM    MUCUS CANCELED 07/06/2022 07:10 AM    TRICHOMONAS NONE SEEN 05/16/2021 01:00 AM    YEAST CANCELED 07/06/2022 07:10 AM    BACTERIA None seen 07/06/2022 07:10 AM    CLARITYU Clear 07/06/2022 07:10 AM    SPECGRAV 1.019 07/06/2022 07:10 AM    LEUKOCYTESUR 3+ 07/06/2022 07:10 AM    LEUKOCYTESUR 6-10 07/06/2022 07:10 AM    UROBILINOGEN 0.2 07/06/2022 07:10 AM    BILIRUBINUR Negative 07/06/2022 07:10 AM    BLOODU Negative 07/06/2022 07:10 AM    GLUCOSEU Negative 07/06/2022 07:10 AM    KETUA Negative 07/06/2022 07:10 AM     Urine Cultures: No results found for: Mindy Garza  Blood Cultures: No results found for: BC  No results found for: BLOODCULT2  Organism: No results found for: ORG    Imaging/Diagnostics Last 24 Hours   XR CHEST PORTABLE    Result Date: 8/31/2022  EXAMINATION: ONE XRAY VIEW OF THE CHEST 8/31/2022 10:19 am COMPARISON: 05/16/2021 HISTORY: ORDERING SYSTEM PROVIDED HISTORY: Chest Pain TECHNOLOGIST PROVIDED HISTORY: Reason for exam:->Chest Pain Reason for Exam: Chest Pain FINDINGS: Left chest cardiac device is present. Left basilar airspace disease or atelectasis. Heart size is stable. Right lung is clear. No pneumothorax or pleural effusion. Left basilar airspace disease or atelectasis. Radiographic follow-up should be considered.        Personally reviewed Lab Studies, Imaging, and discussed case with Dr. Bacilio Thomas    Electronically signed by Filipe Wells CNP on 8/31/2022 at 12:07 PM

## 2022-08-31 NOTE — ED PROVIDER NOTES
Emergency Department Encounter    Patient: Luis A Mckeon  MRN: [id number]  : 1934  Date of Evaluation: 2022  ED Provider:  Celeste Meadows DO    Triage Chief Complaint:   Tingling and Chest Pain (Since this morning )    [place]:  Luis A Mckeon is a 80 y.o. male that presents family for evaluation. Patient states he felt sick and nauseous this morning no vomiting or diarrhea no abdominal pain no fever chills or cough not lightheaded no falls. States chest pain and bilateral arm pain. States bilateral arm pain started last night felt like bee stings. States some shortness of breath as well. He does admit to heart disease and stents. Patient denies any sore throat or earache. Patient took aspirin and Xanax at 915 per family members. Patient poor historian himself and most of the information obtained from family.     ROS - see HPI, below listed is current ROS at time of my eval:  General:  No fevers, no chills, no weakness  Eyes:  No recent vison changes, no discharge  ENT:  No sore throat, no nasal congestion, no hearing changes  Cardiovascular: Positive for chest pain, no palpitations  Respiratory: Positive for shortness of breath, no cough, no wheezing  Gastrointestinal:  No pain, no nausea, no vomiting, no diarrhea  Musculoskeletal:  No muscle pain, no joint pain  Skin:  No rash, no pruritis, no easy bruising  Neurologic:  No speech problems, no headache, no extremity numbness, no extremity tingling, no extremity weakness  Psychiatric:  No anxiety  Genitourinary:  No dysuria, no hematuria  Endocrine:  No unexpected weight gain, no unexpected weight loss  Extremities:  no edema, positive for bilateral arm pain    Past Medical History:   Diagnosis Date    Anxiety     Bladder cancer (HCC)     Cancer (HCC)     CHF (congestive heart failure) (HCC)     Chronic kidney disease     Chronic kidney disease, stage III (moderate) (HCC) 2017    Coronary artery disease involving native coronary artery of native heart with unstable angina pectoris ([facility] 08/11/2019    Dementia ([facility]     Depression     GERD (gastroesophageal reflux disease)     Gout     Hx of Carotid Doppler ultrasound 01/05/2021    mild 0-49% disease and normal flow    Hx of Doppler echocardiogram 11/19/2018    EF 50% mod LV hypertrophy    Hx of exercise stress test 11/19/2018    Treadmill normal study    Hyperlipidemia     Hypertension     Hyperthyroidism     Mitral valve insufficiency      Past Surgical History:   Procedure Laterality Date    COLONOSCOPY      CORONARY ANGIOPLASTY WITH STENT PLACEMENT      PACEMAKER INSERTION N/A 8/15/2019    PACEMAKER INSERTION PERMANENT performed by Neo Monzon MD at [facility] Right 08/14/2019     ml     Family History   Problem Relation Age of Onset    Cancer Sister     Cancer Brother     High Blood Pressure Brother     Heart Disease Other     Hypertension Other     Elevated Lipids Other     Other Other         gout     Social History     Socioeconomic History    Marital status:       Spouse name: Not on file    Number of children: 5    Years of education: Not on file    Highest education level: Not on file   Occupational History    Not on file   Tobacco Use    Smoking status: Former     Packs/day: 1.00     Years: 30.00     Pack years: 30.00     Types: Cigarettes    Smokeless tobacco: Never   Vaping Use    Vaping Use: Never used   Substance and Sexual Activity    Alcohol use: No    Drug use: No    Sexual activity: Not on file   Other Topics Concern    Not on file   Social History Narrative    Not on file     Social Determinants of Health     Financial Resource Strain: Not on file   Food Insecurity: Not on file   Transportation Needs: Not on file   Physical Activity: Not on file   Stress: Not on file   Social Connections: Not on file   Intimate Partner Violence: Not on file   Housing Stability: Not on file     Current Facility-Administered Medications Medication Dose Route Frequency Provider Last Rate Last Admin    aspirin chewable tablet 324 mg  324 mg Oral Once Yue Luque,         fentaNYL (SUBLIMAZE) injection 25 mcg  25 mcg IntraVENous Once Yue Luque DO        ondansetron [facility]) injection 4 mg  4 mg IntraVENous Once Yue Montes DO         Current Outpatient Medications   Medication Sig Dispense Refill    B Complex Vitamins (VITAMIN B COMPLEX PO) Take 1 tablet by mouth daily With electrolytes      acetaminophen (TYLENOL) 325 MG tablet Take 325 mg by mouth as needed for Pain      metoprolol tartrate (LOPRESSOR) 25 MG tablet Take 1 tablet by mouth 2 times daily Changed per Dr Carmen Narayanan 9/10/19 60 tablet 3    midodrine (PROAMATINE) 10 MG tablet Take 1 tablet by mouth 2 times daily AM and afternoon and avoid taking at night 60 tablet 11    pantoprazole (PROTONIX) 40 MG tablet Take 40 mg by mouth daily      allopurinol (ZYLOPRIM) 100 MG tablet Take 100 mg by mouth daily      torsemide (DEMADEX) 20 MG tablet Take 1 tablet by mouth daily (Patient taking differently: Take 10 mg by mouth in the morning.) 90 tablet 3    ALPRAZolam (XANAX) 0.5 MG tablet Take 0.25 mg by mouth in the morning.  Indications: takes in the pm.      senna (SENOKOT) 8.6 MG tablet Take 1 tablet by mouth 2 times daily      ondansetron (ZOFRAN-ODT) 8 MG TBDP disintegrating tablet Place 8 mg under the tongue every 8 hours as needed for Nausea or Vomiting      FLUoxetine (PROZAC) 20 MG capsule Take 20 mg by mouth daily Indications: takes at night Takes a night      atorvastatin (LIPITOR) 80 MG tablet Take 1 tablet by mouth nightly 30 tablet 0    guaiFENesin (MUCINEX) 600 MG extended release tablet Take 1 tablet by mouth 2 times daily (Patient taking differently: Take 600 mg by mouth 2 times daily as needed) 60 tablet 0    tamsulosin (FLOMAX) 0.4 MG capsule Take 1 capsule by mouth daily 30 capsule 0    levothyroxine (SYNTHROID) 50 MCG tablet Take 50 mcg by mouth Daily aspirin 81 MG tablet Take 81 mg by mouth daily       docusate sodium (COLACE) 100 MG capsule Take 1 capsule by mouth 2 times daily as needed for Constipation 90 capsule 1     No Known Allergies    Nursing Notes Reviewed    Physical Exam:  Triage VS:    ED Triage Vitals   Enc Vitals Group      BP 08/31/22 1007 (!) 160/95      Heart Rate 08/31/22 1007 81      Resp 08/31/22 1007 20      Temp 08/31/22 1017 98.1 °F (36.7 °C)      Temp Source 08/31/22 1017 Oral      SpO2 08/31/22 1007 99 %      Weight 08/31/22 1007 180 lb (81.6 kg)      Height 08/31/22 1007 5' 9\" (1.753 m)      Head Circumference --       Peak Flow --       Pain Score --       Pain Loc --       Pain Edu? --       Excl. in GC? --      /66   Pulse 71   Temp 98.1 °F (36.7 °C) (Oral)   Resp 13   Ht 5' 9\" (1.753 m)   Wt 180 lb (81.6 kg)   SpO2 99%   BMI 26.58 kg/m²     My pulse ox interpretation is - normal    General appearance:  No acute distress. Skin:  Warm. Dry. Eye:  Extraocular movements intact. Ears, nose, mouth and throat:  Oral mucosa moist   Neck:  Trachea midline. Extremity:  No swelling. Normal ROM     Heart:  Regular rate and rhythm, normal S1 & S2, no extra heart sounds. Perfusion:  intact  Respiratory:  Lungs clear to auscultation bilaterally. Respirations nonlabored. Abdominal:  Normal bowel sounds. Soft. Nontender. Non distended. Back:  No CVA tenderness to palpation     Neurological:  Alert and oriented times 3. No focal neuro deficits.              Psychiatric:  Appropriate    I have reviewed and interpreted all of the currently available lab results from this visit (if applicable):  Results for orders placed or performed during the hospital encounter of 08/31/22   CBC   Result Value Ref Range    WBC 8.9 4.0 - 10.5 K/CU MM    RBC 5.09 4.6 - 6.2 M/CU MM    Hemoglobin 14.2 13.5 - 18.0 GM/DL    Hematocrit 45.2 42 - 52 %    MCV 88.8 78 - 100 FL    MCH 27.9 27 - 31 PG    MCHC 31.4 (L) 32.0 - 36.0 %    RDW 15.5 (H) 11.7 - 14.9 %    Platelets 461 139 - 465 K/CU MM    MPV 10.1 7.5 - 11.1 FL   Comprehensive Metabolic Panel   Result Value Ref Range    Sodium 140 135 - 145 MMOL/L    Potassium 4.1 3.5 - 5.1 MMOL/L    Chloride 102 99 - 110 mMol/L    CO2 26 21 - 32 MMOL/L    BUN 27 (H) 6 - 23 MG/DL    Creatinine 2.0 (H) 0.9 - 1.3 MG/DL    Glucose 133 (H) 70 - 99 MG/DL    Calcium 9.9 8.3 - 10.6 MG/DL    Albumin 4.4 3.4 - 5.0 GM/DL    Total Protein 7.2 6.4 - 8.2 GM/DL    Total Bilirubin 0.7 0.0 - 1.0 MG/DL    ALT 12 10 - 40 U/L    AST 21 15 - 37 IU/L    Alkaline Phosphatase 119 40 - 129 IU/L    GFR Non- 32 (L) >60 mL/min/1.73m2    GFR  38 (L) >60 mL/min/1.73m2    Anion Gap 12 4 - 16   Troponin   Result Value Ref Range    Troponin T 0.030 (H) <0.01 NG/ML   Lipase   Result Value Ref Range    Lipase 72 (H) 13 - 60 IU/L   Magnesium   Result Value Ref Range    Magnesium 2.2 1.8 - 2.4 mg/dl   EKG 12 Lead   Result Value Ref Range    Ventricular Rate 80 BPM    Atrial Rate 80 BPM    P-R Interval 304 ms    QRS Duration 148 ms    Q-T Interval 416 ms    QTc Calculation (Bazett) 479 ms    R Axis 6 degrees    T Axis -10 degrees    Diagnosis       Sinus rhythm with 1st degree AV block  Right bundle branch block  Minimal voltage criteria for LVH, may be normal variant  Inferior infarct (cited on or before 05-JUL-2020)  Anterior infarct , age undetermined  T wave abnormality, consider lateral ischemia  Abnormal ECG  When compared with ECG of 15-MAY-2021 23:39,  Anterior infarct is now present  T wave inversion now evident in Inferior leads  T wave inversion now evident in Anterolateral leads        Radiographs (if obtained):  Radiologist's Report Reviewed:  XR CHEST PORTABLE    Result Date: 8/31/2022  EXAMINATION: ONE XRAY VIEW OF THE CHEST 8/31/2022 10:19 am COMPARISON: 05/16/2021 HISTORY: ORDERING SYSTEM PROVIDED HISTORY: Chest Pain TECHNOLOGIST PROVIDED HISTORY: Reason for exam:->Chest Pain Reason for Exam: Chest Pain FINDINGS: Left chest cardiac device is present. Left basilar airspace disease or atelectasis. Heart size is stable. Right lung is clear. No pneumothorax or pleural effusion. Left basilar airspace disease or atelectasis. Radiographic follow-up should be considered. EKG (if obtained): (All EKG's are interpreted by myself in the absence of a cardiologist)      MDM:  Patient presents to the emergency department Positive for bilateral arm pain and chest pain shortness of breath. Patient history of heart disease. Patient placed on cardiac monitor. EKG was obtained and shows sinus rhythm first-degree AV block right bundle branch block, rate of 80, T wave abnormalities in the lateral leads. Laboratory studies were ordered which show troponin 0.030 more elevated than previous troponin level. Chest x-ray atelectasis versus left lower airspace disease. Patient denies any cough does not have any fever. Patient normal white count, hemoglobin platelets, normal sodium potassium calcium. Patient chronic kidney disease creatinine 2.0, BUN of 27. Patient normal liver enzymes and lipase. Normal magnesium. I did consult cardiologist Dr. Aurelio Sanches who will see patient in consultation. No further recommendations given. Hospitalist consulted for admission. Clinical Impression:  1. Chest pain, unspecified type    2. Elevated troponin    3. Stage 3 chronic kidney disease, unspecified whether stage 3a or 3b CKD [facility])          ED Provider Disposition Time  DISPOSITION Decision To Admit 08/31/2022 11:40:24 AM      Comment: Please note this report has been produced using speech recognition software and may contain errors related to that system including errors in grammar, punctuation, and spelling, as well as words and phrases that may be inappropriate. Efforts were made to edit the dictations.         Marisol Little DO  08/31/22 0332

## 2022-08-31 NOTE — PLAN OF CARE
Problem: Discharge Planning  Goal: Discharge to home or other facility with appropriate resources  Outcome: Progressing  Flowsheets  Taken 8/31/2022 1558  Discharge to home or other facility with appropriate resources:   Identify barriers to discharge with patient and caregiver   Identify discharge learning needs (meds, wound care, etc)   Refer to discharge planning if patient needs post-hospital services based on physician order or complex needs related to functional status, cognitive ability or social support system   Arrange for needed discharge resources and transportation as appropriate  Taken 8/31/2022 1456  Discharge to home or other facility with appropriate resources:   Identify barriers to discharge with patient and caregiver   Identify discharge learning needs (meds, wound care, etc)   Refer to discharge planning if patient needs post-hospital services based on physician order or complex needs related to functional status, cognitive ability or social support system   Arrange for needed discharge resources and transportation as appropriate     Problem: Safety - Adult  Goal: Free from fall injury  Outcome: Progressing     Problem: ABCDS Injury Assessment  Goal: Absence of physical injury  Outcome: Progressing

## 2022-08-31 NOTE — CONSULTS
pack-year smoking history. He has never used smokeless tobacco. He reports that he does not drink alcohol and does not use drugs. Family history:  family history includes Cancer in his brother and sister; Elevated Lipids in an other family member; Heart Disease in an other family member; High Blood Pressure in his brother; Hypertension in an other family member; Other in an other family member. No Known Allergies    aspirin chewable tablet 324 mg, Once  fentaNYL (SUBLIMAZE) injection 25 mcg, Once  ondansetron (ZOFRAN) injection 4 mg, Once      Current Facility-Administered Medications   Medication Dose Route Frequency Provider Last Rate Last Admin    aspirin chewable tablet 324 mg  324 mg Oral Once Yue Luque, DO        fentaNYL (SUBLIMAZE) injection 25 mcg  25 mcg IntraVENous Once Yue Luque, DO        ondansetron [facility] injection 4 mg  4 mg IntraVENous Once Yue Nunez, DO         Current Outpatient Medications   Medication Sig Dispense Refill    B Complex Vitamins (VITAMIN B COMPLEX PO) Take 1 tablet by mouth daily With electrolytes      acetaminophen (TYLENOL) 325 MG tablet Take 325 mg by mouth as needed for Pain      metoprolol tartrate (LOPRESSOR) 25 MG tablet Take 1 tablet by mouth 2 times daily Changed per Dr Matthias Murrieta 9/10/19 60 tablet 3    midodrine (PROAMATINE) 10 MG tablet Take 1 tablet by mouth 2 times daily AM and afternoon and avoid taking at night 60 tablet 11    pantoprazole (PROTONIX) 40 MG tablet Take 40 mg by mouth daily      allopurinol (ZYLOPRIM) 100 MG tablet Take 100 mg by mouth daily      torsemide (DEMADEX) 20 MG tablet Take 1 tablet by mouth daily (Patient taking differently: Take 10 mg by mouth in the morning.) 90 tablet 3    ALPRAZolam (XANAX) 0.5 MG tablet Take 0.25 mg by mouth in the morning.  Indications: takes in the pm.      senna (SENOKOT) 8.6 MG tablet Take 1 tablet by mouth 2 times daily      ondansetron (ZOFRAN-ODT) 8 MG TBDP disintegrating tablet Place 8 mg under the tongue every 8 hours as needed for Nausea or Vomiting      FLUoxetine (PROZAC) 20 MG capsule Take 20 mg by mouth daily Indications: takes at night Takes a night      atorvastatin (LIPITOR) 80 MG tablet Take 1 tablet by mouth nightly 30 tablet 0    guaiFENesin (MUCINEX) 600 MG extended release tablet Take 1 tablet by mouth 2 times daily (Patient taking differently: Take 600 mg by mouth 2 times daily as needed) 60 tablet 0    tamsulosin (FLOMAX) 0.4 MG capsule Take 1 capsule by mouth daily 30 capsule 0    levothyroxine (SYNTHROID) 50 MCG tablet Take 50 mcg by mouth Daily      aspirin 81 MG tablet Take 81 mg by mouth daily       docusate sodium (COLACE) 100 MG capsule Take 1 capsule by mouth 2 times daily as needed for Constipation 90 capsule 1     Review of Systems:  All 14 systems reviewed, all negative except for chest pain    Physical Examination:    /66   Pulse 71   Temp 98.1 °F (36.7 °C) (Oral)   Resp 13   Ht 5' 9\" (1.753 m)   Wt 180 lb (81.6 kg)   SpO2 99%   BMI 26.58 kg/m²      Wt Readings from Last 3 Encounters:   08/31/22 180 lb (81.6 kg)   08/10/22 194 lb 9.6 oz (88.3 kg)   07/15/22 194 lb 6.4 oz (88.2 kg)     Body mass index is 26.58 kg/m². General Appearance: Fair  Head: normocephalic     Eyes: normal, noninjected conjunctiva    ENT: normal mucosa, noninjected throat, normal     NECK: No JVP  No thyromegaly        Cardiovascular: No thrills palpated   Auscultation: Normal S1 and S2, no murmur   carotid bruit no   Abdominal Aorta no bruit    Respiratory:    Breath sounds clear    Extremities: No edema clubbing ,   no cyanosis    SKIN: Warm and well perfused, no pallor or cyanosis    Vascular exam:  Pedal Pulses: Palp bilaterally        Abdomen:  No masses or tenderness. No organomegaly noted. Neurological:  Oriented to time, place, and person   No focal neurological deficit noted.   Psychiatric:normal mood, no anxiety    Lab Review   Recent Results (from the past 24 hour(s))   EKG 12 Lead    Collection Time: 08/31/22 10:10 AM   Result Value Ref Range    Ventricular Rate 80 BPM    Atrial Rate 80 BPM    P-R Interval 304 ms    QRS Duration 148 ms    Q-T Interval 416 ms    QTc Calculation (Bazett) 479 ms    R Axis 6 degrees    T Axis -10 degrees    Diagnosis       Sinus rhythm with 1st degree AV block  Right bundle branch block  Minimal voltage criteria for LVH, may be normal variant  Inferior infarct (cited on or before 05-JUL-2020)  Anterior infarct , age undetermined  T wave abnormality, consider lateral ischemia  Abnormal ECG  When compared with ECG of 15-MAY-2021 23:39,  Anterior infarct is now present  T wave inversion now evident in Inferior leads  T wave inversion now evident in Anterolateral leads     CBC    Collection Time: 08/31/22 10:34 AM   Result Value Ref Range    WBC 8.9 4.0 - 10.5 K/CU MM    RBC 5.09 4.6 - 6.2 M/CU MM    Hemoglobin 14.2 13.5 - 18.0 GM/DL    Hematocrit 45.2 42 - 52 %    MCV 88.8 78 - 100 FL    MCH 27.9 27 - 31 PG    MCHC 31.4 (L) 32.0 - 36.0 %    RDW 15.5 (H) 11.7 - 14.9 %    Platelets 555 565 - 338 K/CU MM    MPV 10.1 7.5 - 11.1 FL   Comprehensive Metabolic Panel    Collection Time: 08/31/22 10:34 AM   Result Value Ref Range    Sodium 140 135 - 145 MMOL/L    Potassium 4.1 3.5 - 5.1 MMOL/L    Chloride 102 99 - 110 mMol/L    CO2 26 21 - 32 MMOL/L    BUN 27 (H) 6 - 23 MG/DL    Creatinine 2.0 (H) 0.9 - 1.3 MG/DL    Glucose 133 (H) 70 - 99 MG/DL    Calcium 9.9 8.3 - 10.6 MG/DL    Albumin 4.4 3.4 - 5.0 GM/DL    Total Protein 7.2 6.4 - 8.2 GM/DL    Total Bilirubin 0.7 0.0 - 1.0 MG/DL    ALT 12 10 - 40 U/L    AST 21 15 - 37 IU/L    Alkaline Phosphatase 119 40 - 129 IU/L    GFR Non- 32 (L) >60 mL/min/1.73m2    GFR  38 (L) >60 mL/min/1.73m2    Anion Gap 12 4 - 16   Troponin    Collection Time: 08/31/22 10:34 AM   Result Value Ref Range    Troponin T 0.030 (H) <0.01 NG/ML   Lipase    Collection Time: 08/31/22 10:34 AM   Result Value Ref Range    Lipase 72 (H) 13 - 60 IU/L   Magnesium    Collection Time: 08/31/22 10:34 AM   Result Value Ref Range    Magnesium 2.2 1.8 - 2.4 mg/dl           Assessment/Recommendations:     -Chest pain we will check serial troponins and EKGs has history of angioplasty we will obtain echocardiogram today and depending on his progress here will decide if need a stress test  -History of known CAD RCA PCI and history of angioplasty  -History of hypertension in fact his blood pressure is on the lower side and patient is on midodrine  -Hyperlipidemia patient Lipitor 80 mg p.o. daily we will check the LDL  -History of permanent pacemaker implantation  -Elevated troponin probably type II elevation  -CKD creatinine is elevated per renal  -Permanent pacemaker plantation patient is on Braulio Eng MD, 8/31/2022 11:51 AM       Please note this report has been partially produced using speech recognition software and may contain errors related to that system including errors in grammar, punctuation, and spelling, as well as words and phrases that may be inappropriate. If there are any questions or concerns please feel free to contact the dictating provider for clarification.

## 2022-08-31 NOTE — CARE COORDINATION
This RN CM to bedside to initiate DC planning. Pt lives at home alone in a 1 story home. He uses a cane at home. Pt's daughters help him with ADLs and medications. Pt does not have any [facility] services. Pt is a . Pt has PCP and . No needs at this time. I will provide family with [facility] information and [facility] information for senior services per their request. CM available should any new needs arise.

## 2022-08-31 NOTE — ED NOTES
[phone] paged Dr Marie Rice  08/31/22 1141  1152 Dr Cy Colon returned call      Malu Hoff  08/31/22 1154

## 2022-08-31 NOTE — TELEPHONE ENCOUNTER
Patient has been feeling like bee sting feeling in both arms for over a hour pleas call daughter Marcie Bey

## 2022-09-01 VITALS
SYSTOLIC BLOOD PRESSURE: 169 MMHG | OXYGEN SATURATION: 95 % | HEIGHT: 69 IN | TEMPERATURE: 97 F | WEIGHT: 185.41 LBS | RESPIRATION RATE: 16 BRPM | DIASTOLIC BLOOD PRESSURE: 70 MMHG | BODY MASS INDEX: 27.46 KG/M2 | HEART RATE: 67 BPM

## 2022-09-01 LAB
ANION GAP SERPL CALCULATED.3IONS-SCNC: 13 MMOL/L (ref 4–16)
BUN BLDV-MCNC: 29 MG/DL (ref 6–23)
CALCIUM SERPL-MCNC: 9.8 MG/DL (ref 8.3–10.6)
CHLORIDE BLD-SCNC: 103 MMOL/L (ref 99–110)
CO2: 25 MMOL/L (ref 21–32)
CREAT SERPL-MCNC: 2.1 MG/DL (ref 0.9–1.3)
GFR AFRICAN AMERICAN: 36 ML/MIN/1.73M2
GFR NON-AFRICAN AMERICAN: 30 ML/MIN/1.73M2
GLUCOSE BLD-MCNC: 115 MG/DL (ref 70–99)
POTASSIUM SERPL-SCNC: 4.2 MMOL/L (ref 3.5–5.1)
SODIUM BLD-SCNC: 141 MMOL/L (ref 135–145)
T4 FREE: 1.09 NG/DL (ref 0.9–1.8)
TSH HIGH SENSITIVITY: 2.35 UIU/ML (ref 0.27–4.2)

## 2022-09-01 PROCEDURE — 96372 THER/PROPH/DIAG INJ SC/IM: CPT

## 2022-09-01 PROCEDURE — 6370000000 HC RX 637 (ALT 250 FOR IP): Performed by: NURSE PRACTITIONER

## 2022-09-01 PROCEDURE — 36415 COLL VENOUS BLD VENIPUNCTURE: CPT

## 2022-09-01 PROCEDURE — 2580000003 HC RX 258: Performed by: NURSE PRACTITIONER

## 2022-09-01 PROCEDURE — APPSS45 APP SPLIT SHARED TIME 31-45 MINUTES: Performed by: NURSE PRACTITIONER

## 2022-09-01 PROCEDURE — G0378 HOSPITAL OBSERVATION PER HR: HCPCS

## 2022-09-01 PROCEDURE — 99225 PR SBSQ OBSERVATION CARE/DAY 25 MINUTES: CPT | Performed by: INTERNAL MEDICINE

## 2022-09-01 PROCEDURE — 6360000002 HC RX W HCPCS: Performed by: NURSE PRACTITIONER

## 2022-09-01 PROCEDURE — 94761 N-INVAS EAR/PLS OXIMETRY MLT: CPT

## 2022-09-01 PROCEDURE — 80048 BASIC METABOLIC PNL TOTAL CA: CPT

## 2022-09-01 PROCEDURE — 6370000000 HC RX 637 (ALT 250 FOR IP): Performed by: SURGERY

## 2022-09-01 RX ORDER — ALPRAZOLAM 0.25 MG/1
0.25 TABLET ORAL 2 TIMES DAILY PRN
Status: DISCONTINUED | OUTPATIENT
Start: 2022-09-01 | End: 2022-09-01 | Stop reason: HOSPADM

## 2022-09-01 RX ADMIN — ALLOPURINOL 100 MG: 100 TABLET ORAL at 09:06

## 2022-09-01 RX ADMIN — SODIUM CHLORIDE, PRESERVATIVE FREE 10 ML: 5 INJECTION INTRAVENOUS at 09:07

## 2022-09-01 RX ADMIN — LEVOTHYROXINE SODIUM 50 MCG: 0.05 TABLET ORAL at 06:55

## 2022-09-01 RX ADMIN — ENOXAPARIN SODIUM 30 MG: 100 INJECTION SUBCUTANEOUS at 09:05

## 2022-09-01 RX ADMIN — ASPIRIN 81 MG: 81 TABLET, COATED ORAL at 09:05

## 2022-09-01 RX ADMIN — ALPRAZOLAM 0.25 MG: 0.25 TABLET ORAL at 09:06

## 2022-09-01 RX ADMIN — TORSEMIDE 10 MG: 20 TABLET ORAL at 09:06

## 2022-09-01 RX ADMIN — TAMSULOSIN HYDROCHLORIDE 0.4 MG: 0.4 CAPSULE ORAL at 09:06

## 2022-09-01 RX ADMIN — PANTOPRAZOLE SODIUM 40 MG: 40 TABLET, DELAYED RELEASE ORAL at 09:04

## 2022-09-01 RX ADMIN — METOPROLOL TARTRATE 25 MG: 25 TABLET, FILM COATED ORAL at 09:05

## 2022-09-01 NOTE — DISCHARGE SUMMARY
V2.0  Discharge Summary    Name:  Kaz Moss /Age/Sex: 1934 (80 y.o. male)   Admit Date: 2022  Discharge Date: 22    MRN & CSN:  [id number] & [id number] Encounter Date and Time 22 3:48 PM EDT    Attending:  No att. providers found Discharging Provider: Martina Holman MD       Discharge Diagnosess:     NSTEMI  CKD 3  HFrEF not in exacerbation  Panic disorder  Hypothyroidism  HTN      Admission HPI, hospital course, and consultants:     Admission HPI:  Enoch Baker is a 80 y.o. male with pmh of hypertension, CAD, SP stents,  hypothyroidism, congestive heart failure who presents with chest pain. Patient stated he had intermittent sharp pain in bilateral shoulders and radiated to back since yesterday. Patient described the pain like needle poking sensation. Patient reported shortness of breath associated with chest pain. No sweating. Patient's EKG no ST elevation, first degree AV block noted. His troponin level slightly elevated at 0.03. Cardiology consulted in ED. Patient will be admitted to hospital for cardiac work-up. Patient reported slightly improved pain today. [facility] course:  Patient admitted for ACS work up. Troponin slightly elevated above baseline in setting of CKD. Stabilized. Patient easily disoriented in hospital setting and family strongly requesting quick discharge. Echocardiogram normal. Stress test as outpatient as patient is completely asymptomatic. Discharge in stable condition. The patient expressed appropriate understanding of, and agreement with the discharge recommendations, medications, and plan. Consults this admission:  IP CONSULT TO CARDIOLOGY  IP CONSULT TO HOSPITALIST    Discharge Instructions:    Follow up appointments: cardiology  Primary care physician: Ciara Vale PA-C within 2 weeks  Diet: cardiac diet   Activity: activity as tolerated  Disposition: Discharged to:   []Home, []HHC, []SNF, []Acute Rehab, []Hospice   Condition on discharge: Stable  Labs and Tests to be Followed up as an outpatient by PCP or Specialist: outpatient stress test    Discharge Medications:        Medication List        CONTINUE taking these medications      acetaminophen 325 MG tablet  Commonly known as: TYLENOL     allopurinol 100 MG tablet  Commonly known as: ZYLOPRIM     ALPRAZolam 0.5 MG tablet  Commonly known as: XANAX     aspirin 81 MG tablet     atorvastatin 80 MG tablet  Commonly known as: LIPITOR  Take 1 tablet by mouth nightly     docusate sodium 100 MG capsule  Commonly known as: COLACE  Take 1 capsule by mouth 2 times daily as needed for Constipation     FLUoxetine 20 MG capsule  Commonly known as: PROZAC     FOLINIC-PLUS PO     levothyroxine 50 MCG tablet  Commonly known as: SYNTHROID     metoprolol tartrate 25 MG tablet  Commonly known as: LOPRESSOR  Take 1 tablet by mouth 2 times daily Changed per Dr Matthias Murrieta 9/10/19     midodrine 10 MG tablet  Commonly known as: PROAMATINE  Take 1 tablet by mouth 2 times daily AM and afternoon and avoid taking at night     ondansetron 8 MG Tbdp disintegrating tablet  Commonly known as: ZOFRAN-ODT     pantoprazole 40 MG tablet  Commonly known as: PROTONIX     senna 8.6 MG tablet  Commonly known as: SENOKOT     tamsulosin 0.4 MG capsule  Commonly known as: FLOMAX  Take 1 capsule by mouth daily     VITAMIN B COMPLEX PO            ASK your doctor about these medications      guaiFENesin 600 MG extended release tablet  Commonly known as: MUCINEX  Take 1 tablet by mouth 2 times daily     torsemide 20 MG tablet  Commonly known as: DEMADEX  Take 1 tablet by mouth daily             Objective Findings at Discharge:   BP (!) 169/70   Pulse 67   Temp 97 °F (36.1 °C) (Axillary)   Resp 16   Ht 5' 9\" (1.753 m)   Wt 185 lb 6.5 oz (84.1 kg)   SpO2 95%   BMI 27.38 kg/m²       Physical Exam:   General: NAD  Eyes: EOMI  ENT: neck supple  Cardiovascular: Regular rate.   Respiratory: Clear to auscultation  Gastrointestinal: Soft, non tender  Genitourinary: no suprapubic tenderness  Musculoskeletal: No edema  Skin: warm, dry  Neuro: Alert. Psych: Mood appropriate. Labs and Imaging   Echocardiogram complete 2D with doppler with color    Result Date: 8/31/2022  Transthoracic Echocardiography Report (TTE)  Demographics   Patient Name       Chritsiano Miguel      Date of Study       08/31/2022   Date of Birth      1934         Gender              Male   Age                80 year(s)         Race                   Patient Number     [id number]         Room Number         [facility]   Visit Number       [id number]   Corporate ID       [id number]   Accession Number   [id number]         Marybeth Mehta RVT   Ordering Physician Elaine Underwood MD        Physician           Nellie EARL  Procedure Type of Study   TTE procedure:ECHOCARDIOGRAM COMPLETE 2D W DOPPLER W COLOR. Procedure Date Date: 08/31/2022 Start: 01:09 PM Study Location: Portable Technical Quality: Fair visualization Indications:Chest pain. Patient Status: Routine Height: 69 inches Weight: 180 pounds BSA: 1.98 m2 BMI: 26.58 kg/m2 HR: 66 bpm BP: 143/73 mmHg  Conclusions   Summary  Left ventricular systolic function is normal.  Ejection fraction is visually estimated at 55%. Mild left ventricular hypertrophy. PPM wiring visualized in right atrium. Sclerotic, but non-stenotic aortic valve. Mitral annular calcification is present. Mild tricuspid regurgitation; RVSP: 27 mmHg. No evidence of any pericardial effusion.    Signature   ------------------------------------------------------------------  Electronically signed by Candice Palmer MD  (Interpreting physician) on 08/31/2022 at 02:05 PM  ------------------------------------------------------------------   Findings   Left Ventricle  Left ventricular systolic function is normal.  Ejection fraction is visually estimated at 50%. Mild left ventricular hypertrophy. No regional wall motion abnormalites. Left Atrium  Essentially normal left atrium. Right Atrium  PPM wiring visualized in right atrium. Right Ventricle  PPM wiring visualized within the right ventricle. Aortic Valve  Sclerotic, but non-stenotic aortic valve. Mitral Valve  Mitral annular calcification is present. Tricuspid Valve  Mild tricuspid regurgitation; RVSP: 27 mmHg. Pulmonic Valve  The pulmonic valve was not well visualized. Pericardial Effusion  No evidence of any pericardial effusion. Pleural Effusion  No evidence of pleural effusion. Miscellaneous  IVC and abdominal aorta are not clearly visualized due to poor subcostal  window.   M-Mode/2D Measurements & Calculations   LV Diastolic Dimension:   LV Systolic Dimension:   LA Dimension: 3 cmAO Root  4.92 cm                   3.5 cm                   Dimension: 3.1 cm  LV FS:28.9 %              LV Volume Diastolic: 85  LV PW Diastolic: 4.16 cm  ml  LV PW Systolic: 0.90 cm   LV Volume Systolic: 44  Septum Diastolic: 4.65 cm ml  Septum Systolic: 7.07 cm  LV EDV/LV EDV Index: 85  LA/Aorta: 0.97  CO: 3.1 l/min             ml/43 m2LV ESV/LV ESV  CI: 1.57 l/m*m2           Index: 44 ml/22 m2                            EF Calculated (A4C):  LV Area Diastolic: 54.1   95.9 %  cm2                       EF Calculated (2D): 69.1  LV Area Systolic: 91.8    %  cm2                            LV Length: 7.48 cm                             LVOT: 2.3 cm  Doppler Measurements & Calculations   MV Peak E-Wave: 41   AV Peak Velocity: 101 cm/s    LVOT Peak Velocity: 62.4  cm/s                 AV Peak Gradient: 4.08 mmHg   cm/s  MV Peak A-Wave: 71.6 AV Mean Velocity: 81.5 cm/s   LVOT Mean Velocity: 47.9  cm/s                 AV Mean Gradient: 3 mmHg      cm/s  MV E/A Ratio: 0.57   AV VTI: 19.2 cm               LVOT Peak Gradient: 2  MV Peak Gradient: AM    COLORU Yellow 07/06/2022 07:10 AM    PHUR 6.5 07/06/2022 07:10 AM    LABCAST None seen 07/06/2022 07:10 AM    LABCAST CANCELED 07/06/2022 07:10 AM    WBCUA 12 05/16/2021 01:00 AM    RBCUA 0-2 07/06/2022 07:10 AM    RBCUA 1 05/16/2021 01:00 AM    MUCUS CANCELED 07/06/2022 07:10 AM    TRICHOMONAS NONE SEEN 05/16/2021 01:00 AM    YEAST CANCELED 07/06/2022 07:10 AM    BACTERIA None seen 07/06/2022 07:10 AM    CLARITYU Clear 07/06/2022 07:10 AM    SPECGRAV 1.019 07/06/2022 07:10 AM    LEUKOCYTESUR 3+ 07/06/2022 07:10 AM    LEUKOCYTESUR 6-10 07/06/2022 07:10 AM    UROBILINOGEN 0.2 07/06/2022 07:10 AM    BILIRUBINUR Negative 07/06/2022 07:10 AM    BLOODU Negative 07/06/2022 07:10 AM    GLUCOSEU Negative 07/06/2022 07:10 AM    KETUA Negative 07/06/2022 07:10 AM     Urine Cultures: No results found for: LABURIN  Blood Cultures: No results found for: BC  No results found for: BLOODCULT2  Organism: No results found for: ORG    Time Spent Discharging patient 45 minutes    Electronically signed by Jan Morgan MD on 9/1/2022 at 3:48 PM

## 2022-09-01 NOTE — PROGRESS NOTES
Cardiology Progress Note     Today's Plan sign off  To follow up in office    Admit Date:  8/31/2022    Consult reason/ Seen today for: chest pian    Subjective and  Overnight Events:  up in chair- reports he is going home- denies chest pain or shortness of breath       Assessment / Plan:   Chest pain: resolved  Elevated troponin - type 2 rise in the setting of CKD  CAD- PCI of RCA 2019: medical management : asa atorvastatin/ metoprolol  : can do stress test as outpatient   Echo completed: normal left ventricular systolic function: EF 55 % no significant VHD:   Dual chamber pacemaker: stable readings/parameters  CKD: per primary           History of Presenting Illness:    Chief complain on admission : 80 y. o.year old who is admitted for  Chief Complaint   Patient presents with    Tingling    Chest Pain     Since this morning         Past medical history:    has a past medical history of Anxiety, Bladder cancer ([address] Cancer ([address] CHF (congestive heart failure) ([address] Chronic kidney disease, Chronic kidney disease, stage III (moderate) ([address] Coronary artery disease involving native coronary artery of native heart with unstable angina pectoris ([address] Dementia ([address] Depression, GERD (gastroesophageal reflux disease), Gout, Hx of Carotid Doppler ultrasound, Hx of Doppler echocardiogram, Hx of exercise stress test, Hyperlipidemia, Hypertension, Hyperthyroidism, and Mitral valve insufficiency. Past surgical history:   has a past surgical history that includes thoracentesis (Right, 08/14/2019); Pacemaker insertion (N/A, 8/15/2019); Colonoscopy; pacemaker placement; and Coronary angioplasty with stent. Social History:   reports that he has quit smoking. His smoking use included cigarettes. He has a 30.00 pack-year smoking history. He has never used smokeless tobacco. He reports that he does not drink alcohol and does not use drugs.   Family history:  family history includes Cancer in his brother and sister; Elevated Lipids in an other family member; Heart Disease in an other family member; High Blood Pressure in his brother; Hypertension in an other family member; Other in an other family member. No Known Allergies    Review of Systems:   All 14 systems were reviewed and are negative  Except for the positive findings which are documented     BP (!) 169/70   Pulse 67   Temp 97 °F (36.1 °C) (Axillary)   Resp 16   Ht 5' 9\" (1.753 m)   Wt 185 lb 6.5 oz (84.1 kg)   SpO2 95%   BMI 27.38 kg/m²   No intake or output data in the 24 hours ending 09/01/22 0941    Physical Exam:  Physical Exam  Vitals reviewed. HENT:      Head: Normocephalic. Mouth/Throat:      Mouth: Mucous membranes are moist.   Cardiovascular:      Rate and Rhythm: Normal rate and regular rhythm. Pulses: Normal pulses. Heart sounds: Normal heart sounds. Pulmonary:      Effort: Pulmonary effort is normal.      Breath sounds: Normal breath sounds. Abdominal:      General: There is no distension. Tenderness: There is no abdominal tenderness. Musculoskeletal:      Right lower leg: No edema. Left lower leg: No edema. Skin:     General: Skin is warm and dry. Capillary Refill: Capillary refill takes less than 2 seconds. Neurological:      General: No focal deficit present.         Medications:    allopurinol  100 mg Oral Daily    aspirin  81 mg Oral Daily    atorvastatin  80 mg Oral Nightly    FLUoxetine  20 mg Oral Daily    levothyroxine  50 mcg Oral Daily    metoprolol tartrate  25 mg Oral BID    pantoprazole  40 mg Oral Daily    tamsulosin  0.4 mg Oral Daily    torsemide  10 mg Oral Daily    sodium chloride flush  5-40 mL IntraVENous 2 times per day    enoxaparin  30 mg SubCUTAneous Daily      sodium chloride       ALPRAZolam, sodium chloride flush, sodium chloride, ondansetron **OR** ondansetron, polyethylene glycol, nicotine polacrilex, acetaminophen **OR** acetaminophen    Lab Data:  CBC:   Recent Labs     22  1034   WBC 8.9   HGB 14.2   HCT 45.2   MCV 88.8        BMP:   Recent Labs     22  1034 22  0428    141   K 4.1 4.2    103   CO2 26 25   BUN 27* 29*   CREATININE 2.0* 2.1*     PT/INR: No results for input(s): PROTIME, INR in the last 72 hours. BNP:    Recent Labs     22  1034   PROBNP 2,485*     TROPONIN:   Recent Labs     22  1034   TROPONINT 0.020*  0.030*              Impression:  Principal Problem:    Chest pain at rest  Active Problems:    Chest pain  Resolved Problems:    * No resolved hospital problems. *       All labs, medications and tests reviewed by myself, continue all other medications of all above medical condition listed as is except for changes mentioned above. Thank you   Please call with questions. Electronically signed by PRISCILLA Pompa CNP on 2022 at 9:41 AM    I have seen ,spoken to  and examined this patient personally, independently of the L[facility]. I have reviewed the hospital care given to date and reviewed all pertinent labs and imaging. I have spoken with patient, nursing staff and provided written and verbal instructions . The above note has been reviewed     CARDIOLOGY ATTENDING ADDENDUM    HPI:  I have reviewed the above HPI  And agree with above     Pulse Range: Pulse  Av.8  Min: 63  Max: 76    Blood Presuure Range:  Systolic (54FRO), WQF:995 , Min:102 , EXQ:828   ; Diastolic (46MSY), RFQ:33, Min:43, Max:80      Pulse ox Range: SpO2  Av.7 %  Min: 95 %  Max: 99 %    24hr I & O:  No intake or output data in the 24 hours ending 22 1021      BP (!) 169/70   Pulse 67   Temp 97 °F (36.1 °C) (Axillary)   Resp 16   Ht 5' 9\" (1.753 m)   Wt 185 lb 6.5 oz (84.1 kg)   SpO2 95%   BMI 27.38 kg/m²       Physical Exam:  General:  Awake, alert, NAD  Head:normal  Eye:normal  Neck:  No JVD   Chest:  Clear to auscultation, respiration easy  Cardiovascular:  RRR S1S2  Abdomen:   nontender  Extremities:  tr edema  Pulses; palpable  Neuro: grossly normal      MEDICAL DECISION MAKING;    Pt assessed , chart reviewed, patient examined examined , all available data was reviewed, following is the plan which was discussed with L[facility]O as well:    -Chest pain we will check serial troponins and EKGs has history of angioplasty , normal echo, cardiolite as op  -History of known CAD RCA PCI and history of angioplasty  -History of hypertension in fact his blood pressure is on the lower side and patient is on midodrine  -Hyperlipidemia patient Lipitor 80 mg p.o. daily we will check the LDL  -History of permanent pacemaker implantation  -Elevated troponin probably type II elevation  -CKD creatinine is elevated per renal  -Permanent pacemaker plantation patient is on Erick Noble MD [facility]

## 2022-09-01 NOTE — PLAN OF CARE
Problem: Discharge Planning  Goal: Discharge to home or other facility with appropriate resources  8/31/2022 2149 by Asa Orozco RN  Outcome: Progressing  8/31/2022 1659 by Donna Ramírez RN  Outcome: Progressing  Flowsheets  Taken 8/31/2022 1558  Discharge to home or other facility with appropriate resources:   Identify barriers to discharge with patient and caregiver   Identify discharge learning needs (meds, wound care, etc)   Refer to discharge planning if patient needs post-hospital services based on physician order or complex needs related to functional status, cognitive ability or social support system   Arrange for needed discharge resources and transportation as appropriate  Taken 8/31/2022 1456  Discharge to home or other facility with appropriate resources:   Identify barriers to discharge with patient and caregiver   Identify discharge learning needs (meds, wound care, etc)   Refer to discharge planning if patient needs post-hospital services based on physician order or complex needs related to functional status, cognitive ability or social support system   Arrange for needed discharge resources and transportation as appropriate     Problem: Safety - Adult  Goal: Free from fall injury  8/31/2022 2149 by Asa Orozco RN  Outcome: Progressing  8/31/2022 1659 by Donna Ramírez RN  Outcome: Progressing     Problem: ABCDS Injury Assessment  Goal: Absence of physical injury  8/31/2022 2149 by Asa Orozco RN  Outcome: Progressing  8/31/2022 1659 by Donna Ramírez RN  Outcome: Progressing

## 2022-09-01 NOTE — FLOWSHEET NOTE
Verified with Dr Suleman Worley, prior to discharge that the labs (protein/creatinine ration and urinalysis) orders can be cancelled and allow discharge.

## 2022-09-02 ENCOUNTER — CARE COORDINATION (OUTPATIENT)
Dept: CASE MANAGEMENT | Age: 87
End: 2022-09-02

## 2022-09-02 DIAGNOSIS — R07.9 CHEST PAIN AT REST: Primary | ICD-10-CM

## 2022-09-02 PROCEDURE — 1111F DSCHRG MED/CURRENT MED MERGE: CPT | Performed by: FAMILY MEDICINE

## 2022-09-02 NOTE — CARE COORDINATION
John 45 Transitions Initial Follow Up Call    Call within 2 business days of discharge: Yes    Patient: Becky Viera Patient : 1934   MRN: [id number]  Reason for Admission: chest pain  Discharge Date: 22 RARS: Readmission Risk Score: 15      Last Discharge [facility]       Date Complaint Diagnosis Description Type Department Provider    22 Tingling; Chest Pain Chest pain, unspecified type . .. ED to Hosp-Admission (Discharged) (ADMITTED) [address] Renee Wilson MD; Evan Felipe Br. .. Spoke with: Krupa Coughlin (POA) & Jaret (on HIPAA) today and they say Kingston Suazo had a good night. Denies chest pain, sob, panic attacks, n/v/d, dizziness, swelling. Meds reviewed and are correct. They will continue Torsemide as directed by Nephrologist. PCP HFU 22 and the family accompanies him to all appts. They will call to scheduled cardio HFU as well. Family provides meals and are well aware of low sodium/fluid restriction diet. Rafi Sewell says Kingston Suazo has sundowners and panic more after dark. When he gets a panic attack he says he feels thunder in his chest. PCP aware and the Xanax & Prozac help. Eating, drinking & sleeping ok. Denies problems w/ urination/bowels. No other concerns voiced at this time. CTN # given to them.  MARIELENA Bains Care Transitions [phone]        Non-face-to-face services provided:  Scheduled appointment with PCP-22  Scheduled appointment with Specialist-family to schedule HFU  Obtained and reviewed discharge summary and/or continuity of care documents    Care Transitions 24 Hour Call    Schedule Follow Up Appointment with PCP: Completed  Do you have a copy of your discharge instructions?: Yes  Do you have all of your prescriptions and are they filled?: Yes  Have you been contacted by a Aleyda Burk Pharmacist?: No  Have you scheduled your follow up appointment?: Yes  How are you going to get to your appointment?: Car - family or friend to transport  [facility]: Home Health (Comment: [facility])  Patient DME: Ida boyd  Do you have support at home?: Alone  Do you feel like you have everything you need to keep you well at home?: Yes  Care Transitions Interventions   Home Care Waiver: Declined        Transportation Support: Declined   Disease Specific Clinic: Completed  Social Work: Declined    Disease Association: Completed             Transitions of Care Initial Call        Challenges to be reviewed by the provider   Additional needs identified to be addressed with provider: No  none             Method of communication with provider : none    Advance Care Planning:   Does patient have an Advance Directive: reviewed and current. Care Transition Nurse contacted the family by telephone to perform post hospital discharge assessment. Verified name and  with family as identifiers. Provided introduction to self, and explanation of the CTN role. CTN reviewed discharge instructions, medical action plan and red flags with family who verbalized understanding. Family given an opportunity to ask questions and does not have any further questions or concerns at this time. Were discharge instructions available to patient? Yes. Reviewed appropriate site of care based on symptoms and resources available to patient including: PCP  Specialist  When to call 911. The family agrees to contact the PCP office for questions related to their healthcare. Medication reconciliation was performed with family, who verbalizes understanding of administration of home medications. Advised obtaining a 90-day supply of all daily and as-needed medications. Was patient discharged with a pulse oximeter? no    CTN provided contact information. Plan for follow-up call in 5-7 days based on severity of symptoms and risk factors. Plan for next call: symptom management-chest pain, sob, panic attacks?, taking meds?, swelling, wt.?  follow up appointment-cardio?      Follow Up  Future Appointments Date Time Provider Davis Coats   1/25/2023  9:00 AM Eneida Manzo MD [facility]   2/10/2023 10:15 AM Nani Madden MD [facility]       Natalia Caal RN

## 2022-09-06 LAB
ALBUMIN SERPL-MCNC: 4 G/DL
ALP BLD-CCNC: 113 U/L
ALT SERPL-CCNC: 25 U/L
ANION GAP SERPL CALCULATED.3IONS-SCNC: NORMAL MMOL/L
AST SERPL-CCNC: 13 U/L
BASOPHILS ABSOLUTE: 0 /ΜL
BASOPHILS RELATIVE PERCENT: 0 %
BILIRUB SERPL-MCNC: 0.7 MG/DL (ref 0.1–1.4)
BUN BLDV-MCNC: 29 MG/DL
CALCIUM SERPL-MCNC: 9.3 MG/DL
CHLORIDE BLD-SCNC: 100 MMOL/L
CO2: 20 MMOL/L
CREAT SERPL-MCNC: 2.51 MG/DL
EOSINOPHILS ABSOLUTE: 0.1 /ΜL
EOSINOPHILS RELATIVE PERCENT: 1 %
GFR CALCULATED: NORMAL
GLUCOSE BLD-MCNC: 97 MG/DL
HCT VFR BLD CALC: 42.2 % (ref 41–53)
HEMOGLOBIN: 13.5 G/DL (ref 13.5–17.5)
LYMPHOCYTES ABSOLUTE: 1.6 /ΜL
LYMPHOCYTES RELATIVE PERCENT: 18 %
MCH RBC QN AUTO: 27.8 PG
MCHC RBC AUTO-ENTMCNC: 32 G/DL
MCV RBC AUTO: 87 FL
MONOCYTES ABSOLUTE: 0.8 /ΜL
MONOCYTES RELATIVE PERCENT: 9 %
NEUTROPHILS ABSOLUTE: 6.4 /ΜL
NEUTROPHILS RELATIVE PERCENT: 71 %
PDW BLD-RTO: 15.4 %
PLATELET # BLD: 213 K/ΜL
PMV BLD AUTO: NORMAL FL
POTASSIUM SERPL-SCNC: 4.8 MMOL/L
RBC # BLD: 4.86 10^6/ΜL
SODIUM BLD-SCNC: 138 MMOL/L
TOTAL PROTEIN: 6.4
WBC # BLD: 9 10^3/ML

## 2022-09-07 ENCOUNTER — CARE COORDINATION (OUTPATIENT)
Dept: CASE MANAGEMENT | Age: 87
End: 2022-09-07

## 2022-09-07 NOTE — CARE COORDINATION
John 45 Transitions Follow Up Call    2022    Patient: Kaz Moss  Patient : 1934   MRN: [id number]  Reason for Admission: chest pain; NSTEMI  Discharge Date: 22 RARS: Readmission Risk Score: 15         Spoke with: Marleni Ashly (on HIPAA) today and he says Caren Caro is doing pretty good. PCP visit 22 went well-> labs ordered but no changes. Cardio HFU 22  Denies chest pain, sob, panic attacks, n/v/d, swelling, dizziness. Marlenieugenie Limon reported that the pt. Did fall last week without injury-they are not sure how it happened as no one was there to witness. Wt.=185 (same) Eating, drinking & sleeping ok. Denies problems w/ urination/bowels. He able to conduct ADL's normally. No other concerns voiced at this time. Care Transitions Subsequent and Final Call    Schedule Follow Up Appointment with PCP: Completed  Subsequent and Final Calls  Do you have any ongoing symptoms?: No  Have your medications changed?: No  Do you have any questions related to your medications?: No  Do you currently have any active services?: No  Are you currently active with any services?: Home Health  Identified Barriers: Lack of Education, Impairment  Care Transitions Interventions   Home Care Waiver: Declined        Transportation Support: Declined      DME Assistance: Declined   Disease Specific Clinic: Completed  Social Work: Declined    Disease Association: Completed      Other Interventions:         Care Transitions Follow Up Call    Needs to be reviewed by the provider   Additional needs identified to be addressed with provider: No  none             Method of communication with provider : none      Care Transition Nurse contacted the family by telephone to follow up after admission on 22. Verified name and  with family as identifiers. Addressed changes since last contact: none  Discussed follow-up appointments. If no appointment was previously scheduled, appointment scheduling offered: Yes.    Is follow up

## 2022-09-13 ENCOUNTER — OFFICE VISIT (OUTPATIENT)
Dept: CARDIOLOGY CLINIC | Age: 87
End: 2022-09-13
Payer: MEDICARE

## 2022-09-13 VITALS
SYSTOLIC BLOOD PRESSURE: 100 MMHG | HEIGHT: 69 IN | DIASTOLIC BLOOD PRESSURE: 60 MMHG | WEIGHT: 188 LBS | HEART RATE: 76 BPM | BODY MASS INDEX: 27.85 KG/M2

## 2022-09-13 DIAGNOSIS — I25.110 CORONARY ARTERY DISEASE INVOLVING NATIVE CORONARY ARTERY OF NATIVE HEART WITH UNSTABLE ANGINA PECTORIS (HCC): ICD-10-CM

## 2022-09-13 DIAGNOSIS — Z95.0 S/P PLACEMENT OF CARDIAC PACEMAKER: ICD-10-CM

## 2022-09-13 DIAGNOSIS — I50.22 CHRONIC SYSTOLIC (CONGESTIVE) HEART FAILURE (HCC): Primary | ICD-10-CM

## 2022-09-13 PROCEDURE — 1123F ACP DISCUSS/DSCN MKR DOCD: CPT | Performed by: INTERNAL MEDICINE

## 2022-09-13 PROCEDURE — 99214 OFFICE O/P EST MOD 30 MIN: CPT | Performed by: INTERNAL MEDICINE

## 2022-09-13 NOTE — PROGRESS NOTES
Primary care physician: Emelyn Long PA-C      History of present illness: Jairo Masters is a 80 y. o.year old who has prior history of hypertension, hyperlipidemia, coronary artery disease, permanent pacemaker ([employer]) implantation, CKD. 2 weeks ago he was admitted to the hospital after a fall with significant arm pain. His troponin was minimally elevated in setting of chronic kidney disease. He was evaluated by cardiology and conservative treatment was recommended. He was subsequently discharged home. Is here today for routine follow-up. He is accompanied by his son. He denies any chest discomfort, breathing problems. He still has residual left arm pain from the fall. His recent blood work has shown that his creatinine is creeping up and he is following up with nephrology about it. Past medical history:    has a past medical history of Anxiety, Bladder cancer ([address] Cancer ([address] CHF (congestive heart failure) ([address] Chronic kidney disease, Chronic kidney disease, stage III (moderate) ([address] Coronary artery disease involving native coronary artery of native heart with unstable angina pectoris ([address] Dementia ([address] Depression, GERD (gastroesophageal reflux disease), Gout, Hx of Carotid Doppler ultrasound, Hx of Doppler echocardiogram, Hx of exercise stress test, Hyperlipidemia, Hypertension, Hyperthyroidism, and Mitral valve insufficiency. Past surgical history:   has a past surgical history that includes thoracentesis (Right, 08/14/2019); Pacemaker insertion (N/A, 8/15/2019); Colonoscopy; pacemaker placement; and Coronary angioplasty with stent. Social History:   reports that he has quit smoking. His smoking use included cigarettes. He has a 30.00 pack-year smoking history. He has never used smokeless tobacco. He reports that he does not drink alcohol and does not use drugs.     Family history:  No family history of premature CAD  No Known Allergies    No current facility-administered medications for this visit. Current Outpatient Medications   Medication Sig Dispense Refill    Folinic Acid-Vit B6-Vit B12 (FOLINIC-PLUS PO) Take 1 tablet by mouth daily      B Complex Vitamins (VITAMIN B COMPLEX PO) Take 1 tablet by mouth daily With electrolytes      acetaminophen (TYLENOL) 325 MG tablet Take 325 mg by mouth every 4 hours as needed for Pain      metoprolol tartrate (LOPRESSOR) 25 MG tablet Take 1 tablet by mouth 2 times daily Changed per Dr Kelsy Rodarte 9/10/19 60 tablet 3    midodrine (PROAMATINE) 10 MG tablet Take 1 tablet by mouth 2 times daily AM and afternoon and avoid taking at night 60 tablet 11    pantoprazole (PROTONIX) 40 MG tablet Take 40 mg by mouth daily      allopurinol (ZYLOPRIM) 100 MG tablet Take 100 mg by mouth daily      torsemide (DEMADEX) 20 MG tablet Take 1 tablet by mouth daily (Patient taking differently: Take 10 mg by mouth daily) 90 tablet 3    ALPRAZolam (XANAX) 0.5 MG tablet Take 0.25 mg by mouth in the morning.  Indications: takes in the pm.      senna (SENOKOT) 8.6 MG tablet Take 1 tablet by mouth 2 times daily      ondansetron (ZOFRAN-ODT) 8 MG TBDP disintegrating tablet Place 8 mg under the tongue every 8 hours as needed for Nausea or Vomiting      FLUoxetine (PROZAC) 20 MG capsule Take 20 mg by mouth daily Indications: takes at night Takes a night      atorvastatin (LIPITOR) 80 MG tablet Take 1 tablet by mouth nightly 30 tablet 0    guaiFENesin (MUCINEX) 600 MG extended release tablet Take 1 tablet by mouth 2 times daily (Patient taking differently: Take 600 mg by mouth 2 times daily as needed for Congestion) 60 tablet 0    tamsulosin (FLOMAX) 0.4 MG capsule Take 1 capsule by mouth daily 30 capsule 0    levothyroxine (SYNTHROID) 50 MCG tablet Take 50 mcg by mouth Daily      aspirin 81 MG tablet Take 81 mg by mouth daily       docusate sodium (COLACE) 100 MG capsule Take 1 capsule by mouth 2 times daily as needed for Constipation 90 capsule 1 No current facility-administered medications for this visit. Review of Systems:   Constitutional: No Fever or Weight Loss   Eyes: No Decreased Vision  ENT: No Headaches, Hearing Loss or Vertigo  Cardiovascular: As per HPI  Respiratory: As per HPI  Gastrointestinal: No abdominal pain, appetite loss, blood in stools, constipation, diarrhea or heartburn  Genitourinary: No dysuria, trouble voiding, or hematuria  Musculoskeletal:  No gait disturbance, weakness or joint complaints  Integumentary: No rash or pruritis  Neurological: No TIA or stroke symptoms  Psychiatric: No anxiety or depression  Endocrine: No malaise, fatigue or temperature intolerance  Hematologic/Lymphatic: No bleeding problems, blood clots or swollen lymph nodes  Allergic/Immunologic: No nasal congestion or hives  All systems negative except as marked. Physical Examination:    Vitals:    09/13/22 1021   BP: 100/60   Site: Left Upper Arm   Position: Sitting   Pulse: 76   Weight: 188 lb (85.3 kg)   Height: 5' 9\" (1.753 m)       General Appearance: Elderly  male in no distress, conversant    Constitutional:  No acute distress, Non-toxic appearance. HENT:  Normocephalic, Atraumatic, Bilateral external ears normal, Oropharynx moist,   Eyes:  PERRL, EOMI, Conjunctiva normal, No discharge. Respiratory:  Normal breath sounds, No respiratory distress, No wheezing  Cardiovascular: S1, S2, no murmurs, gallops. JVD wnl  Abdomen /GI:  Bowel sounds normal, Soft, No tenderness   Genitourinary: No costovertebral angle tenderness   Musculoskeletal:  No edema, no tenderness, no deformities.    Integument:  Well hydrated, no rash   Neurologic:  Alert & oriented x 3, no focal deficits noted           Medical decision making and Data review:      Lab Results   Component Value Date    INR 1.01 04/03/2021    PROTIME 12.2 04/03/2021       All labs, medications and tests reviewed by myself including data  from outside source , patient and available family . Continue all other medications of all above medical condition listed as is. Impression and Plan:    1. Chronic systolic (congestive) heart failure    2. Coronary artery disease involving native coronary artery of native heart with unstable angina pectoris ([address]    3. S/P placement of cardiac pacemaker        80 y. o.year old with above medical history. He is doing well from a cardiac standpoint. His last pacemaker interrogation was in August 2022 with stable pacing parameters. He appears euvolemic on exam.  At present I will continue with medical management with atorvastatin 80 mg daily, metoprolol 25 mg daily, aspirin 81 mg daily. His creatinine needs to be followed closely and he is at a high risk of fall given wobbly gait. We will see him for follow-up in 6 months. Thank you very much for consult and giving us the opportunity in contributing in the care of this patient. Please feel free to call me for any questions.        Jessica Baxter MD, 9/13/2022 10:42 AM

## 2022-09-14 ENCOUNTER — CARE COORDINATION (OUTPATIENT)
Dept: CASE MANAGEMENT | Age: 87
End: 2022-09-14

## 2022-09-14 NOTE — CARE COORDINATION
John 45 Transitions Follow Up Call    2022    Patient: Ksenia Nicole  Patient : 1934   MRN: [id number]  Reason for Admission: chest pain/NSTEMI  Discharge Date: 22 RARS: Readmission Risk Score: 15         Spoke with: Jaret (ANGLE, on HIPAA) today and he says Caleb Calderon is doing pretty good. Denies chest pain, sob, n/v/d, dizziness, swelling, fever, panic attacks. Cardiology HFU yesterday went well ->needs f/u w/ Nephro, creatinine 2.1. Hossein Marc says he will call Dr. Burgess [place] office to get him in. Hossein Marc says the family is interested in having some PT/OT in home d/t unsteady gait. They have set up safety measures around his house and there is afamily member there for each meal. Writer recommended he call PCP for order to [facility] of their choice. He voices understanding an will call to request. Eating, drinking sleeping ok. Denies problems w/urination/bowels. No other concerns voiced at this time.      Care Transitions Subsequent and Final Call    Schedule Follow Up Appointment with PCP: Completed  Subsequent and Final Calls  Do you have any ongoing symptoms?: No  Have your medications changed?: No  Do you have any questions related to your medications?: No  Do you currently have any active services?: No  Are you currently active with any services?: Home Health  Do you have any needs or concerns that I can assist you with?: No  Identified Barriers: Lack of Education, Impairment  Care Transitions Interventions   Home Care Waiver: Declined        Transportation Support: Declined      DME Assistance: Declined   Disease Specific Clinic: Completed  Social Work: Declined    Disease Association: Completed      Other Interventions:         Care Transitions Follow Up Call    Needs to be reviewed by the provider   Additional needs identified to be addressed with provider: No  home health care-family requesting  PT-family requesting  OT-family requesting  none             Method of communication with provider :  ANGLE will call PCP office      Care Transition Nurse contacted the family by telephone to follow up after admission on 22. Verified name and  with family as identifiers. Addressed changes since last contact: none  Discussed follow-up appointments. If no appointment was previously scheduled, appointment scheduling offered: Yes. Is follow up appointment scheduled within 7 days of discharge? Yes. Advance Care Planning:   Does patient have an Advance Directive: reviewed and current. CTN reviewed discharge instructions, medical action plan and red flags with family and discussed any barriers to care and/or understanding of plan of care after discharge. Discussed appropriate site of care based on symptoms and resources available to patient including: PCP  Specialist  When to call 911. The family agrees to contact the PCP office for questions related to their healthcare. Patients top risk factors for readmission: functional cognitive ability  functional physical ability  falls  lack of knowledge about disease  medical condition-NSTEMI, CKD, CHF, panic disorder, hypothyroid, HTN, pacemaker  multiple health system providers  polypharmacy  caregiver stress  Interventions to address risk factors: Scheduled appointment with PCP-as needed and Scheduled appointment with Specialist-ANGLE to schedule w/ Nephro      Non-[facility] follow up appointment(s):     CTN provided contact information for future needs. Plan for follow-up call in 7-10 days based on severity of symptoms and risk factors. Plan for next call: symptom management-chest pain, sob, weakness, appetite, BM, wt.  follow up appointment-w/ Nephrology?          Follow Up  Future Appointments   Date Time Provider Davis Coats   2023  9:00 AM Cherie Sorensen MD [facility]   2/10/2023 10:15 AM Rasta Cardenas MD [facility]       Columba Espino RN

## 2022-09-21 ENCOUNTER — CARE COORDINATION (OUTPATIENT)
Dept: CASE MANAGEMENT | Age: 87
End: 2022-09-21

## 2022-09-21 NOTE — CARE COORDINATION
John 45 Transitions Follow Up Call    2022    Patient: Slime Pulido  Patient : 1934   MRN: [id number]  Reason for Admission: chest pain  Discharge Date: 22 RARS: Readmission Risk Score: 15         Spoke with: Jaret (ANGLE on HIPAA) today and he says Charmaine Mujica is about the same. Denies chest pain, sob, swelling, wt. Gain, dizziness, n/v/d, panic attacks. Says his dementia seems to be worse-> he can't remember how to use the microwave which is worse than a month ago. The family is looking into memory care facilities. Once they choose an option they will schedule time w/ PCP. CTN offered assistance w/ this but he politely declined. The family takes his meals in 3x/day and makes sure he gets to bed safely at night. Eating, drinking & sleeping well. Denies problems w/ urination/bowels. No other concerns voiced at this time. Will follow 1 more week. Care Transitions Subsequent and Final Call    Schedule Follow Up Appointment with PCP: Completed  Subsequent and Final Calls  Do you have any ongoing symptoms?: No  Have your medications changed?: No  Do you have any questions related to your medications?: No  Do you currently have any active services?: Yes  Are you currently active with any services?: Home Health  Do you have any needs or concerns that I can assist you with?: No  Identified Barriers: Lack of Education, Impairment  Care Transitions Interventions   Home Care Waiver: Declined        Transportation Support: Declined      DME Assistance: Declined   Disease Specific Clinic: Completed  Social Work: Declined    Disease Association: Completed      Other Interventions:         Care Transitions Follow Up Call    Needs to be reviewed by the provider   Additional needs identified to be addressed with provider: No  none             Method of communication with provider : none      Care Transition Nurse contacted the family by telephone to follow up after admission on 22.  Verified name and  with family as identifiers. Addressed changes since last contact: none  Discussed follow-up appointments. If no appointment was previously scheduled, appointment scheduling offered: Yes. Is follow up appointment scheduled within 7 days of discharge? Yes. Advance Care Planning:   Does patient have an Advance Directive: reviewed and current. CTN reviewed discharge instructions, medical action plan and red flags with family and discussed any barriers to care and/or understanding of plan of care after discharge. Discussed appropriate site of care based on symptoms and resources available to patient including: PCP  Specialist  Home health. The family agrees to contact the PCP office for questions related to their healthcare. Patients top risk factors for readmission: functional cognitive ability  lack of knowledge about disease  medical condition-NSTEMI, dementia, panic disorder, CHF, HTN, pacemaker  multiple health system providers  polypharmacy  caregiver stress  Interventions to address risk factors: Scheduled appointment with PCP-as needed and Scheduled appointment with [facility] 1/25/23 or sooner      Non-[facility] follow up appointment(s):     CTN provided contact information for future needs. Plan for follow-up call in 7-10 days based on severity of symptoms and risk factors. Plan for next call: symptom management-chest pain, sob, swelling, wt.?, appetite, panic attacks, dementia issues  follow up appointment-Nephro? ? Before 1/25/23?          Follow Up  Future Appointments   Date Time Provider Davis Coats   1/25/2023  9:00 AM Tracee Garcia MD [facility]   2/10/2023 10:15 AM Neva Rocha MD [facility]       Alanna Childers RN

## 2022-09-28 ENCOUNTER — CARE COORDINATION (OUTPATIENT)
Dept: CASE MANAGEMENT | Age: 87
End: 2022-09-28

## 2022-09-28 NOTE — CARE COORDINATION
[facility] Care Transitions Follow Up Call    Care Transition Nurse contacted the family by telephone to follow up after admission on 22. Verified name and  with family as identifiers. Patient: Karan Roche  Patient : 1934   MRN: [id number]  Reason for Admission: chest pain/ NSTEMI  Discharge Date: 22 RARS: Readmission Risk Score: 15      Needs to be reviewed by the provider   Additional needs identified to be addressed with provider: No  none             Method of communication with provider: none. CTN Final Call to Jaret ([employer] on H[employer]) says Olga Felty is doing pretty good. Denies chest pain, sob, swelling, n/v/d, dizziness. Wt.=185 (same)  The family is considering whether a Homecare agency would be beneficial. This writer informed Keyur Velasquez that they would have to work w/ PCP for referral to agency of their choice. He expressed understanding. The family takes turns checking on him 3x /day and provides his meals. He is eating,drinking & sleeping ok. Has ongoing memory issues but no panic attacks recently. Denies problems w/ urination/bowels. Nephrology f/u appt. 11/15/22. No other concerns voiced at this time. Addressed changes since last contact:  none  Discussed follow-up appointments. If no appointment was previously scheduled, appointment scheduling offered: Yes. Is follow up appointment scheduled within 7 days of discharge? Yes. Follow Up  Future Appointments   Date Time Provider [place] Coats   11/15/2022  3:45 PM Ashtyn Cervantes MD [id number]   2/10/2023 10:15 AM Manny Bone MD [facility]     [facility] follow up appointment(s):     Care Transition Nurse reviewed medical action plan with family and discussed any barriers to care and/or understanding of plan of care after discharge. Discussed appropriate site of care based on symptoms and resources available to patient including: PCP  Specialist  When to call 911.  The family agrees to contact the PCP office for

## 2022-10-31 ENCOUNTER — HOSPITAL ENCOUNTER (EMERGENCY)
Age: 87
Discharge: HOME | End: 2022-10-31
Attending: EMERGENCY MEDICINE
Payer: MEDICARE

## 2022-10-31 ENCOUNTER — APPOINTMENT (OUTPATIENT)
Dept: GENERAL RADIOLOGY | Age: 87
End: 2022-10-31
Payer: MEDICARE

## 2022-10-31 VITALS
BODY MASS INDEX: 25.77 KG/M2 | WEIGHT: 180 LBS | DIASTOLIC BLOOD PRESSURE: 86 MMHG | HEART RATE: 70 BPM | SYSTOLIC BLOOD PRESSURE: 184 MMHG | OXYGEN SATURATION: 100 % | TEMPERATURE: 97.1 F | HEIGHT: 70 IN | RESPIRATION RATE: 16 BRPM

## 2022-10-31 DIAGNOSIS — S80.02XA CONTUSION OF LEFT KNEE, INITIAL ENCOUNTER: ICD-10-CM

## 2022-10-31 DIAGNOSIS — S83.92XA SPRAIN OF LEFT KNEE, UNSPECIFIED LIGAMENT, INITIAL ENCOUNTER: Primary | ICD-10-CM

## 2022-10-31 PROCEDURE — 73564 X-RAY EXAM KNEE 4 OR MORE: CPT

## 2022-10-31 PROCEDURE — 6370000000 HC RX 637 (ALT 250 FOR IP): Performed by: EMERGENCY MEDICINE

## 2022-10-31 PROCEDURE — 99283 EMERGENCY DEPT VISIT LOW MDM: CPT

## 2022-10-31 RX ORDER — HYDROCODONE BITARTRATE AND ACETAMINOPHEN 5; 325 MG/1; MG/1
1 TABLET ORAL ONCE
Status: COMPLETED | OUTPATIENT
Start: 2022-10-31 | End: 2022-10-31

## 2022-10-31 RX ORDER — HYDROCODONE BITARTRATE AND ACETAMINOPHEN 5; 325 MG/1; MG/1
1 TABLET ORAL EVERY 6 HOURS PRN
Qty: 15 TABLET | Refills: 0 | Status: SHIPPED | OUTPATIENT
Start: 2022-10-31 | End: 2022-11-05

## 2022-10-31 RX ADMIN — HYDROCODONE BITARTRATE AND ACETAMINOPHEN 1 TABLET: 5; 325 TABLET ORAL at 17:13

## 2022-10-31 ASSESSMENT — PAIN - FUNCTIONAL ASSESSMENT: PAIN_FUNCTIONAL_ASSESSMENT: 0-10

## 2022-10-31 ASSESSMENT — PAIN DESCRIPTION - LOCATION: LOCATION: KNEE

## 2022-10-31 ASSESSMENT — PAIN SCALES - GENERAL: PAINLEVEL_OUTOF10: 6

## 2022-10-31 ASSESSMENT — PAIN DESCRIPTION - ORIENTATION: ORIENTATION: LEFT

## 2022-10-31 NOTE — ED PROVIDER NOTES
EMERGENCY DEPARTMENT ENCOUNTER      CHIEF COMPLAINT:   Fall  Left knee pain    HPI: Chucho Demarco is a 80 y.o. male who presents to the Emergency Department complaining of left knee pain after he fell in the shower. The patient has a history of dementia and is a poor historian. Family at bedside helps provide the history. They state he fell in the shower and landed on his left knee. He did not hit his head or lose consciousness. The patient denies neck or back pain. He states his left knee hurts. He is unable to describe the pain. He is able to bear weight, but is walking very slowly. Denies any other complaints. REVIEW OF SYSTEMS:  CONSTITUTIONAL:  Denies fever, chills, weight loss or weakness  EYES:  Denies photophobia or discharge  ENT:  Denies sore throat or ear pain  CARDIOVASCULAR:  Denies chest pain, palpitations or swelling  RESPIRATORY:  Denies cough or shortness of breath  GI: Denies abdominal pain, nausea, vomiting, or diarrhea  MUSCULOSKELETAL: See HPI  SKIN:  No rash  NEUROLOGIC:  Denies headache, focal weakness or sensory changes  All systems negative except as marked. \"Remaining review of systems reviewed and negative. I have reviewed the nursing triage documentation and agree unless otherwise noted below. \"    PAST MEDICAL HISTORY:   Past Medical History:   Diagnosis Date    Anxiety     Bladder cancer ([facility]     Cancer ([facility]     CHF (congestive heart failure) ([address]     Chronic kidney disease     Chronic kidney disease, stage III (moderate) ([address] 07/17/2017    Coronary artery disease involving native coronary artery of native heart with unstable angina pectoris ([address] 08/11/2019    Dementia ([facility]     Depression     GERD (gastroesophageal reflux disease)     Gout     Hx of Carotid Doppler ultrasound 01/05/2021    mild 0-49% disease and normal flow    Hx of Doppler echocardiogram 11/19/2018    EF 50% mod LV hypertrophy    Hx of exercise stress test 11/19/2018    Treadmill normal study Hyperlipidemia     Hypertension     Hyperthyroidism     Mitral valve insufficiency        CURRENT MEDICATIONS:   Home medications reviewed. SURGICAL HISTORY:   Past Surgical History:   Procedure Laterality Date    COLONOSCOPY      CORONARY ANGIOPLASTY WITH STENT PLACEMENT      PACEMAKER INSERTION N/A 8/15/2019    PACEMAKER INSERTION PERMANENT performed by Shannon Jewell MD at [facility] Right 08/14/2019     ml       FAMILY HISTORY:   Family History   Problem Relation Age of Onset    Cancer Sister     Cancer Brother     High Blood Pressure Brother     Heart Disease Other     Hypertension Other     Elevated Lipids Other     Other Other         gout       SOCIAL HISTORY:   Social History     Socioeconomic History    Marital status:      Spouse name: Not on file    Number of children: 5    Years of education: Not on file    Highest education level: Not on file   Occupational History    Not on file   Tobacco Use    Smoking status: Former     Packs/day: 1.00     Years: 30.00     Pack years: 30.00     Types: Cigarettes    Smokeless tobacco: Never   Vaping Use    Vaping Use: Never used   Substance and Sexual Activity    Alcohol use: No    Drug use: No    Sexual activity: Not on file   Other Topics Concern    Not on file   Social History Narrative    Not on file     Social Determinants of Health     Financial Resource Strain: Not on file   Food Insecurity: Not on file   Transportation Needs: Not on file   Physical Activity: Not on file   Stress: Not on file   Social Connections: Not on file   Intimate Partner Violence: Not on file   Housing Stability: Not on file       ALLERGIES: Patient has no known allergies.     PHYSICAL EXAM:  VITAL SIGNS:   ED Triage Vitals   Enc Vitals Group      BP 10/31/22 1541 (!) 184/86      Heart Rate 10/31/22 1538 70      Resp 10/31/22 1538 16      Temp 10/31/22 1538 97.1 °F (36.2 °C)      Temp src --       SpO2 10/31/22 1538 99 % Weight 10/31/22 1538 180 lb (81.6 kg)      Height 10/31/22 1538 5' 10\" (1.778 m)      Head Circumference --       Peak Flow --       Pain Score --       Pain Loc --       Pain Edu? --       Excl. in [address]? --      Constitutional:  Non-toxic appearance  HENT: Normocephalic, Atraumatic  Eyes: PERRL, conjunctiva normal   Neck: Normal range of motion, No tenderness, Supple, No stridor, No lymphadenopathy  Cardiovascular:  Normal heart rate, Normal rhythm  Pulmonary/Chest:  Normal breath sounds, No respiratory distress, No wheezing  Abdomen: Bowel sounds normal, Soft, No tenderness, No masses, No pulsatile masses  Extremities: There is tenderness to palpation to the left knee And left upper knee anteriorly, no bruising, no swelling, no deformity, the peripheral pulses are strong, Capillary refill is brisk, there is normal motor and sensory function, the distal extremity is pink, warm, and well perfused, there is no cyanosis  Skin:  Warm, Dry, No erythema, No rash      EKG:    None    Radiology / Procedures:  XR KNEE LEFT (MIN 4 VIEWS) (Final result)  Result time 10/31/22 17:34:51  Final result by Estefany Almazan MD (10/31/22 17:34:51)                Impression:    No acute osseous abnormality. Narrative:    EXAMINATION:   FOUR XRAY VIEWS OF THE LEFT KNEE     10/31/2022 3:47 pm     COMPARISON:   None. HISTORY:   ORDERING SYSTEM PROVIDED HISTORY: Fall, left knee pain   TECHNOLOGIST PROVIDED HISTORY:   Reason for exam:->Fall, left knee pain     FINDINGS:   Chondrocalcinosis in the medial and lateral compartments. Mild medial   compartment degenerative changes. No fracture, dislocation, or joint   effusion. Severe atherosclerotic vascular calcifications are seen. ED COURSE & MEDICAL DECISION MAKING:  Pertinent Labs & Imaging studies reviewed. (See chart for details)  On exam, the patient is afebrile and nontoxic appearing.   He is hypertensive with a known history of hypertension and is asymptomatic. He is otherwise hemodynamically stable and his neurological baseline. Left knee x-rays are obtained and are unremarkable. He was treated with Norco for pain. An Ace wrap was applied. I suspect that the patient has a left knee contusion and strain. I have a low suspicion for DVT, acute fracture, dislocation, compartment syndrome, necrotizing fasciitis, or neurovascular injury. I feel that the patient is stable for outpatient management with follow up in 2-3 days. Return precautions are given. Clinical Impression:  1. Sprain of left knee, unspecified ligament, initial encounter    2. Contusion of left knee, initial encounter        Disposition referral (if applicable):  D[facility] [address]  [phone]    Schedule an appointment as soon as possible for a visit       [address] [place] [address] Rd  [phone]  Go to   If symptoms worsen    Disposition medications (if applicable):  Discharge Medication List as of 10/31/2022  5:46 PM        START taking these medications    Details   HYDROcodone-acetaminophen (NORCO) 5-325 MG per tablet Take 1 tablet by mouth every 6 hours as needed for Pain for up to 5 days. Intended supply: 3 days. Take lowest dose possible to manage pain, Disp-15 tablet, R-0Normal               Comment: Please note this report has been produced using speech recognition software and may contain errors related to that system including errors in grammar, punctuation, and spelling, as well as words and phrases that may be inappropriate. If there are any questions or concerns please feel free to contact the dictating provider for clarification.         Jayda Hawthorne MD  11/03/22 8737

## 2022-11-14 ENCOUNTER — HOSPITAL ENCOUNTER (EMERGENCY)
Age: 87
Discharge: HOME | DRG: 057 | End: 2022-11-14
Attending: EMERGENCY MEDICINE
Payer: MEDICARE

## 2022-11-14 ENCOUNTER — APPOINTMENT (OUTPATIENT)
Dept: CT IMAGING | Age: 87
DRG: 057 | End: 2022-11-14
Payer: MEDICARE

## 2022-11-14 ENCOUNTER — APPOINTMENT (OUTPATIENT)
Dept: GENERAL RADIOLOGY | Age: 87
DRG: 057 | End: 2022-11-14
Payer: MEDICARE

## 2022-11-14 ENCOUNTER — HOSPITAL ENCOUNTER (INPATIENT)
Age: 87
LOS: 8 days | Discharge: NURSING, REHAB OR LONG-TERM CARE | DRG: 057 | End: 2022-11-25
Attending: STUDENT IN AN ORGANIZED HEALTH CARE EDUCATION/TRAINING PROGRAM | Admitting: FAMILY MEDICINE
Payer: MEDICARE

## 2022-11-14 VITALS
OXYGEN SATURATION: 100 % | DIASTOLIC BLOOD PRESSURE: 65 MMHG | TEMPERATURE: 96.9 F | RESPIRATION RATE: 20 BRPM | HEART RATE: 63 BPM | SYSTOLIC BLOOD PRESSURE: 140 MMHG

## 2022-11-14 DIAGNOSIS — F41.9 ANXIETY: Primary | ICD-10-CM

## 2022-11-14 DIAGNOSIS — M11.20 CHONDROCALCINOSIS: ICD-10-CM

## 2022-11-14 DIAGNOSIS — Z87.898: ICD-10-CM

## 2022-11-14 DIAGNOSIS — M25.562 ACUTE PAIN OF LEFT KNEE: ICD-10-CM

## 2022-11-14 DIAGNOSIS — M25.562 LEFT KNEE PAIN, UNSPECIFIED CHRONICITY: ICD-10-CM

## 2022-11-14 DIAGNOSIS — S80.02XA CONTUSION OF LEFT KNEE, INITIAL ENCOUNTER: ICD-10-CM

## 2022-11-14 DIAGNOSIS — R26.81 UNSTEADY GAIT: ICD-10-CM

## 2022-11-14 DIAGNOSIS — I71.43 INFRARENAL ABDOMINAL AORTIC ANEURYSM (AAA) WITHOUT RUPTURE: ICD-10-CM

## 2022-11-14 DIAGNOSIS — S80.02XA CONTUSION OF LEFT KNEE, INITIAL ENCOUNTER: Primary | ICD-10-CM

## 2022-11-14 DIAGNOSIS — M17.12 ARTHRITIS OF LEFT KNEE: ICD-10-CM

## 2022-11-14 DIAGNOSIS — R29.6 FREQUENT FALLS: ICD-10-CM

## 2022-11-14 LAB
ALBUMIN SERPL-MCNC: 3.7 GM/DL (ref 3.4–5)
ALP BLD-CCNC: 90 IU/L (ref 40–129)
ALT SERPL-CCNC: 42 U/L (ref 10–40)
ANION GAP SERPL CALCULATED.3IONS-SCNC: 8 MMOL/L (ref 4–16)
AST SERPL-CCNC: 48 IU/L (ref 15–37)
BASOPHILS ABSOLUTE: 0 K/CU MM
BASOPHILS RELATIVE PERCENT: 0.2 % (ref 0–1)
BILIRUB SERPL-MCNC: 0.5 MG/DL (ref 0–1)
BILIRUBIN URINE: NEGATIVE MG/DL
BLOOD, URINE: NEGATIVE
BUN BLDV-MCNC: 29 MG/DL (ref 6–23)
CALCIUM SERPL-MCNC: 8.9 MG/DL (ref 8.3–10.6)
CHLORIDE BLD-SCNC: 104 MMOL/L (ref 99–110)
CLARITY: CLEAR
CO2: 25 MMOL/L (ref 21–32)
COLOR: YELLOW
CREAT SERPL-MCNC: 1.8 MG/DL (ref 0.9–1.3)
DIFFERENTIAL TYPE: ABNORMAL
EKG ATRIAL RATE: 69 BPM
EKG DIAGNOSIS: NORMAL
EKG P AXIS: 52 DEGREES
EKG P-R INTERVAL: 304 MS
EKG Q-T INTERVAL: 418 MS
EKG QRS DURATION: 138 MS
EKG QTC CALCULATION (BAZETT): 447 MS
EKG R AXIS: -9 DEGREES
EKG T AXIS: 127 DEGREES
EKG VENTRICULAR RATE: 69 BPM
EOSINOPHILS ABSOLUTE: 0 K/CU MM
EOSINOPHILS RELATIVE PERCENT: 0.5 % (ref 0–3)
GFR SERPL CREATININE-BSD FRML MDRD: 36 ML/MIN/1.73M2
GLUCOSE BLD-MCNC: 171 MG/DL (ref 70–99)
GLUCOSE, URINE: NEGATIVE MG/DL
HCT VFR BLD CALC: 40.4 % (ref 42–52)
HEMOGLOBIN: 12.7 GM/DL (ref 13.5–18)
IMMATURE NEUTROPHIL %: 0.7 % (ref 0–0.43)
KETONES, URINE: NEGATIVE MG/DL
LEUKOCYTE ESTERASE, URINE: NEGATIVE
LYMPHOCYTES ABSOLUTE: 0.5 K/CU MM
LYMPHOCYTES RELATIVE PERCENT: 10.3 % (ref 24–44)
MAGNESIUM: 1.8 MG/DL (ref 1.8–2.4)
MCH RBC QN AUTO: 29.3 PG (ref 27–31)
MCHC RBC AUTO-ENTMCNC: 31.4 % (ref 32–36)
MCV RBC AUTO: 93.3 FL (ref 78–100)
MONOCYTES ABSOLUTE: 0.1 K/CU MM
MONOCYTES RELATIVE PERCENT: 1.8 % (ref 0–4)
NITRITE URINE, QUANTITATIVE: NEGATIVE
NUCLEATED RBC %: 0 %
PDW BLD-RTO: 16.9 % (ref 11.7–14.9)
PH, URINE: 5.5 (ref 5–8)
PLATELET # BLD: 195 K/CU MM (ref 140–440)
PMV BLD AUTO: 10.6 FL (ref 7.5–11.1)
POTASSIUM SERPL-SCNC: 4.7 MMOL/L (ref 3.5–5.1)
PROTEIN UA: NEGATIVE MG/DL
RBC # BLD: 4.33 M/CU MM (ref 4.6–6.2)
REASON FOR REJECTION: NORMAL
REJECTED TEST: NORMAL
SEGMENTED NEUTROPHILS ABSOLUTE COUNT: 3.8 K/CU MM
SEGMENTED NEUTROPHILS RELATIVE PERCENT: 86.5 % (ref 36–66)
SODIUM BLD-SCNC: 137 MMOL/L (ref 135–145)
SPECIFIC GRAVITY UA: 1.01 (ref 1–1.03)
TOTAL CK: 71 IU/L (ref 38–174)
TOTAL IMMATURE NEUTOROPHIL: 0.03 K/CU MM
TOTAL NUCLEATED RBC: 0 K/CU MM
TOTAL PROTEIN: 6 GM/DL (ref 6.4–8.2)
TROPONIN T: <0.01 NG/ML
URIC ACID: 8.8 MG/DL (ref 3.5–7.2)
UROBILINOGEN, URINE: 0.2 MG/DL (ref 0.2–1)
VITAMIN D 25-HYDROXY: 11.49 NG/ML
WBC # BLD: 4.4 K/CU MM (ref 4–10.5)

## 2022-11-14 PROCEDURE — G0378 HOSPITAL OBSERVATION PER HR: HCPCS

## 2022-11-14 PROCEDURE — 96372 THER/PROPH/DIAG INJ SC/IM: CPT

## 2022-11-14 PROCEDURE — 96360 HYDRATION IV INFUSION INIT: CPT

## 2022-11-14 PROCEDURE — 71045 X-RAY EXAM CHEST 1 VIEW: CPT

## 2022-11-14 PROCEDURE — 6360000002 HC RX W HCPCS: Performed by: NURSE PRACTITIONER

## 2022-11-14 PROCEDURE — 99285 EMERGENCY DEPT VISIT HI MDM: CPT

## 2022-11-14 PROCEDURE — 73562 X-RAY EXAM OF KNEE 3: CPT

## 2022-11-14 PROCEDURE — 73700 CT LOWER EXTREMITY W/O DYE: CPT | Performed by: RADIOLOGY

## 2022-11-14 PROCEDURE — 96361 HYDRATE IV INFUSION ADD-ON: CPT

## 2022-11-14 PROCEDURE — 84550 ASSAY OF BLOOD/URIC ACID: CPT

## 2022-11-14 PROCEDURE — 80053 COMPREHEN METABOLIC PANEL: CPT

## 2022-11-14 PROCEDURE — 83735 ASSAY OF MAGNESIUM: CPT

## 2022-11-14 PROCEDURE — 94761 N-INVAS EAR/PLS OXIMETRY MLT: CPT

## 2022-11-14 PROCEDURE — 81003 URINALYSIS AUTO W/O SCOPE: CPT

## 2022-11-14 PROCEDURE — 72192 CT PELVIS W/O DYE: CPT

## 2022-11-14 PROCEDURE — G0378 HOSPITAL OBSERVATION PER HR: HCPCS | Performed by: PHYSICIAN ASSISTANT

## 2022-11-14 PROCEDURE — 6370000000 HC RX 637 (ALT 250 FOR IP): Performed by: NURSE PRACTITIONER

## 2022-11-14 PROCEDURE — 99284 EMERGENCY DEPT VISIT MOD MDM: CPT

## 2022-11-14 PROCEDURE — 82306 VITAMIN D 25 HYDROXY: CPT

## 2022-11-14 PROCEDURE — 6370000000 HC RX 637 (ALT 250 FOR IP): Performed by: EMERGENCY MEDICINE

## 2022-11-14 PROCEDURE — 2580000003 HC RX 258: Performed by: NURSE PRACTITIONER

## 2022-11-14 PROCEDURE — 82550 ASSAY OF CK (CPK): CPT

## 2022-11-14 PROCEDURE — 6360000002 HC RX W HCPCS: Performed by: EMERGENCY MEDICINE

## 2022-11-14 PROCEDURE — 93010 ELECTROCARDIOGRAM REPORT: CPT | Performed by: INTERNAL MEDICINE

## 2022-11-14 PROCEDURE — 93005 ELECTROCARDIOGRAM TRACING: CPT | Performed by: PHYSICIAN ASSISTANT

## 2022-11-14 PROCEDURE — 84484 ASSAY OF TROPONIN QUANT: CPT

## 2022-11-14 PROCEDURE — 70450 CT HEAD/BRAIN W/O DYE: CPT

## 2022-11-14 PROCEDURE — 85025 COMPLETE CBC W/AUTO DIFF WBC: CPT

## 2022-11-14 RX ORDER — DOCUSATE SODIUM 100 MG/1
100 CAPSULE, LIQUID FILLED ORAL 2 TIMES DAILY PRN
COMMUNITY
End: 2022-11-30

## 2022-11-14 RX ORDER — LANOLIN ALCOHOL/MO/W.PET/CERES
3 CREAM (GRAM) TOPICAL NIGHTLY
Status: DISCONTINUED | OUTPATIENT
Start: 2022-11-14 | End: 2022-11-25 | Stop reason: HOSPADM

## 2022-11-14 RX ORDER — MIDODRINE HYDROCHLORIDE 5 MG/1
10 TABLET ORAL 2 TIMES DAILY
Status: DISCONTINUED | OUTPATIENT
Start: 2022-11-14 | End: 2022-11-16

## 2022-11-14 RX ORDER — NICOTINE 21 MG/24HR
1 PATCH, TRANSDERMAL 24 HOURS TRANSDERMAL DAILY
Status: DISCONTINUED | OUTPATIENT
Start: 2022-11-14 | End: 2022-11-21

## 2022-11-14 RX ORDER — RISPERIDONE 0.5 MG/1
0.5 TABLET ORAL NIGHTLY
Status: DISCONTINUED | OUTPATIENT
Start: 2022-11-14 | End: 2022-11-16

## 2022-11-14 RX ORDER — SENNA PLUS 8.6 MG/1
1 TABLET ORAL 2 TIMES DAILY
Status: DISCONTINUED | OUTPATIENT
Start: 2022-11-14 | End: 2022-11-25 | Stop reason: HOSPADM

## 2022-11-14 RX ORDER — HYDROCODONE BITARTRATE AND ACETAMINOPHEN 5; 325 MG/1; MG/1
2 TABLET ORAL ONCE
Status: COMPLETED | OUTPATIENT
Start: 2022-11-14 | End: 2022-11-14

## 2022-11-14 RX ORDER — ACETAMINOPHEN 325 MG/1
650 TABLET ORAL EVERY 6 HOURS PRN
Status: DISCONTINUED | OUTPATIENT
Start: 2022-11-14 | End: 2022-11-25 | Stop reason: HOSPADM

## 2022-11-14 RX ORDER — FENTANYL CITRATE 50 UG/ML
50 INJECTION, SOLUTION INTRAMUSCULAR; INTRAVENOUS ONCE
Status: COMPLETED | OUTPATIENT
Start: 2022-11-14 | End: 2022-11-14

## 2022-11-14 RX ORDER — ASPIRIN 81 MG/1
81 TABLET ORAL DAILY
Status: DISCONTINUED | OUTPATIENT
Start: 2022-11-15 | End: 2022-11-25 | Stop reason: HOSPADM

## 2022-11-14 RX ORDER — SODIUM CHLORIDE 9 MG/ML
INJECTION, SOLUTION INTRAVENOUS CONTINUOUS
Status: DISCONTINUED | OUTPATIENT
Start: 2022-11-14 | End: 2022-11-14

## 2022-11-14 RX ORDER — ONDANSETRON 4 MG/1
4 TABLET, ORALLY DISINTEGRATING ORAL EVERY 8 HOURS PRN
Status: DISCONTINUED | OUTPATIENT
Start: 2022-11-14 | End: 2022-11-25 | Stop reason: HOSPADM

## 2022-11-14 RX ORDER — SODIUM CHLORIDE 9 MG/ML
25 INJECTION, SOLUTION INTRAVENOUS PRN
Status: DISCONTINUED | OUTPATIENT
Start: 2022-11-14 | End: 2022-11-25 | Stop reason: HOSPADM

## 2022-11-14 RX ORDER — ACETAMINOPHEN 325 MG/1
325 TABLET ORAL EVERY 4 HOURS PRN
Status: DISCONTINUED | OUTPATIENT
Start: 2022-11-14 | End: 2022-11-14 | Stop reason: SDUPTHER

## 2022-11-14 RX ORDER — ENOXAPARIN SODIUM 100 MG/ML
30 INJECTION SUBCUTANEOUS EVERY 24 HOURS
Status: DISCONTINUED | OUTPATIENT
Start: 2022-11-14 | End: 2022-11-20

## 2022-11-14 RX ORDER — PREGABALIN 25 MG/1
25 CAPSULE ORAL 2 TIMES DAILY
Status: ON HOLD | COMMUNITY
Start: 2022-11-02 | End: 2022-11-18 | Stop reason: SDUPTHER

## 2022-11-14 RX ORDER — HYDROCODONE BITARTRATE AND ACETAMINOPHEN 5; 325 MG/1; MG/1
1 TABLET ORAL EVERY 6 HOURS PRN
Qty: 12 TABLET | Refills: 0 | Status: SHIPPED | OUTPATIENT
Start: 2022-11-14 | End: 2022-11-14

## 2022-11-14 RX ORDER — POLYETHYLENE GLYCOL 3350 17 G
2 POWDER IN PACKET (EA) ORAL
Status: DISCONTINUED | OUTPATIENT
Start: 2022-11-14 | End: 2022-11-25 | Stop reason: HOSPADM

## 2022-11-14 RX ORDER — PREDNISONE 20 MG/1
60 TABLET ORAL ONCE
Status: COMPLETED | OUTPATIENT
Start: 2022-11-14 | End: 2022-11-14

## 2022-11-14 RX ORDER — ALPRAZOLAM 0.25 MG/1
0.25 TABLET ORAL NIGHTLY PRN
Status: DISCONTINUED | OUTPATIENT
Start: 2022-11-14 | End: 2022-11-25 | Stop reason: HOSPADM

## 2022-11-14 RX ORDER — PREDNISONE 10 MG/1
40 TABLET ORAL DAILY
Qty: 25 TABLET | Refills: 0 | Status: SHIPPED | OUTPATIENT
Start: 2022-11-14 | End: 2022-11-14 | Stop reason: ALTCHOICE

## 2022-11-14 RX ORDER — LEVOTHYROXINE SODIUM 0.05 MG/1
50 TABLET ORAL DAILY
Status: DISCONTINUED | OUTPATIENT
Start: 2022-11-15 | End: 2022-11-25 | Stop reason: HOSPADM

## 2022-11-14 RX ORDER — FLUOXETINE HYDROCHLORIDE 20 MG/1
20 CAPSULE ORAL NIGHTLY
Status: DISCONTINUED | OUTPATIENT
Start: 2022-11-14 | End: 2022-11-25 | Stop reason: HOSPADM

## 2022-11-14 RX ORDER — GUAIFENESIN 600 MG/1
600 TABLET, EXTENDED RELEASE ORAL 2 TIMES DAILY PRN
Status: DISCONTINUED | OUTPATIENT
Start: 2022-11-14 | End: 2022-11-25 | Stop reason: HOSPADM

## 2022-11-14 RX ORDER — HALOPERIDOL 5 MG/ML
0.5 INJECTION INTRAMUSCULAR EVERY 30 MIN PRN
Status: DISCONTINUED | OUTPATIENT
Start: 2022-11-14 | End: 2022-11-15

## 2022-11-14 RX ORDER — ATORVASTATIN CALCIUM 40 MG/1
80 TABLET, FILM COATED ORAL NIGHTLY
Status: DISCONTINUED | OUTPATIENT
Start: 2022-11-14 | End: 2022-11-25 | Stop reason: HOSPADM

## 2022-11-14 RX ORDER — SODIUM CHLORIDE 0.9 % (FLUSH) 0.9 %
5-40 SYRINGE (ML) INJECTION PRN
Status: DISCONTINUED | OUTPATIENT
Start: 2022-11-14 | End: 2022-11-25 | Stop reason: HOSPADM

## 2022-11-14 RX ORDER — POLYETHYLENE GLYCOL 3350 17 G/17G
17 POWDER, FOR SOLUTION ORAL DAILY PRN
Status: DISCONTINUED | OUTPATIENT
Start: 2022-11-14 | End: 2022-11-25 | Stop reason: HOSPADM

## 2022-11-14 RX ORDER — SODIUM CHLORIDE 9 MG/ML
INJECTION, SOLUTION INTRAVENOUS CONTINUOUS
Status: DISCONTINUED | OUTPATIENT
Start: 2022-11-14 | End: 2022-11-15

## 2022-11-14 RX ORDER — COLCHICINE 0.6 MG/1
0.6 TABLET ORAL DAILY
Status: COMPLETED | OUTPATIENT
Start: 2022-11-14 | End: 2022-11-15

## 2022-11-14 RX ORDER — SODIUM CHLORIDE 0.9 % (FLUSH) 0.9 %
5-40 SYRINGE (ML) INJECTION EVERY 12 HOURS SCHEDULED
Status: DISCONTINUED | OUTPATIENT
Start: 2022-11-14 | End: 2022-11-25 | Stop reason: HOSPADM

## 2022-11-14 RX ORDER — PREGABALIN 25 MG/1
25 CAPSULE ORAL 2 TIMES DAILY
Status: DISCONTINUED | OUTPATIENT
Start: 2022-11-14 | End: 2022-11-25 | Stop reason: HOSPADM

## 2022-11-14 RX ORDER — TAMSULOSIN HYDROCHLORIDE 0.4 MG/1
0.4 CAPSULE ORAL DAILY
Status: DISCONTINUED | OUTPATIENT
Start: 2022-11-15 | End: 2022-11-25 | Stop reason: HOSPADM

## 2022-11-14 RX ORDER — ONDANSETRON 2 MG/ML
4 INJECTION INTRAMUSCULAR; INTRAVENOUS EVERY 6 HOURS PRN
Status: DISCONTINUED | OUTPATIENT
Start: 2022-11-14 | End: 2022-11-25 | Stop reason: HOSPADM

## 2022-11-14 RX ORDER — DOCUSATE SODIUM 100 MG/1
100 CAPSULE, LIQUID FILLED ORAL 2 TIMES DAILY PRN
Status: DISCONTINUED | OUTPATIENT
Start: 2022-11-14 | End: 2022-11-25 | Stop reason: HOSPADM

## 2022-11-14 RX ORDER — PANTOPRAZOLE SODIUM 40 MG/1
40 TABLET, DELAYED RELEASE ORAL DAILY
Status: DISCONTINUED | OUTPATIENT
Start: 2022-11-15 | End: 2022-11-25 | Stop reason: HOSPADM

## 2022-11-14 RX ORDER — ALLOPURINOL 100 MG/1
100 TABLET ORAL DAILY
Status: DISCONTINUED | OUTPATIENT
Start: 2022-11-15 | End: 2022-11-25 | Stop reason: HOSPADM

## 2022-11-14 RX ORDER — TORSEMIDE 20 MG/1
10 TABLET ORAL DAILY
Status: DISCONTINUED | OUTPATIENT
Start: 2022-11-14 | End: 2022-11-25 | Stop reason: HOSPADM

## 2022-11-14 RX ORDER — ONDANSETRON 4 MG/1
8 TABLET, ORALLY DISINTEGRATING ORAL EVERY 8 HOURS PRN
Status: DISCONTINUED | OUTPATIENT
Start: 2022-11-14 | End: 2022-11-14 | Stop reason: ALTCHOICE

## 2022-11-14 RX ADMIN — ATORVASTATIN CALCIUM 80 MG: 40 TABLET, FILM COATED ORAL at 22:44

## 2022-11-14 RX ADMIN — ENOXAPARIN SODIUM 30 MG: 100 INJECTION SUBCUTANEOUS at 17:18

## 2022-11-14 RX ADMIN — PREDNISONE 60 MG: 20 TABLET ORAL at 05:36

## 2022-11-14 RX ADMIN — HYDROCODONE BITARTRATE AND ACETAMINOPHEN 2 TABLET: 5; 325 TABLET ORAL at 04:37

## 2022-11-14 RX ADMIN — COLCHICINE 0.6 MG: 0.6 TABLET ORAL at 17:18

## 2022-11-14 RX ADMIN — RISPERIDONE 0.5 MG: 0.5 TABLET ORAL at 22:44

## 2022-11-14 RX ADMIN — Medication 3 MG: at 22:44

## 2022-11-14 RX ADMIN — HALOPERIDOL LACTATE 0.5 MG: 5 INJECTION, SOLUTION INTRAMUSCULAR at 18:48

## 2022-11-14 RX ADMIN — METOPROLOL TARTRATE 12.5 MG: 25 TABLET, FILM COATED ORAL at 22:44

## 2022-11-14 RX ADMIN — SODIUM CHLORIDE, PRESERVATIVE FREE 10 ML: 5 INJECTION INTRAVENOUS at 22:43

## 2022-11-14 RX ADMIN — SENNOSIDES 8.6 MG: 8.6 TABLET, FILM COATED ORAL at 22:44

## 2022-11-14 RX ADMIN — FLUOXETINE HYDROCHLORIDE 20 MG: 20 CAPSULE ORAL at 22:45

## 2022-11-14 RX ADMIN — SODIUM CHLORIDE: 9 INJECTION, SOLUTION INTRAVENOUS at 17:26

## 2022-11-14 RX ADMIN — ALPRAZOLAM 0.25 MG: 0.25 TABLET ORAL at 22:45

## 2022-11-14 RX ADMIN — FENTANYL CITRATE 50 MCG: 50 INJECTION INTRAMUSCULAR; INTRAVENOUS at 05:36

## 2022-11-14 RX ADMIN — PREGABALIN 25 MG: 25 CAPSULE ORAL at 17:18

## 2022-11-14 ASSESSMENT — PAIN DESCRIPTION - ORIENTATION: ORIENTATION: LEFT

## 2022-11-14 ASSESSMENT — ENCOUNTER SYMPTOMS
ABDOMINAL PAIN: 0
COUGH: 0
NAUSEA: 0
SORE THROAT: 0
EYES NEGATIVE: 1
SHORTNESS OF BREATH: 0
VOMITING: 0
CONSTIPATION: 0

## 2022-11-14 ASSESSMENT — PAIN SCALES - GENERAL
PAINLEVEL_OUTOF10: 7
PAINLEVEL_OUTOF10: 0

## 2022-11-14 ASSESSMENT — PAIN DESCRIPTION - LOCATION: LOCATION: KNEE

## 2022-11-14 NOTE — ED NOTES
Patient noted to be standing at bedside, this RN re-orientated patient to hospital and assisted x1 into ED cot. Tolerated well; attempted education on fall precautions and waiting for staff to ambulate out of bed, pt verbalized understanding.      Arline Champagne RN  11/14/22 Burke Florentino RN  11/14/22 5057

## 2022-11-14 NOTE — ED PROVIDER NOTES
[id number]  [facility]      Pt Name: John Bennett  MRN: [id number]  [place]arhan 1934  Date of evaluation: 11/14/2022  Provider: Lisset Santillan MD    CHIEF COMPLAINT       Chief Complaint   Patient presents with    Knee Pain     Left knee pain. Family reports that pt fell recently and was here for same. HISTORY OF PRESENT ILLNESS    HPI    Nursing Notes were reviewed. This is a 80 y.o. male who presents to the emergency department with ongoing left knee pain. The patient was seen in this emergency department 2 weeks ago after a fall in the shower. At that time, he landed on his left leg and left knee and had pain. He was evaluated and was not found to have any acute injury at that time. Patient was brought to the emergency department by his son and daughter because of worsening pain. He has had multiple falls since that event and has frequently landed on his left knee. Patient's daughter is extremely concerned because he is having ongoing pain and having difficulty sleeping. Patient has not followed up with an orthopedist since that event. The patient has advanced Alzheimer's dementia and is a poor historian and is unable to provide any significant information. He is able to say that his knee hurts. Otherwise, he is unable to provide any meaningful information. Family denies that the patient has been planed of any other symptoms. He has not had any significant chest pain or difficulty breathing. He has not had any hip or back pain. He does not have any nausea or vomiting. He has not had any recent fevers. REVIEW OF SYSTEMS       Review of Systems    8 Systems were reviewed with this patient and his family members, and all were negative with exception of those noted in the history of present illness above.       PAST MEDICAL HISTORY     Past Medical History:   Diagnosis Date    Anxiety     Bladder cancer ([facility]     [facility] CHF (congestive heart failure) (H[facility])     Chronic kidney disease     Chronic kidney disease, stage III (moderate) ([facility] 07/17/2017    Coronary artery disease involving native coronary artery of native heart with unstable angina pectoris ([facility] 08/11/2019    Dementia ([facility]     Depression     GERD (gastroesophageal reflux disease)     Gout     Hx of Carotid Doppler ultrasound 01/05/2021    mild 0-49% disease and normal flow    Hx of Doppler echocardiogram 11/19/2018    EF 50% mod LV hypertrophy    Hx of exercise stress test 11/19/2018    Treadmill normal study    Hyperlipidemia     Hypertension     Hyperthyroidism     Mitral valve insufficiency          SURGICAL HISTORY       Past Surgical History:   Procedure Laterality Date    COLONOSCOPY      CORONARY ANGIOPLASTY WITH STENT PLACEMENT      PACEMAKER INSERTION N/A 8/15/2019    PACEMAKER INSERTION PERMANENT performed by Ann Dumont MD at [place] [address] Right 08/14/2019     ml         CURRENT MEDICATIONS       Discharge Medication List as of 11/14/2022  5:48 AM        CONTINUE these medications which have NOT CHANGED    Details   Folinic Acid-Vit B6-Vit B12 (FOLINIC-PLUS PO) Take 1 tablet by mouth dailyHistorical Med      B Complex Vitamins (VITAMIN B COMPLEX PO) Take 1 tablet by mouth daily With electrolytesHistorical Med      acetaminophen (TYLENOL) 325 MG tablet Take 325 mg by mouth every 4 hours as needed for PainHistorical Med      metoprolol tartrate (LOPRESSOR) 25 MG tablet Take 1 tablet by mouth 2 times daily Changed per Dr Martha Landa 9/10/19, Disp-60 tablet, R-3Normal      midodrine (PROAMATINE) 10 MG tablet Take 1 tablet by mouth 2 times daily AM and afternoon and avoid taking at night, Disp-60 tablet, R-11Normal      pantoprazole (PROTONIX) 40 MG tablet Take 40 mg by mouth dailyHistorical Med      allopurinol (ZYLOPRIM) 100 MG tablet Take 100 mg by mouth dailyHistorical Med      torsemide (DEMADEX) 20 MG tablet Take 1 tablet by mouth daily, Disp-90 tablet,R-3Normal      ALPRAZolam (XANAX) 0.5 MG tablet Take 0.25 mg by mouth in the morning. Indications: takes in the pm.Historical Med      senna (SENOKOT) 8.6 MG tablet Take 1 tablet by mouth 2 times dailyHistorical Med      ondansetron (ZOFRAN-ODT) 8 MG TBDP disintegrating tablet Place 8 mg under the tongue every 8 hours as needed for Nausea or VomitingHistorical Med      FLUoxetine (PROZAC) 20 MG capsule Take 20 mg by mouth daily Indications: takes at night Takes a nightHistorical Med      atorvastatin (LIPITOR) 80 MG tablet Take 1 tablet by mouth nightly, Disp-30 tablet, R-0Print      guaiFENesin (MUCINEX) 600 MG extended release tablet Take 1 tablet by mouth 2 times daily, Disp-60 tablet, R-0Print      tamsulosin (FLOMAX) 0.4 MG capsule Take 1 capsule by mouth daily, Disp-30 capsule, R-0Print      levothyroxine (SYNTHROID) 50 MCG tablet Take 50 mcg by mouth DailyHistorical Med      aspirin 81 MG tablet Take 81 mg by mouth daily Historical Med             ALLERGIES     Patient has no known allergies. FAMILY HISTORY       Family History   Problem Relation Age of Onset    Cancer Sister     Cancer Brother     High Blood Pressure Brother     Heart Disease Other     Hypertension Other     Elevated Lipids Other     Other Other         gout          SOCIAL HISTORY       Social History     Socioeconomic History    Marital status:     Number of children: 5   Tobacco Use    Smoking status: Former     Packs/day: 1.00     Years: 30.00     Pack years: 30.00     Types: Cigarettes    Smokeless tobacco: Never   Vaping Use    Vaping Use: Never used   Substance and Sexual Activity    Alcohol use: No    Drug use: No       PHYSICAL EXAM       ED Triage Vitals [11/14/22 0419]   BP Temp Temp Source Heart Rate Resp SpO2 Height Weight   (!) 140/65 96.9 °F (36.1 °C) Infrared 63 20 100 % -- --       Physical Exam  Vitals and nursing note reviewed.    Constitutional:       General: He is not in acute distress. Appearance: Normal appearance. He is not ill-appearing, toxic-appearing or diaphoretic. HENT:      Head: Normocephalic. Nose: Nose normal.   Eyes:      Extraocular Movements: Extraocular movements intact. Cardiovascular:      Rate and Rhythm: Normal rate. Pulmonary:      Effort: Pulmonary effort is normal.   Musculoskeletal:         General: Signs of injury present. No swelling. Cervical back: Normal range of motion. Left knee: Ecchymosis present. No swelling, deformity, effusion, erythema, lacerations, bony tenderness or crepitus. Normal range of motion. No tenderness. Right lower leg: No swelling. No edema. Left lower leg: No swelling. No edema. Skin:     General: Skin is warm and dry. Capillary Refill: Capillary refill takes less than 2 seconds. Neurological:      General: No focal deficit present. Mental Status: He is alert. Mental status is at baseline. Psychiatric:         Mood and Affect: Mood normal.         Behavior: Behavior normal.       Vitals:    Vitals:    11/14/22 0419   BP: (!) 140/65   Pulse: 63   Resp: 20   Temp: 96.9 °F (36.1 °C)   TempSrc: Infrared   SpO2: 100%       DIAGNOSTIC RESULTS       RADIOLOGY:   Non-plain film images such as CT, Ultrasound and MRI are read by the radiologist. Plain radiographic images are visualized and preliminarily interpreted by the emergency physician with the below findings:    Interpretation per the Radiologist below, if available at the time of this note:    XR KNEE LEFT (3 VIEWS)   Final Result   No acute osseous abnormality. 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 LABS:  Labs Reviewed - No data to display    All other labs were within normal range or not returned as of this dictation.     MEDICAL DECISION MAKING     Medications   HYDROcodone-acetaminophen (NORCO) 5-325 MG per tablet 2 tablet (2 tablets Oral Given 11/14/22 5556)   predniSONE (Patrizia Noon) tablet 60 mg (60 mg Oral Given 11/14/22 0536)   fentaNYL (SUBLIMAZE) injection 50 mcg (50 mcg IntraMUSCular Given 11/14/22 0536)         MDM  Physical exam indicates ecchymoses across the left knee but there is no acute swelling. There is no deformity. There is minimal tenderness to the knee itself. The possibility of occult fracture or reinjury does need to be considered and as a result x-ray of the knee will be completed. The patient has a history of gouty arthritis and the possibility of arthritic related pain remains a consideration, however there is no effusion or swelling to the knee and as a result, I did not feel it appropriate to attempt arthrocentesis evaluation in the emergency department. Patient has no fevers, no calf tenderness. No leg swelling patient has no leg swelling distal to the knee, no calf tenderness, negative Homans' sign, and there is no significant risk for thromboembolic disease. X-ray was completed shows no acute fracture. There is evidence of significant arthritic changes. I had a detailed conversation with the patient's family and I recommended that he follow-up with an orthopedist to soon as possible. The patient has been given analgesia for home use. I started the patient on a course of steroids for his arthritis related pain. There is no indication for admission or further evaluation here in the emergency department. The patient's family is concerned that he has significant worsening pain, however I did explain to them that there is no clear indication for admission and the patient has assistance at home with all 3 family members. He has been given pain medication for home use. I strongly encouraged him to follow-up with an orthopedist soon as possible and they have been given contact information for orthopedics.       The following Diagnoses were considered in this patient and determined to be unlikely based on the history of present illness, physical exam, as well as other information available. Fracture of the femur  Fracture of the tibia  Fracture of the fibula  Fracture of the patella  Septic arthritis  Deep vein thrombosis    Clinical Diagnoses Addressed  1. Contusion of left knee, initial encounter    2. Acute pain of left knee    3. Arthritis of left knee                                      CIWA Assessment  BP: (!) 140/65  Heart Rate: 63                     CONSULTS:  None    PROCEDURES:  Unless otherwise noted below, none     Procedures      FINAL IMPRESSION      1. Contusion of left knee, initial encounter    2. Acute pain of left knee    3. Arthritis of left knee          DISPOSITION/PLAN   DISPOSITION Decision To Discharge 11/14/2022 04:48:38 AM      PATIENT REFERRED TO:  DANE Gomez   [phone]    Call in 1 day      Micky Saldaña DO  [address] [place] [address]  [phone]    Call in 1 day      DISCHARGE MEDICATIONS:  Discharge Medication List as of 11/14/2022  5:48 AM        START taking these medications    Details   HYDROcodone-acetaminophen (NORCO) 5-325 MG per tablet Take 1 tablet by mouth every 6 hours as needed for Pain for up to 3 days. Intended supply: 3 days. Take lowest dose possible to manage pain, Disp-12 tablet, R-0Normal      predniSONE (DELTASONE) 10 MG tablet Take 4 tablets by mouth daily for 6 days, Disp-25 tablet, R-0Normal           Controlled Substances Monitoring:     No flowsheet data found.     Migdalia Lofton MD (electronically signed)  Attending Emergency Physician            Migdalia Lofton MD  11/14/22 0700

## 2022-11-14 NOTE — ED NOTES
ED TO INPATIENT SBAR HANDOFF    Patient Name: Ita Kolb   :  1934  80 y.o. MRN:  [id number]  Preferred Name    ED Room #:  ED18/ED-18  Family/Caregiver Present no   Restraints no   Sitter no - may need inpatient sitter   Sepsis Risk Score Sepsis Risk Score: 1.58    Situation  Code Status: Prior No additional code details. Allergies: Gabapentin  Weight: Patient Vitals for the past 96 hrs (Last 3 readings):   Weight   22 0948 185 lb (83.9 kg)     Arrived from: nursing home  Chief Complaint:   Chief Complaint   Patient presents with    Leg Pain     Hospital Problem/Diagnosis:  Principal Problem:    Left knee pain, unspecified chronicity  Resolved Problems:    * No resolved hospital problems. *    Imaging:   CT PELVIS WO CONTRAST Additional Contrast? None   Preliminary Result   No acute bony abnormality is seen in the pelvis. Mild bilateral hip joint degenerative changes. Degenerative changes also   seen at the SI joints and visualized lower lumbar spine. 2.8 cm infrarenal abdominal aortic aneurysm suspected. Recommend follow-up   every 5 years. Reference: J Am Erwin Radiol 7033;17:323-703. RECOMMENDATIONS:   2.8 cm infrarenal abdominal aortic aneurysm suspected. Recommend follow-up   every 5 years. Reference: J Am Erwin Radiol 5330;31:476-091. CT KNEE LEFT WO CONTRAST   Preliminary Result   No acute bony abnormality is seen in the left knee. Chondrocalcinosis, without a large knee joint effusion. Calcified small prepatellar bursa. Mild tricompartmental osteoarthrosis. CT HEAD WO CONTRAST   Final Result   No acute intracranial abnormality. Findings consistent with central and cortical atrophy. Microvascular ischemic white matter disease. Chronic left mastoiditis. No interval change.          XR CHEST PORTABLE    (Results Pending)     Abnormal labs:   Abnormal Labs Reviewed   CBC WITH AUTO DIFFERENTIAL - Abnormal; Notable for the following components:       Result Value    RBC 4.33 (*)     Hemoglobin 12.7 (*)     Hematocrit 40.4 (*)     MCHC 31.4 (*)     RDW 16.9 (*)     Segs Relative 86.5 (*)     Lymphocytes % 10.3 (*)     Immature Neutrophil % 0.7 (*)     All other components within normal limits   COMPREHENSIVE METABOLIC PANEL - Abnormal; Notable for the following components:    BUN 29 (*)     Creatinine 1.8 (*)     Est, Glom Filt Rate 36 (*)     Glucose 171 (*)     Total Protein 6.0 (*)     ALT 42 (*)     AST 48 (*)     All other components within normal limits     Critical values: no     Abnormal Assessment Findings: Needs rehab placement    Background  History:   Past Medical History:   Diagnosis Date    Anxiety     Bladder cancer ([facility]     Cancer ([facility]     CHF (congestive heart failure) (HCC)     Chronic kidney disease     Chronic kidney disease, stage III (moderate) ([facility] 07/17/2017    Coronary artery disease involving native coronary artery of native heart with unstable angina pectoris ([facility] 08/11/2019    Dementia (HCC)     Depression     GERD (gastroesophageal reflux disease)     Gout     Hx of Carotid Doppler ultrasound 01/05/2021    mild 0-49% disease and normal flow    Hx of Doppler echocardiogram 11/19/2018    EF 50% mod LV hypertrophy    Hx of exercise stress test 11/19/2018    Treadmill normal study    Hyperlipidemia     Hypertension     Hyperthyroidism     Mitral valve insufficiency        Assessment    Vitals/MEWS: MEWS Score: 2  Level of Consciousness: Alert (0)   Vitals:    11/14/22 1100 11/14/22 1115 11/14/22 1300 11/14/22 1445   BP: (!) 152/77 (!) 172/75     Pulse: 66  72 81   Resp: 12   16   Temp:       TempSrc:       SpO2: 100%  99% 100%   Weight:         FiO2 (%):   O2 Flow Rate: O2 Device: None (Room air)    Cardiac Rhythm:    Pain Assessment:  [x] Verbal [] Reyne Boxer Scale  Pain Scale:    Last documented pain score (0-10 scale)    Last documented pain medication administered:   Mental Status: disoriented  NIH Score:    C-SSRS:    Bedside swallow:    Tony Coma Scale (GCS): [place] Coma Scale  Eye Opening: Spontaneous  Best Verbal Response: Oriented  Best Motor Response: Obeys commands  Tony Coma Scale Score: 15  Active LDA's:   Peripheral IV 11/14/22 Right Antecubital (Active)   Site Assessment Clean, dry & intact 11/14/22 1406   Line Status Blood return noted 11/14/22 1406     PO Status: Regular  Pertinent or High Risk Medications/Drips: no   If Yes, please provide details:   Pending Blood Product Administration: no     You may also review the ED PT Care Timeline found under the Summary Nursing Index tab. Recommendation    Pending orders   Plan for Discharge (if known):    Additional Comments:    If any further questions, please call Sending RN at [phone]    Electronically signed by: Electronically signed by Melina Molina RN on 11/14/2022 at 3:20 PM      Nkechi Mishra RN  11/14/22 [phone]

## 2022-11-14 NOTE — H&P
V2.0  History and Physical      Name:  Polo Paiz /Age/Sex: 1934  (80 y.o. male)   MRN & CSN:  [id number] & [id number] Encounter Date/Time: 2022 2:45 PM EST   Location:  ED18/ED-18 PCP: Levi Srivastava PA-C       Hospital Day: 1    Assessment and Plan:   Polo Paiz is a 80 y.o. male with a pmh as noted below presents with generalized weakness, falls, left knee pain      Left knee pain  Multiple falls, recurrent  Self Care Deficit   Malnutrition, moderate  Chondrocalcinosis     Place in Observation   Chest x-ray, pending   Head CT negative for acute intercranial abnormality, findings consistent with central and cortical atrophy,   Pelvis CT negative for acute bony abnormality   X-ray left knee negative for acute bony abnormality. Chondrocalcinosis without a large knee joint effusion. Start Collchicine 0.6mg po x1 due to low GFR + voltaren. Add on uric acid    Consult to case management     Chronic kidney disease stage III   Baseline creatinine 1.8-2.0   Hold home torsemide   Gentle IV hydration overnight, repeat BMP in a.m. Chronic systolic heart failure  Essential hypertension  Coronary artery disease status post PCI   Continue home Lopressor at reduced dose , torsemide as noted above    Hypothyroidism   Continue Synthroid    Alzheimers Dementia   Falls precautions   UA negative   Continue home meds  Chronic Conditions: continue home medication as ordered    All testing  and results reviewed with patient . All questions answered. Patient and family voiced understanding    This patient was seen and examined in conjunction with Dr. Dante Gregg. He/She was agreeable with the plan and management as dictated above.     Disposition:   Expected Disposition: SNF  Estimated D/C: 3 days     Diet No diet orders on file   GI Prophylaxis  [x] PPI,  [] H2 Blocker,  [] Carafate,  [] Diet/Tube Feeds   DVT Prophylaxis [x] Lovenox, []  Heparin, [] SCDs, [] Ambulation,  [] NOAC   Code Status Prior   Surrogate Decision Maker/ POA          History from:     patient, electronic medical record, family    History of Present Illness:     Chief Complaint: Left knee pain, unspecified chronicity  Morris Echeverria is a 80 y.o. male who presented to the ED with family with concern for worsening lower left leg pain from fall, increasing confusion and gait instability. Patient has severe dementia, currently lives by himself at home. HPI was provided by the patient's daughters at bedside. Patient reported to have a fall in the bathtub several weeks ago, injured his left knee. Imaging studies originally done in ED visit on 10/31, was discharged home with supportive care but had continued symptoms, was seen again in ED in ED this morning with repeated x-rays that were still unremarkable. Patient was discharged home with prescription for Vicodin however patient was having increasing pain in the lower extremities with associated weakness with prompted him to come to emergency department today for evaluation. Family is concerned that the patient has a very unsteady gait over the past month , increasing confusion from his baseline dementia. Denies any new foods, medications, vomiting, diarrhea or urinary complaints. No reported head injuries but endorses multiple falls at home. Denies any known fevers, chills. Reports poor appetite, not drinking much water. Denies any changes in urine output. Reports patient has not been complaining of any chest pain or shortness of breath. Review of Systems: Need 10 Elements   Review of Systems   Constitutional: Negative. Negative for fever and unexpected weight change. HENT:  Negative for congestion and sore throat. Eyes: Negative. Respiratory:  Negative for cough and shortness of breath. Cardiovascular:  Negative for chest pain and leg swelling. Gastrointestinal:  Negative for abdominal pain, constipation, nausea and vomiting. Endocrine: Negative.     Genitourinary: Negative for dysuria and hematuria. Musculoskeletal:  Positive for arthralgias, gait problem and joint swelling. Negative for neck pain. Skin:  Negative for wound. Neurological:  Negative for dizziness and light-headedness. All other systems reviewed and are negative. Objective: Intake/Output Summary (Last 24 hours) at 11/14/2022 1549  Last data filed at 11/14/2022 1353  Gross per 24 hour   Intake --   Output 350 ml   Net -350 ml      Vitals:   Vitals:    11/14/22 1100 11/14/22 1115 11/14/22 1300 11/14/22 1445   BP: (!) 152/77 (!) 172/75     Pulse: 66  72 81   Resp: 12   16   Temp:       TempSrc:       SpO2: 100%  99% 100%   Weight:           Medications Prior to Admission     Prior to Admission medications    Medication Sig Start Date End Date Taking? Authorizing Provider   pregabalin (LYRICA) 25 MG capsule Take 25 mg by mouth in the morning and 25 mg in the evening.  11/2/22 5/1/23 Yes Historical Provider, MD   docusate sodium (COLACE) 100 MG capsule Take 100 mg by mouth 2 times daily as needed for Constipation   Yes Historical Provider, MD   Folinic Acid-Vit B6-Vit B12 (FOLINIC-PLUS PO) Take 1 tablet by mouth daily    Historical Provider, MD   B Complex Vitamins (VITAMIN B COMPLEX PO) Take 1 tablet by mouth daily With electrolytes    Historical Provider, MD   acetaminophen (TYLENOL) 325 MG tablet Take 325 mg by mouth every 4 hours as needed for Pain    Historical Provider, MD   metoprolol tartrate (LOPRESSOR) 25 MG tablet Take 1 tablet by mouth 2 times daily Changed per Dr Luana Quarles 9/10/19 5/5/21   Gordon Mitchell MD   midodrine (PROAMATINE) 10 MG tablet Take 1 tablet by mouth 2 times daily AM and afternoon and avoid taking at night  Patient taking differently: Take 10 mg by mouth 2 times daily avoid taking at night 5/5/21 11/14/22  Gordon Mitchell MD   pantoprazole (PROTONIX) 40 MG tablet Take 40 mg by mouth daily    Historical Provider, MD   allopurinol (ZYLOPRIM) 100 MG tablet Take 100 mg by mouth daily    Historical Provider, MD   torsemide (DEMADEX) 20 MG tablet Take 1 tablet by mouth daily  Patient taking differently: Take 10 mg by mouth daily 8/14/20   Micah Marion MD   [facility]olam [facility]) 0.5 MG tablet Take 0.25 mg by mouth nightly as needed. Indications: takes in the pm    Historical Provider, MD   senna (SENOKOT) 8.6 MG tablet Take 1 tablet by mouth 2 times daily as needed for Constipation    Historical Provider, MD   ondansetron (ZOFRAN-ODT) 8 MG TBDP disintegrating tablet Place 8 mg under the tongue every 8 hours as needed for Nausea or Vomiting    Historical Provider, MD   FLUoxetine (PROZAC) 20 MG capsule Take 20 mg by mouth nightly Indications: takes at night    Historical Provider, MD   atorvastatin (LIPITOR) 80 MG tablet Take 1 tablet by mouth nightly 8/30/19   YARIEL Quarles MD   guaiFENesin (MUCINEX) 600 MG extended release tablet Take 1 tablet by mouth 2 times daily  Patient taking differently: Take 600 mg by mouth 2 times daily as needed for Congestion 8/30/19   YARIEL Quarles MD   tamsulosin [facility]) 0.4 MG capsule Take 1 capsule by mouth daily 8/31/19   YARIEL Quarles MD   levothyroxine (SYNTHROID) 50 MCG tablet Take 50 mcg by mouth Daily    Historical Provider, MD   aspirin 81 MG tablet Take 81 mg by mouth daily     Historical Provider, MD       Physical Exam: Need 8 Elements   Physical Exam  Vitals and nursing note reviewed. Constitutional:       General: He is not in acute distress. Appearance: Normal appearance. HENT:      Head: Normocephalic. Nose: Nose normal.      Mouth/Throat:      Pharynx: Oropharynx is clear. Eyes:      Pupils: Pupils are equal, round, and reactive to light. Cardiovascular:      Rate and Rhythm: Normal rate and regular rhythm. Pulses: Normal pulses. Pulmonary:      Effort: Pulmonary effort is normal. No respiratory distress. Breath sounds: No wheezing or rhonchi.    Abdominal:      General: Abdomen is flat. Bowel sounds are normal. There is no distension. Musculoskeletal:         General: Normal range of motion. Cervical back: Normal range of motion. Skin:     General: Skin is warm and dry. Capillary Refill: Capillary refill takes less than 2 seconds. Neurological:      General: No focal deficit present. Mental Status: He is alert and oriented to person, place, and time. Psychiatric:         Mood and Affect: Mood normal.          Past Medical History:   PMHx   Past Medical History:   Diagnosis Date    Anxiety     Bladder cancer ([facility]     Cancer ([facility]     CHF (congestive heart failure) ([facility]     Chronic kidney disease     Chronic kidney disease, stage III (moderate) ([facility] 07/17/2017    Coronary artery disease involving native coronary artery of native heart with unstable angina pectoris ([facility] 08/11/2019    Dementia ([facility]     Depression     GERD (gastroesophageal reflux disease)     Gout     Hx of Carotid Doppler ultrasound 01/05/2021    mild 0-49% disease and normal flow    Hx of Doppler echocardiogram 11/19/2018    EF 50% mod LV hypertrophy    Hx of exercise stress test 11/19/2018    Treadmill normal study    Hyperlipidemia     Hypertension     Hyperthyroidism     Mitral valve insufficiency      PSHX:  has a past surgical history that includes thoracentesis (Right, 08/14/2019); Pacemaker insertion (N/A, 8/15/2019); Colonoscopy; pacemaker placement; and Coronary angioplasty with stent. Allergies: Allergies   Allergen Reactions    Gabapentin Hallucinations     Fam HX:  family history includes Cancer in his brother and sister; Elevated Lipids in an other family member; Heart Disease in an other family member; High Blood Pressure in his brother; Hypertension in an other family member; Other in an other family member. Soc HX:   Social History     Socioeconomic History    Marital status:       Spouse name: None    Number of children: 5    Years of education: None    Highest education level: None   Tobacco Use    Smoking status: Former     Packs/day: 1.00     Years: 30.00     Pack years: 30.00     Types: Cigarettes    Smokeless tobacco: Never   Vaping Use    Vaping Use: Never used   Substance and Sexual Activity    Alcohol use: No    Drug use: No       Medications:   Medications:    Infusions:    sodium chloride       PRN Meds:      Labs      CBC:   Recent Labs     11/14/22  1112   WBC 4.4   HGB 12.7*        BMP:    Recent Labs     11/14/22  1233      K 4.7      CO2 25   BUN 29*   CREATININE 1.8*   GLUCOSE 171*     Hepatic:   Recent Labs     11/14/22  1233   AST 48*   ALT 42*   BILITOT 0.5   ALKPHOS 90     Lipids:   Lab Results   Component Value Date/Time    CHOL 118 02/22/2021 12:00 AM    HDL 40 02/22/2021 12:00 AM    TRIG 104 02/22/2021 12:00 AM     Hemoglobin A1C: No results found for: LABA1C  TSH: No results found for: TSH  Troponin:   Lab Results   Component Value Date/Time    TROPONINT <0.010 11/14/2022 12:33 PM    TROPONINT 0.030 08/31/2022 10:34 AM    TROPONINT 0.020 08/31/2022 10:34 AM     Lactic Acid: No results for input(s): LACTA in the last 72 hours. BNP: No results for input(s): PROBNP in the last 72 hours.   UA:  Lab Results   Component Value Date/Time    NITRU NEGATIVE 11/14/2022 02:05 PM    NITRU Negative 11/09/2022 11:50 AM    COLORU YELLOW 11/14/2022 02:05 PM    PHUR 6.0 11/09/2022 11:50 AM    LABCAST None seen 11/09/2022 11:50 AM    LABCAST CANCELED 11/09/2022 11:50 AM    WBCUA 12 05/16/2021 01:00 AM    RBCUA None seen 11/09/2022 11:50 AM    RBCUA 1 05/16/2021 01:00 AM    MUCUS CANCELED 11/09/2022 11:50 AM    TRICHOMONAS NONE SEEN 05/16/2021 01:00 AM    YEAST CANCELED 11/09/2022 11:50 AM    BACTERIA None seen 11/09/2022 11:50 AM    CLARITYU CLEAR 11/14/2022 02:05 PM    SPECGRAV 1.010 11/14/2022 02:05 PM    LEUKOCYTESUR NEGATIVE 11/14/2022 02:05 PM    UROBILINOGEN 0.2 11/14/2022 02:05 PM    BILIRUBINUR NEGATIVE 11/14/2022 02:05 PM    BLOODU NEGATIVE 11/14/2022 02:05 PM    GLUCOSEU Negative 11/09/2022 11:50 AM    KETUA NEGATIVE 11/14/2022 02:05 PM     Urine Cultures: No results found for: José Kline  Blood Cultures: No results found for: BC  No results found for: BLOODCULT2  Organism: No results found for: ORG    Imaging/Diagnostics Last 24 Hours   XR KNEE LEFT (3 VIEWS)    Result Date: 11/14/2022  EXAMINATION: THREE XRAY VIEWS OF THE LEFT KNEE 11/14/2022 4:35 am COMPARISON: None. HISTORY: ORDERING SYSTEM PROVIDED HISTORY: Pain, Multiple falls TECHNOLOGIST PROVIDED HISTORY: Reason for exam:->Pain, Multiple falls Reason for Exam: pain after fall Left knee pain FINDINGS: There is no acute fracture, dislocation, or bone destruction. Chondrocalcinosis is seen involving the medial and lateral compartments. There is no joint effusion. Vascular calcifications are seen. No acute osseous abnormality. 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 CT HEAD WO CONTRAST    Result Date: 11/14/2022  EXAMINATION: CT OF THE HEAD WITHOUT CONTRAST  11/14/2022 11:48 am TECHNIQUE: CT of the head was performed without the administration of intravenous contrast. Automated exposure control, iterative reconstruction, and/or weight based adjustment of the mA/kV was utilized to reduce the radiation dose to as low as reasonably achievable. COMPARISON: 11/29/2019 HISTORY: ORDERING SYSTEM PROVIDED HISTORY: unsteady gait x1 month TECHNOLOGIST PROVIDED HISTORY: Has a \"code stroke\" or \"stroke alert\" been called? ->No Reason for exam:->unsteady gait x1 month Decision Support Exception - unselect if not a suspected or confirmed emergency medical condition->Emergency Medical Condition (MA) Reason for Exam: unsteady gait x1 month FINDINGS: BRAIN/VENTRICLES: There is no acute intracranial hemorrhage, mass effect or midline shift. No abnormal extra-axial fluid collection. The gray-white differentiation is maintained without evidence of an acute infarct.   There is no evidence of hydrocephalus. Moderate to severely dilated ventricles and cerebral sulci. Periventricular and subcortical white matter decreased attenuation. ORBITS: The visualized portion of the orbits demonstrate no acute abnormality. SINUSES: The visualized paranasal sinuses and right mastoid air cells demonstrate no acute abnormality. Small amount fluid in left mastoid air cells. SOFT TISSUES/SKULL:  No acute abnormality of the visualized skull or soft tissues. No acute intracranial abnormality. Findings consistent with central and cortical atrophy. Microvascular ischemic white matter disease. Chronic left mastoiditis. No interval change. CT PELVIS WO CONTRAST Additional Contrast? None    Result Date: 11/14/2022  EXAMINATION: CT OF THE PELVIS WITHOUT CONTRAST 11/14/2022 11:14 am TECHNIQUE: CT of the pelvis was performed without the administration of intravenous contrast.  Multiplanar reformatted images are provided for review. Adjustment of mA and/or kV according to patient size was utilized. Automated exposure control, iterative reconstruction, and/or weight based adjustment of the mA/kV was utilized to reduce the radiation dose to as low as reasonably achievable. COMPARISON: CT abdomen and pelvis 08/03/2019 HISTORY: ORDERING SYSTEM PROVIDED HISTORY: pain x2 weeks, from fall TECHNOLOGIST PROVIDED HISTORY: Reason for exam:->pain x2 weeks, from fall Additional Contrast?->None Decision Support Exception - unselect if not a suspected or confirmed emergency medical condition->Emergency Medical Condition (MA) FINDINGS: No acute fracture is seen. No evidence of dislocation. Mild bilateral hip joint degenerative changes. Degenerative changes also seen at the SI joints and visualized lower lumbar spine. Similar peripherally sclerotic lucent lesion in the left posterior iliac bone, suggesting a benign finding. The infrarenal abdominal aorta measures up to 2.8 cm in diameter. There are vascular calcifications.      No acute bony abnormality is seen in the pelvis. Mild bilateral hip joint degenerative changes. Degenerative changes also seen at the SI joints and visualized lower lumbar spine. 2.8 cm infrarenal abdominal aortic aneurysm suspected. Recommend follow-up every 5 years. Reference: J Am Erwin Radiol 6718;08:725-114. RECOMMENDATIONS: 2.8 cm infrarenal abdominal aortic aneurysm suspected. Recommend follow-up every 5 years. Reference: J Am Erwin Radiol 2346;83:236-411. CT KNEE LEFT WO CONTRAST    Result Date: 11/14/2022  EXAMINATION: CT OF THE LEFT KNEE WITHOUT CONTRAST 11/14/2022 11:49 am TECHNIQUE: CT of the left knee was performed without the administration of intravenous contrast.  Multiplanar reformatted images are provided for review. Automated exposure control, iterative reconstruction, and/or weight based adjustment of the mA/kV was utilized to reduce the radiation dose to as low as reasonably achievable. COMPARISON: Radiographs 11/14/2022. HISTORY ORDERING SYSTEM PROVIDED HISTORY: pain trauma TECHNOLOGIST PROVIDED HISTORY: Reason for exam:->pain trauma Decision Support Exception - unselect if not a suspected or confirmed emergency medical condition->Emergency Medical Condition (MA) FINDINGS: No acute fracture is seen. No evidence of dislocation. Mild tricompartmental osteoarthrosis. There is chondrocalcinosis of the menisci. Calcifications are seen involving the cruciate ligaments and proximal gastrocnemius tendon insertion. Vascular calcifications are seen. There is a small calcified prepatellar bursa. Small Baker's cyst.  Small volume knee joint fluid. No acute bony abnormality is seen in the left knee. Chondrocalcinosis, without a large knee joint effusion. Calcified small prepatellar bursa. Mild tricompartmental osteoarthrosis. Electronically signed by PRISCILLA Pope CNP on 11/14/2022 at 3:49 PM          This dictation was created with voice recognition software.  While attempts have been made to review the dictation as it is transcribed, on occasion the spoken word can be misinterpreted by the technology leading to omissions or inappropriate words, phrases or sentences.      Electronically signed by PRISCILLA Rogel CNP on 11/14/2022 at 3:49 PM

## 2022-11-14 NOTE — ED NOTES
MG tablet Take 1 tablet by mouth 2 times daily as needed    Historical Provider, MD   ondansetron (ZOFRAN-ODT) 8 MG TBDP disintegrating tablet Place 8 mg under the tongue every 8 hours as needed for Nausea or Vomiting    Historical Provider, MD   FLUoxetine (PROZAC) 20 MG capsule Take 20 mg by mouth nightly Indications: takes at night    Historical Provider, MD   atorvastatin (LIPITOR) 80 MG tablet Take 1 tablet by mouth nightly 8/30/19   C Verónica Ortiz MD   guaiFENesin (MUCINEX) 600 MG extended release tablet Take 1 tablet by mouth 2 times daily  Patient taking differently: Take 600 mg by mouth 2 times daily as needed for Congestion 8/30/19   C Verónica Ortiz MD   tamsulosin [facility]) 0.4 MG capsule Take 1 capsule by mouth daily 8/31/19   C Verónica Ortiz MD   levothyroxine (SYNTHROID) 50 MCG tablet Take 50 mcg by mouth Daily    Historical Provider, MD   aspirin 81 MG tablet Take 81 mg by mouth daily     Historical Provider, MD     Medications added or changed (ex. new medication, dosage change, interval change, formulation change):  Noroc changed to Pregabalin   Docusate (added)    Medications removed from list (include reason, ex. noncompliance, medication cost, therapy complete etc.):   Prednisone did not  from pharmacy    Comments:  Unable to assess patient. Medication list reviewed with patient's family and insurance claims verified  Per family patient took first dose of medications today. Patient was here in the ER earlier this am, prescriptions sent to pharmacy for prednisone and norco. These medications were not .     To my knowledge the above medication history is accurate as of 11/14/2022 12:58 PM.   Portia Curiel CPhT   11/14/2022 12:58 PM

## 2022-11-14 NOTE — CARE COORDINATION
CM consulted for Hospice. Pt is noted to have advanced Alzheimer's. Pt had a recent fall 11/3/22 and was seen in the ED. Pt came in this morning for leg pain and was d/c back home after being evaluated. Pt returns with leg pain. Pt is noted to live alone with family support of them checking on pt and providing meals. Pt has a PCP and insurance. Pt also has [employer] benefits. No HC noted on chart review. [id number] spoke with Adalgisa Tovar @ [phone]. Adalgisa Tovar was sitting in the waiting room and CM hung up to speak to them in the [address] [place] [address]. CM gave list and briefly explained hospice. Pt's children are all here and going to see which company they would like a referral sent to. CM gave children phone number. CM updated Korinne PA on process so far. Family chose [facility]. [address] called referral line for [facility] @ [phone]. Spoke with Yocasta Solomon. Faxed over all information to [phone]. Handoff note placed.

## 2022-11-14 NOTE — ED PROVIDER NOTES
EMERGENCY DEPARTMENT ENCOUNTER    [facility] EMERGENCY DEPARTMENT        TRIAGE CHIEF COMPLAINT:   Leg Pain      [place]:  Radhames Ivy is a 80 y.o. male that presents with family with concern for worsening lower leg pain on the left from fall, increasing confusion and gait instability. Patient has severe dementia, currently lives by himself at home, HPI provided by daughters at bedside. Patient reportedly had a fall in the bathtub several weeks ago, injuring his left knee. Had negative imaging studies on ED visit on 10/31/2022, was discharged home with supportive care but had continued symptoms. Was seen again this morning at [facility] emergency department, had repeat x-rays that were still unremarkable. Was discharged home with a prescription for Vicodin however patient was having increasing pain in the lower legs which prompted him to bring patient to our emergency department today. Family is concerned that patient has had a very unsteady gait over the last month, increasing confusion from his baseline dementia. No new foods medications vomiting diarrhea or urinary complaints. No reported head injuries at home but patient has had multiple near falls. Family feels patient is no longer safe at home, care for the patient is currently provided by daughters and \"we need help. \"  They do think that he would benefit from rehab and have also discussed possible palliative care/hospice care. No recent fever or chills, URI cough or cold complaints. Family does state patient has had a poor appetite and not drinking as much water at home. No changes in urine output. It is otherwise not been complaining of any chest pain difficulty breathing, cough or abdominal symptoms. ROS: Very limited from patient given history of dementia. Provided by daughters at bedside.   General:  No fevers   Cardiovascular:  No chest pain   Respiratory:  No shortness of breath, no cough Gastrointestinal:  No pain, no nausea, no vomiting, no diarrhea  Musculoskeletal: See HPI  Skin:  No rash   Neurologic:  See HPI  Genitourinary: No dysuria    Past Medical History:   Diagnosis Date    Anxiety     Bladder cancer ([facility]     Cancer ([facility]     CHF (congestive heart failure) (HCC)     Chronic kidney disease     Chronic kidney disease, stage III (moderate) ([facility] 07/17/2017    Coronary artery disease involving native coronary artery of native heart with unstable angina pectoris ([facility] 08/11/2019    Dementia ([facility]     Depression     GERD (gastroesophageal reflux disease)     Gout     Hx of Carotid Doppler ultrasound 01/05/2021    mild 0-49% disease and normal flow    Hx of Doppler echocardiogram 11/19/2018    EF 50% mod LV hypertrophy    Hx of exercise stress test 11/19/2018    Treadmill normal study    Hyperlipidemia     Hypertension     Hyperthyroidism     Mitral valve insufficiency      Past Surgical History:   Procedure Laterality Date    COLONOSCOPY      CORONARY ANGIOPLASTY WITH STENT PLACEMENT      PACEMAKER INSERTION N/A 8/15/2019    PACEMAKER INSERTION PERMANENT performed by Jovi Mckay MD at [facility] Right 08/14/2019     ml     Family History   Problem Relation Age of Onset    Cancer Sister     Cancer Brother     High Blood Pressure Brother     Heart Disease Other     Hypertension Other     Elevated Lipids Other     Other Other         gout     Social History     Socioeconomic History    Marital status:       Spouse name: Not on file    Number of children: 5    Years of education: Not on file    Highest education level: Not on file   Occupational History    Not on file   Tobacco Use    Smoking status: Former     Packs/day: 1.00     Years: 30.00     Pack years: 30.00     Types: Cigarettes    Smokeless tobacco: Never   Vaping Use    Vaping Use: Never used   Substance and Sexual Activity    Alcohol use: No    Drug use: No    Sexual activity: Not on file   Other Topics Concern    Not on file   Social History Narrative    Not on file     Social Determinants of Health     Financial Resource Strain: Not on file   Food Insecurity: Not on file   Transportation Needs: Not on file   Physical Activity: Not on file   Stress: Not on file   Social Connections: Not on file   Intimate Partner Violence: Not on file   Housing Stability: Not on file     Current Facility-Administered Medications   Medication Dose Route Frequency Provider Last Rate Last Admin    0.9 % sodium chloride infusion   IntraVENous Continuous Ritu Weinstein, APRN - CNP         Current Outpatient Medications   Medication Sig Dispense Refill    pregabalin (LYRICA) 25 MG capsule Take 25 mg by mouth in the morning and 25 mg in the evening. docusate sodium (COLACE) 100 MG capsule Take 100 mg by mouth 2 times daily as needed for Constipation      Folinic Acid-Vit B6-Vit B12 (FOLINIC-PLUS PO) Take 1 tablet by mouth daily      B Complex Vitamins (VITAMIN B COMPLEX PO) Take 1 tablet by mouth daily With electrolytes      acetaminophen (TYLENOL) 325 MG tablet Take 325 mg by mouth every 4 hours as needed for Pain      metoprolol tartrate (LOPRESSOR) 25 MG tablet Take 1 tablet by mouth 2 times daily Changed per Dr Ferris Nyhan 9/10/19 60 tablet 3    midodrine (PROAMATINE) 10 MG tablet Take 1 tablet by mouth 2 times daily AM and afternoon and avoid taking at night (Patient taking differently: Take 10 mg by mouth 2 times daily avoid taking at night) 60 tablet 11    pantoprazole (PROTONIX) 40 MG tablet Take 40 mg by mouth daily      allopurinol (ZYLOPRIM) 100 MG tablet Take 100 mg by mouth daily      torsemide (DEMADEX) 20 MG tablet Take 1 tablet by mouth daily (Patient taking differently: Take 10 mg by mouth daily) 90 tablet 3    ALPRAZolam (XANAX) 0.5 MG tablet Take 0.25 mg by mouth nightly as needed.  Indications: takes in the pm      senna (SENOKOT) 8.6 MG tablet Take 1 tablet by mouth 2 times daily as needed for Constipation      ondansetron (ZOFRAN-ODT) 8 MG TBDP disintegrating tablet Place 8 mg under the tongue every 8 hours as needed for Nausea or Vomiting      FLUoxetine (PROZAC) 20 MG capsule Take 20 mg by mouth nightly Indications: takes at night      atorvastatin (LIPITOR) 80 MG tablet Take 1 tablet by mouth nightly 30 tablet 0    guaiFENesin (MUCINEX) 600 MG extended release tablet Take 1 tablet by mouth 2 times daily (Patient taking differently: Take 600 mg by mouth 2 times daily as needed for Congestion) 60 tablet 0    tamsulosin (FLOMAX) 0.4 MG capsule Take 1 capsule by mouth daily 30 capsule 0    levothyroxine (SYNTHROID) 50 MCG tablet Take 50 mcg by mouth Daily      aspirin 81 MG tablet Take 81 mg by mouth daily        Allergies   Allergen Reactions    Gabapentin Hallucinations       Nursing Notes Reviewed  PHYSICAL EXAM    VITAL SIGNS: BP (!) 172/75   Pulse 72   Temp 98.8 °F (37.1 °C) (Oral)   Resp 12   Wt 185 lb (83.9 kg)   SpO2 99%   BMI 26.54 kg/m²    Constitutional:  Well developed, Well nourished, In no acute distress  Head:  Normocephalic, Atraumatic  Eyes: PERRL. EOMI. Sclera clear. Conjunctiva normal, No discharge. Neck/Lymphatics: Supple, no JVD, No lymphadenopathy  Cardiovascular:  RRR, Normal S1 & S2     Peripheral Vascular: Distal pulses 2+, Capillary refill <2seconds  Respiratory:  Respirations nonnlabored, Diminished air exchange but equal.  Clear to auscultation bilaterally, No retractions  Abdomen: Bowel sounds normal in all quadrants, Soft, Non tender/Nondistended, No palpable abdominal masses. Musculoskeletal: BUE/BLE symmetrical without atrophy or deformities  No gross bony deformities of bilateral lower legs, no asymmetry of the left lower compared to the right. No abnormal rotation or limb shortening. At rest, unable to reproduce any tenderness palpation of the bilateral hips, knee or ankle.   Patient is also noted to have full range of motion of UA CLEAR CLEAR    Glucose, Urine NEGATIVE NEGATIVE MG/DL    Bilirubin Urine NEGATIVE NEGATIVE MG/DL    Ketones, Urine NEGATIVE NEGATIVE MG/DL    Specific Gravity, UA 1.010 1.001 - 1.035    Blood, Urine NEGATIVE NEGATIVE    pH, Urine 5.5 5.0 - 8.0    Protein, UA NEGATIVE NEGATIVE MG/DL    Urobilinogen, Urine 0.2 0.2 - 1.0 MG/DL    Nitrite Urine, Quantitative NEGATIVE NEGATIVE    Leukocyte Esterase, Urine NEGATIVE NEGATIVE   SPECIMEN REJECTION   Result Value Ref Range    Rejected Test CHEMISTRY     Reason for Rejection UNABLE TO PERFORM TESTING:    Comprehensive Metabolic Panel   Result Value Ref Range    Sodium 137 135 - 145 MMOL/L    Potassium 4.7 3.5 - 5.1 MMOL/L    Chloride 104 99 - 110 mMol/L    CO2 25 21 - 32 MMOL/L    BUN 29 (H) 6 - 23 MG/DL    Creatinine 1.8 (H) 0.9 - 1.3 MG/DL    Est, Glom Filt Rate 36 (L) >60 mL/min/1.73m2    Glucose 171 (H) 70 - 99 MG/DL    Calcium 8.9 8.3 - 10.6 MG/DL    Albumin 3.7 3.4 - 5.0 GM/DL    Total Protein 6.0 (L) 6.4 - 8.2 GM/DL    Total Bilirubin 0.5 0.0 - 1.0 MG/DL    ALT 42 (H) 10 - 40 U/L    AST 48 (H) 15 - 37 IU/L    Alkaline Phosphatase 90 40 - 129 IU/L    Anion Gap 8 4 - 16   CK   Result Value Ref Range    Total CK 71 38 - 174 IU/L   Magnesium   Result Value Ref Range    Magnesium 1.8 1.8 - 2.4 mg/dl   Troponin   Result Value Ref Range    Troponin T <0.010 <0.01 NG/ML   EKG 12 Lead   Result Value Ref Range    Ventricular Rate 69 BPM    Atrial Rate 69 BPM    P-R Interval 304 ms    QRS Duration 138 ms    Q-T Interval 418 ms    QTc Calculation (Bazett) 447 ms    P Axis 52 degrees    R Axis -9 degrees    T Axis 127 degrees    Diagnosis       Sinus rhythm with 1st degree AV block  Right bundle branch block  Left ventricular hypertrophy with repolarization abnormality  Cannot rule out Septal infarct (cited on or before 16-AUG-2019)  Inferior infarct (cited on or before 05-JUL-2020)  Abnormal ECG  When compared with ECG of 31-AUG-2022 10:10,  Questionable change in initial forces of Anteroseptal leads  T wave inversion no longer evident in Inferior leads  T wave inversion less evident in Anterior leads  T wave inversion more evident in Lateral leads          Radiographs (if obtained):  [] The following radiograph was interpreted by myself in the absence of a radiologist:   [] Radiologist's Report Reviewed:  CT PELVIS WO CONTRAST Additional Contrast? None   Preliminary Result   No acute bony abnormality is seen in the pelvis. Mild bilateral hip joint degenerative changes. Degenerative changes also   seen at the SI joints and visualized lower lumbar spine. 2.8 cm infrarenal abdominal aortic aneurysm suspected. Recommend follow-up   every 5 years. Reference: J Am Erwin Radiol [id number];87:476-126. RECOMMENDATIONS:   2.8 cm infrarenal abdominal aortic aneurysm suspected. Recommend follow-up   every 5 years. Reference: J Am Erwin Radiol 5435;99:436-980. CT KNEE LEFT WO CONTRAST   Preliminary Result   No acute bony abnormality is seen in the left knee. Chondrocalcinosis, without a large knee joint effusion. Calcified small prepatellar bursa. Mild tricompartmental osteoarthrosis. CT HEAD WO CONTRAST   Final Result   No acute intracranial abnormality. Findings consistent with central and cortical atrophy. Microvascular ischemic white matter disease. Chronic left mastoiditis. No interval change. XR CHEST PORTABLE    (Results Pending)        CT PELVIS WO CONTRAST Additional Contrast? None (Preliminary result)  Result time 11/14/22 12:22:47  Preliminary result by Mil Tran MD (11/14/22 12:22:47)                Impression:    No acute bony abnormality is seen in the pelvis. Mild bilateral hip joint degenerative changes. Degenerative changes also   seen at the SI joints and visualized lower lumbar spine. 2.8 cm infrarenal abdominal aortic aneurysm suspected. Recommend follow-up   every 5 years.      Reference: J Am Erwin Radiol [id number];37:184-960. RECOMMENDATIONS:   2.8 cm infrarenal abdominal aortic aneurysm suspected. Recommend follow-up   every 5 years. Reference: J Am Erwin Radiol 1587;63:189-521. Narrative:    EXAMINATION:   CT OF THE PELVIS WITHOUT CONTRAST 11/14/2022 11:14 am     TECHNIQUE:   CT of the pelvis was performed without the administration of intravenous   contrast.  Multiplanar reformatted images are provided for review. Adjustment of mA and/or kV according to patient size was utilized. Automated   exposure control, iterative reconstruction, and/or weight based adjustment of   the mA/kV was utilized to reduce the radiation dose to as low as reasonably   achievable. COMPARISON:   CT abdomen and pelvis 08/03/2019     HISTORY:   ORDERING SYSTEM PROVIDED HISTORY: pain x2 weeks, from fall   TECHNOLOGIST PROVIDED HISTORY:   Reason for exam:->pain x2 weeks, from fall   Additional Contrast?->None   Decision Support Exception - unselect if not a suspected or confirmed   emergency medical condition->Emergency Medical Condition (MA)     FINDINGS:   No acute fracture is seen. No evidence of dislocation. Mild bilateral hip   joint degenerative changes. Degenerative changes also seen at the SI joints   and visualized lower lumbar spine. Similar peripherally sclerotic lucent lesion in the left posterior iliac   bone, suggesting a benign finding. The infrarenal abdominal aorta measures up to 2.8 cm in diameter. There are   vascular calcifications. Preliminary result by Kingston Viera (11/14/22 12:22:47)                Impression:    No acute bony abnormality is seen in the pelvis. Mild bilateral hip joint degenerative changes. Degenerative changes also   seen at the SI joints and visualized lower lumbar spine. 2.8 cm infrarenal abdominal aortic aneurysm suspected. Recommend follow-up   every 5 years. Reference: J Am Erwin Radiol [id number];94:119-632. RECOMMENDATIONS:   2.8 cm infrarenal abdominal aortic aneurysm suspected. Recommend follow-up   every 5 years. Reference: J Am Erwin Radiol 7050;42:465-744. CT KNEE LEFT WO CONTRAST (Preliminary result)  Result time 11/14/22 12:08:30  Preliminary result by Alayna Contreras MD (11/14/22 12:08:30)                Impression:    No acute bony abnormality is seen in the left knee. Chondrocalcinosis, without a large knee joint effusion. Calcified small prepatellar bursa. Mild tricompartmental osteoarthrosis. Narrative:    EXAMINATION:   CT OF THE LEFT KNEE WITHOUT CONTRAST 11/14/2022 11:49 am     TECHNIQUE:   CT of the left knee was performed without the administration of intravenous   contrast.  Multiplanar reformatted images are provided for review. Automated   exposure control, iterative reconstruction, and/or weight based adjustment of   the mA/kV was utilized to reduce the radiation dose to as low as reasonably   achievable. COMPARISON:   Radiographs 11/14/2022. HISTORY   ORDERING SYSTEM PROVIDED HISTORY: pain trauma   TECHNOLOGIST PROVIDED HISTORY:   Reason for exam:->pain trauma   Decision Support Exception - unselect if not a suspected or confirmed   emergency medical condition->Emergency Medical Condition (MA)     FINDINGS:   No acute fracture is seen. No evidence of dislocation. Mild tricompartmental   osteoarthrosis. There is chondrocalcinosis of the menisci. Calcifications   are seen involving the cruciate ligaments and proximal gastrocnemius tendon   insertion. Vascular calcifications are seen. There is a small calcified prepatellar bursa. Small Baker's cyst.  Small volume knee joint fluid. Preliminary result by Antonella Mcdonough (11/14/22 12:06:48)                Impression:    No acute bony abnormality is seen in the left knee. Chondrocalcinosis, without a large knee joint effusion.      Calcified small prepatellar bursa.     Mild tricompartmental osteoarthrosis. CT HEAD WO CONTRAST (Final result)  Result time 11/14/22 12:26:26  Final result by Chen Paige MD (11/14/22 12:26:26)                Impression:    No acute intracranial abnormality. Findings consistent with central and cortical atrophy. Microvascular ischemic white matter disease. Chronic left mastoiditis. No interval change. Narrative:    EXAMINATION:   CT OF THE HEAD WITHOUT CONTRAST  11/14/2022 11:48 am     TECHNIQUE:   CT of the head was performed without the administration of intravenous   contrast. Automated exposure control, iterative reconstruction, and/or weight   based adjustment of the mA/kV was utilized to reduce the radiation dose to as   low as reasonably achievable. COMPARISON:   11/29/2019     HISTORY:   ORDERING SYSTEM PROVIDED HISTORY: unsteady gait x1 month   TECHNOLOGIST PROVIDED HISTORY:   Has a \"code stroke\" or \"stroke alert\" been called? ->No   Reason for exam:->unsteady gait x1 month   Decision Support Exception - unselect if not a suspected or confirmed   emergency medical condition->Emergency Medical Condition (MA)   Reason for Exam: unsteady gait x1 month     FINDINGS:   BRAIN/VENTRICLES: There is no acute intracranial hemorrhage, mass effect or   midline shift. No abnormal extra-axial fluid collection. The gray-white   differentiation is maintained without evidence of an acute infarct. There is   no evidence of hydrocephalus. Moderate to severely dilated ventricles and   cerebral sulci. Periventricular and subcortical white matter decreased   attenuation. ORBITS: The visualized portion of the orbits demonstrate no acute abnormality. SINUSES: The visualized paranasal sinuses and right mastoid air cells   demonstrate no acute abnormality. Small amount fluid in left mastoid air   cells.      SOFT TISSUES/SKULL:  No acute abnormality of the visualized skull or soft tissues. EKG Interpretation  Please see ED physician's note - Dr. Ruperto Mon - for EKG interpretation      Chart review shows recent radiographs:  XR KNEE LEFT (3 VIEWS)    Result Date: 11/14/2022  EXAMINATION: THREE XRAY VIEWS OF THE LEFT KNEE 11/14/2022 4:35 am COMPARISON: None. HISTORY: ORDERING SYSTEM PROVIDED HISTORY: Pain, Multiple falls TECHNOLOGIST PROVIDED HISTORY: Reason for exam:->Pain, Multiple falls Reason for Exam: pain after fall Left knee pain FINDINGS: There is no acute fracture, dislocation, or bone destruction. Chondrocalcinosis is seen involving the medial and lateral compartments. There is no joint effusion. Vascular calcifications are seen. No acute osseous abnormality. 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 XR KNEE LEFT (MIN 4 VIEWS)    Result Date: 10/31/2022  EXAMINATION: FOUR XRAY VIEWS OF THE LEFT KNEE 10/31/2022 3:47 pm COMPARISON: None. HISTORY: ORDERING SYSTEM PROVIDED HISTORY: Fall, left knee pain TECHNOLOGIST PROVIDED HISTORY: Reason for exam:->Fall, left knee pain FINDINGS: Chondrocalcinosis in the medial and lateral compartments. Mild medial compartment degenerative changes. No fracture, dislocation, or joint effusion. Severe atherosclerotic vascular calcifications are seen. No acute osseous abnormality. ED COURSE & MEDICAL DECISION MAKING       Vital signs and nursing notes reviewed during ED course. I have independently evaluated this patient . Supervising physician - Dr. Ruperto Mon - was present in ED and available for consultation throughout entirety of patient's care. All pertinent Lab data and radiographic results reviewed with patient at bedside. The patient and/or the family were informed of the results of any tests/labs/imaging, the treatment plan, and time was allotted to answer questions. Clinical Impression:  1. Frequent falls    2. Unsteady gait    3.  History of inability to perform activities of daily living    4. Acute pain of left knee    5. Chondrocalcinosis    6. Infrarenal abdominal aortic aneurysm (AAA) without rupture        Patient with family with concern for persistent lower leg pain from previous fall, frequent near falls and unsteady gait over the last month. On exam, awake and alert 30-year-old male, pleasantly confused, alert to self only. Is following commands but requires frequent repeating of questions and directions. He is moving all extremities equally without lateralizing weakness. No obvious reproducible tenderness palpation or range of motion tenderness to the lower legs including the knees. No knee joint effusion. He is neurovascularly intact in the upper or lower extremities, soft compartments with equal intact distal pulses. Abdomen is soft nontender. Patient placed on telemetry and continuous pulse ox monitoring. CBC shows normal white count, hemoglobin 12.7. CMP does show BUN/creatinine 29/1.8 over this is stable to baseline. Transaminitis with ALT/AST of 42/48. UA is unremarkable. Normal troponin and magnesium. CT head shows findings consistent with central cortical atrophy with microvascular ischemic white matter disease but no other acute intracranial process. CT pelvis and CT left knee also negative for evidence of acute fracture. There is mild bilateral hip degenerative changes as well as in the SI joint and lower spine with incidental finding for a 2.8 infrarenal abdominal aortic aneurysm. Imaging of the left knee shows calcified small prepatellar bursitis with tricompartment arthrosis arthritis and chondrocalcinosis without large joint effusion.   At this time, given family's concern of ability to care for himself at increased level of care at home, unable to ambulate and perform ADLs, will plan to admit to hospitalist.  Case management did come to the ED to speak with family regarding their desires including possible palliative care/hospice care.    I did consult with HUAN Weinstein CNP - hospitalist - and discussed patient's history, ED presentation/course including any pertinent laboratory findings and imaging study findings. He/she agrees to hospital admission. Patient is admitted to the hospital in stable condition. In consideration of current COVID19 pandemic, with effort to minimize unnecessary provider exposure, this patient was seen at bedside by me independently. However, in compliance with current hospital LITO/ED protocol, prior to admission I did discuss this patient case with emergency department physician, Dr. [facility], who did agree with ED workup/evaluation and plan for admission however but ED attending physician did not independently evaluate the patient. Of note, this Pt was NOT admitted to the ICU. Diagnosis and plan discussed in detail with patient who understands and agrees. Disposition referral (if applicable):  No follow-up provider specified.     Disposition medications (if applicable):  New Prescriptions    No medications on file         (Please note that portions of this note may have been completed with a voice recognition program. Efforts were made to edit the dictations but occasionally words are mis-transcribed.)          Francisca Feliz PA-C  11/14/22 0318

## 2022-11-14 NOTE — CARE COORDINATION
CM performed MCG review. Self care deficit. Patient does not currently meet inpatient criteria. Observation only.

## 2022-11-15 ENCOUNTER — TELEPHONE (OUTPATIENT)
Dept: CARDIOLOGY CLINIC | Age: 87
End: 2022-11-15

## 2022-11-15 ENCOUNTER — PROCEDURE VISIT (OUTPATIENT)
Dept: CARDIOLOGY CLINIC | Age: 87
End: 2022-11-15

## 2022-11-15 DIAGNOSIS — Z95.0 CARDIAC PACEMAKER IN SITU: Primary | ICD-10-CM

## 2022-11-15 DIAGNOSIS — I49.5 SINUS NODE DYSFUNCTION (HCC): ICD-10-CM

## 2022-11-15 LAB
ALBUMIN SERPL-MCNC: 2.9 GM/DL (ref 3.4–5)
ALP BLD-CCNC: 72 IU/L (ref 40–128)
ALT SERPL-CCNC: 33 U/L (ref 10–40)
AMMONIA: 13 UMOL/L (ref 16–60)
ANION GAP SERPL CALCULATED.3IONS-SCNC: 11 MMOL/L (ref 4–16)
AST SERPL-CCNC: 33 IU/L (ref 15–37)
BASOPHILS ABSOLUTE: 0 K/CU MM
BASOPHILS RELATIVE PERCENT: 0.1 % (ref 0–1)
BILIRUB SERPL-MCNC: 0.6 MG/DL (ref 0–1)
BUN BLDV-MCNC: 28 MG/DL (ref 6–23)
CALCIUM SERPL-MCNC: 8.7 MG/DL (ref 8.3–10.6)
CHLORIDE BLD-SCNC: 106 MMOL/L (ref 99–110)
CO2: 22 MMOL/L (ref 21–32)
CREAT SERPL-MCNC: 1.5 MG/DL (ref 0.9–1.3)
DIFFERENTIAL TYPE: ABNORMAL
EOSINOPHILS ABSOLUTE: 0 K/CU MM
EOSINOPHILS RELATIVE PERCENT: 0.1 % (ref 0–3)
GFR SERPL CREATININE-BSD FRML MDRD: 45 ML/MIN/1.73M2
GLUCOSE BLD-MCNC: 93 MG/DL (ref 70–99)
HCT VFR BLD CALC: 35.1 % (ref 42–52)
HEMOGLOBIN: 11 GM/DL (ref 13.5–18)
IMMATURE NEUTROPHIL %: 0.8 % (ref 0–0.43)
LYMPHOCYTES ABSOLUTE: 1.1 K/CU MM
LYMPHOCYTES RELATIVE PERCENT: 15.7 % (ref 24–44)
MCH RBC QN AUTO: 28.8 PG (ref 27–31)
MCHC RBC AUTO-ENTMCNC: 31.3 % (ref 32–36)
MCV RBC AUTO: 91.9 FL (ref 78–100)
MONOCYTES ABSOLUTE: 0.4 K/CU MM
MONOCYTES RELATIVE PERCENT: 4.8 % (ref 0–4)
NUCLEATED RBC %: 0 %
PDW BLD-RTO: 16.8 % (ref 11.7–14.9)
PLATELET # BLD: 191 K/CU MM (ref 140–440)
PMV BLD AUTO: 10.2 FL (ref 7.5–11.1)
POTASSIUM SERPL-SCNC: 4.2 MMOL/L (ref 3.5–5.1)
RBC # BLD: 3.82 M/CU MM (ref 4.6–6.2)
SEGMENTED NEUTROPHILS ABSOLUTE COUNT: 5.7 K/CU MM
SEGMENTED NEUTROPHILS RELATIVE PERCENT: 78.5 % (ref 36–66)
SODIUM BLD-SCNC: 139 MMOL/L (ref 135–145)
TOTAL IMMATURE NEUTOROPHIL: 0.06 K/CU MM
TOTAL NUCLEATED RBC: 0 K/CU MM
TOTAL PROTEIN: 4.8 GM/DL (ref 6.4–8.2)
TSH HIGH SENSITIVITY: 1.24 UIU/ML (ref 0.27–4.2)
WBC # BLD: 7.3 K/CU MM (ref 4–10.5)

## 2022-11-15 PROCEDURE — 6370000000 HC RX 637 (ALT 250 FOR IP): Performed by: NURSE PRACTITIONER

## 2022-11-15 PROCEDURE — 6370000000 HC RX 637 (ALT 250 FOR IP): Performed by: PHYSICIAN ASSISTANT

## 2022-11-15 PROCEDURE — 84443 ASSAY THYROID STIM HORMONE: CPT

## 2022-11-15 PROCEDURE — 96361 HYDRATE IV INFUSION ADD-ON: CPT

## 2022-11-15 PROCEDURE — 80053 COMPREHEN METABOLIC PANEL: CPT

## 2022-11-15 PROCEDURE — 36415 COLL VENOUS BLD VENIPUNCTURE: CPT

## 2022-11-15 PROCEDURE — 51701 INSERT BLADDER CATHETER: CPT

## 2022-11-15 PROCEDURE — 82140 ASSAY OF AMMONIA: CPT

## 2022-11-15 PROCEDURE — 94761 N-INVAS EAR/PLS OXIMETRY MLT: CPT

## 2022-11-15 PROCEDURE — G0378 HOSPITAL OBSERVATION PER HR: HCPCS

## 2022-11-15 PROCEDURE — 6360000002 HC RX W HCPCS: Performed by: NURSE PRACTITIONER

## 2022-11-15 PROCEDURE — 2580000003 HC RX 258: Performed by: NURSE PRACTITIONER

## 2022-11-15 PROCEDURE — 96372 THER/PROPH/DIAG INJ SC/IM: CPT

## 2022-11-15 PROCEDURE — 85025 COMPLETE CBC W/AUTO DIFF WBC: CPT

## 2022-11-15 RX ORDER — HALOPERIDOL 5 MG/ML
0.5 INJECTION INTRAMUSCULAR EVERY 6 HOURS PRN
Status: DISCONTINUED | OUTPATIENT
Start: 2022-11-15 | End: 2022-11-25 | Stop reason: HOSPADM

## 2022-11-15 RX ORDER — VITAMIN B COMPLEX
2000 TABLET ORAL DAILY
Status: DISCONTINUED | OUTPATIENT
Start: 2022-11-16 | End: 2022-11-25 | Stop reason: HOSPADM

## 2022-11-15 RX ORDER — VITAMIN B COMPLEX
1000 TABLET ORAL DAILY
Status: DISCONTINUED | OUTPATIENT
Start: 2022-11-15 | End: 2022-11-15

## 2022-11-15 RX ORDER — DIVALPROEX SODIUM 125 MG/1
250 CAPSULE, COATED PELLETS ORAL EVERY 12 HOURS SCHEDULED
Status: DISCONTINUED | OUTPATIENT
Start: 2022-11-15 | End: 2022-11-25 | Stop reason: HOSPADM

## 2022-11-15 RX ADMIN — TAMSULOSIN HYDROCHLORIDE 0.4 MG: 0.4 CAPSULE ORAL at 10:00

## 2022-11-15 RX ADMIN — ATORVASTATIN CALCIUM 80 MG: 40 TABLET, FILM COATED ORAL at 19:57

## 2022-11-15 RX ADMIN — ALPRAZOLAM 0.25 MG: 0.25 TABLET ORAL at 19:57

## 2022-11-15 RX ADMIN — Medication 3 MG: at 19:54

## 2022-11-15 RX ADMIN — FLUOXETINE HYDROCHLORIDE 20 MG: 20 CAPSULE ORAL at 19:55

## 2022-11-15 RX ADMIN — RISPERIDONE 0.5 MG: 0.5 TABLET ORAL at 19:55

## 2022-11-15 RX ADMIN — ALLOPURINOL 100 MG: 100 TABLET ORAL at 10:00

## 2022-11-15 RX ADMIN — MIDODRINE HYDROCHLORIDE 10 MG: 5 TABLET ORAL at 06:30

## 2022-11-15 RX ADMIN — SENNOSIDES 8.6 MG: 8.6 TABLET, FILM COATED ORAL at 10:00

## 2022-11-15 RX ADMIN — DIVALPROEX SODIUM 250 MG: 125 CAPSULE, COATED PELLETS ORAL at 20:00

## 2022-11-15 RX ADMIN — ACETAMINOPHEN 650 MG: 325 TABLET ORAL at 19:57

## 2022-11-15 RX ADMIN — LEVOTHYROXINE SODIUM 50 MCG: 0.05 TABLET ORAL at 06:30

## 2022-11-15 RX ADMIN — COLCHICINE 0.6 MG: 0.6 TABLET ORAL at 10:00

## 2022-11-15 RX ADMIN — DIVALPROEX SODIUM 250 MG: 125 CAPSULE, COATED PELLETS ORAL at 13:46

## 2022-11-15 RX ADMIN — PREGABALIN 25 MG: 25 CAPSULE ORAL at 10:00

## 2022-11-15 RX ADMIN — ASPIRIN 81 MG: 81 TABLET, COATED ORAL at 10:00

## 2022-11-15 RX ADMIN — DICLOFENAC SODIUM 4 G: 10 GEL TOPICAL at 18:05

## 2022-11-15 RX ADMIN — PREGABALIN 25 MG: 25 CAPSULE ORAL at 17:52

## 2022-11-15 RX ADMIN — METOPROLOL TARTRATE 12.5 MG: 25 TABLET, FILM COATED ORAL at 10:00

## 2022-11-15 RX ADMIN — PANTOPRAZOLE SODIUM 40 MG: 40 TABLET, DELAYED RELEASE ORAL at 10:00

## 2022-11-15 RX ADMIN — METOPROLOL TARTRATE 12.5 MG: 25 TABLET, FILM COATED ORAL at 19:55

## 2022-11-15 RX ADMIN — SODIUM CHLORIDE, PRESERVATIVE FREE 5 ML: 5 INJECTION INTRAVENOUS at 20:05

## 2022-11-15 RX ADMIN — HALOPERIDOL LACTATE 0.5 MG: 5 INJECTION, SOLUTION INTRAMUSCULAR at 09:39

## 2022-11-15 ASSESSMENT — PAIN DESCRIPTION - ONSET
ONSET: GRADUAL
ONSET: GRADUAL

## 2022-11-15 ASSESSMENT — PAIN - FUNCTIONAL ASSESSMENT
PAIN_FUNCTIONAL_ASSESSMENT: PREVENTS OR INTERFERES SOME ACTIVE ACTIVITIES AND ADLS
PAIN_FUNCTIONAL_ASSESSMENT: PREVENTS OR INTERFERES SOME ACTIVE ACTIVITIES AND ADLS

## 2022-11-15 ASSESSMENT — PAIN DESCRIPTION - PAIN TYPE
TYPE: ACUTE PAIN;CHRONIC PAIN
TYPE: ACUTE PAIN;CHRONIC PAIN

## 2022-11-15 ASSESSMENT — PAIN DESCRIPTION - LOCATION
LOCATION: BACK

## 2022-11-15 ASSESSMENT — PAIN DESCRIPTION - FREQUENCY
FREQUENCY: INTERMITTENT
FREQUENCY: INTERMITTENT

## 2022-11-15 ASSESSMENT — PAIN SCALES - GENERAL
PAINLEVEL_OUTOF10: 0
PAINLEVEL_OUTOF10: 4
PAINLEVEL_OUTOF10: 4

## 2022-11-15 ASSESSMENT — PAIN DESCRIPTION - DESCRIPTORS
DESCRIPTORS: ACHING
DESCRIPTORS: ACHING

## 2022-11-15 NOTE — PROGRESS NOTES
Comprehensive Nutrition Assessment    Type and Reason for Visit:  Initial, Consult (Unintentional weight loss)    Nutrition Recommendations/Plan:   Trial Diabetic oral nutrition supplement BID with Frozen oral nutrition supplement daily   Please set up and assist with meals as needed   Document po intakes and measured weights for accurate nutrition assessment     Malnutrition Assessment:  Malnutrition Status: At risk for malnutrition (Comment) (insufficient data) (11/15/22 1502)    Context:  Acute Illness     Findings of the 6 clinical characteristics of malnutrition:  Energy Intake:  Mild decrease in energy intake (Comment) (poor intake during stay less than 75% of meals)  Weight Loss:  Greater than 7.5% over 3 months     Body Fat Loss:  No significant body fat loss     Muscle Mass Loss:  Unable to assess    Fluid Accumulation:  Unable to assess     Strength:  Not Performed    Nutrition Assessment:    Admitted with alzheimer's disease and left knee pain with hx of multiple falls. PMH: Hypothyroidism, PVD, CKD, CA, Gout, GERD, CHF. Pt currently on regular diet, reports fair appetite, PRIYANK po intake as pt falling asleep at visit. Sitter at bedside. No significant wasting per limited visual. Chart indicates significant 8.3% wt loss in 3 months, based on stated weight. Noted gradual wt loss since February. Will offer oral nutrition supplements, follow as high nutrition risk. Nutrition Related Findings:    Abnormal renal labs. Code status changed to [facility], hospice consult pending. No family at bedside. Wound Type: None       Current Nutrition Intake & Therapies:    Average Meal Intake: 51-75%, 26-50% (per flowsheet)  Average Supplements Intake: None Ordered  ADULT DIET; Regular    Anthropometric Measures:  Height: 5' 10\" (177.8 cm)  Ideal Body Weight (IBW): 166 lbs (75 kg)    Admission Body Weight:  (stated)  Current Body Weight: 178 lb 5.6 oz (80.9 kg), 107.4 % IBW.  Weight Source: Stated  Current BMI (kg/m2): 25.6  Usual Body Weight: 194 lb 9.6 oz (88.3 kg) (August per hx)  % Weight Change (Calculated): -8.3  Weight Adjustment For: No Adjustment                 BMI Categories: Overweight (BMI 25.0-29. 9)    Estimated Daily Nutrient Needs:  Energy Requirements Based On: Kcal/kg  Weight Used for Energy Requirements: Ideal  Energy (kcal/day): 5532-2958 (25-30 kcals/kg IBW)  Weight Used for Protein Requirements: Ideal  Protein (g/day): 75-91 (1-1.2 g/kg)  Method Used for Fluid Requirements: Standard Renal  Fluid (ml/day): fluids per nephrology    Nutrition Diagnosis:   Inadequate protein-energy intake related to acute injury/trauma as evidenced by poor intake prior to admission, intake 26-50%, weight loss (weight loss greater than 7.5% in 3 months)    Nutrition Interventions:   Food and/or Nutrient Delivery: Continue Current Diet, Start Oral Nutrition Supplement  Nutrition Education/Counseling: No recommendation at this time  Coordination of Nutrition Care: Continue to monitor while inpatient, Feeding Assistance/Environment Change, Coordination of Care  Plan of Care discussed with: pt, sitter at bedside    Goals:     Goals: Meet at least 75% of estimated needs       Nutrition Monitoring and Evaluation:   Behavioral-Environmental Outcomes: None Identified  Food/Nutrient Intake Outcomes: Food and Nutrient Intake, Supplement Intake  Physical Signs/Symptoms Outcomes: Biochemical Data, GI Status, Meal Time Behavior, Nutrition Focused Physical Findings, Skin, Weight    Discharge Planning:     Too soon to determine     Concha Avilez RD, LD  Contact: [phone]

## 2022-11-15 NOTE — LETTER
Cardiology [address]  Phone: [phone]  Fax: [phone]    11/15/2022        Cr Foss  [facility]            Dear Alfredo Flores: This is your [facility] schedule. You can ryan your calendar with these dates. Remember that your device is wireless and should automatically do these checks while you are sleeping. If for any reason I do not get your transmission then I will call you and ask that you send a manual transmission. If you have any questions or concerns, please call and ask for Ernesto Castle at [phone]. Thank you.

## 2022-11-15 NOTE — PLAN OF CARE
Problem: Discharge Planning  Goal: Discharge to home or other facility with appropriate resources  Outcome: Progressing     Problem: Confusion  Goal: Confusion, delirium, dementia, or psychosis is improved or at baseline  Description: INTERVENTIONS:  1. Assess for possible contributors to thought disturbance, including medications, impaired vision or hearing, underlying metabolic abnormalities, dehydration, psychiatric diagnoses, and notify attending LIP  2. Clements high risk fall precautions, as indicated  3. Provide frequent short contacts to provide reality reorientation, refocusing and direction  4. Decrease environmental stimuli, including noise as appropriate  5. Monitor and intervene to maintain adequate nutrition, hydration, elimination, sleep and activity  6. If unable to ensure safety without constant attention obtain sitter and review sitter guidelines with assigned personnel  7. Initiate Psychosocial CNS and Spiritual Care consult, as indicated  Outcome: Progressing     Problem: Skin/Tissue Integrity  Goal: Absence of new skin breakdown  Description: 1. Monitor for areas of redness and/or skin breakdown  2. Assess vascular access sites hourly  3. Every 4-6 hours minimum:  Change oxygen saturation probe site  4. Every 4-6 hours:  If on nasal continuous positive airway pressure, respiratory therapy assess nares and determine need for appliance change or resting period.   Outcome: Progressing     Problem: ABCDS Injury Assessment  Goal: Absence of physical injury  Outcome: Progressing     Problem: Safety - Adult  Goal: Free from fall injury  Outcome: Progressing

## 2022-11-15 NOTE — CARE COORDINATION
JASVIR received VM from Covina at [facility] letting CM know that they have been unable to reach pt's daughter. Covina left number for daughter to reach her at. JASVIR called Sheila España and spoke to her. Gema Vogel reports that pt is being seen by the doctor upstairs. POA asked CM to call Covina back and report this. JASVIR called Covina back [phone]. Covina states still needs to speak to daughter. CM gave cell phone number and other two daughters names and numbers also. All daughters are on [address] are the alternates.

## 2022-11-15 NOTE — PROGRESS NOTES
V2.0  [facility] Hospitalist Progress Note      Name:  Sulma Barnett /Age/Sex: 1934  (80 y.o. male)   MRN & CSN:  [id number] & [id number] Encounter Date/Time: 11/15/2022 11:26 AM EST    Location:  [address] [place] [address]-A PCP: Jahaira Srivastava PA-C       Hospital Day: 2    Assessment and Plan:   Sulma Barnett is a 80 y.o. male with past medical history of dementia, CKD 3, chronic HFrEF who presents with Left knee pain, unspecified chronicity, worsening confusion. Alzheimer's dementia with behavioral disturbance  -Progressively worsening over the last several weeks   -No longer able to care for self at home, intermittently agitated  -UA noninfectious, labs generally unremarkable  -Maintain sleep-wake cycle, scheduled melatonin, start scheduled Depakote  -Continue as needed Haldol  -CODE STATUS changed to DNR CC, hospice consulted per family request  - fall precautions, sitter at bedside  - bladder scan to r/o urinary retention    Left knee pain  -Status post fall  -CT left knee with chondrocalcinosis, calcified small prepatellar bursa  -No signs of gout on exam, pain improved this a.m.  -Continue Voltaren gel, as needed Tylenol    Multiple falls  -In setting of dementia  -CT head, x-ray pelvis no acute abnormality  -PT OT    CKD III  -Creatinine near baseline  -Avoid nephrotoxins    Chronic HFrEF  -Torsemide held on admission due to decreased p.o. intake, appears euvolemic and will continue to hold next-continue home beta-blocker    Hypertension  -Continue beta-blocker    Hypothyroidism   -continue home Synthroid  -Check TSH    This patient was discussed with Dr. Jennifer Hollins. He was agreeable with the plan and management as dictated above. Current Living situation: home  Expected Disposition: TBD  Estimated D/C:     Diet ADULT DIET;  Regular   DVT Prophylaxis [x] Lovenox, []  Heparin, [] SCDs, [] Ambulation,  [] Eliquis, [] Xarelto []Coumadin   Code Status DNR-CC   Disposition Patient requires continued admission due to awaiting placement   Surrogate Decision Maker/ POA Daughter, Emerald Stover     Subjective:     Chief Complaint: Leg Pain       Patient seen and examined at bedside. Initially called this morning, alert and oriented to self only. Became agitated especially when family arrived. Discussed plan of care with family who is requesting CODE STATUS changed to DNR CC and hospice evaluation. Review of Systems:    Unable to obtain secondary to dementia    Objective: Intake/Output Summary (Last 24 hours) at 11/15/2022 1126  Last data filed at 11/15/2022 0900  Gross per 24 hour   Intake 250 ml   Output 350 ml   Net -100 ml        Vitals:   Vitals:    11/15/22 0623   BP: (!) 143/62   Pulse: 63   Resp: 18   Temp: 97.7 °F (36.5 °C)   SpO2: 98%       Physical Exam:   PHYSICAL EXAM   GEN Awake male, sitting upright in chair  EYES Pupils are equally round. HENT Mucous membranes are moist.   NECK Supple, no apparent thyromegaly or masses. RESP Respirations even and unlabored on RA, clear to auscultation bilaterally   CARDIO/VASC Regular rate without appreciable murmurs. GI Abdomen is soft without significant tenderness, masses, or guarding.   Shaw catheter is not present. MSK No gross joint deformities. Mild bruising of left knee. SKIN Normal coloration, warm, dry. NEURO Cranial nerves appear grossly intact, normal speech, no lateralizing weakness. PSYCH Awake, alert, oriented x 1. Agitated, restless.     Medications:   Medications:    [START ON 11/16/2022] Vitamin D  2,000 Units Oral Daily    divalproex  250 mg Oral 2 times per day    sodium chloride flush  5-40 mL IntraVENous 2 times per day    enoxaparin  30 mg SubCUTAneous Q24H    nicotine  1 patch TransDERmal Daily    [Held by provider] torsemide  10 mg Oral Daily    tamsulosin  0.4 mg Oral Daily    senna  1 tablet Oral BID    pregabalin  25 mg Oral BID    pantoprazole  40 mg Oral Daily    midodrine  10 mg Oral BID    metoprolol tartrate  12.5 mg Oral BID levothyroxine  50 mcg Oral Daily    FLUoxetine  20 mg Oral Nightly    atorvastatin  80 mg Oral Nightly    aspirin  81 mg Oral Daily    allopurinol  100 mg Oral Daily    diclofenac sodium  4 g Topical 4x daily    risperiDONE  0.5 mg Oral Nightly    melatonin  3 mg Oral Nightly      Infusions:    sodium chloride       PRN Meds: sodium chloride flush, 5-40 mL, PRN  sodium chloride, 25 mL, PRN  ondansetron, 4 mg, Q8H PRN   Or  ondansetron, 4 mg, Q6H PRN  polyethylene glycol, 17 g, Daily PRN  acetaminophen, 650 mg, Q6H PRN   Or  acetaminophen, 650 mg, Q6H PRN  nicotine polacrilex, 2 mg, Q1H PRN  guaiFENesin, 600 mg, BID PRN  docusate sodium, 100 mg, BID PRN  ALPRAZolam, 0.25 mg, Nightly PRN  haloperidol lactate, 0.5 mg, Q30 Min PRN        Labs      Recent Results (from the past 24 hour(s))   Comprehensive Metabolic Panel    Collection Time: 11/14/22 12:33 PM   Result Value Ref Range    Sodium 137 135 - 145 MMOL/L    Potassium 4.7 3.5 - 5.1 MMOL/L    Chloride 104 99 - 110 mMol/L    CO2 25 21 - 32 MMOL/L    BUN 29 (H) 6 - 23 MG/DL    Creatinine 1.8 (H) 0.9 - 1.3 MG/DL    Est, Glom Filt Rate 36 (L) >60 mL/min/1.73m2    Glucose 171 (H) 70 - 99 MG/DL    Calcium 8.9 8.3 - 10.6 MG/DL    Albumin 3.7 3.4 - 5.0 GM/DL    Total Protein 6.0 (L) 6.4 - 8.2 GM/DL    Total Bilirubin 0.5 0.0 - 1.0 MG/DL    ALT 42 (H) 10 - 40 U/L    AST 48 (H) 15 - 37 IU/L    Alkaline Phosphatase 90 40 - 129 IU/L    Anion Gap 8 4 - 16   CK    Collection Time: 11/14/22 12:33 PM   Result Value Ref Range    Total CK 71 38 - 174 IU/L   Magnesium    Collection Time: 11/14/22 12:33 PM   Result Value Ref Range    Magnesium 1.8 1.8 - 2.4 mg/dl   Troponin    Collection Time: 11/14/22 12:33 PM   Result Value Ref Range    Troponin T <0.010 <0.01 NG/ML   Vitamin D 25 Hydroxy    Collection Time: 11/14/22 12:33 PM   Result Value Ref Range    Vit D, 25-Hydroxy 11.49 (L) >20 NG/ML   Urinalysis    Collection Time: 11/14/22  2:05 PM   Result Value Ref Range    Color, UA YELLOW YELLOW    Clarity, UA CLEAR CLEAR    Glucose, Urine NEGATIVE NEGATIVE MG/DL    Bilirubin Urine NEGATIVE NEGATIVE MG/DL    Ketones, Urine NEGATIVE NEGATIVE MG/DL    Specific Gravity, UA 1.010 1.001 - 1.035    Blood, Urine NEGATIVE NEGATIVE    pH, Urine 5.5 5.0 - 8.0    Protein, UA NEGATIVE NEGATIVE MG/DL    Urobilinogen, Urine 0.2 0.2 - 1.0 MG/DL    Nitrite Urine, Quantitative NEGATIVE NEGATIVE    Leukocyte Esterase, Urine NEGATIVE NEGATIVE   Uric Acid    Collection Time: 11/14/22  5:00 PM   Result Value Ref Range    Uric Acid 8.8 (H) 3.5 - 7.2 MG/DL   Comprehensive Metabolic Panel w/ Reflex to MG    Collection Time: 11/15/22  6:24 AM   Result Value Ref Range    Sodium 139 135 - 145 MMOL/L    Potassium 4.2 3.5 - 5.1 MMOL/L    Chloride 106 99 - 110 mMol/L    CO2 22 21 - 32 MMOL/L    BUN 28 (H) 6 - 23 MG/DL    Creatinine 1.5 (H) 0.9 - 1.3 MG/DL    Est, Glom Filt Rate 45 (L) >60 mL/min/1.73m2    Glucose 93 70 - 99 MG/DL    Calcium 8.7 8.3 - 10.6 MG/DL    Albumin 2.9 (L) 3.4 - 5.0 GM/DL    Total Protein 4.8 (L) 6.4 - 8.2 GM/DL    Total Bilirubin 0.6 0.0 - 1.0 MG/DL    ALT 33 10 - 40 U/L    AST 33 15 - 37 IU/L    Alkaline Phosphatase 72 40 - 128 IU/L    Anion Gap 11 4 - 16   CBC with Auto Differential    Collection Time: 11/15/22  6:24 AM   Result Value Ref Range    WBC 7.3 4.0 - 10.5 K/CU MM    RBC 3.82 (L) 4.6 - 6.2 M/CU MM    Hemoglobin 11.0 (L) 13.5 - 18.0 GM/DL    Hematocrit 35.1 (L) 42 - 52 %    MCV 91.9 78 - 100 FL    MCH 28.8 27 - 31 PG    MCHC 31.3 (L) 32.0 - 36.0 %    RDW 16.8 (H) 11.7 - 14.9 %    Platelets 965 102 - 882 K/CU MM    MPV 10.2 7.5 - 11.1 FL    Differential Type AUTOMATED DIFFERENTIAL     Segs Relative 78.5 (H) 36 - 66 %    Lymphocytes % 15.7 (L) 24 - 44 %    Monocytes % 4.8 (H) 0 - 4 %    Eosinophils % 0.1 0 - 3 %    Basophils % 0.1 0 - 1 %    Segs Absolute 5.7 K/CU MM    Lymphocytes Absolute 1.1 K/CU MM    Monocytes Absolute 0.4 K/CU MM    Eosinophils Absolute 0.0 K/CU MM    Basophils Absolute 0.0 K/CU MM    Nucleated RBC % 0.0 %    Total Nucleated RBC 0.0 K/CU MM    Total Immature Neutrophil 0.06 K/CU MM    Immature Neutrophil % 0.8 (H) 0 - 0.43 %        Imaging/Diagnostics Last 24 Hours   XR KNEE LEFT (3 VIEWS)    Result Date: 11/14/2022  EXAMINATION: THREE XRAY VIEWS OF THE LEFT KNEE 11/14/2022 4:35 am COMPARISON: None. HISTORY: ORDERING SYSTEM PROVIDED HISTORY: Pain, Multiple falls TECHNOLOGIST PROVIDED HISTORY: Reason for exam:->Pain, Multiple falls Reason for Exam: pain after fall Left knee pain FINDINGS: There is no acute fracture, dislocation, or bone destruction. Chondrocalcinosis is seen involving the medial and lateral compartments. There is no joint effusion. Vascular calcifications are seen. No acute osseous abnormality. 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 CT HEAD WO CONTRAST    Result Date: 11/14/2022  EXAMINATION: CT OF THE HEAD WITHOUT CONTRAST  11/14/2022 11:48 am TECHNIQUE: CT of the head was performed without the administration of intravenous contrast. Automated exposure control, iterative reconstruction, and/or weight based adjustment of the mA/kV was utilized to reduce the radiation dose to as low as reasonably achievable. COMPARISON: 11/29/2019 HISTORY: ORDERING SYSTEM PROVIDED HISTORY: unsteady gait x1 month TECHNOLOGIST PROVIDED HISTORY: Has a \"code stroke\" or \"stroke alert\" been called? ->No Reason for exam:->unsteady gait x1 month Decision Support Exception - unselect if not a suspected or confirmed emergency medical condition->Emergency Medical Condition (MA) Reason for Exam: unsteady gait x1 month FINDINGS: BRAIN/VENTRICLES: There is no acute intracranial hemorrhage, mass effect or midline shift. No abnormal extra-axial fluid collection. The gray-white differentiation is maintained without evidence of an acute infarct. There is no evidence of hydrocephalus. Moderate to severely dilated ventricles and cerebral sulci.   Periventricular and subcortical white matter decreased attenuation. ORBITS: The visualized portion of the orbits demonstrate no acute abnormality. SINUSES: The visualized paranasal sinuses and right mastoid air cells demonstrate no acute abnormality. Small amount fluid in left mastoid air cells. SOFT TISSUES/SKULL:  No acute abnormality of the visualized skull or soft tissues. No acute intracranial abnormality. Findings consistent with central and cortical atrophy. Microvascular ischemic white matter disease. Chronic left mastoiditis. No interval change. CT PELVIS WO CONTRAST Additional Contrast? None    Result Date: 11/14/2022  EXAMINATION: CT OF THE PELVIS WITHOUT CONTRAST 11/14/2022 11:14 am TECHNIQUE: CT of the pelvis was performed without the administration of intravenous contrast.  Multiplanar reformatted images are provided for review. Adjustment of mA and/or kV according to patient size was utilized. Automated exposure control, iterative reconstruction, and/or weight based adjustment of the mA/kV was utilized to reduce the radiation dose to as low as reasonably achievable. COMPARISON: CT abdomen and pelvis 08/03/2019 HISTORY: ORDERING SYSTEM PROVIDED HISTORY: pain x2 weeks, from fall TECHNOLOGIST PROVIDED HISTORY: Reason for exam:->pain x2 weeks, from fall Additional Contrast?->None Decision Support Exception - unselect if not a suspected or confirmed emergency medical condition->Emergency Medical Condition (MA) FINDINGS: No acute fracture is seen. No evidence of dislocation. Mild bilateral hip joint degenerative changes. Degenerative changes also seen at the SI joints and visualized lower lumbar spine. Similar peripherally sclerotic lucent lesion in the left posterior iliac bone, suggesting a benign finding. The infrarenal abdominal aorta measures up to 2.8 cm in diameter. There are vascular calcifications. No acute bony abnormality is seen in the pelvis. Mild bilateral hip joint degenerative changes. Degenerative changes also seen at the SI joints and visualized lower lumbar spine. 2.8 cm infrarenal abdominal aortic aneurysm suspected. Recommend follow-up every 5 years. Reference: J Am Erwin Radiol 4999;29:308-898. CT KNEE LEFT WO CONTRAST    Result Date: 11/14/2022  EXAMINATION: CT OF THE LEFT KNEE WITHOUT CONTRAST 11/14/2022 11:49 am TECHNIQUE: CT of the left knee was performed without the administration of intravenous contrast.  Multiplanar reformatted images are provided for review. Automated exposure control, iterative reconstruction, and/or weight based adjustment of the mA/kV was utilized to reduce the radiation dose to as low as reasonably achievable. COMPARISON: Radiographs 11/14/2022. HISTORY ORDERING SYSTEM PROVIDED HISTORY: pain trauma TECHNOLOGIST PROVIDED HISTORY: Reason for exam:->pain trauma Decision Support Exception - unselect if not a suspected or confirmed emergency medical condition->Emergency Medical Condition (MA) FINDINGS: No acute fracture is seen. No evidence of dislocation. Mild tricompartmental osteoarthrosis. There is chondrocalcinosis of the menisci. Calcifications are seen involving the cruciate ligaments and proximal gastrocnemius tendon insertion. Vascular calcifications are seen. There is a small calcified prepatellar bursa. Small Baker's cyst.  Small volume knee joint fluid. No acute bony abnormality is seen in the left knee. Chondrocalcinosis, without a large knee joint effusion. Calcified small prepatellar bursa. Mild tricompartmental osteoarthrosis.      XR CHEST PORTABLE    Result Date: 11/14/2022  EXAMINATION: ONE XRAY VIEW OF THE CHEST 11/14/2022 3:17 pm COMPARISON: 08/31/2022 HISTORY: ORDERING SYSTEM PROVIDED HISTORY: AMS, frequent falls TECHNOLOGIST PROVIDED HISTORY: Reason for exam:->AMS, frequent falls Reason for Exam: AMS, frequent falls Additional signs and symptoms: na Relevant Medical/Surgical History: bladder cancer, chf, ckd FINDINGS: Heart size is globular and mildly enlarged without change. Aorta is tortuous with atherosclerotic changes. Mediastinum is not widened. .  Lungs are under expanded. Patchy opacity in the left lower lobe but this is similar in appearance. .  No pleural effusions.  Mild spondylosis     No acute lung disease       Electronically signed by Paolo Schneider PA-C on 11/15/2022 at 11:26 AM

## 2022-11-15 NOTE — PROGRESS NOTES
Pt without independent void all shift. Bladder scan completed and showed 650mL. Straight cath completed 550mL clear ximena urine obtained. Pt denies ;pain.  Tolerated well

## 2022-11-16 PROBLEM — Z79.899 CHRONIC PRESCRIPTION BENZODIAZEPINE USE: Status: ACTIVE | Noted: 2022-01-01

## 2022-11-16 PROBLEM — F02.818 MAJOR NEUROCOGNITIVE DISORDER DUE TO ALZHEIMER'S DISEASE, WITH BEHAVIORAL DISTURBANCE (HCC): Status: ACTIVE | Noted: 2022-11-16

## 2022-11-16 PROBLEM — G30.9 MAJOR NEUROCOGNITIVE DISORDER DUE TO ALZHEIMER'S DISEASE, WITH BEHAVIORAL DISTURBANCE (HCC): Status: ACTIVE | Noted: 2022-11-16

## 2022-11-16 LAB
ANION GAP SERPL CALCULATED.3IONS-SCNC: 7 MMOL/L (ref 4–16)
BUN BLDV-MCNC: 27 MG/DL (ref 6–23)
CALCIUM SERPL-MCNC: 8.5 MG/DL (ref 8.3–10.6)
CHLORIDE BLD-SCNC: 108 MMOL/L (ref 99–110)
CO2: 24 MMOL/L (ref 21–32)
CREAT SERPL-MCNC: 1.4 MG/DL (ref 0.9–1.3)
GFR SERPL CREATININE-BSD FRML MDRD: 48 ML/MIN/1.73M2
GLUCOSE BLD-MCNC: 86 MG/DL (ref 70–99)
POTASSIUM SERPL-SCNC: 4.3 MMOL/L (ref 3.5–5.1)
SODIUM BLD-SCNC: 139 MMOL/L (ref 135–145)

## 2022-11-16 PROCEDURE — 94761 N-INVAS EAR/PLS OXIMETRY MLT: CPT

## 2022-11-16 PROCEDURE — 80048 BASIC METABOLIC PNL TOTAL CA: CPT

## 2022-11-16 PROCEDURE — 6360000002 HC RX W HCPCS: Performed by: PHYSICIAN ASSISTANT

## 2022-11-16 PROCEDURE — 6370000000 HC RX 637 (ALT 250 FOR IP): Performed by: NURSE PRACTITIONER

## 2022-11-16 PROCEDURE — 36415 COLL VENOUS BLD VENIPUNCTURE: CPT

## 2022-11-16 PROCEDURE — 51798 US URINE CAPACITY MEASURE: CPT

## 2022-11-16 PROCEDURE — 6370000000 HC RX 637 (ALT 250 FOR IP): Performed by: PHYSICIAN ASSISTANT

## 2022-11-16 PROCEDURE — 2580000003 HC RX 258: Performed by: NURSE PRACTITIONER

## 2022-11-16 PROCEDURE — 99203 OFFICE O/P NEW LOW 30 MIN: CPT | Performed by: NURSE PRACTITIONER

## 2022-11-16 PROCEDURE — 96372 THER/PROPH/DIAG INJ SC/IM: CPT

## 2022-11-16 PROCEDURE — G0378 HOSPITAL OBSERVATION PER HR: HCPCS

## 2022-11-16 PROCEDURE — 6370000000 HC RX 637 (ALT 250 FOR IP): Performed by: FAMILY MEDICINE

## 2022-11-16 RX ORDER — HYDROCODONE BITARTRATE AND ACETAMINOPHEN 5; 325 MG/1; MG/1
1 TABLET ORAL EVERY 6 HOURS PRN
Status: DISCONTINUED | OUTPATIENT
Start: 2022-11-16 | End: 2022-11-25 | Stop reason: HOSPADM

## 2022-11-16 RX ORDER — RISPERIDONE 0.5 MG/1
1 TABLET ORAL NIGHTLY
Status: DISCONTINUED | OUTPATIENT
Start: 2022-11-16 | End: 2022-11-25 | Stop reason: HOSPADM

## 2022-11-16 RX ADMIN — HYDROCODONE BITARTRATE AND ACETAMINOPHEN 1 TABLET: 5; 325 TABLET ORAL at 16:00

## 2022-11-16 RX ADMIN — TAMSULOSIN HYDROCHLORIDE 0.4 MG: 0.4 CAPSULE ORAL at 08:23

## 2022-11-16 RX ADMIN — SODIUM CHLORIDE, PRESERVATIVE FREE 10 ML: 5 INJECTION INTRAVENOUS at 08:30

## 2022-11-16 RX ADMIN — RISPERIDONE 1 MG: 0.5 TABLET ORAL at 21:14

## 2022-11-16 RX ADMIN — METOPROLOL TARTRATE 12.5 MG: 25 TABLET, FILM COATED ORAL at 21:14

## 2022-11-16 RX ADMIN — DICLOFENAC SODIUM 4 G: 10 GEL TOPICAL at 13:57

## 2022-11-16 RX ADMIN — Medication 3 MG: at 21:15

## 2022-11-16 RX ADMIN — ALLOPURINOL 100 MG: 100 TABLET ORAL at 08:23

## 2022-11-16 RX ADMIN — DICLOFENAC SODIUM 4 G: 10 GEL TOPICAL at 18:26

## 2022-11-16 RX ADMIN — FLUOXETINE HYDROCHLORIDE 20 MG: 20 CAPSULE ORAL at 21:15

## 2022-11-16 RX ADMIN — ATORVASTATIN CALCIUM 80 MG: 40 TABLET, FILM COATED ORAL at 21:14

## 2022-11-16 RX ADMIN — PREGABALIN 25 MG: 25 CAPSULE ORAL at 16:00

## 2022-11-16 RX ADMIN — ASPIRIN 81 MG: 81 TABLET, COATED ORAL at 08:23

## 2022-11-16 RX ADMIN — ACETAMINOPHEN 650 MG: 325 TABLET ORAL at 13:18

## 2022-11-16 RX ADMIN — ACETAMINOPHEN 650 MG: 325 TABLET ORAL at 06:54

## 2022-11-16 RX ADMIN — DIVALPROEX SODIUM 250 MG: 125 CAPSULE, COATED PELLETS ORAL at 08:22

## 2022-11-16 RX ADMIN — HALOPERIDOL LACTATE 0.5 MG: 5 INJECTION, SOLUTION INTRAMUSCULAR at 22:42

## 2022-11-16 RX ADMIN — DIVALPROEX SODIUM 250 MG: 125 CAPSULE, COATED PELLETS ORAL at 21:14

## 2022-11-16 RX ADMIN — SODIUM CHLORIDE, PRESERVATIVE FREE 10 ML: 5 INJECTION INTRAVENOUS at 21:15

## 2022-11-16 RX ADMIN — Medication 2000 UNITS: at 08:22

## 2022-11-16 RX ADMIN — PANTOPRAZOLE SODIUM 40 MG: 40 TABLET, DELAYED RELEASE ORAL at 08:23

## 2022-11-16 RX ADMIN — METOPROLOL TARTRATE 12.5 MG: 25 TABLET, FILM COATED ORAL at 08:23

## 2022-11-16 RX ADMIN — SENNOSIDES 8.6 MG: 8.6 TABLET, FILM COATED ORAL at 21:14

## 2022-11-16 RX ADMIN — SENNOSIDES 8.6 MG: 8.6 TABLET, FILM COATED ORAL at 08:24

## 2022-11-16 RX ADMIN — PREGABALIN 25 MG: 25 CAPSULE ORAL at 08:23

## 2022-11-16 RX ADMIN — LEVOTHYROXINE SODIUM 50 MCG: 0.05 TABLET ORAL at 06:54

## 2022-11-16 RX ADMIN — HALOPERIDOL LACTATE 0.5 MG: 5 INJECTION, SOLUTION INTRAMUSCULAR at 09:03

## 2022-11-16 RX ADMIN — DICLOFENAC SODIUM 4 G: 10 GEL TOPICAL at 21:19

## 2022-11-16 RX ADMIN — DICLOFENAC SODIUM 4 G: 10 GEL TOPICAL at 08:29

## 2022-11-16 ASSESSMENT — PAIN DESCRIPTION - PAIN TYPE: TYPE: ACUTE PAIN;CHRONIC PAIN

## 2022-11-16 ASSESSMENT — PAIN DESCRIPTION - DESCRIPTORS
DESCRIPTORS: ACHING;SHARP
DESCRIPTORS: ACHING
DESCRIPTORS: ACHING;SORE;SHARP

## 2022-11-16 ASSESSMENT — PAIN DESCRIPTION - ORIENTATION
ORIENTATION: LEFT
ORIENTATION: LEFT

## 2022-11-16 ASSESSMENT — PAIN SCALES - GENERAL
PAINLEVEL_OUTOF10: 2
PAINLEVEL_OUTOF10: 8
PAINLEVEL_OUTOF10: 5
PAINLEVEL_OUTOF10: 6
PAINLEVEL_OUTOF10: 5
PAINLEVEL_OUTOF10: 5
PAINLEVEL_OUTOF10: 7

## 2022-11-16 ASSESSMENT — PAIN DESCRIPTION - FREQUENCY: FREQUENCY: INTERMITTENT

## 2022-11-16 ASSESSMENT — PAIN DESCRIPTION - LOCATION
LOCATION: BUTTOCKS;HIP
LOCATION: HIP;KNEE
LOCATION: BACK

## 2022-11-16 ASSESSMENT — PAIN - FUNCTIONAL ASSESSMENT
PAIN_FUNCTIONAL_ASSESSMENT: PREVENTS OR INTERFERES SOME ACTIVE ACTIVITIES AND ADLS
PAIN_FUNCTIONAL_ASSESSMENT: PREVENTS OR INTERFERES WITH MANY ACTIVE NOT PASSIVE ACTIVITIES
PAIN_FUNCTIONAL_ASSESSMENT: PREVENTS OR INTERFERES WITH MANY ACTIVE NOT PASSIVE ACTIVITIES

## 2022-11-16 ASSESSMENT — PAIN DESCRIPTION - ONSET: ONSET: GRADUAL

## 2022-11-16 NOTE — PROGRESS NOTES
V2.0  [facility] Hospitalist Progress Note      Name:  Polo Paiz /Age/Sex: 1934  (80 y.o. male)   MRN & CSN:  [id number] & [id number] Encounter Date/Time: 2022 11:26 AM EST    Location:  [address] [place] [address]-A PCP: Levi Srivastava PA-C       Hospital Day: 3    Assessment and Plan:   Polo Paiz is a 80 y.o. male with past medical history of dementia, CKD 3, chronic HFrEF who presents with Left knee pain, unspecified chronicity, worsening confusion. Alzheimer's dementia with behavioral disturbance  -Progressively worsening over the last several weeks   -No longer able to care for self at home, intermittently agitated  -UA noninfectious, labs generally unremarkable  -Maintain sleep-wake cycle, scheduled melatonin, start scheduled Depakote  -Continue as needed Haldol  -CODE STATUS changed to DNR CC, hospice consulted per family request  - PT/OT   - fall precautions, sitter at bedside  - psychiatry consulted per family request    Acute urinary retention   - required straight cath x2  - continue bladder scan q8h, straight cath for >150cc  - continue home flomax   - not a good candidate for march given agitation     Left knee pain  -Status post fall  -CT left knee with chondrocalcinosis, calcified small prepatellar bursa  -No signs of gout on exam, pain improved   -Continue Voltaren gel, as needed Tylenol    Multiple falls  -In setting of dementia  -CT head, x-ray pelvis no acute abnormality  -PT OT    CKD III  -Creatinine near baseline  -Avoid nephrotoxins    Chronic HFrEF  -Torsemide held on admission due to decreased p.o. intake, appears euvolemic and will continue to hold   -continue home beta-blocker    Hypertension  -Continue beta-blocker  - BP elevated, consider titrating meds if remains high     Hypothyroidism   -continue home Synthroid    This patient was discussed with Dr. Patsy Hamilton. He was agreeable with the plan and management as dictated above.      Current Living situation: home  Expected Disposition: TBD  Estimated D/C:     Diet ADULT DIET; Regular  ADULT ORAL NUTRITION SUPPLEMENT; Breakfast, Lunch; Diabetic Oral Supplement  ADULT ORAL NUTRITION SUPPLEMENT; Dinner; Frozen Oral Supplement   DVT Prophylaxis [x] Lovenox, []  Heparin, [] SCDs, [] Ambulation,  [] Eliquis, [] Xarelto []Coumadin   Code Status DNR-CC   Disposition Patient requires continued admission due to awaiting placement   Surrogate Decision Maker/ POA Daughter, Mary Valentino     Subjective:     Chief Complaint: Leg Pain       Patient seen and examined at bedside. Patient remained calm overnight, did not require any as needed's for agitation. Patient tearful this morning due to pain and wanting to go home. Provided reassurance. Review of Systems:    Unable to obtain secondary to dementia    Objective: Intake/Output Summary (Last 24 hours) at 11/16/2022 1045  Last data filed at 11/16/2022 7536  Gross per 24 hour   Intake 290 ml   Output 550 ml   Net -260 ml          Vitals:   Vitals:    11/16/22 0652   BP: (!) 174/73   Pulse: 61   Resp: 20   Temp: 97.5 °F (36.4 °C)   SpO2: 99%       Physical Exam:   PHYSICAL EXAM   GEN Awake male, laying in bed  EYES Pupils are equally round. HENT Mucous membranes are moist.   NECK Supple, no apparent thyromegaly or masses. RESP Respirations even and unlabored on RA, clear to auscultation bilaterally   CARDIO/VASC Regular rate without appreciable murmurs. GI Abdomen is soft without significant tenderness, masses, or guarding.   Shaw catheter is not present. MSK No gross joint deformities. Mild bruising of left knee. SKIN Normal coloration, warm, dry. NEURO Cranial nerves appear grossly intact, normal speech, no lateralizing weakness. PSYCH Awake, alert, oriented x 1. Tearful.     Medications:   Medications:    Vitamin D  2,000 Units Oral Daily    divalproex  250 mg Oral 2 times per day    sodium chloride flush  5-40 mL IntraVENous 2 times per day    enoxaparin  30 mg SubCUTAneous Q24H    nicotine  1 patch TransDERmal Daily    [Held by provider] torsemide  10 mg Oral Daily    tamsulosin  0.4 mg Oral Daily    senna  1 tablet Oral BID    pregabalin  25 mg Oral BID    pantoprazole  40 mg Oral Daily    metoprolol tartrate  12.5 mg Oral BID    levothyroxine  50 mcg Oral Daily    FLUoxetine  20 mg Oral Nightly    atorvastatin  80 mg Oral Nightly    aspirin  81 mg Oral Daily    allopurinol  100 mg Oral Daily    diclofenac sodium  4 g Topical 4x daily    risperiDONE  0.5 mg Oral Nightly    melatonin  3 mg Oral Nightly      Infusions:    sodium chloride       PRN Meds: haloperidol lactate, 0.5 mg, Q6H PRN  sodium chloride flush, 5-40 mL, PRN  sodium chloride, 25 mL, PRN  ondansetron, 4 mg, Q8H PRN   Or  ondansetron, 4 mg, Q6H PRN  polyethylene glycol, 17 g, Daily PRN  acetaminophen, 650 mg, Q6H PRN   Or  acetaminophen, 650 mg, Q6H PRN  nicotine polacrilex, 2 mg, Q1H PRN  guaiFENesin, 600 mg, BID PRN  docusate sodium, 100 mg, BID PRN  ALPRAZolam, 0.25 mg, Nightly PRN      Labs      Recent Results (from the past 24 hour(s))   TSH    Collection Time: 11/15/22 12:57 PM   Result Value Ref Range    TSH, High Sensitivity 1.240 0.270 - 4.20 uIu/ml   Ammonia    Collection Time: 11/15/22 12:57 PM   Result Value Ref Range    Ammonia 13 (L) 16 - 60 UMOL/L   Basic Metabolic Panel    Collection Time: 11/16/22  3:51 AM   Result Value Ref Range    Sodium 139 135 - 145 MMOL/L    Potassium 4.3 3.5 - 5.1 MMOL/L    Chloride 108 99 - 110 mMol/L    CO2 24 21 - 32 MMOL/L    Anion Gap 7 4 - 16    BUN 27 (H) 6 - 23 MG/DL    Creatinine 1.4 (H) 0.9 - 1.3 MG/DL    Est, Glom Filt Rate 48 (L) >60 mL/min/1.73m2    Glucose 86 70 - 99 MG/DL    Calcium 8.5 8.3 - 10.6 MG/DL        Imaging/Diagnostics Last 24 Hours   XR KNEE LEFT (3 VIEWS)    Result Date: 11/14/2022  EXAMINATION: THREE XRAY VIEWS OF THE LEFT KNEE 11/14/2022 4:35 am COMPARISON: None.  HISTORY: ORDERING SYSTEM PROVIDED HISTORY: Pain, Multiple falls TECHNOLOGIST PROVIDED HISTORY: Reason for exam:->Pain, Multiple falls Reason for Exam: pain after fall Left knee pain FINDINGS: There is no acute fracture, dislocation, or bone destruction. Chondrocalcinosis is seen involving the medial and lateral compartments. There is no joint effusion. Vascular calcifications are seen. No acute osseous abnormality. 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 CT HEAD WO CONTRAST    Result Date: 11/14/2022  EXAMINATION: CT OF THE HEAD WITHOUT CONTRAST  11/14/2022 11:48 am TECHNIQUE: CT of the head was performed without the administration of intravenous contrast. Automated exposure control, iterative reconstruction, and/or weight based adjustment of the mA/kV was utilized to reduce the radiation dose to as low as reasonably achievable. COMPARISON: 11/29/2019 HISTORY: ORDERING SYSTEM PROVIDED HISTORY: unsteady gait x1 month TECHNOLOGIST PROVIDED HISTORY: Has a \"code stroke\" or \"stroke alert\" been called? ->No Reason for exam:->unsteady gait x1 month Decision Support Exception - unselect if not a suspected or confirmed emergency medical condition->Emergency Medical Condition (MA) Reason for Exam: unsteady gait x1 month FINDINGS: BRAIN/VENTRICLES: There is no acute intracranial hemorrhage, mass effect or midline shift. No abnormal extra-axial fluid collection. The gray-white differentiation is maintained without evidence of an acute infarct. There is no evidence of hydrocephalus. Moderate to severely dilated ventricles and cerebral sulci. Periventricular and subcortical white matter decreased attenuation. ORBITS: The visualized portion of the orbits demonstrate no acute abnormality. SINUSES: The visualized paranasal sinuses and right mastoid air cells demonstrate no acute abnormality. Small amount fluid in left mastoid air cells. SOFT TISSUES/SKULL:  No acute abnormality of the visualized skull or soft tissues. No acute intracranial abnormality.  Findings consistent with central and cortical atrophy. Microvascular ischemic white matter disease. Chronic left mastoiditis. No interval change. CT PELVIS WO CONTRAST Additional Contrast? None    Result Date: 11/14/2022  EXAMINATION: CT OF THE PELVIS WITHOUT CONTRAST 11/14/2022 11:14 am TECHNIQUE: CT of the pelvis was performed without the administration of intravenous contrast.  Multiplanar reformatted images are provided for review. Adjustment of mA and/or kV according to patient size was utilized. Automated exposure control, iterative reconstruction, and/or weight based adjustment of the mA/kV was utilized to reduce the radiation dose to as low as reasonably achievable. COMPARISON: CT abdomen and pelvis 08/03/2019 HISTORY: ORDERING SYSTEM PROVIDED HISTORY: pain x2 weeks, from fall TECHNOLOGIST PROVIDED HISTORY: Reason for exam:->pain x2 weeks, from fall Additional Contrast?->None Decision Support Exception - unselect if not a suspected or confirmed emergency medical condition->Emergency Medical Condition (MA) FINDINGS: No acute fracture is seen. No evidence of dislocation. Mild bilateral hip joint degenerative changes. Degenerative changes also seen at the SI joints and visualized lower lumbar spine. Similar peripherally sclerotic lucent lesion in the left posterior iliac bone, suggesting a benign finding. The infrarenal abdominal aorta measures up to 2.8 cm in diameter. There are vascular calcifications. No acute bony abnormality is seen in the pelvis. Mild bilateral hip joint degenerative changes. Degenerative changes also seen at the SI joints and visualized lower lumbar spine. 2.8 cm infrarenal abdominal aortic aneurysm suspected. Recommend follow-up every 5 years. Reference: J Am Erwin Radiol 4665;30:115-295.      CT KNEE LEFT WO CONTRAST    Result Date: 11/14/2022  EXAMINATION: CT OF THE LEFT KNEE WITHOUT CONTRAST 11/14/2022 11:49 am TECHNIQUE: CT of the left knee was performed without the administration of intravenous contrast.  Multiplanar reformatted images are provided for review. Automated exposure control, iterative reconstruction, and/or weight based adjustment of the mA/kV was utilized to reduce the radiation dose to as low as reasonably achievable. COMPARISON: Radiographs 11/14/2022. HISTORY ORDERING SYSTEM PROVIDED HISTORY: pain trauma TECHNOLOGIST PROVIDED HISTORY: Reason for exam:->pain trauma Decision Support Exception - unselect if not a suspected or confirmed emergency medical condition->Emergency Medical Condition (MA) FINDINGS: No acute fracture is seen. No evidence of dislocation. Mild tricompartmental osteoarthrosis. There is chondrocalcinosis of the menisci. Calcifications are seen involving the cruciate ligaments and proximal gastrocnemius tendon insertion. Vascular calcifications are seen. There is a small calcified prepatellar bursa. Small Baker's cyst.  Small volume knee joint fluid. No acute bony abnormality is seen in the left knee. Chondrocalcinosis, without a large knee joint effusion. Calcified small prepatellar bursa. Mild tricompartmental osteoarthrosis. XR CHEST PORTABLE    Result Date: 11/14/2022  EXAMINATION: ONE XRAY VIEW OF THE CHEST 11/14/2022 3:17 pm COMPARISON: 08/31/2022 HISTORY: ORDERING SYSTEM PROVIDED HISTORY: AMS, frequent falls TECHNOLOGIST PROVIDED HISTORY: Reason for exam:->AMS, frequent falls Reason for Exam: AMS, frequent falls Additional signs and symptoms: na Relevant Medical/Surgical History: bladder cancer, chf, ckd FINDINGS: Heart size is globular and mildly enlarged without change. Aorta is tortuous with atherosclerotic changes. Mediastinum is not widened. .  Lungs are under expanded. Patchy opacity in the left lower lobe but this is similar in appearance. .  No pleural effusions.  Mild spondylosis     No acute lung disease       Electronically signed by Pino Ashraf PA-C on 11/16/2022 at 10:45 AM

## 2022-11-16 NOTE — CONSULTS
Initial Psychiatric History and Physical    [place]  [id number]  2022    ID: Patient is a 80 yrs y.o. male    CC:\"I'm in a bad shape- I broke my leg\"    Source of Information: [facility], [facility] [place] Notes, Patient and nursing. HPI: Patient is an 80year old   male with PMHx of alzheimer's dementia with behavioral disturbance who presents to [facility]  Patient has hx of fall two weeks ago while in the shower. His son and daughter brought him back to the ED due to increase in pain. Patient currently lives alone. PT/OT have recommend PT. Family is in agreement as he could benefit from rehab but is struggling with dementia, CKD, CHR, HTN, CAD s/p PCI, and hypothyroidism. Patient admitted to hospital for observation. Depakote sprinkles 250 mg bid started yesterday and per Lesley Vasques, his behavior have improved. Psychiatry consulted by Kathy Glaser PA-C due to dementia with behavioral disturbance, agitation, family requesting consult. Note code status changed to DNR CC and hospice evaluation. Met with patient at bedside.  is present and required due to impulsivity of patient. He is moving around the bed and having difficulty staying still as he said he is in a lot of bed, pointing to his left trochanter area as well as his back. He believes he is in the hospital in [facility] for a leg fx. He is unable to tell the month or year. When asking social things, he mentions his wife is at home, but per notes she was  27 years ago. He was very much engaged and talking about his past. Insight and judgment are impaired. Patient is delusional. Per sitter picking at things in the air- having visual hallucinations. He has been talking to people not there. Per Kathy Glaser, patient has been living alone. His wife  long ago. He does have children who monitor his wellbeing.       Labs- Vit D 11.49 Low, Urine neg for UTI,      Past Psychiatric History:   Current medications include: haldol 0.5 mg q6 hours IM prn q6 for agitation. Alprazolam 0.25 mg hs prn for sleep,- has been on different decreasing doses for several years. risperdal 0.5 mg at bedtime,   nicotine patch,   melatonin 3 mg at bedtime,   prozac 20 mg at bedtime,   depakote sprinkles 250 mg q 12 hours- started on 11/15/22    Social history  He was in the [employer] for 24 years. He was  and  with three children. Family Psychiatric History:   Family History   Problem Relation Age of Onset    Cancer Sister     Cancer Brother     High Blood Pressure Brother     Heart Disease Other     Hypertension Other     Elevated Lipids Other     Other Other         gout        Allergies:   Allergies   Allergen Reactions    Gabapentin Hallucinations        OBJECTIVE  Vital Signs:  Vitals:    11/16/22 0652   BP: (!) 174/73   Pulse: 61   Resp: 20   Temp: 97.5 °F (36.4 °C)   SpO2: 99%       Labs:  Recent Results (from the past 48 hour(s))   Comprehensive Metabolic Panel    Collection Time: 11/14/22 12:33 PM   Result Value Ref Range    Sodium 137 135 - 145 MMOL/L    Potassium 4.7 3.5 - 5.1 MMOL/L    Chloride 104 99 - 110 mMol/L    CO2 25 21 - 32 MMOL/L    BUN 29 (H) 6 - 23 MG/DL    Creatinine 1.8 (H) 0.9 - 1.3 MG/DL    Est, Glom Filt Rate 36 (L) >60 mL/min/1.73m2    Glucose 171 (H) 70 - 99 MG/DL    Calcium 8.9 8.3 - 10.6 MG/DL    Albumin 3.7 3.4 - 5.0 GM/DL    Total Protein 6.0 (L) 6.4 - 8.2 GM/DL    Total Bilirubin 0.5 0.0 - 1.0 MG/DL    ALT 42 (H) 10 - 40 U/L    AST 48 (H) 15 - 37 IU/L    Alkaline Phosphatase 90 40 - 129 IU/L    Anion Gap 8 4 - 16   CK    Collection Time: 11/14/22 12:33 PM   Result Value Ref Range    Total CK 71 38 - 174 IU/L   Magnesium    Collection Time: 11/14/22 12:33 PM   Result Value Ref Range    Magnesium 1.8 1.8 - 2.4 mg/dl   Troponin    Collection Time: 11/14/22 12:33 PM   Result Value Ref Range    Troponin T <0.010 <0.01 NG/ML   Vitamin D 25 Hydroxy    Collection Time: 11/14/22 12:33 PM Result Value Ref Range    Vit D, 25-Hydroxy 11.49 (L) >20 NG/ML   Urinalysis    Collection Time: 11/14/22  2:05 PM   Result Value Ref Range    Color, UA YELLOW YELLOW    Clarity, UA CLEAR CLEAR    Glucose, Urine NEGATIVE NEGATIVE MG/DL    Bilirubin Urine NEGATIVE NEGATIVE MG/DL    Ketones, Urine NEGATIVE NEGATIVE MG/DL    Specific Gravity, UA 1.010 1.001 - 1.035    Blood, Urine NEGATIVE NEGATIVE    pH, Urine 5.5 5.0 - 8.0    Protein, UA NEGATIVE NEGATIVE MG/DL    Urobilinogen, Urine 0.2 0.2 - 1.0 MG/DL    Nitrite Urine, Quantitative NEGATIVE NEGATIVE    Leukocyte Esterase, Urine NEGATIVE NEGATIVE   Uric Acid    Collection Time: 11/14/22  5:00 PM   Result Value Ref Range    Uric Acid 8.8 (H) 3.5 - 7.2 MG/DL   Comprehensive Metabolic Panel w/ Reflex to MG    Collection Time: 11/15/22  6:24 AM   Result Value Ref Range    Sodium 139 135 - 145 MMOL/L    Potassium 4.2 3.5 - 5.1 MMOL/L    Chloride 106 99 - 110 mMol/L    CO2 22 21 - 32 MMOL/L    BUN 28 (H) 6 - 23 MG/DL    Creatinine 1.5 (H) 0.9 - 1.3 MG/DL    Est, Glom Filt Rate 45 (L) >60 mL/min/1.73m2    Glucose 93 70 - 99 MG/DL    Calcium 8.7 8.3 - 10.6 MG/DL    Albumin 2.9 (L) 3.4 - 5.0 GM/DL    Total Protein 4.8 (L) 6.4 - 8.2 GM/DL    Total Bilirubin 0.6 0.0 - 1.0 MG/DL    ALT 33 10 - 40 U/L    AST 33 15 - 37 IU/L    Alkaline Phosphatase 72 40 - 128 IU/L    Anion Gap 11 4 - 16   CBC with Auto Differential    Collection Time: 11/15/22  6:24 AM   Result Value Ref Range    WBC 7.3 4.0 - 10.5 K/CU MM    RBC 3.82 (L) 4.6 - 6.2 M/CU MM    Hemoglobin 11.0 (L) 13.5 - 18.0 GM/DL    Hematocrit 35.1 (L) 42 - 52 %    MCV 91.9 78 - 100 FL    MCH 28.8 27 - 31 PG    MCHC 31.3 (L) 32.0 - 36.0 %    RDW 16.8 (H) 11.7 - 14.9 %    Platelets 844 184 - 129 K/CU MM    MPV 10.2 7.5 - 11.1 FL    Differential Type AUTOMATED DIFFERENTIAL     Segs Relative 78.5 (H) 36 - 66 %    Lymphocytes % 15.7 (L) 24 - 44 %    Monocytes % 4.8 (H) 0 - 4 %    Eosinophils % 0.1 0 - 3 %    Basophils % 0.1 0 - 1 %    Segs Absolute 5.7 K/CU MM    Lymphocytes Absolute 1.1 K/CU MM    Monocytes Absolute 0.4 K/CU MM    Eosinophils Absolute 0.0 K/CU MM    Basophils Absolute 0.0 K/CU MM    Nucleated RBC % 0.0 %    Total Nucleated RBC 0.0 K/CU MM    Total Immature Neutrophil 0.06 K/CU MM    Immature Neutrophil % 0.8 (H) 0 - 0.43 %   TSH    Collection Time: 11/15/22 12:57 PM   Result Value Ref Range    TSH, High Sensitivity 1.240 0.270 - 4.20 uIu/ml   Ammonia    Collection Time: 11/15/22 12:57 PM   Result Value Ref Range    Ammonia 13 (L) 16 - 60 UMOL/L   Basic Metabolic Panel    Collection Time: 11/16/22  3:51 AM   Result Value Ref Range    Sodium 139 135 - 145 MMOL/L    Potassium 4.3 3.5 - 5.1 MMOL/L    Chloride 108 99 - 110 mMol/L    CO2 24 21 - 32 MMOL/L    Anion Gap 7 4 - 16    BUN 27 (H) 6 - 23 MG/DL    Creatinine 1.4 (H) 0.9 - 1.3 MG/DL    Est, Glom Filt Rate 48 (L) >60 mL/min/1.73m2    Glucose 86 70 - 99 MG/DL    Calcium 8.5 8.3 - 10.6 MG/DL       Review of Systems:  Reports of no current cardiovascular, respiratory, gastrointestinal, genitourinary, integumentary, neurological, muscuoskeletal, or immunological symptoms today. PSYCHIATRIC: See HPI above.     PSYCHIATRIC EXAMINATION / MENTAL STATUS EXAM    CONSTITUTIONAL:    Vitals:   Vitals:    11/16/22 0652   BP: (!) 174/73   Pulse: 61   Resp: 20   Temp: 97.5 °F (36.4 °C)   SpO2: 99%      General appearance: [x] appears age, []  appears older than stated age,               [x]  adequately dressed and groomed, [] disheveled,               []  in no acute distress, [x] appears mildly distressed, [] other           MUSCULOSKELETAL:   Gait:   [] normal, [] antalgic, [] unsteady, [] gait not evaluated   Station:             [] erect, [] sitting, [x] recumbent, [] other        Strength/tone:  [x] muscle strength and tone appear consistent with age and                                        condition     [] atrophy      [] abnormal movements     Vitals: Blood pressure (!) 174/73, pulse 61, temperature 97.5 °F (36.4 °C), temperature source Oral, resp. rate 20, height 5' 10\" (1.778 m), weight 178 lb 5.6 oz (80.9 kg), SpO2 99 %. CONSTITUTIONAL:    Appearance: appears stated age. alert and oriented to person, place, disoriented to time & situation. Mild  distress. Adequate grooming and hygeine. Fair  eye contact. No prominent physical abnormalities. Attitude: Manner is cooperative and pleasant but confused  Motor: Noted psychomotor agitation, retardation or abnormal movements noted  Speech: Clearly articulated; normal rate, volume, tone & amount. Language: intact understanding and production  Mood: irritable  Affect: agitated at times non-labile, congruent with mood and content of speech  Thought Production: Spontaneous. Thought Form: Coherent, linear, not logical but goal-directed. No tangentiality or circumstantiality. No flight of ideas or loosening of associations. Thought Content/Perceptions: No GERDA, ? VH denies auditory   noted paraoia delusion  Insight: impaired  Judgment Impaired  Memory: Immediate, recent, and remote appear  impaired, though not formally tested. 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 He is not on much but has been on this for many many years.  An appropriate taper would be every other night for a week and then every two nights for a week, etc. Patients can have a lot of difficulty weaning from alprazolam.  Patient will require 24/7 supervision at SD due to impulsivity, paranoia and inability to perform ADLs  Psychiatry will follow  Thank you for this consult  PS to Liseth Salinas PA-C    Electronically signed by PRISCILLA Fitch CNP on 11/16/2022 at 11:39 AM

## 2022-11-16 NOTE — CARE COORDINATION
CM - Room # 0374-CM in ER received a return call from Anita Vicente ([phone])  placed from ER (message)  a day or days ago while trying to reach her as the POA for Mr Abril Santos. Since that time , earlier today , she was in touch with Rigo Rider from [facility] and \"discussed options\", and stated pt has been seen by [facility], as they are \"now deciding which was to go\". JASVIR was asked for a  / phone # as she wanted to continue with these alternatives . Gave  CM # [id number].     [address] / Kevin Arceo / [address]

## 2022-11-17 PROBLEM — Z78.9 SELF-CARE DEFICIT: Status: ACTIVE | Noted: 2022-11-17

## 2022-11-17 PROCEDURE — 51701 INSERT BLADDER CATHETER: CPT

## 2022-11-17 PROCEDURE — 6370000000 HC RX 637 (ALT 250 FOR IP): Performed by: PHYSICIAN ASSISTANT

## 2022-11-17 PROCEDURE — G0378 HOSPITAL OBSERVATION PER HR: HCPCS

## 2022-11-17 PROCEDURE — 6370000000 HC RX 637 (ALT 250 FOR IP): Performed by: FAMILY MEDICINE

## 2022-11-17 PROCEDURE — 6360000002 HC RX W HCPCS: Performed by: NURSE PRACTITIONER

## 2022-11-17 PROCEDURE — 51798 US URINE CAPACITY MEASURE: CPT

## 2022-11-17 PROCEDURE — 1200000000 HC SEMI PRIVATE

## 2022-11-17 PROCEDURE — 97162 PT EVAL MOD COMPLEX 30 MIN: CPT

## 2022-11-17 PROCEDURE — 2580000003 HC RX 258: Performed by: NURSE PRACTITIONER

## 2022-11-17 PROCEDURE — 6370000000 HC RX 637 (ALT 250 FOR IP): Performed by: NURSE PRACTITIONER

## 2022-11-17 PROCEDURE — 97530 THERAPEUTIC ACTIVITIES: CPT

## 2022-11-17 PROCEDURE — 97166 OT EVAL MOD COMPLEX 45 MIN: CPT

## 2022-11-17 PROCEDURE — 94761 N-INVAS EAR/PLS OXIMETRY MLT: CPT

## 2022-11-17 PROCEDURE — 97116 GAIT TRAINING THERAPY: CPT

## 2022-11-17 RX ADMIN — RISPERIDONE 1 MG: 0.5 TABLET ORAL at 20:48

## 2022-11-17 RX ADMIN — LEVOTHYROXINE SODIUM 50 MCG: 0.05 TABLET ORAL at 09:01

## 2022-11-17 RX ADMIN — PREGABALIN 25 MG: 25 CAPSULE ORAL at 18:08

## 2022-11-17 RX ADMIN — PANTOPRAZOLE SODIUM 40 MG: 40 TABLET, DELAYED RELEASE ORAL at 08:49

## 2022-11-17 RX ADMIN — DICLOFENAC SODIUM 4 G: 10 GEL TOPICAL at 08:49

## 2022-11-17 RX ADMIN — DIVALPROEX SODIUM 250 MG: 125 CAPSULE, COATED PELLETS ORAL at 08:49

## 2022-11-17 RX ADMIN — DICLOFENAC SODIUM 4 G: 10 GEL TOPICAL at 18:09

## 2022-11-17 RX ADMIN — SENNOSIDES 8.6 MG: 8.6 TABLET, FILM COATED ORAL at 08:49

## 2022-11-17 RX ADMIN — PREGABALIN 25 MG: 25 CAPSULE ORAL at 09:01

## 2022-11-17 RX ADMIN — ASPIRIN 81 MG: 81 TABLET, COATED ORAL at 08:49

## 2022-11-17 RX ADMIN — DICLOFENAC SODIUM 4 G: 10 GEL TOPICAL at 12:27

## 2022-11-17 RX ADMIN — SENNOSIDES 8.6 MG: 8.6 TABLET, FILM COATED ORAL at 20:47

## 2022-11-17 RX ADMIN — METOPROLOL TARTRATE 12.5 MG: 25 TABLET, FILM COATED ORAL at 20:48

## 2022-11-17 RX ADMIN — DICLOFENAC SODIUM 4 G: 10 GEL TOPICAL at 20:47

## 2022-11-17 RX ADMIN — ENOXAPARIN SODIUM 30 MG: 100 INJECTION SUBCUTANEOUS at 18:08

## 2022-11-17 RX ADMIN — SODIUM CHLORIDE, PRESERVATIVE FREE 10 ML: 5 INJECTION INTRAVENOUS at 20:51

## 2022-11-17 RX ADMIN — Medication 3 MG: at 20:48

## 2022-11-17 RX ADMIN — FLUOXETINE HYDROCHLORIDE 20 MG: 20 CAPSULE ORAL at 20:48

## 2022-11-17 RX ADMIN — Medication 2000 UNITS: at 08:49

## 2022-11-17 RX ADMIN — SODIUM CHLORIDE, PRESERVATIVE FREE 10 ML: 5 INJECTION INTRAVENOUS at 08:49

## 2022-11-17 RX ADMIN — DIVALPROEX SODIUM 250 MG: 125 CAPSULE, COATED PELLETS ORAL at 20:48

## 2022-11-17 RX ADMIN — METOPROLOL TARTRATE 12.5 MG: 25 TABLET, FILM COATED ORAL at 08:49

## 2022-11-17 RX ADMIN — ALLOPURINOL 100 MG: 100 TABLET ORAL at 08:49

## 2022-11-17 RX ADMIN — ATORVASTATIN CALCIUM 80 MG: 40 TABLET, FILM COATED ORAL at 20:48

## 2022-11-17 RX ADMIN — TAMSULOSIN HYDROCHLORIDE 0.4 MG: 0.4 CAPSULE ORAL at 08:49

## 2022-11-17 NOTE — PLAN OF CARE
Problem: Discharge Planning  Goal: Discharge to home or other facility with appropriate resources  11/17/2022 1622 by Phillip Shay RN  Outcome: Progressing  11/17/2022 1622 by Phillip Shay RN  Outcome: Progressing

## 2022-11-17 NOTE — PROGRESS NOTES
V2.0  [facility] [place] [facility] Note      Name:  Florence Huitron /Age/Sex: 1934  (80 y.o. male)   MRN & CSN:  [id number] & [id number] Encounter Date/Time: 2022 11:26 AM EST    Location:  [address] [place] [address]-A PCP: Peri Srivastava PA-C       Hospital Day: 4    Assessment and Plan:   Florence Huitron is a 80 y.o. male with past medical history of dementia, CKD 3, chronic HFrEF who presents with Left knee pain, unspecified chronicity, worsening confusion.     Alzheimer's dementia with behavioral disturbance  -Progressively worsening over the last several weeks   -No longer able to care for self at home, intermittently agitated  -UA noninfectious, labs generally unremarkable  -Maintain sleep-wake cycle, scheduled melatonin  -Continue as needed Haldol  -CODE STATUS changed to DNR CC, hospice consulted per family request  - PT/OT recommended SNF, CM following for placement  - psych consulted - increased home Risperdal, agreed with depakote  - fall precautions, sitter at bedside    Acute urinary retention   - requiring intermittent straight cath  - has history of prostate and bladder cancer  - continue bladder scan q8h, straight cath for >150cc  - continue home flomax   - not a good candidate for march given agitation     Left knee pain  -Status post fall  -CT left knee with chondrocalcinosis, calcified small prepatellar bursa  -No signs of gout on exam, pain improved   -Continue Voltaren gel, as needed Tylenol, PRN Norco for severe pain     Multiple falls  -In setting of dementia  -CT head, x-ray pelvis no acute abnormality  -PT OT recommended SNF    CKD III  -Creatinine near baseline  -Avoid nephrotoxins    Chronic HFrEF  -Torsemide held on admission due to decreased p.o. intake, appears euvolemic and will continue to hold   -continue home beta-blocker    Hypertension  -Continue beta-blocker  - BP elevated, consider titrating meds if remains high     Hypothyroidism   -continue home Synthroid    This patient was discussed with Dr. Hussein Garcia. He was agreeable with the plan and management as dictated above. Current Living situation: home  Expected Disposition: TBD  Estimated D/C:     Diet ADULT DIET; Regular  ADULT ORAL NUTRITION SUPPLEMENT; Breakfast, Lunch; Diabetic Oral Supplement  ADULT ORAL NUTRITION SUPPLEMENT; Dinner; Frozen Oral Supplement   DVT Prophylaxis [x] Lovenox, []  Heparin, [] SCDs, [] Ambulation,  [] Eliquis, [] Xarelto []Coumadin   Code Status DNR-CC   Disposition Patient requires continued admission due to awaiting placement   Surrogate Decision Maker/ POA Daughter, Stanley Rosenberg     Subjective:     Chief Complaint: Leg Pain       Patient seen and examined at bedside. States his knee feels better. Calm today, still only alert and oriented to self. Review of Systems:    Unable to obtain secondary to dementia    Objective: Intake/Output Summary (Last 24 hours) at 11/17/2022 1158  Last data filed at 11/17/2022 1000  Gross per 24 hour   Intake 10 ml   Output 1277 ml   Net -1267 ml          Vitals:   Vitals:    11/17/22 0845   BP: 133/69   Pulse: 64   Resp: 18   Temp: 97.5 °F (36.4 °C)   SpO2: 99%       Physical Exam:   PHYSICAL EXAM   GEN Awake male, laying in bed  EYES Pupils are equally round. HENT Mucous membranes are moist.   NECK Supple, no apparent thyromegaly or masses. RESP Respirations even and unlabored on RA, clear to auscultation bilaterally   CARDIO/VASC Regular rate without appreciable murmurs. GI Abdomen is soft without significant tenderness, masses, or guarding.   Shaw catheter is not present. MSK No gross joint deformities. Mild bruising of left knee. SKIN Normal coloration, warm, dry. NEURO Cranial nerves appear grossly intact, normal speech, no lateralizing weakness. PSYCH Awake, alert, oriented x 1. Calm.     Medications:   Medications:    risperiDONE  1 mg Oral Nightly    Vitamin D  2,000 Units Oral Daily    divalproex  250 mg Oral 2 times per day    sodium chloride flush  5-40 mL IntraVENous 2 times per day    enoxaparin  30 mg SubCUTAneous Q24H    nicotine  1 patch TransDERmal Daily    [Held by provider] torsemide  10 mg Oral Daily    tamsulosin  0.4 mg Oral Daily    senna  1 tablet Oral BID    pregabalin  25 mg Oral BID    pantoprazole  40 mg Oral Daily    metoprolol tartrate  12.5 mg Oral BID    levothyroxine  50 mcg Oral Daily    FLUoxetine  20 mg Oral Nightly    atorvastatin  80 mg Oral Nightly    aspirin  81 mg Oral Daily    allopurinol  100 mg Oral Daily    diclofenac sodium  4 g Topical 4x daily    melatonin  3 mg Oral Nightly      Infusions:    sodium chloride       PRN Meds: HYDROcodone 5 mg - acetaminophen, 1 tablet, Q6H PRN  hydrALAZINE (APRESOLINE) ivpb, 10 mg, Q4H PRN  haloperidol lactate, 0.5 mg, Q6H PRN  sodium chloride flush, 5-40 mL, PRN  sodium chloride, 25 mL, PRN  ondansetron, 4 mg, Q8H PRN   Or  ondansetron, 4 mg, Q6H PRN  polyethylene glycol, 17 g, Daily PRN  acetaminophen, 650 mg, Q6H PRN   Or  acetaminophen, 650 mg, Q6H PRN  nicotine polacrilex, 2 mg, Q1H PRN  guaiFENesin, 600 mg, BID PRN  docusate sodium, 100 mg, BID PRN  ALPRAZolam, 0.25 mg, Nightly PRN      Labs      No results found for this or any previous visit (from the past 24 hour(s)). Imaging/Diagnostics Last 24 Hours   XR KNEE LEFT (3 VIEWS)    Result Date: 11/14/2022  EXAMINATION: THREE XRAY VIEWS OF THE LEFT KNEE 11/14/2022 4:35 am COMPARISON: None. HISTORY: ORDERING SYSTEM PROVIDED HISTORY: Pain, Multiple falls TECHNOLOGIST PROVIDED HISTORY: Reason for exam:->Pain, Multiple falls Reason for Exam: pain after fall Left knee pain FINDINGS: There is no acute fracture, dislocation, or bone destruction. Chondrocalcinosis is seen involving the medial and lateral compartments. There is no joint effusion. Vascular calcifications are seen. No acute osseous abnormality. 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      CT HEAD WO CONTRAST    Result Date: 11/14/2022  EXAMINATION: CT OF THE HEAD WITHOUT CONTRAST  11/14/2022 11:48 am TECHNIQUE: CT of the head was performed without the administration of intravenous contrast. Automated exposure control, iterative reconstruction, and/or weight based adjustment of the mA/kV was utilized to reduce the radiation dose to as low as reasonably achievable. COMPARISON: 11/29/2019 HISTORY: ORDERING SYSTEM PROVIDED HISTORY: unsteady gait x1 month TECHNOLOGIST PROVIDED HISTORY: Has a \"code stroke\" or \"stroke alert\" been called? ->No Reason for exam:->unsteady gait x1 month Decision Support Exception - unselect if not a suspected or confirmed emergency medical condition->Emergency Medical Condition (MA) Reason for Exam: unsteady gait x1 month FINDINGS: BRAIN/VENTRICLES: There is no acute intracranial hemorrhage, mass effect or midline shift. No abnormal extra-axial fluid collection. The gray-white differentiation is maintained without evidence of an acute infarct. There is no evidence of hydrocephalus. Moderate to severely dilated ventricles and cerebral sulci. Periventricular and subcortical white matter decreased attenuation. ORBITS: The visualized portion of the orbits demonstrate no acute abnormality. SINUSES: The visualized paranasal sinuses and right mastoid air cells demonstrate no acute abnormality. Small amount fluid in left mastoid air cells. SOFT TISSUES/SKULL:  No acute abnormality of the visualized skull or soft tissues. No acute intracranial abnormality. Findings consistent with central and cortical atrophy. Microvascular ischemic white matter disease. Chronic left mastoiditis. No interval change. CT PELVIS WO CONTRAST Additional Contrast? None    Result Date: 11/14/2022  EXAMINATION: CT OF THE PELVIS WITHOUT CONTRAST 11/14/2022 11:14 am TECHNIQUE: CT of the pelvis was performed without the administration of intravenous contrast.  Multiplanar reformatted images are provided for review.  Adjustment of mA and/or kV according to patient size was utilized. Automated exposure control, iterative reconstruction, and/or weight based adjustment of the mA/kV was utilized to reduce the radiation dose to as low as reasonably achievable. COMPARISON: CT abdomen and pelvis 08/03/2019 HISTORY: ORDERING SYSTEM PROVIDED HISTORY: pain x2 weeks, from fall TECHNOLOGIST PROVIDED HISTORY: Reason for exam:->pain x2 weeks, from fall Additional Contrast?->None Decision Support Exception - unselect if not a suspected or confirmed emergency medical condition->Emergency Medical Condition (MA) FINDINGS: No acute fracture is seen. No evidence of dislocation. Mild bilateral hip joint degenerative changes. Degenerative changes also seen at the SI joints and visualized lower lumbar spine. Similar peripherally sclerotic lucent lesion in the left posterior iliac bone, suggesting a benign finding. The infrarenal abdominal aorta measures up to 2.8 cm in diameter. There are vascular calcifications. No acute bony abnormality is seen in the pelvis. Mild bilateral hip joint degenerative changes. Degenerative changes also seen at the SI joints and visualized lower lumbar spine. 2.8 cm infrarenal abdominal aortic aneurysm suspected. Recommend follow-up every 5 years. Reference: J Am Erwin Radiol 6061;19:119-796. CT KNEE LEFT WO CONTRAST    Result Date: 11/14/2022  EXAMINATION: CT OF THE LEFT KNEE WITHOUT CONTRAST 11/14/2022 11:49 am TECHNIQUE: CT of the left knee was performed without the administration of intravenous contrast.  Multiplanar reformatted images are provided for review. Automated exposure control, iterative reconstruction, and/or weight based adjustment of the mA/kV was utilized to reduce the radiation dose to as low as reasonably achievable. COMPARISON: Radiographs 11/14/2022.  HISTORY ORDERING SYSTEM PROVIDED HISTORY: pain trauma TECHNOLOGIST PROVIDED HISTORY: Reason for exam:->pain trauma Decision Support Exception - unselect if not a suspected or confirmed emergency medical condition->Emergency Medical Condition (MA) FINDINGS: No acute fracture is seen. No evidence of dislocation. Mild tricompartmental osteoarthrosis. There is chondrocalcinosis of the menisci. Calcifications are seen involving the cruciate ligaments and proximal gastrocnemius tendon insertion. Vascular calcifications are seen. There is a small calcified prepatellar bursa. Small Baker's cyst.  Small volume knee joint fluid. No acute bony abnormality is seen in the left knee. Chondrocalcinosis, without a large knee joint effusion. Calcified small prepatellar bursa. Mild tricompartmental osteoarthrosis. XR CHEST PORTABLE    Result Date: 11/14/2022  EXAMINATION: ONE XRAY VIEW OF THE CHEST 11/14/2022 3:17 pm COMPARISON: 08/31/2022 HISTORY: ORDERING SYSTEM PROVIDED HISTORY: AMS, frequent falls TECHNOLOGIST PROVIDED HISTORY: Reason for exam:->AMS, frequent falls Reason for Exam: AMS, frequent falls Additional signs and symptoms: na Relevant Medical/Surgical History: bladder cancer, chf, ckd FINDINGS: Heart size is globular and mildly enlarged without change. Aorta is tortuous with atherosclerotic changes. Mediastinum is not widened. .  Lungs are under expanded. Patchy opacity in the left lower lobe but this is similar in appearance. .  No pleural effusions.  Mild spondylosis     No acute lung disease       Electronically signed by Liseth Salinsa PA-C on 11/17/2022 at 11:58 AM

## 2022-11-17 NOTE — CONSULTS
[address] 1934, [id number]/[id number], 11/17/2022      Discharge Recommendation: SNF    History:  [place]:  The primary encounter diagnosis was Frequent falls. Diagnoses of Unsteady gait, History of inability to perform activities of daily living, Acute pain of left knee, Chondrocalcinosis, and Infrarenal abdominal aortic aneurysm (AAA) without rupture were also pertinent to this visit. Past Medical History:   Diagnosis Date    Anxiety     Bladder cancer ([facility]     Cancer ([facility]     CHF (congestive heart failure) (HCC)     Chronic kidney disease     Chronic kidney disease, stage III (moderate) ([facility] 07/17/2017    Coronary artery disease involving native coronary artery of native heart with unstable angina pectoris ([facility] 08/11/2019    Dementia ([facility]     Depression     GERD (gastroesophageal reflux disease)     Gout     Hx of Carotid Doppler ultrasound 01/05/2021    mild 0-49% disease and normal flow    Hx of Doppler echocardiogram 11/19/2018    EF 50% mod LV hypertrophy    Hx of exercise stress test 11/19/2018    Treadmill normal study    Hyperlipidemia     Hypertension     Hyperthyroidism     Mitral valve insufficiency          Subjective:  Patient states: \"My throat and arm are sore\" - pt speech is difficult to understand   Pain: pt endorses pain, but unable to report   Communication with other providers: Coeval with PT for pt safety and tolerance, RN handoff   Restrictions: general precautions, fall risk, tele, dementia at baseline   Sitter at bedside    Home Setup/Prior level of function:  Per CM, pt is from home alone. Pt is questionable historian at this time.  Would benefit from confirmation with family regarding social/functional.     Examination:  Observation: Pt was in bed upon arrival, agreeable to session  Vision: WFL  Hearing: slight [place]   Objective Measures: on RA     Body Systems and functions:  ROM: WFL in BUE, with exception to bilat shoulders ~90 degrees   Strength: 3+/5 in BUE  Sensation: WFL  Tone: normotonic in BUE  Coordination: movements fluid and coordinated  Posture: normal posture  Activity Tolerance: Good    Activities of Daily Living (ADLs):  Feeding: ModA   Grooming: ModA   Toileting: DEP   UB dressing: MAxA  LB dressing: DEP   UB bathing: MaxA   LB bathing: MaxA       *pt ADL function inferred from gross functional assessment of mobility, balance, posture, safety awareness, activity tolerance (unless otherwise indicated)    Cognitive and Psychosocial Functioning:  Overall cognitive status:    11/17/22 1132   Orientation   Orientation Level Disoriented to situation;Disoriented to time;Disoriented to place;Oriented to person  (pt able to report name and birthday. Knows he is in [facility] but reported likely a [facility] that he is in. Pt was edu that it is the hospital.)   Cognition   Overall Cognitive Status Exceptions   Arousal/Alertness Appropriate responses to stimuli   Following Commands Follows one step commands with increased time   Attention Span Attends with cues to redirect; Difficulty attending to directions; Difficulty dividing attention   Memory Decreased recall of recent events;Decreased short term memory;Decreased long term memory   Safety Judgement Decreased awareness of need for assistance;Decreased awareness of need for safety   Problem Solving Decreased awareness of errors   Insights Not aware of deficits   Initiation Requires cues for all   Sequencing Requires cues for all     Affect: normal     Balance:   Sitting: poor d/t impaired heed of task. Pt provided Mod-MaxA with intermittent CGA   Standing: CGA at [facility]     Functional Mobility:  Rolling Laterally in Bed: MaxA   Supine to Sit: ModAx2   Sit to Stand: ModA to FWW   Ambulation: CGA using FWW       AM-PAC 6 click short form for inpatient daily activity:   How much help from another person does the patient currently need. ..  Unable  Dep A Lot  Max A A Lot   Mod A A Little  Min A A Little   CGA  SBA None   Mod I  Indep  Sup   1. Putting on and taking off regular lower body clothing? [x] 1    [] 2   [] 2   [] 3   [] 3   [] 4      2. Bathing (including washing, rinsing, drying)? [] 1   [x] 2   [] 2 [] 3 [] 3 [] 4   3. Toileting, which includes using toilet, bedpan, or urinal? [x] 1    [] 2   [] 2   [] 3   [] 3   [] 4     4. Putting on and taking off regular upper body clothing? [] 1   [x] 2   [] 2   [] 3   [] 3    [] 4      5. Taking care of personal grooming such as brushing teeth? [] 1   [] 2    [x] 2 [] 3    [] 3   [] 4      6. Eating meals? [] 1   [] 2   [x] 2   [] 3   [] 3   [] 4        Raw Score:  10      24/24 = unimpaired  23/24 = 1-20% impaired   20/24-22/24 = 21-40% impaired  15/24-19/24 = 41-59% impaired   10/24-14/24 = 60%-79% impaired  7/24-9/24 = 80%-99% impaired  6/24 = 100% impaired     Treatment:  Therapeutic Activity Training:   Therapeutic activity training was instructed today. Cues were given for safety, sequence, UE/LE placement, visual cues, and balance. Activities performed today included:    Bed mobility:  Pt completed sup to sit with ModAx2 d/t poor initiation and heeding of task. Seated balance:  Pt tolerated EOB fairly. Pt provided Mod-MaxA at EOB d/t poor heeding of task. Pt had bouts of CGA with [facility] assist to place on bed rails and max cues for WS. Pt tolerated for ~8mins to receive medications from RN. STS:  Pt completed from EOB ModA to [address] with max cues. Ambulation:  Pt ambulated ~50' CGA with FWW and provided a chair follow for safety. Pt required seated rest break and provided ride to return to room via rolling recliner. See PT note for further details regarding ambulation. Self Care Training:   Self care training was performed today. Cues were given for safety, sequence, UE/LE placement, visual cues, and balance.     Activities performed today included:     LB dressing:  Pt donned bilat nonslip socks seated EOB with DEP assist.     Feeding/eating:  Pt completed in semifowlers with ModA to complete d/t poor initiation and heeding of task. Pt benefited from encouragement and cues. Education: Role of OT, OT POC, discharge needs, safety, benefits of EOB/OOB activity, AE needs    Safety Measures: Gait belt used for safety of pt and therapist, Left in recliner with sitter present, Alarm in place, call light and phone within reach, lines managed, needs met     Assessment:  Pt is a 80 yr old male from home who presents with Frequent falls. Diagnoses of Unsteady gait, History of inability to perform activities of daily living, Acute pain of left knee, Chondrocalcinosis, and Infrarenal abdominal aortic aneurysm (AAA) without rupture were also pertinent to this visit. Prior to admission, pt was living alone and reportedly independent, however pt questionable historian. Pt currently performed bed mobility MoDAx2, seated balance up to MaxA d/t retro + L lean, transfers Marva to FWW, and ambulated ~50' CGA with FWW. Pt demo'd impaired insight and safety awareness. Pt would benefit from SNF admission to address fall risk and address remaining impairments to return pt to typical baseline. Pt would not be safe to return to home alone at this time d/t both physical and cognitive impairments. Pt would benefit from continued IP OT services during their stay, and discharge to SNF.     Complexity: Moderate   Prognosis: Good   Barriers: strength, endurance, cognition, R knee pain     Plan:  Plan: 3+/week    Treatment to include: Strengthening, ROM, Balance Training, Functional Mobility Training, Endurance Training, Gait Training, Pain Management, Safety Education and Training, Patient+Caregiver Education and Training, Equipment Evaluation Education and Procurement, Positioning, Self Care Training, Home Management Training, Coordination Training    Pt would benefit from continued edu on: safety awareness   Adaptive Equipment Recommendations: Defer to next LOC     Goals:  Time frame for goals: 2 weeks  Pt will complete feeding tasks with Marva at seated level and AE PRN   Pt will complete grooming tasks with Marva at standing level with mod cues   Pt will complete toileting tasks with MaxA using BSC  Pt will complete UB dressing tasks with ModA seated EOB   Pt will complete LB dressing tasks with MaxA seated EOB   Pt will complete UB bathing tasks with ModA seated and AE PRN   Pt will complete LB bathing tasks with ModA seated and AE PRN   Pt will complete therapeutic exercise/activity to increase independence in ADL/IADL function  Pt will practice functional transfers and mobility with AD for increased safety and independence    Time:   Time in: 0901  Time out: [facility] Minutes: 14  Evaluation Minutes: 10  Total time: 24    Electronically signed by:    GLENDY Newsome/L   License: [id number]  83/97/1355, 9:40 AM

## 2022-11-17 NOTE — PROGRESS NOTES
Physical Therapy  [facility] ACUTE CARE PHYSICAL THERAPY EVALUATION  True Osler, 1934, [id number], 11/17/2022    History  [place]:  The primary encounter diagnosis was Frequent falls. Diagnoses of Unsteady gait, History of inability to perform activities of daily living, Acute pain of left knee, Chondrocalcinosis, and Infrarenal abdominal aortic aneurysm (AAA) without rupture were also pertinent to this visit. Patient  has a past medical history of Anxiety, Bladder cancer ([address] Cancer ([address] CHF (congestive heart failure) ([address] Chronic kidney disease, Chronic kidney disease, stage III (moderate) ([address] Coronary artery disease involving native coronary artery of native heart with unstable angina pectoris ([address] Dementia ([address] Depression, GERD (gastroesophageal reflux disease), Gout, Hx of Carotid Doppler ultrasound, Hx of Doppler echocardiogram, Hx of exercise stress test, Hyperlipidemia, Hypertension, Hyperthyroidism, and Mitral valve insufficiency. Patient  has a past surgical history that includes thoracentesis (Right, 08/14/2019); Pacemaker insertion (N/A, 8/15/2019); Colonoscopy; pacemaker placement; and Coronary angioplasty with stent. Subjective:  Patient states:  \"I'd like to get up\"   Pain:  sore throat, did not rate   Communication with other providers:  RN, co-eval with ELAN Lyla   Restrictions: general precautions, falls     Home Setup/Prior level of function  Social/Functional History  Lives With: Alone  Type of Home: House  Home Layout: One level  Home Access: Level entry  Bathroom Shower/Tub: Walk-in shower  Bathroom Toilet: Standard  Bathroom Equipment: Built-in shower seat, Grab bars in shower, Hand-held shower, Grab bars around toilet  Home Equipment: Harshil Wise, Jarvis, Alert [employer] Help From: Family-per charting his family checks in on him and provides meals.    ADL Assistance: Independent  Ambulation Assistance: Independent(intermittent use of can)   Transfer Assistance: Independent  Active : Yes  Occupation: Retired  Type of occupation: [employer] years     Additional Comments: Above information taken from a prior PT eval in 2019/CM charting. Pt is an unreliable historian. Should confirm with family. Examination of body systems (includes body structures/functions, activity/participation limitations):  Observation:  Supine in bed upon arrival. Cooperative with therapy. Pleasantly confused. Vision:  WFL  Hearing:  Slightly [place]   Cardiopulmonary:  stable vitals on room air   Cognition: Dec STM, problem solving, comprehension, command following (with verbal cues and demonstrations), and safety awareness. Disoriented to place, time, and situation. See OT/SLP note for further evaluation. Musculoskeletal  ROM R/L:  [facility] BLEs   Strength R/L:    Moderate weakness observed in function and endurance. Poor command following to formally assess with MMT. L ankle DF appeared slightly weaker compared to right. Mobility/treatment:   Rolling L/R:  partial roll maxA to the right with facilitation at trunk to reach grab bar and initiation at hips  Supine to sit:  modA x 2 for sequencing BLEs to EOB,  hip scooting, and uprighting trunk. Dec initiation with verbal cues. Transfers:   Sit to stand: modA for fwd weight shift and lift assist   Stand to sit: modA for controlling sit to recliner. Hand over hand assist for guiding UEs back to chair for support. Sitting balance:  Variable from mod-maxA but also had intermittent bouts of CGA while at the EOB, static with single to bilat UE support. Assist with UE placement on bed/bed rail for support. Tendency to lean posterior and laterally to the left. Pt needed frequent VC/TCS and assist for attaining/keeping midline. Standing balance:  CGA to Fabio at RW with BUE support, static x ~20 seconds prior to initiating mobility   Gait: ~50ft with RW Fabio to  CGA for safety.  Dec pace with fwd flexed posture and dec bilat foot clearance. Chair follow for safety due to unpredictable endurance and dec awareness to his deficits. Cues for RW placement and keeping body within MARCIE of walker. Educated pt on POC, role of PT. Cues provided for sequencing to inc safety and indep with mobility     [facility] 6 Clicks Inpatient Mobility:  AM-PAC Inpatient Mobility Raw Score : 13    Safety: patient left in chair with sitter present, call light within reach, RN notified, gait belt used. Assessment:  Pt is an 80year old male admitted with multiple falls and left knee pain. No fxs found. Hx of Alzheimer's dementia. Recommend subacute rehab once medically stable. Baseline function unclear but pt was admitted from home and was living alone. He performed below his assumed baseline today with dec strength, balance, and activity tolerance. He would benefit from continued therapy to address his current deficits, dec potential fall risk, dec burden of care, and restore function. Complexity: Moderate  Prognosis: Good, no significant barriers to participation at this time. Plan: 3-5 times per week  Discharge Recommendations: Subacute/Skilled Nursing Facility  Equipment: continue to assess (also need to confirm what DME pt already owns). At this time pt would benefit from a RW.      Goals:  Short Term Goals  Time Frame for Short Term Goals: 2 weeks  Short Term Goal 1: Pt will perform sit><supine modA  Short Term Goal 2: Pt will transition sit><stand Fabio  Short Term Goal 3: Pt will ambulate 150ft with LRAD CGA  Short Term Goal 4: Pt will perform standing light dynamic activity with single UE support x 3 minutes CGA  Short Term Goal 5: Pt will perform sitting light dynamic activity with single UE support CGA       Treatment plan:  Bed mobility, transfers, balance, gait, TA, TX, stairs     Recommendations for NURSING mobility: ambulate with RW to the bathroom     Time:   Time in: 0900  Time out: 0926  Timed treatment minutes: 16  Total time: 26 minutes    Electronically signed by:    Sobeida Portillo, [id number]  11/17/2022, 11:42 AM

## 2022-11-17 NOTE — CARE COORDINATION
This RN CM spoke with patient's Dona Harris. Family would like referral sent to [facility]. Referral sent via fax.

## 2022-11-18 LAB
GLUCOSE BLD-MCNC: 90 MG/DL (ref 70–99)
SARS-COV-2, NAAT: NOT DETECTED
SOURCE: NORMAL

## 2022-11-18 PROCEDURE — 94761 N-INVAS EAR/PLS OXIMETRY MLT: CPT

## 2022-11-18 PROCEDURE — 2580000003 HC RX 258: Performed by: NURSE PRACTITIONER

## 2022-11-18 PROCEDURE — 6370000000 HC RX 637 (ALT 250 FOR IP): Performed by: FAMILY MEDICINE

## 2022-11-18 PROCEDURE — 6370000000 HC RX 637 (ALT 250 FOR IP): Performed by: PHYSICIAN ASSISTANT

## 2022-11-18 PROCEDURE — 51798 US URINE CAPACITY MEASURE: CPT

## 2022-11-18 PROCEDURE — 1200000000 HC SEMI PRIVATE

## 2022-11-18 PROCEDURE — 6360000002 HC RX W HCPCS: Performed by: NURSE PRACTITIONER

## 2022-11-18 PROCEDURE — 6360000002 HC RX W HCPCS: Performed by: PHYSICIAN ASSISTANT

## 2022-11-18 PROCEDURE — 6370000000 HC RX 637 (ALT 250 FOR IP): Performed by: NURSE PRACTITIONER

## 2022-11-18 PROCEDURE — 82962 GLUCOSE BLOOD TEST: CPT

## 2022-11-18 PROCEDURE — 87635 SARS-COV-2 COVID-19 AMP PRB: CPT

## 2022-11-18 RX ORDER — LANOLIN ALCOHOL/MO/W.PET/CERES
3 CREAM (GRAM) TOPICAL NIGHTLY
Qty: 30 TABLET | Refills: 3
Start: 2022-11-18 | End: 2022-11-30

## 2022-11-18 RX ORDER — CHOLECALCIFEROL (VITAMIN D3) 50 MCG
2000 TABLET ORAL DAILY
Qty: 60 TABLET | Refills: 0
Start: 2022-11-19 | End: 2022-11-30

## 2022-11-18 RX ORDER — 0.9 % SODIUM CHLORIDE 0.9 %
500 INTRAVENOUS SOLUTION INTRAVENOUS ONCE
Status: DISCONTINUED | OUTPATIENT
Start: 2022-11-18 | End: 2022-11-25 | Stop reason: HOSPADM

## 2022-11-18 RX ORDER — PREGABALIN 25 MG/1
25 CAPSULE ORAL 2 TIMES DAILY
Qty: 14 CAPSULE | Refills: 0 | Status: ON HOLD | OUTPATIENT
Start: 2022-11-18 | End: 2022-11-25

## 2022-11-18 RX ORDER — HYDROCODONE BITARTRATE AND ACETAMINOPHEN 5; 325 MG/1; MG/1
1 TABLET ORAL EVERY 6 HOURS PRN
Qty: 12 TABLET | Refills: 0 | Status: SHIPPED | OUTPATIENT
Start: 2022-11-18 | End: 2022-11-21

## 2022-11-18 RX ORDER — MIDODRINE HYDROCHLORIDE 5 MG/1
10 TABLET ORAL 3 TIMES DAILY PRN
Status: DISCONTINUED | OUTPATIENT
Start: 2022-11-18 | End: 2022-11-25 | Stop reason: HOSPADM

## 2022-11-18 RX ORDER — ALPRAZOLAM 0.5 MG/1
0.25 TABLET ORAL NIGHTLY PRN
Qty: 7 TABLET | Refills: 0 | Status: SHIPPED | OUTPATIENT
Start: 2022-11-18 | End: 2022-11-25

## 2022-11-18 RX ORDER — DIVALPROEX SODIUM 125 MG/1
250 CAPSULE, COATED PELLETS ORAL EVERY 12 HOURS SCHEDULED
Qty: 60 CAPSULE | Refills: 3 | Status: ON HOLD
Start: 2022-11-18

## 2022-11-18 RX ORDER — MIDODRINE HYDROCHLORIDE 10 MG/1
10 TABLET ORAL 3 TIMES DAILY PRN
Qty: 90 TABLET | Refills: 11 | Status: ON HOLD
Start: 2022-11-18 | End: 2023-11-18

## 2022-11-18 RX ORDER — TORSEMIDE 20 MG/1
10 TABLET ORAL DAILY PRN
Qty: 30 TABLET | Refills: 0 | Status: ON HOLD
Start: 2022-11-18

## 2022-11-18 RX ORDER — RISPERIDONE 1 MG/1
1 TABLET ORAL NIGHTLY
Qty: 60 TABLET | Refills: 3 | Status: ON HOLD
Start: 2022-11-18

## 2022-11-18 RX ADMIN — PREGABALIN 25 MG: 25 CAPSULE ORAL at 09:08

## 2022-11-18 RX ADMIN — RISPERIDONE 1 MG: 0.5 TABLET ORAL at 20:51

## 2022-11-18 RX ADMIN — DICLOFENAC SODIUM 4 G: 10 GEL TOPICAL at 16:18

## 2022-11-18 RX ADMIN — METOPROLOL TARTRATE 12.5 MG: 25 TABLET, FILM COATED ORAL at 20:51

## 2022-11-18 RX ADMIN — HALOPERIDOL LACTATE 0.5 MG: 5 INJECTION, SOLUTION INTRAMUSCULAR at 23:49

## 2022-11-18 RX ADMIN — SENNOSIDES 8.6 MG: 8.6 TABLET, FILM COATED ORAL at 20:50

## 2022-11-18 RX ADMIN — ENOXAPARIN SODIUM 30 MG: 100 INJECTION SUBCUTANEOUS at 16:17

## 2022-11-18 RX ADMIN — DIVALPROEX SODIUM 250 MG: 125 CAPSULE, COATED PELLETS ORAL at 20:50

## 2022-11-18 RX ADMIN — LEVOTHYROXINE SODIUM 50 MCG: 0.05 TABLET ORAL at 06:20

## 2022-11-18 RX ADMIN — DIVALPROEX SODIUM 250 MG: 125 CAPSULE, COATED PELLETS ORAL at 09:07

## 2022-11-18 RX ADMIN — Medication 3 MG: at 20:50

## 2022-11-18 RX ADMIN — SODIUM CHLORIDE, PRESERVATIVE FREE 10 ML: 5 INJECTION INTRAVENOUS at 20:52

## 2022-11-18 RX ADMIN — DICLOFENAC SODIUM 4 G: 10 GEL TOPICAL at 20:52

## 2022-11-18 RX ADMIN — SODIUM CHLORIDE, PRESERVATIVE FREE 10 ML: 5 INJECTION INTRAVENOUS at 09:08

## 2022-11-18 RX ADMIN — ASPIRIN 81 MG: 81 TABLET, COATED ORAL at 09:07

## 2022-11-18 RX ADMIN — DICLOFENAC SODIUM 4 G: 10 GEL TOPICAL at 09:08

## 2022-11-18 RX ADMIN — Medication 2000 UNITS: at 09:07

## 2022-11-18 RX ADMIN — PREGABALIN 25 MG: 25 CAPSULE ORAL at 16:17

## 2022-11-18 RX ADMIN — FLUOXETINE HYDROCHLORIDE 20 MG: 20 CAPSULE ORAL at 20:50

## 2022-11-18 RX ADMIN — PANTOPRAZOLE SODIUM 40 MG: 40 TABLET, DELAYED RELEASE ORAL at 09:07

## 2022-11-18 RX ADMIN — TAMSULOSIN HYDROCHLORIDE 0.4 MG: 0.4 CAPSULE ORAL at 09:07

## 2022-11-18 RX ADMIN — SENNOSIDES 8.6 MG: 8.6 TABLET, FILM COATED ORAL at 09:07

## 2022-11-18 RX ADMIN — ATORVASTATIN CALCIUM 80 MG: 40 TABLET, FILM COATED ORAL at 20:51

## 2022-11-18 RX ADMIN — ALLOPURINOL 100 MG: 100 TABLET ORAL at 09:07

## 2022-11-18 NOTE — PROGRESS NOTES
V2.0  [facility] Hospitalist Progress Note      Name:  Noris Lopez /Age/Sex: 1934  (80 y.o. male)   MRN & CSN:  [id number] & [id number] Encounter Date/Time: 2022 11:26 AM EST    Location:  [address] [place] [address]-A PCP: Darren Srivastava PA-C       Hospital Day: 5    Assessment and Plan:   Noris Lopez is a 80 y.o. male with past medical history of dementia, CKD 3, chronic HFrEF who presents with Left knee pain, unspecified chronicity, worsening confusion.      Alzheimer's dementia with behavioral disturbance  -Progressively worsening over the last several weeks   -No longer able to care for self at home, intermittently agitated  -UA noninfectious, labs generally unremarkable  -Maintain sleep-wake cycle, scheduled melatonin  -CODE STATUS changed to DNR CC, hospice consulted per family request who will follow peripherally while patient is at SNF  - PT/OT recommended SNF, CM following for placement  - psych consulted - increased home Risperdal, agreed with depakote, continue PRN haldol  - fall precautions, sitter at bedside    Acute urinary retention   - requiring intermittent straight cath  - has history of prostate and bladder cancer  - continue bladder scan q8h, straight cath for >150cc  - continue home flomax   - not a good candidate for march given agitation     Left knee pain  -Status post fall  -CT left knee with chondrocalcinosis, calcified small prepatellar bursa  -No signs of gout on exam, pain improved   -Continue Voltaren gel, as needed Tylenol, PRN Norco for severe pain     Multiple falls  -In setting of dementia  -CT head, x-ray pelvis no acute abnormality  -PT OT recommended SNF    CKD III  -Creatinine near baseline  -Avoid nephrotoxins    Chronic HFrEF  -Torsemide held on admission due to decreased p.o. intake, appears euvolemic and will continue to hold and spot diuresis as needed  -continue home beta-blocker    Hypertension  -Continue beta-blocker with hold parameters  -was on midodrine at home as well which is ordered PRN    Hypothyroidism   - TSH WNL  -continue home Synthroid    This patient was discussed with Dr. Alvarez Mendez. He was agreeable with the plan and management as dictated above. Current Living situation: home  Expected Disposition: [facility] accepted  Estimated D/C: TBD pending acceptance    Diet ADULT DIET; Regular  ADULT ORAL NUTRITION SUPPLEMENT; Breakfast, Lunch; Diabetic Oral Supplement  ADULT ORAL NUTRITION SUPPLEMENT; Dinner; Frozen Oral Supplement   DVT Prophylaxis [x] Lovenox, []  Heparin, [] SCDs, [] Ambulation,  [] Eliquis, [] Xarelto []Coumadin   Code Status DNR-CC   Disposition Patient requires continued admission due to awaiting placement   Surrogate Decision Maker/ POA Daughter, Cyndi Benton     Subjective:     Chief Complaint: Leg Pain       Patient seen and examined at bedside. Alert and oriented times self, knows he is in the hospital in [address], otherwise confused, but remains calm today. Review of Systems:    Unable to obtain secondary to dementia    Objective: Intake/Output Summary (Last 24 hours) at 11/18/2022 1138  Last data filed at 11/18/2022 5045  Gross per 24 hour   Intake 200 ml   Output 300 ml   Net -100 ml          Vitals:   Vitals:    11/18/22 0905   BP: (!) 124/57   Pulse: 63   Resp:    Temp:    SpO2:        Physical Exam:   PHYSICAL EXAM   GEN Awake male, laying in bed  EYES Pupils are equally round. HENT Mucous membranes are moist.   NECK Supple, no apparent thyromegaly or masses. RESP Respirations even and unlabored on RA, clear to auscultation bilaterally   CARDIO/VASC Regular rate without appreciable murmurs. GI Abdomen is soft without significant tenderness, masses, or guarding.   Shaw catheter is not present. MSK No gross joint deformities. Mild bruising of left knee. SKIN Normal coloration, warm, dry. NEURO Cranial nerves appear grossly intact, normal speech, no lateralizing weakness. PSYCH Awake, alert, oriented x 2. Calm.    Medications:   Medications:    sodium chloride  500 mL IntraVENous Once    risperiDONE  1 mg Oral Nightly    Vitamin D  2,000 Units Oral Daily    divalproex  250 mg Oral 2 times per day    sodium chloride flush  5-40 mL IntraVENous 2 times per day    enoxaparin  30 mg SubCUTAneous Q24H    nicotine  1 patch TransDERmal Daily    [Held by provider] torsemide  10 mg Oral Daily    tamsulosin  0.4 mg Oral Daily    senna  1 tablet Oral BID    pregabalin  25 mg Oral BID    pantoprazole  40 mg Oral Daily    metoprolol tartrate  12.5 mg Oral BID    levothyroxine  50 mcg Oral Daily    FLUoxetine  20 mg Oral Nightly    atorvastatin  80 mg Oral Nightly    aspirin  81 mg Oral Daily    allopurinol  100 mg Oral Daily    diclofenac sodium  4 g Topical 4x daily    melatonin  3 mg Oral Nightly      Infusions:    sodium chloride       PRN Meds: midodrine, 10 mg, TID PRN  HYDROcodone 5 mg - acetaminophen, 1 tablet, Q6H PRN  hydrALAZINE (APRESOLINE) ivpb, 10 mg, Q4H PRN  haloperidol lactate, 0.5 mg, Q6H PRN  sodium chloride flush, 5-40 mL, PRN  sodium chloride, 25 mL, PRN  ondansetron, 4 mg, Q8H PRN   Or  ondansetron, 4 mg, Q6H PRN  polyethylene glycol, 17 g, Daily PRN  acetaminophen, 650 mg, Q6H PRN   Or  acetaminophen, 650 mg, Q6H PRN  nicotine polacrilex, 2 mg, Q1H PRN  guaiFENesin, 600 mg, BID PRN  docusate sodium, 100 mg, BID PRN  ALPRAZolam, 0.25 mg, Nightly PRN      Labs      Recent Results (from the past 24 hour(s))   COVID-19, Rapid    Collection Time: 11/18/22  8:00 AM    Specimen: Nasopharyngeal   Result Value Ref Range    Source UNKNOWN     SARS-CoV-2, NAAT NOT DETECTED NOT DETECTED          Imaging/Diagnostics Last 24 Hours   XR KNEE LEFT (3 VIEWS)    Result Date: 11/14/2022  EXAMINATION: THREE XRAY VIEWS OF THE LEFT KNEE 11/14/2022 4:35 am COMPARISON: None.  HISTORY: ORDERING SYSTEM PROVIDED HISTORY: Pain, Multiple falls TECHNOLOGIST PROVIDED HISTORY: Reason for exam:->Pain, Multiple falls Reason for Exam: pain after fall Left knee pain FINDINGS: There is no acute fracture, dislocation, or bone destruction. Chondrocalcinosis is seen involving the medial and lateral compartments. There is no joint effusion. Vascular calcifications are seen. No acute osseous abnormality. 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 CT HEAD WO CONTRAST    Result Date: 11/14/2022  EXAMINATION: CT OF THE HEAD WITHOUT CONTRAST  11/14/2022 11:48 am TECHNIQUE: CT of the head was performed without the administration of intravenous contrast. Automated exposure control, iterative reconstruction, and/or weight based adjustment of the mA/kV was utilized to reduce the radiation dose to as low as reasonably achievable. COMPARISON: 11/29/2019 HISTORY: ORDERING SYSTEM PROVIDED HISTORY: unsteady gait x1 month TECHNOLOGIST PROVIDED HISTORY: Has a \"code stroke\" or \"stroke alert\" been called? ->No Reason for exam:->unsteady gait x1 month Decision Support Exception - unselect if not a suspected or confirmed emergency medical condition->Emergency Medical Condition (MA) Reason for Exam: unsteady gait x1 month FINDINGS: BRAIN/VENTRICLES: There is no acute intracranial hemorrhage, mass effect or midline shift. No abnormal extra-axial fluid collection. The gray-white differentiation is maintained without evidence of an acute infarct. There is no evidence of hydrocephalus. Moderate to severely dilated ventricles and cerebral sulci. Periventricular and subcortical white matter decreased attenuation. ORBITS: The visualized portion of the orbits demonstrate no acute abnormality. SINUSES: The visualized paranasal sinuses and right mastoid air cells demonstrate no acute abnormality. Small amount fluid in left mastoid air cells. SOFT TISSUES/SKULL:  No acute abnormality of the visualized skull or soft tissues. No acute intracranial abnormality. Findings consistent with central and cortical atrophy. Microvascular ischemic white matter disease. Chronic left mastoiditis. No interval change. CT PELVIS WO CONTRAST Additional Contrast? None    Result Date: 11/14/2022  EXAMINATION: CT OF THE PELVIS WITHOUT CONTRAST 11/14/2022 11:14 am TECHNIQUE: CT of the pelvis was performed without the administration of intravenous contrast.  Multiplanar reformatted images are provided for review. Adjustment of mA and/or kV according to patient size was utilized. Automated exposure control, iterative reconstruction, and/or weight based adjustment of the mA/kV was utilized to reduce the radiation dose to as low as reasonably achievable. COMPARISON: CT abdomen and pelvis 08/03/2019 HISTORY: ORDERING SYSTEM PROVIDED HISTORY: pain x2 weeks, from fall TECHNOLOGIST PROVIDED HISTORY: Reason for exam:->pain x2 weeks, from fall Additional Contrast?->None Decision Support Exception - unselect if not a suspected or confirmed emergency medical condition->Emergency Medical Condition (MA) FINDINGS: No acute fracture is seen. No evidence of dislocation. Mild bilateral hip joint degenerative changes. Degenerative changes also seen at the SI joints and visualized lower lumbar spine. Similar peripherally sclerotic lucent lesion in the left posterior iliac bone, suggesting a benign finding. The infrarenal abdominal aorta measures up to 2.8 cm in diameter. There are vascular calcifications. No acute bony abnormality is seen in the pelvis. Mild bilateral hip joint degenerative changes. Degenerative changes also seen at the SI joints and visualized lower lumbar spine. 2.8 cm infrarenal abdominal aortic aneurysm suspected. Recommend follow-up every 5 years. Reference: J Am Erwin Radiol 1026;[id number]. CT KNEE LEFT WO CONTRAST    Result Date: 11/14/2022  EXAMINATION: CT OF THE LEFT KNEE WITHOUT CONTRAST 11/14/2022 11:49 am TECHNIQUE: CT of the left knee was performed without the administration of intravenous contrast.  Multiplanar reformatted images are provided for review. Automated exposure control, iterative reconstruction, and/or weight based adjustment of the mA/kV was utilized to reduce the radiation dose to as low as reasonably achievable. COMPARISON: Radiographs 11/14/2022. HISTORY ORDERING SYSTEM PROVIDED HISTORY: pain trauma TECHNOLOGIST PROVIDED HISTORY: Reason for exam:->pain trauma Decision Support Exception - unselect if not a suspected or confirmed emergency medical condition->Emergency Medical Condition (MA) FINDINGS: No acute fracture is seen. No evidence of dislocation. Mild tricompartmental osteoarthrosis. There is chondrocalcinosis of the menisci. Calcifications are seen involving the cruciate ligaments and proximal gastrocnemius tendon insertion. Vascular calcifications are seen. There is a small calcified prepatellar bursa. Small Baker's cyst.  Small volume knee joint fluid. No acute bony abnormality is seen in the left knee. Chondrocalcinosis, without a large knee joint effusion. Calcified small prepatellar bursa. Mild tricompartmental osteoarthrosis. XR CHEST PORTABLE    Result Date: 11/14/2022  EXAMINATION: ONE XRAY VIEW OF THE CHEST 11/14/2022 3:17 pm COMPARISON: 08/31/2022 HISTORY: ORDERING SYSTEM PROVIDED HISTORY: AMS, frequent falls TECHNOLOGIST PROVIDED HISTORY: Reason for exam:->AMS, frequent falls Reason for Exam: AMS, frequent falls Additional signs and symptoms: na Relevant Medical/Surgical History: bladder cancer, chf, ckd FINDINGS: Heart size is globular and mildly enlarged without change. Aorta is tortuous with atherosclerotic changes. Mediastinum is not widened. .  Lungs are under expanded. Patchy opacity in the left lower lobe but this is similar in appearance. .  No pleural effusions.  Mild spondylosis     No acute lung disease       Electronically signed by Lukas Mahajan PA-C on 11/18/2022 at 11:38 AM

## 2022-11-18 NOTE — CARE COORDINATION
This RN CM verified with Roge Nguyen with [facility] admissions that referral was received. Referral continues to pend at this time. Roge Nguyen will notify CM once a determination has been made.

## 2022-11-18 NOTE — PLAN OF CARE
This morning patient is comfortable, denies SOB/CP, and remains on room air. Bladder scans ordered Q8H and neuro Q4H. Discharge planning in progress for [facility] pending precert. Bed locked in lowest position, bed alarm activated, call light within reach, and frequent rounding in progress. Will continue to monitor. BP was low this morning while pt was in chair. JADA Singh was notified and ordered IV bolus however when patient was placed back in to the bed BP immediately resolved. JADA Snigh was updated and IVF were cancelled. BP remains stable. Will continue to monitor. 11/18/22 0847 11/18/22 0849 11/18/22 0900   Vital Signs   Temp 97.9 °F (36.6 °C)  --   --    Temp Source Oral  --   --    Heart Rate 82 69 63   Heart Rate Source Monitor  --   --    Resp 18  --   --    BP (!) 72/48 (!) 145/62 (!) 110/58   MAP (Calculated) 56 90 75   MAP (mmHg) (!) 56  --   --    BP Location  --  Left upper arm Left upper arm   BP Method Automatic Automatic Automatic   Patient Position Up in chair;Sitting Trendelenburg Supine        11/18/22 0905   Vital Signs   Temp  --    Temp Source  --    Heart Rate 63   Heart Rate Source  --    Resp  --    BP (!) 124/57   MAP (Calculated) 79   MAP (mmHg) 82   BP Location Left upper arm   BP Method Automatic   Patient Position Sitting     1530: Bladder scan showed 156mL; straight cath was attempted however patient complained of pain and discomfort then proceeded to ask for the catheter to be removed before pulling at it. Catheter was removed without being able to removed any urine. Catheter was intact with some blood at the tip. JADA Singh was notified; no new orders. Will continue to monitor. Patient was in the chair for all three meals and returned to bed in between each meal. Patient has been very calm and cooperative. Patient expresses gratitude often stating \"I want to thank you for your service\" and \"I want you to know that I love you\".  In addition, today patient was calm and cooperative during family visit in contrast to previous encounters. Problem: Discharge Planning  Goal: Discharge to home or other facility with appropriate resources  Outcome: Progressing     Problem: Confusion  Goal: Confusion, delirium, dementia, or psychosis is improved or at baseline  Description: INTERVENTIONS:  1. Assess for possible contributors to thought disturbance, including medications, impaired vision or hearing, underlying metabolic abnormalities, dehydration, psychiatric diagnoses, and notify attending LIP  2. Zenia high risk fall precautions, as indicated  3. Provide frequent short contacts to provide reality reorientation, refocusing and direction  4. Decrease environmental stimuli, including noise as appropriate  5. Monitor and intervene to maintain adequate nutrition, hydration, elimination, sleep and activity  6. If unable to ensure safety without constant attention obtain sitter and review sitter guidelines with assigned personnel  7. Initiate Psychosocial CNS and Spiritual Care consult, as indicated  Outcome: Progressing     Problem: Skin/Tissue Integrity  Goal: Absence of new skin breakdown  Description: 1. Monitor for areas of redness and/or skin breakdown  2. Assess vascular access sites hourly  3. Every 4-6 hours minimum:  Change oxygen saturation probe site  4. Every 4-6 hours:  If on nasal continuous positive airway pressure, respiratory therapy assess nares and determine need for appliance change or resting period.   Outcome: Progressing     Problem: ABCDS Injury Assessment  Goal: Absence of physical injury  Outcome: Progressing     Problem: Safety - Adult  Goal: Free from fall injury  Outcome: Progressing     Problem: Nutrition Deficit:  Goal: Optimize nutritional status  Outcome: Progressing     Problem: Pain  Goal: Verbalizes/displays adequate comfort level or baseline comfort level  Outcome: Progressing     Problem: Chronic Conditions and Co-morbidities  Goal: Patient's chronic conditions and co-morbidity symptoms are monitored and maintained or improved  Outcome: Progressing     Problem: Neurosensory - Adult  Goal: Achieves stable or improved neurological status  Outcome: Progressing  Goal: Absence of seizures  Outcome: Progressing  Goal: Achieves maximal functionality and self care  Outcome: Progressing     Problem: Respiratory - Adult  Goal: Achieves optimal ventilation and oxygenation  Outcome: Progressing     Problem: Cardiovascular - Adult  Goal: Maintains optimal cardiac output and hemodynamic stability  Outcome: Progressing  Goal: Absence of cardiac dysrhythmias or at baseline  Outcome: Progressing     Problem: Skin/Tissue Integrity - Adult  Goal: Skin integrity remains intact  Outcome: Progressing  Goal: Incisions, wounds, or drain sites healing without S/S of infection  Outcome: Progressing  Goal: Oral mucous membranes remain intact  Outcome: Progressing     Problem: Musculoskeletal - Adult  Goal: Return mobility to safest level of function  Outcome: Progressing  Goal: Maintain proper alignment of affected body part  Outcome: Progressing  Goal: Return ADL status to a safe level of function  Outcome: Progressing     Problem: Gastrointestinal - Adult  Goal: Minimal or absence of nausea and vomiting  Outcome: Progressing  Goal: Maintains or returns to baseline bowel function  Outcome: Progressing  Goal: Maintains adequate nutritional intake  Outcome: Progressing     Problem: Genitourinary - Adult  Goal: Absence of urinary retention  Outcome: Progressing     Problem: Infection - Adult  Goal: Absence of infection at discharge  Outcome: Progressing  Goal: Absence of infection during hospitalization  Outcome: Progressing  Goal: Absence of fever/infection during anticipated neutropenic period  Outcome: Progressing     Problem: Metabolic/Fluid and Electrolytes - Adult  Goal: Electrolytes maintained within normal limits  Outcome: Progressing  Goal: Hemodynamic stability and optimal renal function maintained  Outcome: Progressing     Problem: Hematologic - Adult  Goal: Maintains hematologic stability  Outcome: Progressing

## 2022-11-18 NOTE — DISCHARGE INSTR - COC
Continuity of Care Form    Patient Name: John Bennett   :  1934  MRN:  [id number]    Admit date:  2022  Discharge date:  22    Code Status Order: DNR-CC   Advance Directives:     Admitting Physician:  Lyubov Rangel MD  PCP: Cheryle Lara PA-C    Discharging Nurse: Cody Driscoll Unit/Room#: 4008/4008-A  Discharging Unit Phone Number: [phone]    Emergency Contact:   Extended Emergency Contact Information  Primary Emergency Contact: Adrienne Turner   [address] Phone: [phone]  Mobile Phone: [phone]  Relation: Child  Secondary Emergency Contact: Brock [address] Phone: [phone]  Mobile Phone: [phone]  Relation: Child    Past Surgical History:  Past Surgical History:   Procedure Laterality Date    COLONOSCOPY      CORONARY ANGIOPLASTY WITH STENT PLACEMENT      PACEMAKER INSERTION N/A 8/15/2019    PACEMAKER INSERTION PERMANENT performed by Ailyn Dwyer MD at [facility] 81 Right 2019     ml       Immunization History:   Immunization History   Administered Date(s) Administered    COVID-19, MODERNA BLUE border, Primary or Immunocompromised, (age 12y+), IM, 100 mcg/0.5mL 2021, 2021, 2022    Influenza Vaccine, unspecified formulation 2015, 2016, 2017, 2018    Influenza Virus Vaccine 10/29/2004, 10/16/2009, 10/08/2010, 2012, 2013, 2016, 2018    Influenza Whole 10/24/2002, 2003    Influenza, High Dose (Fluzone 65 yrs and older) 2013, 2019, 2020, 10/12/2021    Influenza, Triv, inactivated, subunit, adjuvanted, IM (Fluad 65 yrs and older) 2018, 10/03/2019    Pneumococcal Conjugate 13-valent (Kyildml50) 10/19/2015    Pneumococcal Polysaccharide (Nyqfqwuoa53) 2004, 10/14/2014    Td, unspecified formulation 2008    Zoster Recombinant (Shingrix) Indicated Last Indicated By Review Planned Expiration Resolved Resolved By    None active    Resolved    COVID-19 (Rule Out) 05/15/21 05/15/21 05/16/21 COVID-19, Rapid (Ordered)   05/16/21 Rule-Out Test Resulted            Nurse Assessment:  Last Vital Signs: BP (!) 124/57   Pulse 63   Temp 97.9 °F (36.6 °C) (Oral)   Resp 18   Ht 5' 10\" (1.778 m)   Wt 178 lb 5.6 oz (80.9 kg)   SpO2 96%   BMI 25.59 kg/m²     Last documented pain score (0-10 scale): Pain Level: 2  Last Weight:   Wt Readings from Last 1 Encounters:   11/14/22 178 lb 5.6 oz (80.9 kg)     Mental Status:  disoriented    IV Access:  - None    Nursing Mobility/ADLs:  Walking   Assisted  Transfer  Assisted  Bathing  Assisted  Dressing  Assisted  Toileting  Assisted  Feeding  Assisted  Med Admin  Assisted  Med Delivery   whole    Wound Care Documentation and Therapy:  Incision 08/15/19 Chest (Active)   Number of days: 1191       Incision 08/20/19 Chest Left;Upper (Active)   Number of days: 1185        Elimination:  Continence: Bowel: No  Bladder: No  Urinary Catheter: None   Colostomy/Ileostomy/Ileal Conduit: No       Date of Last BM: 11/24/22    Intake/Output Summary (Last 24 hours) at 11/18/2022 0924  Last data filed at 11/18/2022 0903  Gross per 24 hour   Intake --   Output 450 ml   Net -450 ml     I/O last 3 completed shifts: In: 10 [I.V.:10]  Out: 950 [Urine:950]    Safety Concerns:     Sund[employer] [place], History of Falls (last 30 days), and At Risk for Falls    Impairments/Disabilities:      Vision and Hearing    Nutrition Therapy:  Current Nutrition Therapy:   - Oral Diet:  General  - Oral Nutrition Supplement:  Standard  three times a day    Routes of Feeding: Oral  Liquids: Thin Liquids  Daily Fluid Restriction: no  Last Modified Barium Swallow with Video (Video Swallowing Test): not done    Treatments at the Time of Hospital Discharge:   Respiratory Treatments: none  Oxygen Therapy:  is not on home oxygen therapy.   Ventilator:    - No ventilator support    Rehab Therapies: Physical Therapy and Occupational Therapy  Weight Bearing Status/Restrictions: No weight bearing restrictions  Other Medical Equipment (for information only, NOT a DME order):  walker  Other Treatments:     Patient's personal belongings (please select all that are sent with patient):  Glasses, Hearing Aides bilateral, Dentures upper and lower    RN SIGNATURE:  Electronically signed by Josh Arnold RN on 11/25/22 at 11:55 AM EST    CASE MANAGEMENT/SOCIAL WORK SECTION    Inpatient Status Date: ***    Readmission Risk Assessment Score:  Readmission Risk              Risk of Unplanned Readmission:  40           Discharging to Facility/ Agency   Name:   Address:  Phone:  Fax:    Dialysis Facility (if applicable)   Name:  Address:  Dialysis Schedule:  Phone:  Fax:    / signature: {Esignature:[id number]}    PHYSICIAN SECTION    Prognosis: Fair    Condition at Discharge: Stable    Rehab Potential (if transferring to Rehab): Fair    Recommended Labs or Other Treatments After Discharge:     - check depakote level in 5 days   - plz monitor urine output; patient has intermittent urinary retention  - patient is occasionally orthostatic, he has PRN midodrine for this. Physician Certification: I certify the above information and transfer of Tien Smiley  is necessary for the continuing treatment of the diagnosis listed and that he requires [facility] for greater 30 days.      Update Admission H&P: No change in H&P    PHYSICIAN SIGNATURE:  Electronically signed by Nicol Dunne MD on 11/25/22 at 9:27 AM EST

## 2022-11-19 LAB
DOSE AMOUNT: ABNORMAL
DOSE TIME: ABNORMAL
VALPROIC ACID LEVEL: 24.6 UG/ML (ref 50–100)

## 2022-11-19 PROCEDURE — 6370000000 HC RX 637 (ALT 250 FOR IP): Performed by: NURSE PRACTITIONER

## 2022-11-19 PROCEDURE — 6370000000 HC RX 637 (ALT 250 FOR IP): Performed by: PHYSICIAN ASSISTANT

## 2022-11-19 PROCEDURE — 2580000003 HC RX 258: Performed by: NURSE PRACTITIONER

## 2022-11-19 PROCEDURE — 6370000000 HC RX 637 (ALT 250 FOR IP): Performed by: FAMILY MEDICINE

## 2022-11-19 PROCEDURE — 51798 US URINE CAPACITY MEASURE: CPT

## 2022-11-19 PROCEDURE — 1200000000 HC SEMI PRIVATE

## 2022-11-19 PROCEDURE — 36415 COLL VENOUS BLD VENIPUNCTURE: CPT

## 2022-11-19 PROCEDURE — 80164 ASSAY DIPROPYLACETIC ACD TOT: CPT

## 2022-11-19 PROCEDURE — 6360000002 HC RX W HCPCS: Performed by: NURSE PRACTITIONER

## 2022-11-19 RX ADMIN — PANTOPRAZOLE SODIUM 40 MG: 40 TABLET, DELAYED RELEASE ORAL at 09:39

## 2022-11-19 RX ADMIN — SENNOSIDES 8.6 MG: 8.6 TABLET, FILM COATED ORAL at 09:40

## 2022-11-19 RX ADMIN — SENNOSIDES 8.6 MG: 8.6 TABLET, FILM COATED ORAL at 20:32

## 2022-11-19 RX ADMIN — DICLOFENAC SODIUM 4 G: 10 GEL TOPICAL at 09:53

## 2022-11-19 RX ADMIN — DIVALPROEX SODIUM 250 MG: 125 CAPSULE, COATED PELLETS ORAL at 09:40

## 2022-11-19 RX ADMIN — ALLOPURINOL 100 MG: 100 TABLET ORAL at 09:40

## 2022-11-19 RX ADMIN — LEVOTHYROXINE SODIUM 50 MCG: 0.05 TABLET ORAL at 05:22

## 2022-11-19 RX ADMIN — FLUOXETINE HYDROCHLORIDE 20 MG: 20 CAPSULE ORAL at 20:31

## 2022-11-19 RX ADMIN — METOPROLOL TARTRATE 12.5 MG: 25 TABLET, FILM COATED ORAL at 20:31

## 2022-11-19 RX ADMIN — ASPIRIN 81 MG: 81 TABLET, COATED ORAL at 09:40

## 2022-11-19 RX ADMIN — DICLOFENAC SODIUM 4 G: 10 GEL TOPICAL at 20:33

## 2022-11-19 RX ADMIN — ALPRAZOLAM 0.25 MG: 0.25 TABLET ORAL at 20:32

## 2022-11-19 RX ADMIN — DIVALPROEX SODIUM 250 MG: 125 CAPSULE, COATED PELLETS ORAL at 20:31

## 2022-11-19 RX ADMIN — Medication 2000 UNITS: at 09:39

## 2022-11-19 RX ADMIN — METOPROLOL TARTRATE 12.5 MG: 25 TABLET, FILM COATED ORAL at 09:40

## 2022-11-19 RX ADMIN — DICLOFENAC SODIUM 4 G: 10 GEL TOPICAL at 14:21

## 2022-11-19 RX ADMIN — ENOXAPARIN SODIUM 30 MG: 100 INJECTION SUBCUTANEOUS at 18:10

## 2022-11-19 RX ADMIN — PREGABALIN 25 MG: 25 CAPSULE ORAL at 11:01

## 2022-11-19 RX ADMIN — PREGABALIN 25 MG: 25 CAPSULE ORAL at 18:10

## 2022-11-19 RX ADMIN — ATORVASTATIN CALCIUM 80 MG: 40 TABLET, FILM COATED ORAL at 20:31

## 2022-11-19 RX ADMIN — DICLOFENAC SODIUM 4 G: 10 GEL TOPICAL at 18:11

## 2022-11-19 RX ADMIN — RISPERIDONE 1 MG: 0.5 TABLET ORAL at 20:31

## 2022-11-19 RX ADMIN — TAMSULOSIN HYDROCHLORIDE 0.4 MG: 0.4 CAPSULE ORAL at 09:39

## 2022-11-19 RX ADMIN — SODIUM CHLORIDE, PRESERVATIVE FREE 10 ML: 5 INJECTION INTRAVENOUS at 09:39

## 2022-11-19 RX ADMIN — Medication 3 MG: at 20:31

## 2022-11-19 ASSESSMENT — PAIN SCALES - GENERAL: PAINLEVEL_OUTOF10: 5

## 2022-11-19 ASSESSMENT — PAIN DESCRIPTION - LOCATION: LOCATION: FOOT;KNEE

## 2022-11-19 NOTE — CARE COORDINATION
Patient on the weekend follow up list. Call to [facility]. They are still reviewing patient chart and will get back with the LSW.

## 2022-11-19 NOTE — PROGRESS NOTES
V2.0  [facility] Hospitalist Progress Note      Name:  Tien Smiley /Age/Sex: 1934  (80 y.o. male)   MRN & CSN:  [id number] & [id number] Encounter Date/Time: 2022 11:26 AM EST    Location:  [address] [place] [address]-A PCP: Belen Srivastava PA-C       Hospital Day: 6    Assessment and Plan:   Tien Smiley is a 80 y.o. male with past medical history of dementia, CKD 3, chronic HFrEF who presents with Left knee pain, unspecified chronicity, worsening confusion.      Alzheimer's dementia with behavioral disturbance  -Progressively worsening over the last several weeks   -No longer able to care for self at home, intermittently agitated  -UA noninfectious, labs generally unremarkable  -Maintain sleep-wake cycle, scheduled melatonin  -CODE STATUS changed to DNR CC, hospice consulted per family request who will follow peripherally while patient is at SNF  - PT/OT recommended SNF, Awaiting placement   - psych consulted - increased home Risperdal, agreed with depakote, continue PRN haldol  - fall precautions    Acute urinary retention   - requiring intermittent straight cath  - has history of prostate and bladder cancer  - continue bladder scan q8h, straight cath for >150cc  - continue home flomax   - not a good candidate for march given agitation     Left knee pain  -Status post fall  -CT left knee with chondrocalcinosis, calcified small prepatellar bursa  -No signs of gout on exam, pain improved   -Continue Voltaren gel, as needed Tylenol, PRN Norco for severe pain     Multiple falls  -In setting of dementia  -CT head, x-ray pelvis no acute abnormality  -PT OT recommended SNF    CKD III  -Creatinine near baseline  -Avoid nephrotoxins    Chronic HFrEF  -Torsemide held on admission due to decreased p.o. intake, appears euvolemic and will continue to hold and spot diuresis as needed  -continue home beta-blocker    Hypertension  -Continue beta-blocker with hold parameters  -was on midodrine at home as well which is ordered PRN    Hypothyroidism   - TSH WNL  -continue home Synthroid    This patient was discussed with Dr. Alexia Cast. Current Living situation: home  Expected Dis[facility]  Estimated D/C: TBD pending acceptance    Diet ADULT DIET; Regular  ADULT ORAL NUTRITION SUPPLEMENT; Breakfast, Lunch; Diabetic Oral Supplement  ADULT ORAL NUTRITION SUPPLEMENT; Dinner; Frozen Oral Supplement   DVT Prophylaxis [x] Lovenox, []  Heparin, [] SCDs, [] Ambulation,  [] Eliquis, [] Xarelto []Coumadin   Code Status DNR-CC   Disposition Patient requires continued admission due to awaiting placement   Surrogate Decision Maker/ POA DaughterKaylee     Subjective:     Chief Complaint: Leg Pain       Patient seen and examined at bedside. He is pleasantly confused, not agitated. Reoriented. Patient reporting he is attempting to get out of bed. Unable to redirect. Review of systems impossible given mental status. Plan of care discussed with bedside RN. Review of Systems:    Unable to obtain secondary to dementia    Objective: Intake/Output Summary (Last 24 hours) at 11/19/2022 1056  Last data filed at 11/19/2022 0635  Gross per 24 hour   Intake 400 ml   Output 375 ml   Net 25 ml        Vitals:   Vitals:    11/18/22 2047   BP: (!) 145/64   Pulse: 73   Resp: 18   Temp: 98.9 °F (37.2 °C)   SpO2:        Physical Exam:   PHYSICAL EXAM   GEN Awake male, laying in bed. Appears in no acute distress. EYES Pupils are equally round. HENT Mucous membranes are moist.   NECK Supple, no apparent thyromegaly or masses. RESP Respirations even and unlabored on RA, clear to auscultation bilaterally   CARDIO/VASC Regular rate without appreciable murmurs. GI Abdomen is soft without significant tenderness, masses, or guarding.   Shaw catheter is not present. MSK No gross joint deformities. Mild bruising of left knee. SKIN Normal coloration, warm, dry. NEURO   moves all extremities x4.   He is alert, however pleasantly confused. PSYCH currently calm. Medications:   Medications:    sodium chloride  500 mL IntraVENous Once    risperiDONE  1 mg Oral Nightly    Vitamin D  2,000 Units Oral Daily    divalproex  250 mg Oral 2 times per day    sodium chloride flush  5-40 mL IntraVENous 2 times per day    enoxaparin  30 mg SubCUTAneous Q24H    nicotine  1 patch TransDERmal Daily    [Held by provider] torsemide  10 mg Oral Daily    tamsulosin  0.4 mg Oral Daily    senna  1 tablet Oral BID    pregabalin  25 mg Oral BID    pantoprazole  40 mg Oral Daily    metoprolol tartrate  12.5 mg Oral BID    levothyroxine  50 mcg Oral Daily    FLUoxetine  20 mg Oral Nightly    atorvastatin  80 mg Oral Nightly    aspirin  81 mg Oral Daily    allopurinol  100 mg Oral Daily    diclofenac sodium  4 g Topical 4x daily    melatonin  3 mg Oral Nightly      Infusions:    sodium chloride       PRN Meds: midodrine, 10 mg, TID PRN  HYDROcodone 5 mg - acetaminophen, 1 tablet, Q6H PRN  hydrALAZINE (APRESOLINE) ivpb, 10 mg, Q4H PRN  haloperidol lactate, 0.5 mg, Q6H PRN  sodium chloride flush, 5-40 mL, PRN  sodium chloride, 25 mL, PRN  ondansetron, 4 mg, Q8H PRN   Or  ondansetron, 4 mg, Q6H PRN  polyethylene glycol, 17 g, Daily PRN  acetaminophen, 650 mg, Q6H PRN   Or  acetaminophen, 650 mg, Q6H PRN  nicotine polacrilex, 2 mg, Q1H PRN  guaiFENesin, 600 mg, BID PRN  docusate sodium, 100 mg, BID PRN  ALPRAZolam, 0.25 mg, Nightly PRN      Labs      Recent Results (from the past 24 hour(s))   POCT Glucose    Collection Time: 11/18/22  8:23 PM   Result Value Ref Range    POC Glucose 90 70 - 99 MG/DL          Imaging/Diagnostics Last 24 Hours   XR KNEE LEFT (3 VIEWS)    Result Date: 11/14/2022  EXAMINATION: THREE XRAY VIEWS OF THE LEFT KNEE 11/14/2022 4:35 am COMPARISON: None.  HISTORY: ORDERING SYSTEM PROVIDED HISTORY: Pain, Multiple falls TECHNOLOGIST PROVIDED HISTORY: Reason for exam:->Pain, Multiple falls Reason for Exam: pain after fall Left knee pain FINDINGS: There is no acute fracture, dislocation, or bone destruction. Chondrocalcinosis is seen involving the medial and lateral compartments. There is no joint effusion. Vascular calcifications are seen. No acute osseous abnormality. 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 CT HEAD WO CONTRAST    Result Date: 11/14/2022  EXAMINATION: CT OF THE HEAD WITHOUT CONTRAST  11/14/2022 11:48 am TECHNIQUE: CT of the head was performed without the administration of intravenous contrast. Automated exposure control, iterative reconstruction, and/or weight based adjustment of the mA/kV was utilized to reduce the radiation dose to as low as reasonably achievable. COMPARISON: 11/29/2019 HISTORY: ORDERING SYSTEM PROVIDED HISTORY: unsteady gait x1 month TECHNOLOGIST PROVIDED HISTORY: Has a \"code stroke\" or \"stroke alert\" been called? ->No Reason for exam:->unsteady gait x1 month Decision Support Exception - unselect if not a suspected or confirmed emergency medical condition->Emergency Medical Condition (MA) Reason for Exam: unsteady gait x1 month FINDINGS: BRAIN/VENTRICLES: There is no acute intracranial hemorrhage, mass effect or midline shift. No abnormal extra-axial fluid collection. The gray-white differentiation is maintained without evidence of an acute infarct. There is no evidence of hydrocephalus. Moderate to severely dilated ventricles and cerebral sulci. Periventricular and subcortical white matter decreased attenuation. ORBITS: The visualized portion of the orbits demonstrate no acute abnormality. SINUSES: The visualized paranasal sinuses and right mastoid air cells demonstrate no acute abnormality. Small amount fluid in left mastoid air cells. SOFT TISSUES/SKULL:  No acute abnormality of the visualized skull or soft tissues. No acute intracranial abnormality. Findings consistent with central and cortical atrophy. Microvascular ischemic white matter disease.  Chronic left mastoiditis. No interval change. CT PELVIS WO CONTRAST Additional Contrast? None    Result Date: 11/14/2022  EXAMINATION: CT OF THE PELVIS WITHOUT CONTRAST 11/14/2022 11:14 am TECHNIQUE: CT of the pelvis was performed without the administration of intravenous contrast.  Multiplanar reformatted images are provided for review. Adjustment of mA and/or kV according to patient size was utilized. Automated exposure control, iterative reconstruction, and/or weight based adjustment of the mA/kV was utilized to reduce the radiation dose to as low as reasonably achievable. COMPARISON: CT abdomen and pelvis 08/03/2019 HISTORY: ORDERING SYSTEM PROVIDED HISTORY: pain x2 weeks, from fall TECHNOLOGIST PROVIDED HISTORY: Reason for exam:->pain x2 weeks, from fall Additional Contrast?->None Decision Support Exception - unselect if not a suspected or confirmed emergency medical condition->Emergency Medical Condition (MA) FINDINGS: No acute fracture is seen. No evidence of dislocation. Mild bilateral hip joint degenerative changes. Degenerative changes also seen at the SI joints and visualized lower lumbar spine. Similar peripherally sclerotic lucent lesion in the left posterior iliac bone, suggesting a benign finding. The infrarenal abdominal aorta measures up to 2.8 cm in diameter. There are vascular calcifications. No acute bony abnormality is seen in the pelvis. Mild bilateral hip joint degenerative changes. Degenerative changes also seen at the SI joints and visualized lower lumbar spine. 2.8 cm infrarenal abdominal aortic aneurysm suspected. Recommend follow-up every 5 years. Reference: J Am Erwin Radiol 4811;11:054-832. CT KNEE LEFT WO CONTRAST    Result Date: 11/14/2022  EXAMINATION: CT OF THE LEFT KNEE WITHOUT CONTRAST 11/14/2022 11:49 am TECHNIQUE: CT of the left knee was performed without the administration of intravenous contrast.  Multiplanar reformatted images are provided for review.  Automated exposure control, iterative reconstruction, and/or weight based adjustment of the mA/kV was utilized to reduce the radiation dose to as low as reasonably achievable. COMPARISON: Radiographs 11/14/2022. HISTORY ORDERING SYSTEM PROVIDED HISTORY: pain trauma TECHNOLOGIST PROVIDED HISTORY: Reason for exam:->pain trauma Decision Support Exception - unselect if not a suspected or confirmed emergency medical condition->Emergency Medical Condition (MA) FINDINGS: No acute fracture is seen. No evidence of dislocation. Mild tricompartmental osteoarthrosis. There is chondrocalcinosis of the menisci. Calcifications are seen involving the cruciate ligaments and proximal gastrocnemius tendon insertion. Vascular calcifications are seen. There is a small calcified prepatellar bursa. Small Baker's cyst.  Small volume knee joint fluid. No acute bony abnormality is seen in the left knee. Chondrocalcinosis, without a large knee joint effusion. Calcified small prepatellar bursa. Mild tricompartmental osteoarthrosis. XR CHEST PORTABLE    Result Date: 11/14/2022  EXAMINATION: ONE XRAY VIEW OF THE CHEST 11/14/2022 3:17 pm COMPARISON: 08/31/2022 HISTORY: ORDERING SYSTEM PROVIDED HISTORY: AMS, frequent falls TECHNOLOGIST PROVIDED HISTORY: Reason for exam:->AMS, frequent falls Reason for Exam: AMS, frequent falls Additional signs and symptoms: na Relevant Medical/Surgical History: bladder cancer, chf, ckd FINDINGS: Heart size is globular and mildly enlarged without change. Aorta is tortuous with atherosclerotic changes. Mediastinum is not widened. .  Lungs are under expanded. Patchy opacity in the left lower lobe but this is similar in appearance. .  No pleural effusions.  Mild spondylosis     No acute lung disease       Electronically signed by PRISCILLA Mathew CNP on 11/19/2022 at 10:57 AM

## 2022-11-20 PROCEDURE — 6370000000 HC RX 637 (ALT 250 FOR IP): Performed by: PHYSICIAN ASSISTANT

## 2022-11-20 PROCEDURE — 6370000000 HC RX 637 (ALT 250 FOR IP): Performed by: FAMILY MEDICINE

## 2022-11-20 PROCEDURE — 51701 INSERT BLADDER CATHETER: CPT

## 2022-11-20 PROCEDURE — 6370000000 HC RX 637 (ALT 250 FOR IP): Performed by: NURSE PRACTITIONER

## 2022-11-20 PROCEDURE — 97116 GAIT TRAINING THERAPY: CPT

## 2022-11-20 PROCEDURE — 2580000003 HC RX 258: Performed by: NURSE PRACTITIONER

## 2022-11-20 PROCEDURE — 94761 N-INVAS EAR/PLS OXIMETRY MLT: CPT

## 2022-11-20 PROCEDURE — 1200000000 HC SEMI PRIVATE

## 2022-11-20 PROCEDURE — 51798 US URINE CAPACITY MEASURE: CPT

## 2022-11-20 PROCEDURE — 97530 THERAPEUTIC ACTIVITIES: CPT

## 2022-11-20 RX ADMIN — ASPIRIN 81 MG: 81 TABLET, COATED ORAL at 08:03

## 2022-11-20 RX ADMIN — RISPERIDONE 1 MG: 0.5 TABLET ORAL at 21:46

## 2022-11-20 RX ADMIN — ATORVASTATIN CALCIUM 80 MG: 40 TABLET, FILM COATED ORAL at 21:47

## 2022-11-20 RX ADMIN — DICLOFENAC SODIUM 4 G: 10 GEL TOPICAL at 16:35

## 2022-11-20 RX ADMIN — FLUOXETINE HYDROCHLORIDE 20 MG: 20 CAPSULE ORAL at 21:46

## 2022-11-20 RX ADMIN — SENNOSIDES 8.6 MG: 8.6 TABLET, FILM COATED ORAL at 21:47

## 2022-11-20 RX ADMIN — DICLOFENAC SODIUM 4 G: 10 GEL TOPICAL at 08:12

## 2022-11-20 RX ADMIN — HYDROCODONE BITARTRATE AND ACETAMINOPHEN 1 TABLET: 5; 325 TABLET ORAL at 12:45

## 2022-11-20 RX ADMIN — DOCUSATE SODIUM 100 MG: 100 CAPSULE, LIQUID FILLED ORAL at 08:03

## 2022-11-20 RX ADMIN — SENNOSIDES 8.6 MG: 8.6 TABLET, FILM COATED ORAL at 08:03

## 2022-11-20 RX ADMIN — TAMSULOSIN HYDROCHLORIDE 0.4 MG: 0.4 CAPSULE ORAL at 08:03

## 2022-11-20 RX ADMIN — METOPROLOL TARTRATE 12.5 MG: 25 TABLET, FILM COATED ORAL at 08:11

## 2022-11-20 RX ADMIN — ALLOPURINOL 100 MG: 100 TABLET ORAL at 08:03

## 2022-11-20 RX ADMIN — PREGABALIN 25 MG: 25 CAPSULE ORAL at 16:36

## 2022-11-20 RX ADMIN — PANTOPRAZOLE SODIUM 40 MG: 40 TABLET, DELAYED RELEASE ORAL at 08:03

## 2022-11-20 RX ADMIN — DICLOFENAC SODIUM 4 G: 10 GEL TOPICAL at 21:48

## 2022-11-20 RX ADMIN — GUAIFENESIN 600 MG: 600 TABLET, EXTENDED RELEASE ORAL at 08:03

## 2022-11-20 RX ADMIN — Medication 2000 UNITS: at 08:03

## 2022-11-20 RX ADMIN — PREGABALIN 25 MG: 25 CAPSULE ORAL at 08:03

## 2022-11-20 RX ADMIN — Medication 3 MG: at 21:47

## 2022-11-20 RX ADMIN — LEVOTHYROXINE SODIUM 50 MCG: 0.05 TABLET ORAL at 05:27

## 2022-11-20 RX ADMIN — SODIUM CHLORIDE, PRESERVATIVE FREE 10 ML: 5 INJECTION INTRAVENOUS at 21:47

## 2022-11-20 RX ADMIN — DICLOFENAC SODIUM 4 G: 10 GEL TOPICAL at 13:03

## 2022-11-20 RX ADMIN — METOPROLOL TARTRATE 12.5 MG: 25 TABLET, FILM COATED ORAL at 21:50

## 2022-11-20 RX ADMIN — DIVALPROEX SODIUM 250 MG: 125 CAPSULE, COATED PELLETS ORAL at 08:03

## 2022-11-20 RX ADMIN — DIVALPROEX SODIUM 250 MG: 125 CAPSULE, COATED PELLETS ORAL at 21:46

## 2022-11-20 ASSESSMENT — PAIN SCALES - GENERAL
PAINLEVEL_OUTOF10: 7
PAINLEVEL_OUTOF10: 0
PAINLEVEL_OUTOF10: 0

## 2022-11-20 NOTE — PROGRESS NOTES
Bladder scan showed 277 on 0715 bladder scan, pt voided in incont. Breif, bladder scan was 187, pt straight cath per order. Pt c/o pain pain during cath, and stated \"stop doing this to me\" \"take it out\" Only apx 100cc drained due to consistency of urine. Dr. Schaeffer Even notified and informed to hold off on cath due to pt complaint. Pt resting in bed, c/o pain, medicated for pain per order. Call light in bed, bed alarm on.

## 2022-11-20 NOTE — PROGRESS NOTES
V2.0  [facility] [place]      Name:  Radhames Ivy /Age/Sex: 1934  (80 y.o. male)   MRN & CSN:  [id number] & [id number] Encounter Date/Time: 2022 11:26 AM EST    Location:  [address] [place] [address]-A PCP: Mu Srivastava PA-C       Hospital Day: 7    Assessment and Plan:   Radhames Ivy is a 80 y.o. male with past medical history of dementia, CKD 3, chronic HFrEF who presents with Left knee pain, unspecified chronicity, worsening confusion.      Alzheimer's dementia with behavioral disturbance  -Progressively worsening over the last several weeks   -No longer able to care for self at home, intermittently agitated  -UA noninfectious, labs generally unremarkable  -Maintain sleep-wake cycle, scheduled melatonin  -CODE STATUS changed to DNR CC, hospice consulted per family request who will follow peripherally while patient is at SNF  - PT/OT recommended SNF, Awaiting placement   - psych consulted - increased home Risperdal, agreed with depakote, continue PRN haldol  - fall precautions    Acute urinary retention   - requiring intermittent straight cath  - has history of prostate and bladder cancer  - patient refusing bladder scan and straight cath - ok to hold off as long as patient does not have discomfort from urinary retention  - continue home flomax   - not a good candidate for march given agitation     Left knee pain  -Status post fall  -CT left knee with chondrocalcinosis, calcified small prepatellar bursa  -No signs of gout on exam, pain improved   -Continue Voltaren gel, as needed Tylenol, PRN Norco for severe pain     Multiple falls  -In setting of dementia  -CT head, x-ray pelvis no acute abnormality  -PT OT recommended SNF    CKD III  -Creatinine near baseline  -Avoid nephrotoxins    Chronic HFrEF  -Torsemide held on admission due to decreased p.o. intake, appears euvolemic and will continue to hold and spot diuresis as needed  -continue home beta-blocker    Hypertension  -Continue beta-blocker with hold parameters  -was on midodrine at home as well which is ordered PRN    Hypothyroidism   - TSH WNL  -continue home Synthroid      Current Living situation: home  Expected Disposition: [facility]  Estimated D/C: Ready for discharge - precert pending. D/C Lovenox - patient is DNR-CC - no need to poke patient. Diet ADULT DIET; Regular  ADULT ORAL NUTRITION SUPPLEMENT; Breakfast, Lunch; Diabetic Oral Supplement  ADULT ORAL NUTRITION SUPPLEMENT; Dinner; Frozen Oral Supplement   DVT Prophylaxis [] Lovenox, []  Heparin, [] SCDs, [] Ambulation,  [] Eliquis, [] Xarelto [x]None   Code Status DNR-CC   Disposition Patient requires continued admission due to awaiting placement   Surrogate Decision Maker/ POA Daughter, Karla Pulliam     Subjective:     Chief Complaint: Leg Pain       Patient seen and examined at bedside. He is pleasantly confused, not agitated. He states he feels ok. Review of Systems:    Unable to obtain secondary to dementia    Objective: Intake/Output Summary (Last 24 hours) at 11/20/2022 1417  Last data filed at 11/19/2022 2315  Gross per 24 hour   Intake --   Output 125 ml   Net -125 ml          Vitals:   Vitals:    11/20/22 1245   BP:    Pulse:    Resp: 20   Temp:    SpO2:        Physical Exam:   PHYSICAL EXAM   GEN Awake male, laying in bed. Appears in no acute distress. EYES Pupils are equally round. HENT Mucous membranes are moist.   NECK Supple, no apparent thyromegaly or masses. RESP Respirations even and unlabored on RA, clear to auscultation bilaterally   CARDIO/VASC Regular rate without appreciable murmurs. GI Abdomen is soft without significant tenderness, masses, or guarding.   Shaw catheter is not present. MSK No gross joint deformities. Mild bruising of left knee. SKIN Normal coloration, warm, dry. NEURO   moves all extremities x4. He is alert, however pleasantly confused.   PSYCH Calm  Medications:   Medications:    sodium chloride 500 mL IntraVENous Once    risperiDONE  1 mg Oral Nightly    Vitamin D  2,000 Units Oral Daily    divalproex  250 mg Oral 2 times per day    sodium chloride flush  5-40 mL IntraVENous 2 times per day    enoxaparin  30 mg SubCUTAneous Q24H    nicotine  1 patch TransDERmal Daily    [Held by provider] torsemide  10 mg Oral Daily    tamsulosin  0.4 mg Oral Daily    senna  1 tablet Oral BID    pregabalin  25 mg Oral BID    pantoprazole  40 mg Oral Daily    metoprolol tartrate  12.5 mg Oral BID    levothyroxine  50 mcg Oral Daily    FLUoxetine  20 mg Oral Nightly    atorvastatin  80 mg Oral Nightly    aspirin  81 mg Oral Daily    allopurinol  100 mg Oral Daily    diclofenac sodium  4 g Topical 4x daily    melatonin  3 mg Oral Nightly      Infusions:    sodium chloride       PRN Meds: midodrine, 10 mg, TID PRN  HYDROcodone 5 mg - acetaminophen, 1 tablet, Q6H PRN  hydrALAZINE (APRESOLINE) ivpb, 10 mg, Q4H PRN  haloperidol lactate, 0.5 mg, Q6H PRN  sodium chloride flush, 5-40 mL, PRN  sodium chloride, 25 mL, PRN  ondansetron, 4 mg, Q8H PRN   Or  ondansetron, 4 mg, Q6H PRN  polyethylene glycol, 17 g, Daily PRN  acetaminophen, 650 mg, Q6H PRN   Or  acetaminophen, 650 mg, Q6H PRN  nicotine polacrilex, 2 mg, Q1H PRN  guaiFENesin, 600 mg, BID PRN  docusate sodium, 100 mg, BID PRN  ALPRAZolam, 0.25 mg, Nightly PRN      Labs      No results found for this or any previous visit (from the past 24 hour(s)). Imaging/Diagnostics Last 24 Hours   XR KNEE LEFT (3 VIEWS)    Result Date: 11/14/2022  EXAMINATION: THREE XRAY VIEWS OF THE LEFT KNEE 11/14/2022 4:35 am COMPARISON: None. HISTORY: ORDERING SYSTEM PROVIDED HISTORY: Pain, Multiple falls TECHNOLOGIST PROVIDED HISTORY: Reason for exam:->Pain, Multiple falls Reason for Exam: pain after fall Left knee pain FINDINGS: There is no acute fracture, dislocation, or bone destruction. Chondrocalcinosis is seen involving the medial and lateral compartments. There is no joint effusion. Vascular calcifications are seen. No acute osseous abnormality. 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 CT HEAD WO CONTRAST    Result Date: 11/14/2022  EXAMINATION: CT OF THE HEAD WITHOUT CONTRAST  11/14/2022 11:48 am TECHNIQUE: CT of the head was performed without the administration of intravenous contrast. Automated exposure control, iterative reconstruction, and/or weight based adjustment of the mA/kV was utilized to reduce the radiation dose to as low as reasonably achievable. COMPARISON: 11/29/2019 HISTORY: ORDERING SYSTEM PROVIDED HISTORY: unsteady gait x1 month TECHNOLOGIST PROVIDED HISTORY: Has a \"code stroke\" or \"stroke alert\" been called? ->No Reason for exam:->unsteady gait x1 month Decision Support Exception - unselect if not a suspected or confirmed emergency medical condition->Emergency Medical Condition (MA) Reason for Exam: unsteady gait x1 month FINDINGS: BRAIN/VENTRICLES: There is no acute intracranial hemorrhage, mass effect or midline shift. No abnormal extra-axial fluid collection. The gray-white differentiation is maintained without evidence of an acute infarct. There is no evidence of hydrocephalus. Moderate to severely dilated ventricles and cerebral sulci. Periventricular and subcortical white matter decreased attenuation. ORBITS: The visualized portion of the orbits demonstrate no acute abnormality. SINUSES: The visualized paranasal sinuses and right mastoid air cells demonstrate no acute abnormality. Small amount fluid in left mastoid air cells. SOFT TISSUES/SKULL:  No acute abnormality of the visualized skull or soft tissues. No acute intracranial abnormality. Findings consistent with central and cortical atrophy. Microvascular ischemic white matter disease. Chronic left mastoiditis. No interval change.      CT PELVIS WO CONTRAST Additional Contrast? None    Result Date: 11/14/2022  EXAMINATION: CT OF THE PELVIS WITHOUT CONTRAST 11/14/2022 11:14 am TECHNIQUE: CT of the pelvis was performed without the administration of intravenous contrast.  Multiplanar reformatted images are provided for review. Adjustment of mA and/or kV according to patient size was utilized. Automated exposure control, iterative reconstruction, and/or weight based adjustment of the mA/kV was utilized to reduce the radiation dose to as low as reasonably achievable. COMPARISON: CT abdomen and pelvis 08/03/2019 HISTORY: ORDERING SYSTEM PROVIDED HISTORY: pain x2 weeks, from fall TECHNOLOGIST PROVIDED HISTORY: Reason for exam:->pain x2 weeks, from fall Additional Contrast?->None Decision Support Exception - unselect if not a suspected or confirmed emergency medical condition->Emergency Medical Condition (MA) FINDINGS: No acute fracture is seen. No evidence of dislocation. Mild bilateral hip joint degenerative changes. Degenerative changes also seen at the SI joints and visualized lower lumbar spine. Similar peripherally sclerotic lucent lesion in the left posterior iliac bone, suggesting a benign finding. The infrarenal abdominal aorta measures up to 2.8 cm in diameter. There are vascular calcifications. No acute bony abnormality is seen in the pelvis. Mild bilateral hip joint degenerative changes. Degenerative changes also seen at the SI joints and visualized lower lumbar spine. 2.8 cm infrarenal abdominal aortic aneurysm suspected. Recommend follow-up every 5 years. Reference: J Am Erwin Radiol 0978;28:[id number]. CT KNEE LEFT WO CONTRAST    Result Date: 11/14/2022  EXAMINATION: CT OF THE LEFT KNEE WITHOUT CONTRAST 11/14/2022 11:49 am TECHNIQUE: CT of the left knee was performed without the administration of intravenous contrast.  Multiplanar reformatted images are provided for review. Automated exposure control, iterative reconstruction, and/or weight based adjustment of the mA/kV was utilized to reduce the radiation dose to as low as reasonably achievable.  COMPARISON: Radiographs 11/14/2022. HISTORY ORDERING SYSTEM PROVIDED HISTORY: pain trauma TECHNOLOGIST PROVIDED HISTORY: Reason for exam:->pain trauma Decision Support Exception - unselect if not a suspected or confirmed emergency medical condition->Emergency Medical Condition (MA) FINDINGS: No acute fracture is seen. No evidence of dislocation. Mild tricompartmental osteoarthrosis. There is chondrocalcinosis of the menisci. Calcifications are seen involving the cruciate ligaments and proximal gastrocnemius tendon insertion. Vascular calcifications are seen. There is a small calcified prepatellar bursa. Small Baker's cyst.  Small volume knee joint fluid. No acute bony abnormality is seen in the left knee. Chondrocalcinosis, without a large knee joint effusion. Calcified small prepatellar bursa. Mild tricompartmental osteoarthrosis. XR CHEST PORTABLE    Result Date: 11/14/2022  EXAMINATION: ONE XRAY VIEW OF THE CHEST 11/14/2022 3:17 pm COMPARISON: 08/31/2022 HISTORY: ORDERING SYSTEM PROVIDED HISTORY: AMS, frequent falls TECHNOLOGIST PROVIDED HISTORY: Reason for exam:->AMS, frequent falls Reason for Exam: AMS, frequent falls Additional signs and symptoms: na Relevant Medical/Surgical History: bladder cancer, chf, ckd FINDINGS: Heart size is globular and mildly enlarged without change. Aorta is tortuous with atherosclerotic changes. Mediastinum is not widened. .  Lungs are under expanded. Patchy opacity in the left lower lobe but this is similar in appearance. .  No pleural effusions.  Mild spondylosis     No acute lung disease       Electronically signed by Lorelei Crowder MD on 11/20/2022 at 2:17 PM

## 2022-11-20 NOTE — PLAN OF CARE
Problem: Discharge Planning  Goal: Discharge to home or other facility with appropriate resources  Outcome: Progressing     Problem: Confusion  Goal: Confusion, delirium, dementia, or psychosis is improved or at baseline  Description: INTERVENTIONS:  1. Assess for possible contributors to thought disturbance, including medications, impaired vision or hearing, underlying metabolic abnormalities, dehydration, psychiatric diagnoses, and notify attending LIP  2. [place] high risk fall precautions, as indicated  3. Provide frequent short contacts to provide reality reorientation, refocusing and direction  4. Decrease environmental stimuli, including noise as appropriate  5. Monitor and intervene to maintain adequate nutrition, hydration, elimination, sleep and activity  6. If unable to ensure safety without constant attention obtain sitter and review sitter guidelines with assigned personnel  7. Initiate Psychosocial CNS and Spiritual Care consult, as indicated  Outcome: Progressing     Problem: Skin/Tissue Integrity  Goal: Absence of new skin breakdown  Description: 1. Monitor for areas of redness and/or skin breakdown  2. Assess vascular access sites hourly  3. Every 4-6 hours minimum:  Change oxygen saturation probe site  4. Every 4-6 hours:  If on nasal continuous positive airway pressure, respiratory therapy assess nares and determine need for appliance change or resting period.   Outcome: Progressing     Problem: ABCDS Injury Assessment  Goal: Absence of physical injury  Outcome: Progressing     Problem: Safety - Adult  Goal: Free from fall injury  Outcome: Progressing     Problem: Nutrition Deficit:  Goal: Optimize nutritional status  Outcome: Progressing     Problem: Pain  Goal: Verbalizes/displays adequate comfort level or baseline comfort level  Outcome: Progressing     Problem: Chronic Conditions and Co-morbidities  Goal: Patient's chronic conditions and co-morbidity symptoms are monitored and maintained or improved  Outcome: Progressing     Problem: Neurosensory - Adult  Goal: Achieves stable or improved neurological status  Outcome: Progressing  Goal: Absence of seizures  Outcome: Progressing  Goal: Achieves maximal functionality and self care  Outcome: Progressing     Problem: Respiratory - Adult  Goal: Achieves optimal ventilation and oxygenation  Outcome: Progressing     Problem: Cardiovascular - Adult  Goal: Maintains optimal cardiac output and hemodynamic stability  Outcome: Progressing  Goal: Absence of cardiac dysrhythmias or at baseline  Outcome: Progressing     Problem: Skin/Tissue Integrity - Adult  Goal: Skin integrity remains intact  Outcome: Progressing  Goal: Incisions, wounds, or drain sites healing without S/S of infection  Outcome: Progressing  Goal: Oral mucous membranes remain intact  Outcome: Progressing     Problem: Musculoskeletal - Adult  Goal: Return mobility to safest level of function  Outcome: Progressing  Goal: Maintain proper alignment of affected body part  Outcome: Progressing  Goal: Return ADL status to a safe level of function  Outcome: Progressing     Problem: Gastrointestinal - Adult  Goal: Minimal or absence of nausea and vomiting  Outcome: Progressing  Goal: Maintains or returns to baseline bowel function  Outcome: Progressing  Goal: Maintains adequate nutritional intake  Outcome: Progressing     Problem: Genitourinary - Adult  Goal: Absence of urinary retention  Outcome: Progressing     Problem: Infection - Adult  Goal: Absence of infection at discharge  Outcome: Progressing  Goal: Absence of infection during hospitalization  Outcome: Progressing  Goal: Absence of fever/infection during anticipated neutropenic period  Outcome: Progressing     Problem: Metabolic/Fluid and Electrolytes - Adult  Goal: Electrolytes maintained within normal limits  Outcome: Progressing  Goal: Hemodynamic stability and optimal renal function maintained  Outcome: Progressing     Problem: Hematologic - Adult  Goal: Maintains hematologic stability  Outcome: Progressing

## 2022-11-20 NOTE — PLAN OF CARE
Problem: Discharge Planning  Goal: Discharge to home or other facility with appropriate resources  Outcome: Progressing     Problem: Confusion  Goal: Confusion, delirium, dementia, or psychosis is improved or at baseline  Description: INTERVENTIONS:  1. Assess for possible contributors to thought disturbance, including medications, impaired vision or hearing, underlying metabolic abnormalities, dehydration, psychiatric diagnoses, and notify attending LIP  2. [place] high risk fall precautions, as indicated  3. Provide frequent short contacts to provide reality reorientation, refocusing and direction  4. Decrease environmental stimuli, including noise as appropriate  5. Monitor and intervene to maintain adequate nutrition, hydration, elimination, sleep and activity  6. If unable to ensure safety without constant attention obtain sitter and review sitter guidelines with assigned personnel  7. Initiate Psychosocial CNS and Spiritual Care consult, as indicated  Outcome: Progressing     Problem: Skin/Tissue Integrity  Goal: Absence of new skin breakdown  Description: 1. Monitor for areas of redness and/or skin breakdown  2. Assess vascular access sites hourly  3. Every 4-6 hours minimum:  Change oxygen saturation probe site  4. Every 4-6 hours:  If on nasal continuous positive airway pressure, respiratory therapy assess nares and determine need for appliance change or resting period.   Outcome: Progressing     Problem: ABCDS Injury Assessment  Goal: Absence of physical injury  Outcome: Progressing     Problem: Safety - Adult  Goal: Free from fall injury  Outcome: Progressing     Problem: Nutrition Deficit:  Goal: Optimize nutritional status  Outcome: Progressing     Problem: Pain  Goal: Verbalizes/displays adequate comfort level or baseline comfort level  Outcome: Progressing     Problem: Chronic Conditions and Co-morbidities  Goal: Patient's chronic conditions and co-morbidity symptoms are monitored and maintained or improved  Outcome: Progressing     Problem: Neurosensory - Adult  Goal: Achieves stable or improved neurological status  Outcome: Progressing  Goal: Absence of seizures  Outcome: Progressing  Goal: Achieves maximal functionality and self care  Outcome: Progressing     Problem: Respiratory - Adult  Goal: Achieves optimal ventilation and oxygenation  Outcome: Progressing     Problem: Cardiovascular - Adult  Goal: Maintains optimal cardiac output and hemodynamic stability  Outcome: Progressing  Goal: Absence of cardiac dysrhythmias or at baseline  Outcome: Progressing     Problem: Skin/Tissue Integrity - Adult  Goal: Skin integrity remains intact  Outcome: Progressing  Flowsheets (Taken 11/19/2022 2000)  Skin Integrity Remains Intact: Monitor for areas of redness and/or skin breakdown  Goal: Incisions, wounds, or drain sites healing without S/S of infection  Outcome: Progressing  Goal: Oral mucous membranes remain intact  Outcome: Progressing     Problem: Musculoskeletal - Adult  Goal: Return mobility to safest level of function  Outcome: Progressing  Goal: Maintain proper alignment of affected body part  Outcome: Progressing  Goal: Return ADL status to a safe level of function  Outcome: Progressing     Problem: Gastrointestinal - Adult  Goal: Minimal or absence of nausea and vomiting  Outcome: Progressing  Goal: Maintains or returns to baseline bowel function  Outcome: Progressing  Goal: Maintains adequate nutritional intake  Outcome: Progressing     Problem: Genitourinary - Adult  Goal: Absence of urinary retention  Outcome: Progressing     Problem: Infection - Adult  Goal: Absence of infection at discharge  Outcome: Progressing  Goal: Absence of infection during hospitalization  Outcome: Progressing  Goal: Absence of fever/infection during anticipated neutropenic period  Outcome: Progressing     Problem: Metabolic/Fluid and Electrolytes - Adult  Goal: Electrolytes maintained within normal

## 2022-11-20 NOTE — PROGRESS NOTES
Physical Therapy    Physical Therapy Treatment Note  Name: Priscilla Pandey MRN: [id number] :   1934   Date:  2022   Admission Date: 2022 Room:  [address] [place] [address]   Restrictions/Precautions:          general precautions, falls, LISA   Communication with other providers:  RN   Subjective:  Patient states: \"That water spilled all over me\"   Pain:   Location, Type, Intensity (0/10 to 10/10):  denies   Objective:    Observation:    Supine in bed. Daughter visiting. Pt cooperative with therapy. Slightly impulsive with lack of safety awareness, problem solving, attention, comprehension, and insight to his deficits. Disoriented to place and time. Perseverated on wanting his shoes. Needed re-directed often. Objective Measures:    Stable vitals on room air   Treatment, including education/measures:  Supine to sit: modA for trunk support and sequencing LES to EOB. Constant cues for sequencing with dec understanding of task initially and transitioning only to long sit. Scooting: fwd to EOB Fabio progressing to CGA. Inc time and multiple verbal cues for getting feet on the floor. Sitting balance: CGA to SBA static with BUE support and constant cues for fwd weight shift due to tendency to lean posterior. Fabio for light dynamic with single UE support having inc LOB posterior   Sit to stand: Fabio from EOB (2x). Dec carry over with VCS for hand sequencing from bed to RW and ultimately pulled to stand from RW each time. Stand to sit: Fabio for controlling sit to EOB, modA to recliner with maximal cues for reaching back to chair for support with dec carry over. Ambulation: 40ft with RW CGA to Fabio for safety and steadying. Chair follow for safety due to pts tolerance and insight being limited on what his capacity for ambulation was. Very fwd flexed posture with shuffled steps and dec kyle. Educated pt on POC, role of PT, DME use ,discharge.  Cues provided for sequencing to inc safety and indep with mobility Assessment / Impression:   Patient's tolerance of treatment: good   Adverse Reaction: no   Significant change in status and impact:  inc independence with bed mobility and some of his transfers. Barriers to improvement:  activity tolerance, strength, balance, cognition.    Plan for Next Session:    Activity tolerance, strength, balance, gait, transfers,bed mobility   Time in:  1319  Time out:  1343  Timed treatment minutes:  24  Total treatment time:  24 minutes       Short Term Goals  Time Frame for Short Term Goals: 2 weeks  Short Term Goal 1: Pt will perform sit><supine modA  Short Term Goal 2: Pt will transition sit><stand Fabio  Short Term Goal 3: Pt will ambulate 150ft with LRAD CGA  Short Term Goal 4: Pt will perform standing light dynamic activity with single UE support x 3 minutes CGA  Short Term Goal 5: Pt will perform sitting light dynamic activity with single UE support CGA    Electronically signed by:    Anisha Mcclain [id number]  11/20/2022, 2:44 PM

## 2022-11-21 PROCEDURE — 6370000000 HC RX 637 (ALT 250 FOR IP): Performed by: PHYSICIAN ASSISTANT

## 2022-11-21 PROCEDURE — 97530 THERAPEUTIC ACTIVITIES: CPT

## 2022-11-21 PROCEDURE — 6370000000 HC RX 637 (ALT 250 FOR IP): Performed by: NURSE PRACTITIONER

## 2022-11-21 PROCEDURE — 94761 N-INVAS EAR/PLS OXIMETRY MLT: CPT

## 2022-11-21 PROCEDURE — 6370000000 HC RX 637 (ALT 250 FOR IP): Performed by: FAMILY MEDICINE

## 2022-11-21 PROCEDURE — 1200000000 HC SEMI PRIVATE

## 2022-11-21 PROCEDURE — 6360000002 HC RX W HCPCS: Performed by: PHYSICIAN ASSISTANT

## 2022-11-21 PROCEDURE — 97535 SELF CARE MNGMENT TRAINING: CPT

## 2022-11-21 RX ORDER — OLANZAPINE 10 MG/1
5 TABLET ORAL NIGHTLY
Status: DISCONTINUED | OUTPATIENT
Start: 2022-11-21 | End: 2022-11-25 | Stop reason: HOSPADM

## 2022-11-21 RX ADMIN — LEVOTHYROXINE SODIUM 50 MCG: 0.05 TABLET ORAL at 06:53

## 2022-11-21 RX ADMIN — PREGABALIN 25 MG: 25 CAPSULE ORAL at 09:17

## 2022-11-21 RX ADMIN — FLUOXETINE HYDROCHLORIDE 20 MG: 20 CAPSULE ORAL at 21:35

## 2022-11-21 RX ADMIN — Medication 3 MG: at 21:35

## 2022-11-21 RX ADMIN — Medication 2000 UNITS: at 09:17

## 2022-11-21 RX ADMIN — METOPROLOL TARTRATE 12.5 MG: 25 TABLET, FILM COATED ORAL at 09:17

## 2022-11-21 RX ADMIN — ATORVASTATIN CALCIUM 80 MG: 40 TABLET, FILM COATED ORAL at 21:35

## 2022-11-21 RX ADMIN — METOPROLOL TARTRATE 12.5 MG: 25 TABLET, FILM COATED ORAL at 21:35

## 2022-11-21 RX ADMIN — ALPRAZOLAM 0.25 MG: 0.25 TABLET ORAL at 03:39

## 2022-11-21 RX ADMIN — SENNOSIDES 8.6 MG: 8.6 TABLET, FILM COATED ORAL at 09:17

## 2022-11-21 RX ADMIN — DIVALPROEX SODIUM 250 MG: 125 CAPSULE, COATED PELLETS ORAL at 09:17

## 2022-11-21 RX ADMIN — DIVALPROEX SODIUM 250 MG: 125 CAPSULE, COATED PELLETS ORAL at 21:34

## 2022-11-21 RX ADMIN — RISPERIDONE 1 MG: 0.5 TABLET ORAL at 21:35

## 2022-11-21 RX ADMIN — HALOPERIDOL LACTATE 0.5 MG: 5 INJECTION, SOLUTION INTRAMUSCULAR at 18:46

## 2022-11-21 RX ADMIN — DICLOFENAC SODIUM 4 G: 10 GEL TOPICAL at 17:10

## 2022-11-21 RX ADMIN — TAMSULOSIN HYDROCHLORIDE 0.4 MG: 0.4 CAPSULE ORAL at 09:17

## 2022-11-21 RX ADMIN — ALLOPURINOL 100 MG: 100 TABLET ORAL at 09:17

## 2022-11-21 RX ADMIN — OLANZAPINE 5 MG: 10 TABLET, FILM COATED ORAL at 22:54

## 2022-11-21 RX ADMIN — DICLOFENAC SODIUM 4 G: 10 GEL TOPICAL at 15:01

## 2022-11-21 RX ADMIN — ASPIRIN 81 MG: 81 TABLET, COATED ORAL at 09:17

## 2022-11-21 RX ADMIN — PREGABALIN 25 MG: 25 CAPSULE ORAL at 17:11

## 2022-11-21 RX ADMIN — SENNOSIDES 8.6 MG: 8.6 TABLET, FILM COATED ORAL at 21:35

## 2022-11-21 RX ADMIN — DICLOFENAC SODIUM 4 G: 10 GEL TOPICAL at 09:24

## 2022-11-21 RX ADMIN — HALOPERIDOL LACTATE 0.5 MG: 5 INJECTION, SOLUTION INTRAMUSCULAR at 02:49

## 2022-11-21 RX ADMIN — PANTOPRAZOLE SODIUM 40 MG: 40 TABLET, DELAYED RELEASE ORAL at 09:17

## 2022-11-21 RX ADMIN — ALPRAZOLAM 0.25 MG: 0.25 TABLET ORAL at 21:34

## 2022-11-21 NOTE — PROGRESS NOTES
V2.0  [facility] Hospitalist Progress Note      Name:  Gurpreet Bernstein /Age/Sex: 1934  (80 y.o. male)   MRN & CSN:  [id number] & [id number] Encounter Date/Time: 2022 11:26 AM EST    Location:  [address] [place] [address]-A PCP: Hannah Srivastava PA-C       Hospital Day: 8    Assessment and Plan:   Gurpreet Bernstein is a 80 y.o. male with past medical history of dementia, CKD 3, chronic HFrEF who presents with Left knee pain, unspecified chronicity, worsening confusion.      Alzheimer's dementia with behavioral disturbance  -Progressively worsening over the last several weeks prior to admission   -No longer able to care for self at home, intermittently agitated  -UA noninfectious, labs generally unremarkable  -Maintain sleep-wake cycle, scheduled melatonin  -CODE STATUS changed to DNR CC, hospice consulted per family request who will follow peripherally while patient is at SNF  - PT/OT recommended SNF, Awaiting placement   - psych on board - increased home Risperdal, agreed with depakote, continue PRN haldol  - fall precautions    Acute urinary retention   - requiring intermittent straight cath  - has history of prostate and bladder cancer  - patient states he cannot pee - ok with straight cath today - ok to hold off if patient is not comfortable with straight cath as long as patient does not have discomfort from urinary retention  - continue home flomax   - not a good candidate for march given agitation     Left knee pain  -Status post fall  -CT left knee with chondrocalcinosis, calcified small prepatellar bursa  -No signs of gout on exam, pain improved   -Continue Voltaren gel, as needed Tylenol, PRN Norco for severe pain     Multiple falls  -In setting of dementia  -CT head, x-ray pelvis no acute abnormality  -PT OT recommended SNF    CKD III  -Creatinine near baseline  -Avoid nephrotoxins    Chronic HFrEF  -Torsemide held on admission due to decreased p.o. intake, appears euvolemic and will continue to hold and spot diuresis as needed  -continue home beta-blocker    Hypertension  -Continue beta-blocker with hold parameters  -was on midodrine at home as well which is ordered PRN    Hypothyroidism   - TSH WNL  -continue home Synthroid      Current Living situation: home  [facility]  Estimated D/C: Ready for discharge - precert pending. Diet ADULT DIET; Regular  ADULT ORAL NUTRITION SUPPLEMENT; Breakfast, Lunch; Diabetic Oral Supplement  ADULT ORAL NUTRITION SUPPLEMENT; Dinner; Frozen Oral Supplement   DVT Prophylaxis [] Lovenox, []  Heparin, [] SCDs, [] Ambulation,  [] Eliquis, [] Xarelto [x]None - patient is DNR-CC - please do not poke him un-necessarily    Code Status DNR-CC   Disposition Patient requires continued admission due to awaiting placement   Surrogate Decision Maker/ POA Daughter, Audra Diane     Subjective:     Chief Complaint: Leg Pain       Patient seen and examined at bedside. He is pleasantly confused, not agitated. He states he feels ok. Did not recall seeing me yesterday. States he feels like he cannot pee. Discussed with his bedside nurse. Review of Systems:    Unable to obtain secondary to dementia    Objective: Intake/Output Summary (Last 24 hours) at 11/21/2022 1322  Last data filed at 11/20/2022 2104  Gross per 24 hour   Intake --   Output 100 ml   Net -100 ml          Vitals:   Vitals:    11/21/22 0925   BP: (!) 164/59   Pulse: 59   Resp: 16   Temp: 97.5 °F (36.4 °C)   SpO2: 100%       Physical Exam:   PHYSICAL EXAM   GEN Awake male, laying in bed. Appears in no acute distress. EYES Pupils are equally round. HENT Mucous membranes are moist.   NECK Supple, no apparent thyromegaly or masses. RESP Respirations even and unlabored on RA, clear to auscultation bilaterally   CARDIO/VASC Regular rate without appreciable murmurs. GI Abdomen is soft without significant tenderness, masses, or guarding.   Shaw catheter is not present.  Mild suprapubic discomfort. MSK No gross joint deformities. Mild bruising of left knee. SKIN Normal coloration, warm, dry. NEURO   moves all extremities x4. He is alert, however pleasantly confused. PSYCH Calm  Medications:   Medications:    sodium chloride  500 mL IntraVENous Once    risperiDONE  1 mg Oral Nightly    Vitamin D  2,000 Units Oral Daily    divalproex  250 mg Oral 2 times per day    sodium chloride flush  5-40 mL IntraVENous 2 times per day    [Held by provider] torsemide  10 mg Oral Daily    tamsulosin  0.4 mg Oral Daily    senna  1 tablet Oral BID    pregabalin  25 mg Oral BID    pantoprazole  40 mg Oral Daily    metoprolol tartrate  12.5 mg Oral BID    levothyroxine  50 mcg Oral Daily    FLUoxetine  20 mg Oral Nightly    atorvastatin  80 mg Oral Nightly    aspirin  81 mg Oral Daily    allopurinol  100 mg Oral Daily    diclofenac sodium  4 g Topical 4x daily    melatonin  3 mg Oral Nightly      Infusions:    sodium chloride       PRN Meds: midodrine, 10 mg, TID PRN  HYDROcodone 5 mg - acetaminophen, 1 tablet, Q6H PRN  hydrALAZINE (APRESOLINE) ivpb, 10 mg, Q4H PRN  haloperidol lactate, 0.5 mg, Q6H PRN  sodium chloride flush, 5-40 mL, PRN  sodium chloride, 25 mL, PRN  ondansetron, 4 mg, Q8H PRN   Or  ondansetron, 4 mg, Q6H PRN  polyethylene glycol, 17 g, Daily PRN  acetaminophen, 650 mg, Q6H PRN   Or  acetaminophen, 650 mg, Q6H PRN  nicotine polacrilex, 2 mg, Q1H PRN  guaiFENesin, 600 mg, BID PRN  docusate sodium, 100 mg, BID PRN  ALPRAZolam, 0.25 mg, Nightly PRN      Labs      No results found for this or any previous visit (from the past 24 hour(s)). Imaging/Diagnostics Last 24 Hours   XR KNEE LEFT (3 VIEWS)    Result Date: 11/14/2022  EXAMINATION: THREE XRAY VIEWS OF THE LEFT KNEE 11/14/2022 4:35 am COMPARISON: None.  HISTORY: ORDERING SYSTEM PROVIDED HISTORY: Pain, Multiple falls TECHNOLOGIST PROVIDED HISTORY: Reason for exam:->Pain, Multiple falls Reason for Exam: pain after fall Left knee pain FINDINGS: There is no acute fracture, dislocation, or bone destruction. Chondrocalcinosis is seen involving the medial and lateral compartments. There is no joint effusion. Vascular calcifications are seen. No acute osseous abnormality. 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 CT HEAD WO CONTRAST    Result Date: 11/14/2022  EXAMINATION: CT OF THE HEAD WITHOUT CONTRAST  11/14/2022 11:48 am TECHNIQUE: CT of the head was performed without the administration of intravenous contrast. Automated exposure control, iterative reconstruction, and/or weight based adjustment of the mA/kV was utilized to reduce the radiation dose to as low as reasonably achievable. COMPARISON: 11/29/2019 HISTORY: ORDERING SYSTEM PROVIDED HISTORY: unsteady gait x1 month TECHNOLOGIST PROVIDED HISTORY: Has a \"code stroke\" or \"stroke alert\" been called? ->No Reason for exam:->unsteady gait x1 month Decision Support Exception - unselect if not a suspected or confirmed emergency medical condition->Emergency Medical Condition (MA) Reason for Exam: unsteady gait x1 month FINDINGS: BRAIN/VENTRICLES: There is no acute intracranial hemorrhage, mass effect or midline shift. No abnormal extra-axial fluid collection. The gray-white differentiation is maintained without evidence of an acute infarct. There is no evidence of hydrocephalus. Moderate to severely dilated ventricles and cerebral sulci. Periventricular and subcortical white matter decreased attenuation. ORBITS: The visualized portion of the orbits demonstrate no acute abnormality. SINUSES: The visualized paranasal sinuses and right mastoid air cells demonstrate no acute abnormality. Small amount fluid in left mastoid air cells. SOFT TISSUES/SKULL:  No acute abnormality of the visualized skull or soft tissues. No acute intracranial abnormality. Findings consistent with central and cortical atrophy. Microvascular ischemic white matter disease. Chronic left mastoiditis. No interval change. CT PELVIS WO CONTRAST Additional Contrast? None    Result Date: 11/14/2022  EXAMINATION: CT OF THE PELVIS WITHOUT CONTRAST 11/14/2022 11:14 am TECHNIQUE: CT of the pelvis was performed without the administration of intravenous contrast.  Multiplanar reformatted images are provided for review. Adjustment of mA and/or kV according to patient size was utilized. Automated exposure control, iterative reconstruction, and/or weight based adjustment of the mA/kV was utilized to reduce the radiation dose to as low as reasonably achievable. COMPARISON: CT abdomen and pelvis 08/03/2019 HISTORY: ORDERING SYSTEM PROVIDED HISTORY: pain x2 weeks, from fall TECHNOLOGIST PROVIDED HISTORY: Reason for exam:->pain x2 weeks, from fall Additional Contrast?->None Decision Support Exception - unselect if not a suspected or confirmed emergency medical condition->Emergency Medical Condition (MA) FINDINGS: No acute fracture is seen. No evidence of dislocation. Mild bilateral hip joint degenerative changes. Degenerative changes also seen at the SI joints and visualized lower lumbar spine. Similar peripherally sclerotic lucent lesion in the left posterior iliac bone, suggesting a benign finding. The infrarenal abdominal aorta measures up to 2.8 cm in diameter. There are vascular calcifications. No acute bony abnormality is seen in the pelvis. Mild bilateral hip joint degenerative changes. Degenerative changes also seen at the SI joints and visualized lower lumbar spine. 2.8 cm infrarenal abdominal aortic aneurysm suspected. Recommend follow-up every 5 years. Reference: J Am Erwin Radiol 8144;84:882-685. CT KNEE LEFT WO CONTRAST    Result Date: 11/14/2022  EXAMINATION: CT OF THE LEFT KNEE WITHOUT CONTRAST 11/14/2022 11:49 am TECHNIQUE: CT of the left knee was performed without the administration of intravenous contrast.  Multiplanar reformatted images are provided for review.  Automated exposure control, iterative reconstruction, and/or weight based adjustment of the mA/kV was utilized to reduce the radiation dose to as low as reasonably achievable. COMPARISON: Radiographs 11/14/2022. HISTORY ORDERING SYSTEM PROVIDED HISTORY: pain trauma TECHNOLOGIST PROVIDED HISTORY: Reason for exam:->pain trauma Decision Support Exception - unselect if not a suspected or confirmed emergency medical condition->Emergency Medical Condition (MA) FINDINGS: No acute fracture is seen. No evidence of dislocation. Mild tricompartmental osteoarthrosis. There is chondrocalcinosis of the menisci. Calcifications are seen involving the cruciate ligaments and proximal gastrocnemius tendon insertion. Vascular calcifications are seen. There is a small calcified prepatellar bursa. Small Baker's cyst.  Small volume knee joint fluid. No acute bony abnormality is seen in the left knee. Chondrocalcinosis, without a large knee joint effusion. Calcified small prepatellar bursa. Mild tricompartmental osteoarthrosis. XR CHEST PORTABLE    Result Date: 11/14/2022  EXAMINATION: ONE XRAY VIEW OF THE CHEST 11/14/2022 3:17 pm COMPARISON: 08/31/2022 HISTORY: ORDERING SYSTEM PROVIDED HISTORY: AMS, frequent falls TECHNOLOGIST PROVIDED HISTORY: Reason for exam:->AMS, frequent falls Reason for Exam: AMS, frequent falls Additional signs and symptoms: na Relevant Medical/Surgical History: bladder cancer, chf, ckd FINDINGS: Heart size is globular and mildly enlarged without change. Aorta is tortuous with atherosclerotic changes. Mediastinum is not widened. .  Lungs are under expanded. Patchy opacity in the left lower lobe but this is similar in appearance. .  No pleural effusions.  Mild spondylosis     No acute lung disease       Electronically signed by Daniel Mack MD on 11/21/2022 at 1:22 PM

## 2022-11-21 NOTE — CARE COORDINATION
This RN CM spoke with Coco Castle with [facility] admissions and they are not able to accept patient at this time due to patients needs. LVM for patient's POA to please return my call at her earliest convenience.

## 2022-11-21 NOTE — PROGRESS NOTES
Occupational Therapy Treatment Note  Name: Tien Smiley MRN: [id number] :   1934   Date:  2022   Admission Date: 2022 Room:  [address] [place] [address]-A     Primary Problem:  The primary encounter diagnosis was Anxiety. Diagnoses of Frequent falls, Unsteady gait, History of inability to perform activities of daily living, Acute pain of left knee, Chondrocalcinosis, Infrarenal abdominal aortic aneurysm (AAA) without rupture, Contusion of left knee, initial encounter, and Left knee pain, unspecified chronicity were also pertinent to this visit. Past Medical History:   Diagnosis Date    Anxiety     Bladder cancer ([facility]     Cancer (HCC)     CHF (congestive heart failure) (HCC)     Chronic kidney disease     Chronic kidney disease, stage III (moderate) ([facility] 2017    Coronary artery disease involving native coronary artery of native heart with unstable angina pectoris ([facility] 2019    Dementia [facility]     Depression     GERD (gastroesophageal reflux disease)     Gout     Hx of Carotid Doppler ultrasound 2021    mild 0-49% disease and normal flow    Hx of Doppler echocardiogram 2018    EF 50% mod LV hypertrophy    Hx of exercise stress test 2018    Treadmill normal study    Hyperlipidemia     Hypertension     Hyperthyroidism     Mitral valve insufficiency          Communication with other providers:  RN was made aware that family inquired regarding nicotine patch, as pt has not smoked in 30 years per family. RN reported to be aware and pt removed. RN was made aware of pt c/o neck and back pain. RN handoff. Subjective:  Patient states:  \"I cannot sit much longer, my neck is hurting I have to get up\" - pt stated this in armless chair.    Pain: pt frequently c/o neck pain and was unable to rate   Restrictions: general, fall, , dementia at baseline   Family at bedside    Objective:    Observation:  pt was seated in recliner upon arrival with chair alarm on and  in place, agreeable to session    Treatment, including education:    Therapeutic Activity Training:   Therapeutic activity training was instructed today. Cues were given for safety, sequence, UE/LE placement, visual cues, and balance. Activities performed today included:    STS:  Pt completed from recliner to [address] and max cues to initiate. Pt with poor carryover of cues for hand placement. Pt completed to armless chair in bathroom MaxA d/t fatigue, pain, and lack of command following to ensure safety. Pt ATT to complete from armless chair MaxA without success. Pt successfully completed from armless chair MaxA with BUE support on therapist and pivoted to recliner MaxA d/t poor BLE advancement. Pt demo confusion with commands for safety and thus MaxA required for pivot to seated in recliner. Ambulation:  Pt ambulated ~6' CGA with FWW and digressed to MaxA for additional ~2' d/t progressive fatigue, c/o pain, and confusion with commands. Pt unable to manage FWW appropriately and was safely seated to armless chair MaxA. Recliner was brought to bathroom to complete pivot vs ambulating to return for safety. Pt returned to bedside via rolling recliner and left seated safely in recliner with pillows positioned for comfort. Self Care Training:   Self care training was performed today. Cues were given for safety, sequence, UE/LE placement, visual cues, and balance. Activities performed today included:    Grooming:   Pt engaged in shaving with electric razor and provided ModA seated at sink d/t intolerance for standing today. Pt reported pain and fatigue and digressed to MaxA for standing balance, thus for safety, completed seated. Pt required max cues for initiation, sequencing, and orientation. Pt intermittently required [facility] assist to heed task. This required inc time to complete. Facial/hair hygiene:  Pt completed with Marva d/t requiring [employer] assist to initiate and heed task.  Pt benefited from max cues to appropriately initiate, sequence, and heed task. Activity completed seated at sink. UB bathing:  Pt completed with Marva to reach posterior neck and back. Pt completed other aspects of UB bathing with max verbal and tactile cues to complete. Activity completed seated at sink. LB bathing:  Pt completed with SetupA. Pt completed spontaneously with minimal verbal cues to engage. Activity completed seated at sink. UB dressing:  Pt doffed old gown and donned fresh gown MaxA while seated at sink. Education: Role of OT, OT POC, safety, benefits of EOB/OOB activity, rationale for treatment, educated on use of call bell- unsure of carryover     Safety Measures: Gait belt used for safety of pt and therapist, Left in recliner with LISA in place, Alarm in place, call light and phone within reach, lines managed, needs met     Assessment / Impression:    Pt demo dec tolerance for mobility this date d/t neck and back pain. Pt demo confusion with commands for mobility and digressed to MaxA for mobility. Will cont to address safe transfers and safe functional mobility. Patient's tolerance of treatment: Good   Adverse Reaction: none   Significant change in status and impact:  dec mobility and c/o back and neck pain. RN was made aware.    Barriers to improvement: strength, endurance, confusion, pain     Plan for Next Session:    Continue OT POC    Time in:  1017  Time out:  1058  Timed treatment minutes:  41  Total treatment time:  41    Electronically signed by:    GLENDY Cullen/L  License: [id number]  89/80/0783, 11:45 AM

## 2022-11-22 LAB
BACTERIA: ABNORMAL /HPF
BILIRUBIN URINE: NEGATIVE MG/DL
BLOOD, URINE: ABNORMAL
CLARITY: ABNORMAL
COLOR: YELLOW
GLUCOSE, URINE: NEGATIVE MG/DL
KETONES, URINE: NEGATIVE MG/DL
LEUKOCYTE ESTERASE, URINE: ABNORMAL
NITRITE URINE, QUANTITATIVE: POSITIVE
PH, URINE: 6 (ref 5–8)
PROTEIN UA: 100 MG/DL
RBC URINE: 115 /HPF (ref 0–3)
SPECIFIC GRAVITY UA: 1.02 (ref 1–1.03)
TRICHOMONAS: ABNORMAL /HPF
UROBILINOGEN, URINE: 0.2 MG/DL (ref 0.2–1)
WBC CLUMP: ABNORMAL /HPF
WBC UA: 733 /HPF (ref 0–2)

## 2022-11-22 PROCEDURE — 6360000002 HC RX W HCPCS: Performed by: NURSE PRACTITIONER

## 2022-11-22 PROCEDURE — 6370000000 HC RX 637 (ALT 250 FOR IP): Performed by: NURSE PRACTITIONER

## 2022-11-22 PROCEDURE — 6370000000 HC RX 637 (ALT 250 FOR IP): Performed by: FAMILY MEDICINE

## 2022-11-22 PROCEDURE — 81003 URINALYSIS AUTO W/O SCOPE: CPT

## 2022-11-22 PROCEDURE — 1200000000 HC SEMI PRIVATE

## 2022-11-22 PROCEDURE — 87086 URINE CULTURE/COLONY COUNT: CPT

## 2022-11-22 PROCEDURE — 87088 URINE BACTERIA CULTURE: CPT

## 2022-11-22 PROCEDURE — 2580000003 HC RX 258: Performed by: NURSE PRACTITIONER

## 2022-11-22 PROCEDURE — 6370000000 HC RX 637 (ALT 250 FOR IP): Performed by: PHYSICIAN ASSISTANT

## 2022-11-22 PROCEDURE — 94761 N-INVAS EAR/PLS OXIMETRY MLT: CPT

## 2022-11-22 PROCEDURE — 87186 SC STD MICRODIL/AGAR DIL: CPT

## 2022-11-22 RX ADMIN — TAMSULOSIN HYDROCHLORIDE 0.4 MG: 0.4 CAPSULE ORAL at 10:00

## 2022-11-22 RX ADMIN — PREGABALIN 25 MG: 25 CAPSULE ORAL at 15:31

## 2022-11-22 RX ADMIN — SENNOSIDES 8.6 MG: 8.6 TABLET, FILM COATED ORAL at 20:36

## 2022-11-22 RX ADMIN — DIVALPROEX SODIUM 250 MG: 125 CAPSULE, COATED PELLETS ORAL at 10:00

## 2022-11-22 RX ADMIN — PREGABALIN 25 MG: 25 CAPSULE ORAL at 10:00

## 2022-11-22 RX ADMIN — METOPROLOL TARTRATE 12.5 MG: 25 TABLET, FILM COATED ORAL at 10:01

## 2022-11-22 RX ADMIN — Medication 2000 UNITS: at 10:00

## 2022-11-22 RX ADMIN — HYDROCODONE BITARTRATE AND ACETAMINOPHEN 1 TABLET: 5; 325 TABLET ORAL at 20:37

## 2022-11-22 RX ADMIN — DIVALPROEX SODIUM 250 MG: 125 CAPSULE, COATED PELLETS ORAL at 20:36

## 2022-11-22 RX ADMIN — SENNOSIDES 8.6 MG: 8.6 TABLET, FILM COATED ORAL at 10:00

## 2022-11-22 RX ADMIN — METOPROLOL TARTRATE 12.5 MG: 25 TABLET, FILM COATED ORAL at 20:36

## 2022-11-22 RX ADMIN — Medication 3 MG: at 20:36

## 2022-11-22 RX ADMIN — FLUOXETINE HYDROCHLORIDE 20 MG: 20 CAPSULE ORAL at 20:36

## 2022-11-22 RX ADMIN — OLANZAPINE 5 MG: 10 TABLET, FILM COATED ORAL at 20:36

## 2022-11-22 RX ADMIN — CEFTRIAXONE SODIUM 1000 MG: 1 INJECTION, POWDER, FOR SOLUTION INTRAMUSCULAR; INTRAVENOUS at 20:52

## 2022-11-22 RX ADMIN — ASPIRIN 81 MG: 81 TABLET, COATED ORAL at 10:00

## 2022-11-22 RX ADMIN — DICLOFENAC SODIUM 4 G: 10 GEL TOPICAL at 15:37

## 2022-11-22 RX ADMIN — ATORVASTATIN CALCIUM 80 MG: 40 TABLET, FILM COATED ORAL at 20:36

## 2022-11-22 RX ADMIN — DICLOFENAC SODIUM 4 G: 10 GEL TOPICAL at 20:52

## 2022-11-22 RX ADMIN — DICLOFENAC SODIUM 4 G: 10 GEL TOPICAL at 10:06

## 2022-11-22 RX ADMIN — RISPERIDONE 1 MG: 0.5 TABLET ORAL at 20:36

## 2022-11-22 RX ADMIN — LEVOTHYROXINE SODIUM 50 MCG: 0.05 TABLET ORAL at 10:01

## 2022-11-22 RX ADMIN — PANTOPRAZOLE SODIUM 40 MG: 40 TABLET, DELAYED RELEASE ORAL at 10:00

## 2022-11-22 RX ADMIN — ALLOPURINOL 100 MG: 100 TABLET ORAL at 10:01

## 2022-11-22 RX ADMIN — DICLOFENAC SODIUM 4 G: 10 GEL TOPICAL at 13:30

## 2022-11-22 ASSESSMENT — PAIN SCALES - GENERAL: PAINLEVEL_OUTOF10: 7

## 2022-11-22 NOTE — CARE COORDINATION
M for patient's family regarding new SNF choice. [phone] - This RN CM spoke with patient's daughter Barb Lopez. The family has not made a choice in SNFs yet. This RN CM explained to family that patient has choice of whole list due to patient has traditional medicare. I also explained facilities that have locked units. Family verbalized understanding. I did let family know that patient is medically ready to be discharged and that the sooner we have the choices , the better , especially with the holiday falling this week. Family verbalized understanding and will get back with this RN CM as soon as possible with choices. [id number] - Patient's family called me back and they state they [facility] and that they spoke with Zheng Coto with Admissions and that she states they have memory care and skilled nursing. I have left a voicemail with Zheng Coto about clarification as [facility] is [facility] as [facility]. I have asked Zheng Coto to return my call at her earliest convenience. 1413 - This RN CM spoke with Zheng Coto with [facility] . She states that patient's family is supposed to come in tomorrow at 2:15 to sign contract so patient can come to [facility]. Facility is able to accept patient this week as soon as family brings over patient's belongings. Roselia is going to contact this RN CM and let me know as soon as contract is signed.

## 2022-11-22 NOTE — PROGRESS NOTES
V2.0  [facility] [place] Hospitalist Progress Note      Name:  Morris Echeverria /Age/Sex: 1934  (80 y.o. male)   MRN & CSN:  [id number] & [id number] Encounter Date/Time: 2022 11:26 AM EST    Location:  [address] [place] [address]-A PCP: Allen Srivastava PA-C       Hospital Day: 9    Assessment and Plan:   Morris Echeverria is a 80 y.o. male with past medical history of dementia, CKD 3, chronic HFrEF who presents with Left knee pain, unspecified chronicity, worsening confusion.      Alzheimer's dementia with behavioral disturbance  -Progressively worsening over the last several weeks prior to admission   -No longer able to care for self at home, intermittently agitated  -UA noninfectious, labs generally unremarkable  -Maintain sleep-wake cycle, scheduled melatonin  -CODE STATUS changed to DNR CC, hospice consulted per family request who will follow peripherally while patient is at SNF  - PT/OT recommended SNF, Awaiting placement   - psych on board - increased home Risperdal, agreed with depakote, continue PRN haldol  - fall precautions    Acute urinary retention   - requiring intermittent straight cath  - has history of prostate and bladder cancer  - patient states he cannot pee - ok with straight cath today - ok to hold off if patient is not comfortable with straight cath as long as patient does not have discomfort from urinary retention  - continue home flomax   - not a good candidate for march given agitation   - bladder scan q8h    Left knee pain  -Status post fall  -CT left knee with chondrocalcinosis, calcified small prepatellar bursa  -No signs of gout on exam, pain improved   -Continue Voltaren gel, as needed Tylenol, PRN Norco for severe pain     Multiple falls  -In setting of dementia  -CT head, x-ray pelvis no acute abnormality  -PT OT recommended SNF    CKD III  -Creatinine near baseline  -Avoid nephrotoxins    Chronic HFrEF  -Torsemide held on admission due to decreased p.o. intake, appears euvolemic and will continue to hold and spot diuresis as needed  -continue home beta-blocker    Hypertension  -Continue beta-blocker with hold parameters  -was on midodrine at home as well which is ordered PRN    Hypothyroidism   - TSH WNL  -continue home Synthroid      Current Living situation: home  Expected Dis[facility]: [facility]  Estimated D/C: Ready for discharge - precert pending. Diet ADULT DIET; Regular  ADULT ORAL NUTRITION SUPPLEMENT; Breakfast, Lunch; Diabetic Oral Supplement  ADULT ORAL NUTRITION SUPPLEMENT; Dinner; Frozen Oral Supplement   DVT Prophylaxis [] Lovenox, []  Heparin, [] SCDs, [] Ambulation,  [] Eliquis, [] Xarelto [x]None - patient is DNR-CC - please do not poke him un-necessarily    Code Status DNR-CC   Disposition Patient requires continued admission due to awaiting placement   Surrogate Decision Maker/ POA Daughter, Josué Alcantara     Subjective:     Chief Complaint: Leg Pain       Patient seen and examined at bedside. Pt was sleepy but awakens to questions. He is pleasantly confused, not agitated. He states he feels ok. Pt has no new complaints or concerns. Review of Systems:    Unable to obtain secondary to dementia    Objective:   No intake or output data in the 24 hours ending 11/22/22 0956       Vitals:   Vitals:    11/22/22 0218   BP: 129/63   Pulse: 60   Resp: 14   Temp: 98.2 °F (36.8 °C)   SpO2: 99%       Physical Exam:   PHYSICAL EXAM   GEN Awake male, laying in bed. Appears in no acute distress. EYES Pupils are equally round. HENT Mucous membranes are moist.   NECK Supple, no apparent thyromegaly or masses. RESP Respirations even and unlabored on RA, clear to auscultation bilaterally   CARDIO/VASC Regular rate without appreciable murmurs. GI Abdomen is soft without significant tenderness, masses, or guarding.   Shaw catheter is not present. Mild suprapubic discomfort. MSK No gross joint deformities. Mild bruising of left knee.   SKIN Normal coloration, warm, dry.  NEURO   moves all extremities x4. He is alert, however pleasantly confused. PSYCH Calm    Medications:   Medications:    OLANZapine  5 mg Oral Nightly    sodium chloride  500 mL IntraVENous Once    risperiDONE  1 mg Oral Nightly    Vitamin D  2,000 Units Oral Daily    divalproex  250 mg Oral 2 times per day    sodium chloride flush  5-40 mL IntraVENous 2 times per day    [Held by provider] torsemide  10 mg Oral Daily    tamsulosin  0.4 mg Oral Daily    senna  1 tablet Oral BID    pregabalin  25 mg Oral BID    pantoprazole  40 mg Oral Daily    metoprolol tartrate  12.5 mg Oral BID    levothyroxine  50 mcg Oral Daily    FLUoxetine  20 mg Oral Nightly    atorvastatin  80 mg Oral Nightly    aspirin  81 mg Oral Daily    allopurinol  100 mg Oral Daily    diclofenac sodium  4 g Topical 4x daily    melatonin  3 mg Oral Nightly      Infusions:    sodium chloride       PRN Meds: midodrine, 10 mg, TID PRN  HYDROcodone 5 mg - acetaminophen, 1 tablet, Q6H PRN  hydrALAZINE (APRESOLINE) ivpb, 10 mg, Q4H PRN  haloperidol lactate, 0.5 mg, Q6H PRN  sodium chloride flush, 5-40 mL, PRN  sodium chloride, 25 mL, PRN  ondansetron, 4 mg, Q8H PRN   Or  ondansetron, 4 mg, Q6H PRN  polyethylene glycol, 17 g, Daily PRN  acetaminophen, 650 mg, Q6H PRN   Or  acetaminophen, 650 mg, Q6H PRN  nicotine polacrilex, 2 mg, Q1H PRN  guaiFENesin, 600 mg, BID PRN  docusate sodium, 100 mg, BID PRN  ALPRAZolam, 0.25 mg, Nightly PRN      Labs      No results found for this or any previous visit (from the past 24 hour(s)). Imaging/Diagnostics Last 24 Hours   XR KNEE LEFT (3 VIEWS)    Result Date: 11/14/2022  EXAMINATION: THREE XRAY VIEWS OF THE LEFT KNEE 11/14/2022 4:35 am COMPARISON: None. HISTORY: ORDERING SYSTEM PROVIDED HISTORY: Pain, Multiple falls TECHNOLOGIST PROVIDED HISTORY: Reason for exam:->Pain, Multiple falls Reason for Exam: pain after fall Left knee pain FINDINGS: There is no acute fracture, dislocation, or bone destruction. Chondrocalcinosis is seen involving the medial and lateral compartments. There is no joint effusion. Vascular calcifications are seen. No acute osseous abnormality. 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 CT HEAD WO CONTRAST    Result Date: 11/14/2022  EXAMINATION: CT OF THE HEAD WITHOUT CONTRAST  11/14/2022 11:48 am TECHNIQUE: CT of the head was performed without the administration of intravenous contrast. Automated exposure control, iterative reconstruction, and/or weight based adjustment of the mA/kV was utilized to reduce the radiation dose to as low as reasonably achievable. COMPARISON: 11/29/2019 HISTORY: ORDERING SYSTEM PROVIDED HISTORY: unsteady gait x1 month TECHNOLOGIST PROVIDED HISTORY: Has a \"code stroke\" or \"stroke alert\" been called? ->No Reason for exam:->unsteady gait x1 month Decision Support Exception - unselect if not a suspected or confirmed emergency medical condition->Emergency Medical Condition (MA) Reason for Exam: unsteady gait x1 month FINDINGS: BRAIN/VENTRICLES: There is no acute intracranial hemorrhage, mass effect or midline shift. No abnormal extra-axial fluid collection. The gray-white differentiation is maintained without evidence of an acute infarct. There is no evidence of hydrocephalus. Moderate to severely dilated ventricles and cerebral sulci. Periventricular and subcortical white matter decreased attenuation. ORBITS: The visualized portion of the orbits demonstrate no acute abnormality. SINUSES: The visualized paranasal sinuses and right mastoid air cells demonstrate no acute abnormality. Small amount fluid in left mastoid air cells. SOFT TISSUES/SKULL:  No acute abnormality of the visualized skull or soft tissues. No acute intracranial abnormality. Findings consistent with central and cortical atrophy. Microvascular ischemic white matter disease. Chronic left mastoiditis. No interval change.      CT PELVIS WO CONTRAST Additional Contrast? utilized to reduce the radiation dose to as low as reasonably achievable. COMPARISON: Radiographs 11/14/2022. HISTORY ORDERING SYSTEM PROVIDED HISTORY: pain trauma TECHNOLOGIST PROVIDED HISTORY: Reason for exam:->pain trauma Decision Support Exception - unselect if not a suspected or confirmed emergency medical condition->Emergency Medical Condition (MA) FINDINGS: No acute fracture is seen. No evidence of dislocation. Mild tricompartmental osteoarthrosis. There is chondrocalcinosis of the menisci. Calcifications are seen involving the cruciate ligaments and proximal gastrocnemius tendon insertion. Vascular calcifications are seen. There is a small calcified prepatellar bursa. Small Baker's cyst.  Small volume knee joint fluid. No acute bony abnormality is seen in the left knee. Chondrocalcinosis, without a large knee joint effusion. Calcified small prepatellar bursa. Mild tricompartmental osteoarthrosis. XR CHEST PORTABLE    Result Date: 11/14/2022  EXAMINATION: ONE XRAY VIEW OF THE CHEST 11/14/2022 3:17 pm COMPARISON: 08/31/2022 HISTORY: ORDERING SYSTEM PROVIDED HISTORY: AMS, frequent falls TECHNOLOGIST PROVIDED HISTORY: Reason for exam:->AMS, frequent falls Reason for Exam: AMS, frequent falls Additional signs and symptoms: na Relevant Medical/Surgical History: bladder cancer, chf, ckd FINDINGS: Heart size is globular and mildly enlarged without change. Aorta is tortuous with atherosclerotic changes. Mediastinum is not widened. .  Lungs are under expanded. Patchy opacity in the left lower lobe but this is similar in appearance. .  No pleural effusions.  Mild spondylosis     No acute lung disease       Electronically signed by Tl Pope MD on 11/22/2022 at 9:56 AM

## 2022-11-22 NOTE — PROGRESS NOTES
Occupational Therapy ATTEMPT Treatment Note  Name: Ita Kolb MRN: [id number] :   1934   Date:  2022   Admission Date: 2022 Room:  [address] [place] [address]-A     Primary Problem:  The primary encounter diagnosis was Anxiety. Diagnoses of Frequent falls, Unsteady gait, History of inability to perform activities of daily living, Acute pain of left knee, Chondrocalcinosis, Infrarenal abdominal aortic aneurysm (AAA) without rupture, Contusion of left knee, initial encounter, and Left knee pain, unspecified chronicity were also pertinent to this visit. Past Medical History:   Diagnosis Date    Anxiety     Bladder cancer ([facility]     Cancer ([facility]     CHF (congestive heart failure) (HCC)     Chronic kidney disease     Chronic kidney disease, stage III (moderate) ([facility] 2017    Coronary artery disease involving native coronary artery of native heart with unstable angina pectoris ([facility] 2019    Dementia ([facility]     Depression     GERD (gastroesophageal reflux disease)     Gout     Hx of Carotid Doppler ultrasound 2021    mild 0-49% disease and normal flow    Hx of Doppler echocardiogram 2018    EF 50% mod LV hypertrophy    Hx of exercise stress test 2018    Treadmill normal study    Hyperlipidemia     Hypertension     Hyperthyroidism     Mitral valve insufficiency              PT/OT attempted to see pt at 1003. RN and aides present in room and reported not a good time d/t requiring extensive cleanup. Will re-attempt as we are able.            Electronically signed by:    GLENDY Hernandez/L  License: [id number]  [id number], 10:13 AM

## 2022-11-22 NOTE — PROGRESS NOTES
Bladder scans discontinued, patient is using restroom in urinal and also wetting depends, patient's urine is dark and cloudy, MD ordered a urine sample, urine obtained using a clean urinal,

## 2022-11-23 PROCEDURE — 2580000003 HC RX 258: Performed by: NURSE PRACTITIONER

## 2022-11-23 PROCEDURE — 6370000000 HC RX 637 (ALT 250 FOR IP): Performed by: STUDENT IN AN ORGANIZED HEALTH CARE EDUCATION/TRAINING PROGRAM

## 2022-11-23 PROCEDURE — 6370000000 HC RX 637 (ALT 250 FOR IP): Performed by: NURSE PRACTITIONER

## 2022-11-23 PROCEDURE — 1200000000 HC SEMI PRIVATE

## 2022-11-23 PROCEDURE — 6370000000 HC RX 637 (ALT 250 FOR IP): Performed by: PHYSICIAN ASSISTANT

## 2022-11-23 PROCEDURE — 6360000002 HC RX W HCPCS: Performed by: NURSE PRACTITIONER

## 2022-11-23 PROCEDURE — 6370000000 HC RX 637 (ALT 250 FOR IP): Performed by: FAMILY MEDICINE

## 2022-11-23 PROCEDURE — 6360000002 HC RX W HCPCS: Performed by: PHYSICIAN ASSISTANT

## 2022-11-23 PROCEDURE — 94761 N-INVAS EAR/PLS OXIMETRY MLT: CPT

## 2022-11-23 RX ORDER — LIDOCAINE 4 G/G
1 PATCH TOPICAL DAILY
Status: DISCONTINUED | OUTPATIENT
Start: 2022-11-23 | End: 2022-11-25 | Stop reason: HOSPADM

## 2022-11-23 RX ADMIN — RISPERIDONE 1 MG: 0.5 TABLET ORAL at 21:51

## 2022-11-23 RX ADMIN — PREGABALIN 25 MG: 25 CAPSULE ORAL at 17:15

## 2022-11-23 RX ADMIN — HYDROCODONE BITARTRATE AND ACETAMINOPHEN 1 TABLET: 5; 325 TABLET ORAL at 17:13

## 2022-11-23 RX ADMIN — METOPROLOL TARTRATE 12.5 MG: 25 TABLET, FILM COATED ORAL at 10:08

## 2022-11-23 RX ADMIN — SODIUM CHLORIDE, PRESERVATIVE FREE 10 ML: 5 INJECTION INTRAVENOUS at 10:08

## 2022-11-23 RX ADMIN — DICLOFENAC SODIUM 4 G: 10 GEL TOPICAL at 21:59

## 2022-11-23 RX ADMIN — SENNOSIDES 8.6 MG: 8.6 TABLET, FILM COATED ORAL at 21:52

## 2022-11-23 RX ADMIN — ALLOPURINOL 100 MG: 100 TABLET ORAL at 10:06

## 2022-11-23 RX ADMIN — HALOPERIDOL LACTATE 0.5 MG: 5 INJECTION, SOLUTION INTRAMUSCULAR at 21:19

## 2022-11-23 RX ADMIN — DIVALPROEX SODIUM 250 MG: 125 CAPSULE, COATED PELLETS ORAL at 10:06

## 2022-11-23 RX ADMIN — Medication 2000 UNITS: at 10:06

## 2022-11-23 RX ADMIN — ASPIRIN 81 MG: 81 TABLET, COATED ORAL at 10:06

## 2022-11-23 RX ADMIN — TAMSULOSIN HYDROCHLORIDE 0.4 MG: 0.4 CAPSULE ORAL at 10:07

## 2022-11-23 RX ADMIN — ATORVASTATIN CALCIUM 80 MG: 40 TABLET, FILM COATED ORAL at 21:52

## 2022-11-23 RX ADMIN — PANTOPRAZOLE SODIUM 40 MG: 40 TABLET, DELAYED RELEASE ORAL at 10:06

## 2022-11-23 RX ADMIN — FLUOXETINE HYDROCHLORIDE 20 MG: 20 CAPSULE ORAL at 21:51

## 2022-11-23 RX ADMIN — METOPROLOL TARTRATE 12.5 MG: 25 TABLET, FILM COATED ORAL at 21:52

## 2022-11-23 RX ADMIN — PREGABALIN 25 MG: 25 CAPSULE ORAL at 10:07

## 2022-11-23 RX ADMIN — DICLOFENAC SODIUM 4 G: 10 GEL TOPICAL at 10:11

## 2022-11-23 RX ADMIN — HYDROCODONE BITARTRATE AND ACETAMINOPHEN 1 TABLET: 5; 325 TABLET ORAL at 10:07

## 2022-11-23 RX ADMIN — OLANZAPINE 5 MG: 10 TABLET, FILM COATED ORAL at 21:51

## 2022-11-23 RX ADMIN — CEFTRIAXONE SODIUM 1000 MG: 1 INJECTION, POWDER, FOR SOLUTION INTRAMUSCULAR; INTRAVENOUS at 22:02

## 2022-11-23 RX ADMIN — SENNOSIDES 8.6 MG: 8.6 TABLET, FILM COATED ORAL at 10:06

## 2022-11-23 RX ADMIN — DIVALPROEX SODIUM 250 MG: 125 CAPSULE, COATED PELLETS ORAL at 21:51

## 2022-11-23 RX ADMIN — LEVOTHYROXINE SODIUM 50 MCG: 0.05 TABLET ORAL at 06:51

## 2022-11-23 RX ADMIN — Medication 3 MG: at 21:51

## 2022-11-23 RX ADMIN — DICLOFENAC SODIUM 4 G: 10 GEL TOPICAL at 12:15

## 2022-11-23 ASSESSMENT — PAIN DESCRIPTION - LOCATION
LOCATION: LEG;BACK
LOCATION: BACK;LEG

## 2022-11-23 ASSESSMENT — PAIN DESCRIPTION - DESCRIPTORS
DESCRIPTORS: ACHING;DISCOMFORT
DESCRIPTORS: ACHING;DISCOMFORT

## 2022-11-23 ASSESSMENT — PAIN - FUNCTIONAL ASSESSMENT
PAIN_FUNCTIONAL_ASSESSMENT: ACTIVITIES ARE NOT PREVENTED
PAIN_FUNCTIONAL_ASSESSMENT: ACTIVITIES ARE NOT PREVENTED

## 2022-11-23 ASSESSMENT — PAIN SCALES - GENERAL
PAINLEVEL_OUTOF10: 6
PAINLEVEL_OUTOF10: 3
PAINLEVEL_OUTOF10: 4
PAINLEVEL_OUTOF10: 6

## 2022-11-23 ASSESSMENT — PAIN DESCRIPTION - ORIENTATION
ORIENTATION: RIGHT;LEFT
ORIENTATION: OTHER (COMMENT);RIGHT;LEFT

## 2022-11-23 ASSESSMENT — PAIN SCALES - WONG BAKER
WONGBAKER_NUMERICALRESPONSE: 0
WONGBAKER_NUMERICALRESPONSE: 0

## 2022-11-23 NOTE — PROGRESS NOTES
Urinalysis reviewed. Culture pending. Last culture grew E. coli. On 11/11. Sensitive to rocephin.  Will stat rocephin pending attending re-eval in am.

## 2022-11-23 NOTE — PROGRESS NOTES
Nutrition Assessment     Type and Reason for Visit: Reassess    Nutrition Recommendations/Plan:   Continue current diet and oral nutrition supplements  Help with meal set up PRN   Obtain measured weights and po intakes in I/O      Malnutrition Assessment:  Malnutrition Status: At risk for malnutrition (Comment) (insufficient data)    Nutrition Assessment:  Pt c/o abdominal pain, RN at bedside addressed pain. Pt has a fair appetite throughout stay, feeling drowsy after receiving analgesic, states will eat lunch later at RD visit. Enjoys diabetic oral nutrition supplements per pt. Monitor as moderate nutrition risk. Estimated Daily Nutrient Needs:  Energy (kcal):  5025-4010 (25-30 kcals/kg IBW) Weight Used for Energy Requirements: Ideal     Protein (g):  75-91 (1-1.2 g/kg) Weight Used for Protein Requirements: Ideal        Fluid (ml/day):  fluids per nephrology Method Used for Fluid Requirements: Standard Renal    Nutrition Related Findings:   +senokot, still confused, sitting up in chair Wound Type: None    Current Nutrition Therapies:    ADULT DIET;  Regular  ADULT ORAL NUTRITION SUPPLEMENT; Breakfast, Lunch; Diabetic Oral Supplement  ADULT ORAL NUTRITION SUPPLEMENT; Dinner; Frozen Oral Supplement    Anthropometric Measures:  Height: 5' 10\" (177.8 cm)  Current Body Wt: 178 lb 5.6 oz (80.9 kg)   BMI: 25.6    Nutrition Diagnosis:   Inadequate protein-energy intake related to cognitive or neurological impairment, catabolic illness as evidenced by poor intake prior to admission, intake 26-50%, weight loss (weight loss greater than 7.5% in 3 months)    Nutrition Interventions:   Food and/or Nutrient Delivery: Continue Current Diet, Modify Oral Nutrition Supplement  Nutrition Education/Counseling: No recommendation at this time  Coordination of Nutrition Care: Continue to monitor while inpatient, Feeding Assistance/Environment Change, Coordination of Care  Plan of Care discussed with: pt without sitter    Goals:  Previous Goal Met: Progressing toward Goal(s)  Goals: Meet at least 75% of estimated needs       Nutrition Monitoring and Evaluation:   Behavioral-Environmental Outcomes: None Identified  Food/Nutrient Intake Outcomes: Diet Advancement/Tolerance, Food and Nutrient Intake  Physical Signs/Symptoms Outcomes: Biochemical Data, Nutrition Focused Physical Findings, Weight, GI Status, Meal Time Behavior    Discharge Planning:    Continue Oral Nutrition Supplement, Continue current diet     Ayo Tee RD, LD  Contact: [phone]

## 2022-11-23 NOTE — CARE COORDINATION
Marjorie Del Rio was evaluated today and a DME order was entered for variable height hospital bed because he requires assistance for positioning needs not possible in an ordinary bed, complexity of body positioning needs, requirement of elevation of head of bed more than 30 degrees for the diagnosis of debility . Patient needs variability of bed height to perform patient transfers and for eating, bathing, toileting, personal cares, ambulating, grooming, hygiene, dressing upper body, dressing lower body, meal preparation, and taking own medications. Current body Weight: 178 lb 5.6 oz (80.9 kg). The need for this equipment was discussed with the patient and he understands and is in agreement.

## 2022-11-23 NOTE — PROGRESS NOTES
V2.0  [facility] [place]      Name:  Delia Kilpatrick /Age/Sex: 1934  (80 y.o. male)   MRN & CSN:  [id number] & [id number] Encounter Date/Time: 2022 11:26 AM EST    Location:  [facility] PCP: Daniel Srivastava, PAKENYATTA       Hospital Day: 10    Assessment and Plan:   Delia Kilpatrick is a 80 y.o. male with past medical history of dementia, CKD 3, chronic HFrEF who presents with Left knee pain, unspecified chronicity, worsening confusion.      UTI vs bacteruria  UA + for LE, nitrite, unable to ell if pt has symptoms given his dementia and confusion.   - continue rocephin  - follow up urine culture    Alzheimer's dementia with behavioral disturbance  -Progressively worsening over the last several weeks prior to admission   -No longer able to care for self at home, intermittently agitated  - no evidence of infection on admission, labs generally unremarkable  -Maintain sleep-wake cycle, scheduled melatonin  -CODE STATUS changed to DNR CC, hospice consulted per family request who will follow peripherally while patient is at SNF  - PT/OT recommended SNF, Awaiting placement ; family to sign contract  - psych on board - increased home Risperdal, agreed with depakote, continue PRN haldol  - fall precautions    Acute urinary retention  - resolved  - requiring intermittent straight cath  - has history of prostate and bladder cancer  - continue home flomax   - not a good candidate for march given agitation   - d/c bladder scan q shift as pt is urinating on his own    Left knee pain  -Status post fall  -CT left knee with chondrocalcinosis, calcified small prepatellar bursa  -No signs of gout on exam, pain improved   -Continue Voltaren gel, as needed Tylenol, PRN Norco for severe pain   - add lidocaine patch    Multiple falls  -In setting of dementia  -CT head, x-ray pelvis no acute abnormality  -PT OT recommended SNF    CKD III  -Creatinine near baseline  -Avoid nephrotoxins    Chronic HFrEF  -Torsemide held on admission due to decreased p.o. intake, appears euvolemic and will continue to hold and spot diuresis as needed  -continue home beta-blocker    Hypertension  -Continue beta-blocker with hold parameters  -was on midodrine at home as well which is ordered PRN    Hypothyroidism   - TSH WNL  -continue home Synthroid      Current Living situation: home  Expected Disposition: [place] and Home   Estimated D/C: Ready for discharge         Diet ADULT DIET; Regular  ADULT ORAL NUTRITION SUPPLEMENT; Breakfast, Lunch; Diabetic Oral Supplement  ADULT ORAL NUTRITION SUPPLEMENT; Dinner; Frozen Oral Supplement   DVT Prophylaxis [] Lovenox, []  Heparin, [] SCDs, [] Ambulation,  [] Eliquis, [] Xarelto [x]None - patient is DNR-CC - please do not poke him un-necessarily    Code Status DNR-CC   Disposition Patient requires continued admission due to awaiting placement   Surrogate Decision Maker/ POA Daughter, Oracio Tolentino     Subjective:     Chief Complaint: Leg Pain     Urine noted to be cloudy. UA ordered which came back positive for possible UTI. Unable to obtain history from pt regarding symptoms. Pt was started on rocephin. Patient seen and examined at bedside. Complains of left knee pain. Unable to obtain full ROS. Review of Systems:    Unable to obtain secondary to dementia    Objective: Intake/Output Summary (Last 24 hours) at 11/23/2022 1204  Last data filed at 11/23/2022 0802  Gross per 24 hour   Intake 360 ml   Output 125 ml   Net 235 ml          Vitals:   Vitals:    11/23/22 1008   BP: (!) 103/50   Pulse: 69   Resp:    Temp:    SpO2:        Physical Exam:   PHYSICAL EXAM   GEN Awake male, laying in bed. Appears in no acute distress. EYES Pupils are equally round. HENT Mucous membranes are moist.   NECK Supple, no apparent thyromegaly or masses. RESP Respirations even and unlabored on RA, clear to auscultation bilaterally   CARDIO/VASC Regular rate without appreciable murmurs.   GI Abdomen is soft without significant tenderness, masses, or guarding.   Shaw catheter is not present. Mild suprapubic discomfort. MSK No gross joint deformities. Mild bruising of left knee tender to palpation. SKIN Normal coloration, warm, dry. NEURO   moves all extremities x4. He is alert, however pleasantly confused.   PSYCH Calm    Medications:   Medications:    diclofenac sodium  4 g Topical BID    lidocaine  1 patch TransDERmal Daily    cefTRIAXone (ROCEPHIN) IV  1,000 mg IntraVENous Q24H    OLANZapine  5 mg Oral Nightly    sodium chloride  500 mL IntraVENous Once    risperiDONE  1 mg Oral Nightly    Vitamin D  2,000 Units Oral Daily    divalproex  250 mg Oral 2 times per day    sodium chloride flush  5-40 mL IntraVENous 2 times per day    [Held by provider] torsemide  10 mg Oral Daily    tamsulosin  0.4 mg Oral Daily    senna  1 tablet Oral BID    pregabalin  25 mg Oral BID    pantoprazole  40 mg Oral Daily    metoprolol tartrate  12.5 mg Oral BID    levothyroxine  50 mcg Oral Daily    FLUoxetine  20 mg Oral Nightly    atorvastatin  80 mg Oral Nightly    aspirin  81 mg Oral Daily    allopurinol  100 mg Oral Daily    melatonin  3 mg Oral Nightly      Infusions:    sodium chloride       PRN Meds: midodrine, 10 mg, TID PRN  HYDROcodone 5 mg - acetaminophen, 1 tablet, Q6H PRN  hydrALAZINE (APRESOLINE) ivpb, 10 mg, Q4H PRN  haloperidol lactate, 0.5 mg, Q6H PRN  sodium chloride flush, 5-40 mL, PRN  sodium chloride, 25 mL, PRN  ondansetron, 4 mg, Q8H PRN   Or  ondansetron, 4 mg, Q6H PRN  polyethylene glycol, 17 g, Daily PRN  acetaminophen, 650 mg, Q6H PRN   Or  acetaminophen, 650 mg, Q6H PRN  nicotine polacrilex, 2 mg, Q1H PRN  guaiFENesin, 600 mg, BID PRN  docusate sodium, 100 mg, BID PRN  ALPRAZolam, 0.25 mg, Nightly PRN      Labs      Recent Results (from the past 24 hour(s))   Urinalysis with Reflex to Culture    Collection Time: 11/22/22  5:50 PM    Specimen: Urine   Result Value Ref Range    Color, UA YELLOW YELLOW Exception - unselect if not a suspected or confirmed emergency medical condition->Emergency Medical Condition (MA) Reason for Exam: unsteady gait x1 month FINDINGS: BRAIN/VENTRICLES: There is no acute intracranial hemorrhage, mass effect or midline shift. No abnormal extra-axial fluid collection. The gray-white differentiation is maintained without evidence of an acute infarct. There is no evidence of hydrocephalus. Moderate to severely dilated ventricles and cerebral sulci. Periventricular and subcortical white matter decreased attenuation. ORBITS: The visualized portion of the orbits demonstrate no acute abnormality. SINUSES: The visualized paranasal sinuses and right mastoid air cells demonstrate no acute abnormality. Small amount fluid in left mastoid air cells. SOFT TISSUES/SKULL:  No acute abnormality of the visualized skull or soft tissues. No acute intracranial abnormality. Findings consistent with central and cortical atrophy. Microvascular ischemic white matter disease. Chronic left mastoiditis. No interval change. CT PELVIS WO CONTRAST Additional Contrast? None    Result Date: 11/14/2022  EXAMINATION: CT OF THE PELVIS WITHOUT CONTRAST 11/14/2022 11:14 am TECHNIQUE: CT of the pelvis was performed without the administration of intravenous contrast.  Multiplanar reformatted images are provided for review. Adjustment of mA and/or kV according to patient size was utilized. Automated exposure control, iterative reconstruction, and/or weight based adjustment of the mA/kV was utilized to reduce the radiation dose to as low as reasonably achievable.  COMPARISON: CT abdomen and pelvis 08/03/2019 HISTORY: ORDERING SYSTEM PROVIDED HISTORY: pain x2 weeks, from fall TECHNOLOGIST PROVIDED HISTORY: Reason for exam:->pain x2 weeks, from fall Additional Contrast?->None Decision Support Exception - unselect if not a suspected or confirmed emergency medical condition->Emergency Medical Condition (MA) FINDINGS: No acute fracture is seen. No evidence of dislocation. Mild bilateral hip joint degenerative changes. Degenerative changes also seen at the SI joints and visualized lower lumbar spine. Similar peripherally sclerotic lucent lesion in the left posterior iliac bone, suggesting a benign finding. The infrarenal abdominal aorta measures up to 2.8 cm in diameter. There are vascular calcifications. No acute bony abnormality is seen in the pelvis. Mild bilateral hip joint degenerative changes. Degenerative changes also seen at the SI joints and visualized lower lumbar spine. 2.8 cm infrarenal abdominal aortic aneurysm suspected. Recommend follow-up every 5 years. Reference: J Am Erwin Radiol 8597;04:[id number]. CT KNEE LEFT WO CONTRAST    Result Date: 11/14/2022  EXAMINATION: CT OF THE LEFT KNEE WITHOUT CONTRAST 11/14/2022 11:49 am TECHNIQUE: CT of the left knee was performed without the administration of intravenous contrast.  Multiplanar reformatted images are provided for review. Automated exposure control, iterative reconstruction, and/or weight based adjustment of the mA/kV was utilized to reduce the radiation dose to as low as reasonably achievable. COMPARISON: Radiographs 11/14/2022. HISTORY ORDERING SYSTEM PROVIDED HISTORY: pain trauma TECHNOLOGIST PROVIDED HISTORY: Reason for exam:->pain trauma Decision Support Exception - unselect if not a suspected or confirmed emergency medical condition->Emergency Medical Condition (MA) FINDINGS: No acute fracture is seen. No evidence of dislocation. Mild tricompartmental osteoarthrosis. There is chondrocalcinosis of the menisci. Calcifications are seen involving the cruciate ligaments and proximal gastrocnemius tendon insertion. Vascular calcifications are seen. There is a small calcified prepatellar bursa. Small Baker's cyst.  Small volume knee joint fluid. No acute bony abnormality is seen in the left knee.  Chondrocalcinosis, without a large knee joint effusion. Calcified small prepatellar bursa. Mild tricompartmental osteoarthrosis. XR CHEST PORTABLE    Result Date: 11/14/2022  EXAMINATION: ONE XRAY VIEW OF THE CHEST 11/14/2022 3:17 pm COMPARISON: 08/31/2022 HISTORY: ORDERING SYSTEM PROVIDED HISTORY: AMS, frequent falls TECHNOLOGIST PROVIDED HISTORY: Reason for exam:->AMS, frequent falls Reason for Exam: AMS, frequent falls Additional signs and symptoms: na Relevant Medical/Surgical History: bladder cancer, chf, ckd FINDINGS: Heart size is globular and mildly enlarged without change. Aorta is tortuous with atherosclerotic changes. Mediastinum is not widened. .  Lungs are under expanded. Patchy opacity in the left lower lobe but this is similar in appearance. .  No pleural effusions.  Mild spondylosis     No acute lung disease       Electronically signed by Padmini Wright MD on 11/23/2022 at 12:04 PM

## 2022-11-24 LAB
CULTURE: ABNORMAL
CULTURE: ABNORMAL
Lab: ABNORMAL
SPECIMEN: ABNORMAL

## 2022-11-24 PROCEDURE — 2580000003 HC RX 258: Performed by: NURSE PRACTITIONER

## 2022-11-24 PROCEDURE — 6370000000 HC RX 637 (ALT 250 FOR IP): Performed by: NURSE PRACTITIONER

## 2022-11-24 PROCEDURE — 6370000000 HC RX 637 (ALT 250 FOR IP): Performed by: FAMILY MEDICINE

## 2022-11-24 PROCEDURE — 6360000002 HC RX W HCPCS: Performed by: NURSE PRACTITIONER

## 2022-11-24 PROCEDURE — 6370000000 HC RX 637 (ALT 250 FOR IP): Performed by: STUDENT IN AN ORGANIZED HEALTH CARE EDUCATION/TRAINING PROGRAM

## 2022-11-24 PROCEDURE — 6370000000 HC RX 637 (ALT 250 FOR IP): Performed by: PHYSICIAN ASSISTANT

## 2022-11-24 PROCEDURE — 1200000000 HC SEMI PRIVATE

## 2022-11-24 PROCEDURE — 94761 N-INVAS EAR/PLS OXIMETRY MLT: CPT

## 2022-11-24 RX ADMIN — Medication 2000 UNITS: at 08:43

## 2022-11-24 RX ADMIN — FLUOXETINE HYDROCHLORIDE 20 MG: 20 CAPSULE ORAL at 20:08

## 2022-11-24 RX ADMIN — SODIUM CHLORIDE, PRESERVATIVE FREE 10 ML: 5 INJECTION INTRAVENOUS at 20:10

## 2022-11-24 RX ADMIN — PREGABALIN 25 MG: 25 CAPSULE ORAL at 16:34

## 2022-11-24 RX ADMIN — DIVALPROEX SODIUM 250 MG: 125 CAPSULE, COATED PELLETS ORAL at 20:08

## 2022-11-24 RX ADMIN — DIVALPROEX SODIUM 250 MG: 125 CAPSULE, COATED PELLETS ORAL at 08:42

## 2022-11-24 RX ADMIN — DICLOFENAC SODIUM 4 G: 10 GEL TOPICAL at 08:49

## 2022-11-24 RX ADMIN — PREGABALIN 25 MG: 25 CAPSULE ORAL at 08:42

## 2022-11-24 RX ADMIN — DICLOFENAC SODIUM 4 G: 10 GEL TOPICAL at 20:10

## 2022-11-24 RX ADMIN — METOPROLOL TARTRATE 12.5 MG: 25 TABLET, FILM COATED ORAL at 08:43

## 2022-11-24 RX ADMIN — METOPROLOL TARTRATE 12.5 MG: 25 TABLET, FILM COATED ORAL at 20:09

## 2022-11-24 RX ADMIN — RISPERIDONE 1 MG: 0.5 TABLET ORAL at 20:08

## 2022-11-24 RX ADMIN — ALLOPURINOL 100 MG: 100 TABLET ORAL at 08:43

## 2022-11-24 RX ADMIN — CEFTRIAXONE SODIUM 1000 MG: 1 INJECTION, POWDER, FOR SOLUTION INTRAMUSCULAR; INTRAVENOUS at 20:31

## 2022-11-24 RX ADMIN — SODIUM CHLORIDE, PRESERVATIVE FREE 10 ML: 5 INJECTION INTRAVENOUS at 08:43

## 2022-11-24 RX ADMIN — TAMSULOSIN HYDROCHLORIDE 0.4 MG: 0.4 CAPSULE ORAL at 08:42

## 2022-11-24 RX ADMIN — ASPIRIN 81 MG: 81 TABLET, COATED ORAL at 08:42

## 2022-11-24 RX ADMIN — LEVOTHYROXINE SODIUM 50 MCG: 0.05 TABLET ORAL at 06:09

## 2022-11-24 RX ADMIN — SENNOSIDES 8.6 MG: 8.6 TABLET, FILM COATED ORAL at 20:08

## 2022-11-24 RX ADMIN — PANTOPRAZOLE SODIUM 40 MG: 40 TABLET, DELAYED RELEASE ORAL at 08:43

## 2022-11-24 RX ADMIN — OLANZAPINE 5 MG: 10 TABLET, FILM COATED ORAL at 20:08

## 2022-11-24 RX ADMIN — Medication 3 MG: at 20:08

## 2022-11-24 RX ADMIN — SENNOSIDES 8.6 MG: 8.6 TABLET, FILM COATED ORAL at 08:42

## 2022-11-24 RX ADMIN — ATORVASTATIN CALCIUM 80 MG: 40 TABLET, FILM COATED ORAL at 20:08

## 2022-11-24 ASSESSMENT — PAIN DESCRIPTION - DESCRIPTORS: DESCRIPTORS: ACHING

## 2022-11-24 ASSESSMENT — PAIN DESCRIPTION - ORIENTATION: ORIENTATION: RIGHT;LEFT

## 2022-11-24 ASSESSMENT — PAIN SCALES - GENERAL
PAINLEVEL_OUTOF10: 3
PAINLEVEL_OUTOF10: 0

## 2022-11-24 ASSESSMENT — PAIN SCALES - WONG BAKER: WONGBAKER_NUMERICALRESPONSE: 0

## 2022-11-24 ASSESSMENT — PAIN DESCRIPTION - LOCATION: LOCATION: LEG

## 2022-11-24 NOTE — PLAN OF CARE
Problem: Discharge Planning  Goal: Discharge to home or other facility with appropriate resources  Outcome: Progressing  Flowsheets (Taken 11/24/2022 0834)  Discharge to home or other facility with appropriate resources:   Identify barriers to discharge with patient and caregiver   Arrange for needed discharge resources and transportation as appropriate   Identify discharge learning needs (meds, wound care, etc)   Refer to discharge planning if patient needs post-hospital services based on physician order or complex needs related to functional status, cognitive ability or social support system     Problem: Confusion  Goal: Confusion, delirium, dementia, or psychosis is improved or at baseline  Description: INTERVENTIONS:  1. Assess for possible contributors to thought disturbance, including medications, impaired vision or hearing, underlying metabolic abnormalities, dehydration, psychiatric diagnoses, and notify attending LIP  2. [place] high risk fall precautions, as indicated  3. Provide frequent short contacts to provide reality reorientation, refocusing and direction  4. Decrease environmental stimuli, including noise as appropriate  5. Monitor and intervene to maintain adequate nutrition, hydration, elimination, sleep and activity  6. If unable to ensure safety without constant attention obtain sitter and review sitter guidelines with assigned personnel  7. Initiate Psychosocial CNS and Spiritual Care consult, as indicated  Outcome: Progressing  Flowsheets (Taken 11/24/2022 1756)  Effect of thought disturbance (confusion, delirium, dementia, or psychosis) are managed with adequate functional status: Assess for contributors to thought disturbance, including medications, impaired vision or hearing, underlying metabolic abnormalities, dehydration, psychiatric diagnoses, notify LIP     Problem: Skin/Tissue Integrity  Goal: Absence of new skin breakdown  Description: 1.   Monitor for areas of redness and/or skin breakdown  2. Assess vascular access sites hourly  3. Every 4-6 hours minimum:  Change oxygen saturation probe site  4. Every 4-6 hours:  If on nasal continuous positive airway pressure, respiratory therapy assess nares and determine need for appliance change or resting period.   Outcome: Progressing     Problem: ABCDS Injury Assessment  Goal: Absence of physical injury  Outcome: Progressing  Flowsheets (Taken 11/24/2022 0840)  Absence of Physical Injury: Implement safety measures based on patient assessment     Problem: Safety - Adult  Goal: Free from fall injury  Outcome: Progressing  Flowsheets (Taken 11/24/2022 0840)  Free From Fall Injury: Instruct family/caregiver on patient safety     Problem: Nutrition Deficit:  Goal: Optimize nutritional status  Outcome: Progressing     Problem: Pain  Goal: Verbalizes/displays adequate comfort level or baseline comfort level  Outcome: Progressing  Flowsheets (Taken 11/24/2022 0755)  Verbalizes/displays adequate comfort level or baseline comfort level:   Encourage patient to monitor pain and request assistance   Assess pain using appropriate pain scale   Administer analgesics based on type and severity of pain and evaluate response   Implement non-pharmacological measures as appropriate and evaluate response   Consider cultural and social influences on pain and pain management   Notify Licensed Independent Practitioner if interventions unsuccessful or patient reports new pain

## 2022-11-24 NOTE — PROGRESS NOTES
V2.0  [facility] Progress Note      Name:  Glenn Neves /Age/Sex: 1934  (80 y.o. male)   MRN & CSN:  [id number] & [id number] Encounter Date/Time: 2022 11:26 AM EST    Location:  [address] [place] [address]-A PCP: Cecelia Srivastava PA-C       Hospital Day: 11    Assessment and Plan:   Glenn Neves is a 80 y.o. male with past medical history of dementia, CKD 3, chronic HFrEF who presents with Left knee pain, unspecified chronicity, worsening confusion.      UTI vs bacteruria  UA + for LE, nitrite, unable to tell if pt has symptoms given his dementia and confusion.   - continue rocephin  - follow up urine culture    Alzheimer's dementia with behavioral disturbance  -Progressively worsening over the last several weeks prior to admission   -No longer able to care for self at home, intermittently agitated  - no evidence of infection on admission, labs generally unremarkable  -Maintain sleep-wake cycle, scheduled melatonin  -CODE STATUS changed to DNR CC, hospice consulted per family request who will follow peripherally while patient is at SNF  - PT/OT recommended SNF, Awaiting placement ; family to sign contract  - psych on board - increased home Risperdal, agreed with depakote, continue PRN haldol  - fall precautions    Acute urinary retention  - resolved  - requiring intermittent straight cath  - has history of prostate and bladder cancer  - continue home flomax   - not a good candidate for march given agitation   - d/c bladder scan q shift as pt is urinating on his own    Left knee pain  -Status post fall  -CT left knee with chondrocalcinosis, calcified small prepatellar bursa  -No signs of gout on exam, pain improved   -Continue Voltaren gel, as needed Tylenol, PRN Norco for severe pain   - add lidocaine patch    Multiple falls  -In setting of dementia  -CT head, x-ray pelvis no acute abnormality  -PT OT recommended SNF    CKD III  -Creatinine near baseline  -Avoid nephrotoxins    Chronic HFrEF  -Torsemide held on admission due to decreased p.o. intake, appears euvolemic and will continue to hold and spot diuresis as needed  -continue home beta-blocker    Hypertension  -Continue beta-blocker with hold parameters  -was on midodrine at home as well which is ordered PRN    Hypothyroidism   - TSH WNL  -continue home Synthroid      Current Living situation: home  Expected Disposition: [place] and Home   Estimated D/C: Ready for discharge         Diet ADULT DIET; Regular  ADULT ORAL NUTRITION SUPPLEMENT; Breakfast, Lunch; Diabetic Oral Supplement  ADULT ORAL NUTRITION SUPPLEMENT; Dinner; Frozen Oral Supplement   DVT Prophylaxis [] Lovenox, []  Heparin, [] SCDs, [] Ambulation,  [] Eliquis, [] Xarelto [x]None - patient is DNR-CC - please do not poke him un-necessarily    Code Status DNR-CC   Disposition Patient requires continued admission due to awaiting placement   Surrogate Decision Maker/ POA Daughter, Krupa Cooney     Subjective:     Chief Complaint: Leg Pain         Patient seen and examined at bedside. No complaints or concerns. Denies lightheadedness, dizziness, fever, night sweats, chills, chest pain, cough, dyspnea, palpitations, abd pain, nausea, vomiting, diarrhea, dysuria. Review of Systems:    Unable to obtain secondary to dementia    Objective: Intake/Output Summary (Last 24 hours) at 11/24/2022 1054  Last data filed at 11/23/2022 1731  Gross per 24 hour   Intake --   Output 150 ml   Net -150 ml          Vitals:   Vitals:    11/24/22 0755   BP: (!) 161/58   Pulse: 59   Resp: 16   Temp: 97.8 °F (36.6 °C)   SpO2: 95%       Physical Exam:   PHYSICAL EXAM   GEN Awake male, laying in bed. Appears in no acute distress. EYES Pupils are equally round. HENT Mucous membranes are moist.   NECK Supple, no apparent thyromegaly or masses. RESP Respirations even and unlabored on RA, clear to auscultation bilaterally   CARDIO/VASC Regular rate without appreciable murmurs.   GI Abdomen is soft without significant tenderness, masses, or guarding.   Shaw catheter is not present. Mild suprapubic discomfort. MSK No gross joint deformities. Mild bruising of left knee tender to palpation. SKIN Normal coloration, warm, dry. NEURO   moves all extremities x4. He is alert, however pleasantly confused. PSYCH Calm    Medications:   Medications:    diclofenac sodium  4 g Topical BID    lidocaine  1 patch TransDERmal Daily    cefTRIAXone (ROCEPHIN) IV  1,000 mg IntraVENous Q24H    OLANZapine  5 mg Oral Nightly    sodium chloride  500 mL IntraVENous Once    risperiDONE  1 mg Oral Nightly    Vitamin D  2,000 Units Oral Daily    divalproex  250 mg Oral 2 times per day    sodium chloride flush  5-40 mL IntraVENous 2 times per day    [Held by provider] torsemide  10 mg Oral Daily    tamsulosin  0.4 mg Oral Daily    senna  1 tablet Oral BID    pregabalin  25 mg Oral BID    pantoprazole  40 mg Oral Daily    metoprolol tartrate  12.5 mg Oral BID    levothyroxine  50 mcg Oral Daily    FLUoxetine  20 mg Oral Nightly    atorvastatin  80 mg Oral Nightly    aspirin  81 mg Oral Daily    allopurinol  100 mg Oral Daily    melatonin  3 mg Oral Nightly      Infusions:    sodium chloride       PRN Meds: midodrine, 10 mg, TID PRN  HYDROcodone 5 mg - acetaminophen, 1 tablet, Q6H PRN  hydrALAZINE (APRESOLINE) ivpb, 10 mg, Q4H PRN  haloperidol lactate, 0.5 mg, Q6H PRN  sodium chloride flush, 5-40 mL, PRN  sodium chloride, 25 mL, PRN  ondansetron, 4 mg, Q8H PRN   Or  ondansetron, 4 mg, Q6H PRN  polyethylene glycol, 17 g, Daily PRN  acetaminophen, 650 mg, Q6H PRN   Or  acetaminophen, 650 mg, Q6H PRN  nicotine polacrilex, 2 mg, Q1H PRN  guaiFENesin, 600 mg, BID PRN  docusate sodium, 100 mg, BID PRN  ALPRAZolam, 0.25 mg, Nightly PRN      Labs      No results found for this or any previous visit (from the past 24 hour(s)).            Imaging/Diagnostics Last 24 Hours   XR KNEE LEFT (3 VIEWS)    Result Date: 11/14/2022  EXAMINATION: THREE XRAY VIEWS OF THE LEFT KNEE 11/14/2022 4:35 am COMPARISON: None. HISTORY: ORDERING SYSTEM PROVIDED HISTORY: Pain, Multiple falls TECHNOLOGIST PROVIDED HISTORY: Reason for exam:->Pain, Multiple falls Reason for Exam: pain after fall Left knee pain FINDINGS: There is no acute fracture, dislocation, or bone destruction. Chondrocalcinosis is seen involving the medial and lateral compartments. There is no joint effusion. Vascular calcifications are seen. No acute osseous abnormality. 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 CT HEAD WO CONTRAST    Result Date: 11/14/2022  EXAMINATION: CT OF THE HEAD WITHOUT CONTRAST  11/14/2022 11:48 am TECHNIQUE: CT of the head was performed without the administration of intravenous contrast. Automated exposure control, iterative reconstruction, and/or weight based adjustment of the mA/kV was utilized to reduce the radiation dose to as low as reasonably achievable. COMPARISON: 11/29/2019 HISTORY: ORDERING SYSTEM PROVIDED HISTORY: unsteady gait x1 month TECHNOLOGIST PROVIDED HISTORY: Has a \"code stroke\" or \"stroke alert\" been called? ->No Reason for exam:->unsteady gait x1 month Decision Support Exception - unselect if not a suspected or confirmed emergency medical condition->Emergency Medical Condition (MA) Reason for Exam: unsteady gait x1 month FINDINGS: BRAIN/VENTRICLES: There is no acute intracranial hemorrhage, mass effect or midline shift. No abnormal extra-axial fluid collection. The gray-white differentiation is maintained without evidence of an acute infarct. There is no evidence of hydrocephalus. Moderate to severely dilated ventricles and cerebral sulci. Periventricular and subcortical white matter decreased attenuation. ORBITS: The visualized portion of the orbits demonstrate no acute abnormality. SINUSES: The visualized paranasal sinuses and right mastoid air cells demonstrate no acute abnormality. Small amount fluid in left mastoid air cells.  SOFT TISSUES/SKULL:  No acute abnormality of the visualized skull or soft tissues. No acute intracranial abnormality. Findings consistent with central and cortical atrophy. Microvascular ischemic white matter disease. Chronic left mastoiditis. No interval change. CT PELVIS WO CONTRAST Additional Contrast? None    Result Date: 11/14/2022  EXAMINATION: CT OF THE PELVIS WITHOUT CONTRAST 11/14/2022 11:14 am TECHNIQUE: CT of the pelvis was performed without the administration of intravenous contrast.  Multiplanar reformatted images are provided for review. Adjustment of mA and/or kV according to patient size was utilized. Automated exposure control, iterative reconstruction, and/or weight based adjustment of the mA/kV was utilized to reduce the radiation dose to as low as reasonably achievable. COMPARISON: CT abdomen and pelvis 08/03/2019 HISTORY: ORDERING SYSTEM PROVIDED HISTORY: pain x2 weeks, from fall TECHNOLOGIST PROVIDED HISTORY: Reason for exam:->pain x2 weeks, from fall Additional Contrast?->None Decision Support Exception - unselect if not a suspected or confirmed emergency medical condition->Emergency Medical Condition (MA) FINDINGS: No acute fracture is seen. No evidence of dislocation. Mild bilateral hip joint degenerative changes. Degenerative changes also seen at the SI joints and visualized lower lumbar spine. Similar peripherally sclerotic lucent lesion in the left posterior iliac bone, suggesting a benign finding. The infrarenal abdominal aorta measures up to 2.8 cm in diameter. There are vascular calcifications. No acute bony abnormality is seen in the pelvis. Mild bilateral hip joint degenerative changes. Degenerative changes also seen at the SI joints and visualized lower lumbar spine. 2.8 cm infrarenal abdominal aortic aneurysm suspected. Recommend follow-up every 5 years. Reference: J Am Erwin Radiol 8028;[id number]:[id number].      CT KNEE LEFT WO CONTRAST    Result Date: 11/14/2022  EXAMINATION: CT OF THE LEFT KNEE WITHOUT CONTRAST 11/14/2022 11:49 am TECHNIQUE: CT of the left knee was performed without the administration of intravenous contrast.  Multiplanar reformatted images are provided for review. Automated exposure control, iterative reconstruction, and/or weight based adjustment of the mA/kV was utilized to reduce the radiation dose to as low as reasonably achievable. COMPARISON: Radiographs 11/14/2022. HISTORY ORDERING SYSTEM PROVIDED HISTORY: pain trauma TECHNOLOGIST PROVIDED HISTORY: Reason for exam:->pain trauma Decision Support Exception - unselect if not a suspected or confirmed emergency medical condition->Emergency Medical Condition (MA) FINDINGS: No acute fracture is seen. No evidence of dislocation. Mild tricompartmental osteoarthrosis. There is chondrocalcinosis of the menisci. Calcifications are seen involving the cruciate ligaments and proximal gastrocnemius tendon insertion. Vascular calcifications are seen. There is a small calcified prepatellar bursa. Small Baker's cyst.  Small volume knee joint fluid. No acute bony abnormality is seen in the left knee. Chondrocalcinosis, without a large knee joint effusion. Calcified small prepatellar bursa. Mild tricompartmental osteoarthrosis. XR CHEST PORTABLE    Result Date: 11/14/2022  EXAMINATION: ONE XRAY VIEW OF THE CHEST 11/14/2022 3:17 pm COMPARISON: 08/31/2022 HISTORY: ORDERING SYSTEM PROVIDED HISTORY: AMS, frequent falls TECHNOLOGIST PROVIDED HISTORY: Reason for exam:->AMS, frequent falls Reason for Exam: AMS, frequent falls Additional signs and symptoms: na Relevant Medical/Surgical History: bladder cancer, chf, ckd FINDINGS: Heart size is globular and mildly enlarged without change. Aorta is tortuous with atherosclerotic changes. Mediastinum is not widened. .  Lungs are under expanded. Patchy opacity in the left lower lobe but this is similar in appearance. .  No pleural effusions.  Mild spondylosis     No acute lung disease Electronically signed by Valentino Perches, MD on 11/24/2022 at 10:54 AM

## 2022-11-25 VITALS
DIASTOLIC BLOOD PRESSURE: 63 MMHG | WEIGHT: 178.35 LBS | OXYGEN SATURATION: 97 % | HEIGHT: 70 IN | HEART RATE: 63 BPM | TEMPERATURE: 98.6 F | SYSTOLIC BLOOD PRESSURE: 163 MMHG | BODY MASS INDEX: 25.53 KG/M2 | RESPIRATION RATE: 17 BRPM

## 2022-11-25 PROCEDURE — 6370000000 HC RX 637 (ALT 250 FOR IP): Performed by: PHYSICIAN ASSISTANT

## 2022-11-25 PROCEDURE — 6370000000 HC RX 637 (ALT 250 FOR IP): Performed by: STUDENT IN AN ORGANIZED HEALTH CARE EDUCATION/TRAINING PROGRAM

## 2022-11-25 PROCEDURE — 2580000003 HC RX 258: Performed by: NURSE PRACTITIONER

## 2022-11-25 PROCEDURE — 6370000000 HC RX 637 (ALT 250 FOR IP): Performed by: FAMILY MEDICINE

## 2022-11-25 PROCEDURE — 6370000000 HC RX 637 (ALT 250 FOR IP): Performed by: NURSE PRACTITIONER

## 2022-11-25 PROCEDURE — 94761 N-INVAS EAR/PLS OXIMETRY MLT: CPT

## 2022-11-25 RX ORDER — OLANZAPINE 5 MG/1
5 TABLET ORAL NIGHTLY
Qty: 30 TABLET | Refills: 3 | Status: ON HOLD | OUTPATIENT
Start: 2022-11-25

## 2022-11-25 RX ORDER — LIDOCAINE 4 G/G
1 PATCH TOPICAL DAILY
Qty: 30 EACH | Refills: 0 | Status: ON HOLD | OUTPATIENT
Start: 2022-11-26 | End: 2022-12-26

## 2022-11-25 RX ADMIN — PANTOPRAZOLE SODIUM 40 MG: 40 TABLET, DELAYED RELEASE ORAL at 08:27

## 2022-11-25 RX ADMIN — Medication 2000 UNITS: at 08:26

## 2022-11-25 RX ADMIN — DICLOFENAC SODIUM 4 G: 10 GEL TOPICAL at 10:14

## 2022-11-25 RX ADMIN — TAMSULOSIN HYDROCHLORIDE 0.4 MG: 0.4 CAPSULE ORAL at 08:26

## 2022-11-25 RX ADMIN — DIVALPROEX SODIUM 250 MG: 125 CAPSULE, COATED PELLETS ORAL at 08:27

## 2022-11-25 RX ADMIN — SENNOSIDES 8.6 MG: 8.6 TABLET, FILM COATED ORAL at 08:25

## 2022-11-25 RX ADMIN — LEVOTHYROXINE SODIUM 50 MCG: 0.05 TABLET ORAL at 05:31

## 2022-11-25 RX ADMIN — METOPROLOL TARTRATE 12.5 MG: 25 TABLET, FILM COATED ORAL at 08:26

## 2022-11-25 RX ADMIN — SODIUM CHLORIDE, PRESERVATIVE FREE 10 ML: 5 INJECTION INTRAVENOUS at 08:27

## 2022-11-25 RX ADMIN — ASPIRIN 81 MG: 81 TABLET, COATED ORAL at 08:26

## 2022-11-25 RX ADMIN — HYDROCODONE BITARTRATE AND ACETAMINOPHEN 1 TABLET: 5; 325 TABLET ORAL at 10:42

## 2022-11-25 RX ADMIN — PREGABALIN 25 MG: 25 CAPSULE ORAL at 08:27

## 2022-11-25 RX ADMIN — ALLOPURINOL 100 MG: 100 TABLET ORAL at 08:26

## 2022-11-25 NOTE — PROGRESS NOTES
Talked to pts daughter Emerald Stover to let her know pt is going to [facility] at 1215.

## 2022-11-25 NOTE — DISCHARGE INSTR - DIET

## 2022-11-26 NOTE — DISCHARGE SUMMARY
Discharge Summary    Name:  Celia Loza /Age/Sex: 1934  (80 y.o. male)   MRN & CSN:  [id number] & [id number] Admission Date/Time: 2022 10:20 AM   Attending:  No att. providers found Discharging Physician: Myriam Lyons MD     Hospital Course:   Celai Loza is a 80 y.o.  male  who presents with Left knee pain, unspecified chronicity    HPI  Ronald Perez is a 80 y.o. male who presented to the ED with family with concern for worsening lower left leg pain from fall, increasing confusion and gait instability. Patient has severe dementia, currently lives by himself at home. HPI was provided by the patient's daughters at bedside. Patient reported to have a fall in the bathtub several weeks ago, injured his left knee. Imaging studies originally done in ED visit on 10/31, was discharged home with supportive care but had continued symptoms, was seen again in ED in ED this morning with repeated x-rays that were still unremarkable. Patient was discharged home with prescription for Vicodin however patient was having increasing pain in the lower extremities with associated weakness with prompted him to come to emergency department today for evaluation. Family is concerned that the patient has a very unsteady gait over the past month , increasing confusion from his baseline dementia. Denies any new foods, medications, vomiting, diarrhea or urinary complaints. No reported head injuries but endorses multiple falls at home. Denies any known fevers, chills. Reports poor appetite, not drinking much water. Denies any changes in urine output. Reports patient has not been complaining of any chest pain or shortness of breath. \"       The following problems have been addressed during this hospitalization. UTI vs bacteruria  UA + for LE, nitrite, unable to ell if pt has symptoms given his dementia and confusion. Urine cx grew E. Coli pan-sensitive.   - rocephin 3 days     Alzheimer's dementia with behavioral disturbance  -Progressively worsening over the last several weeks prior to admission   -No longer able to care for self at home, intermittently agitated  - no evidence of infection on admission, labs generally unremarkable  -Maintain sleep-wake cycle, scheduled melatonin  -CODE STATUS changed to DNR CC, hospice consulted per family request who will follow peripherally while patient is at [facility]  - psych on board - increased home Risperdal, agreed with depakote, continue PRN haldol  - fall precautions     Acute urinary retention  - resolved  - requiring intermittent straight cath  - has history of prostate and bladder cancer  - continue home flomax   - not a good candidate for march given agitation   - d/c bladder scan q shift as pt is urinating on his own     Left knee pain  -Status post fall  -CT left knee with chondrocalcinosis, calcified small prepatellar bursa  -No signs of gout on exam, pain improved   -Continue Voltaren gel, as needed Tylenol, PRN Norco for severe pain   - add lidocaine patch     Multiple falls  -In setting of dementia  -CT head, x-ray pelvis no acute abnormality  -PT OT recommended SNF     CKD III  -Creatinine near baseline  -Avoid nephrotoxins     Chronic HFrEF  -Torsemide held on admission due to decreased p.o. intake, appears euvolemic and will continue to hold and spot diuresis as needed  -continue home beta-blocker    Patient was seen and examined at bedside on day of discharge. This note can be used as the progress note for today's visit. Unable to obtain full ROS but pt denies having any symptoms or concerns. Physical Exam  Vitals:   Vitals:    11/25/22 1049   BP:    Pulse:    Resp:    Temp:    SpO2: 97%       GEN    Awake male, laying in bed. Appears in no acute distress. EYES   Pupils are equally round. HENT  Mucous membranes are moist.   NECK  Supple, no apparent thyromegaly or masses.   RESP  Respirations even and unlabored on RA, clear to auscultation bilaterally   CARDIO/VASC           Regular rate without appreciable murmurs. GI        Abdomen is soft without significant tenderness, masses, or guarding.         Shaw catheter is not present. Mild suprapubic discomfort. MSK    No gross joint deformities. Mild bruising of left knee tender to palpation. SKIN    Normal coloration, warm, dry. NEURO   moves all extremities x4. He is alert, however pleasantly confused. PSYCH Calm    The patient expressed appropriate understanding of and agreement with the discharge recommendations, medications, and plan. Consults this admission:  IP CONSULT TO CASE MANAGEMENT  IP CONSULT TO DIETITIAN  IP CONSULT TO HOSPICE  IP CONSULT TO PSYCHIATRY      Discharge Instruction:   Handoff to PCP:     Follow up appointments: PCP    Primary care physician: Brad Meneses PA-C      Diet:  cardiac diet   Activity: activity as tolerated  Disposition: Discharged to:   []Home, [][facility], []SNF, []Acute Rehab, []Hospice   Condition on discharge: Stable    Discharge Medications:        Medication List        START taking these medications      * diclofenac sodium 1 % Gel  Commonly known as: VOLTAREN  Apply 4 g topically in the morning, at noon, in the evening, and at bedtime     * diclofenac sodium 1 % Gel  Commonly known as: VOLTAREN  Apply 4 g topically 2 times daily     divalproex 125 MG capsule  Commonly known as: DEPAKOTE SPRINKLE  Take 2 capsules by mouth every 12 hours     lidocaine 4 % external patch  Place 1 patch onto the skin daily     melatonin 3 MG Tabs tablet  Take 1 tablet by mouth nightly     OLANZapine 5 MG tablet  Commonly known as: ZYPREXA  Take 1 tablet by mouth nightly     risperiDONE 1 MG tablet  Commonly known as: RISPERDAL  Take 1 tablet by mouth nightly     vitamin D 50 MCG (2000 UT) Tabs tablet  Commonly known as: CHOLECALCIFEROL  Take 1 tablet by mouth daily           * This list has 2 medication(s) that are the same as other medications prescribed for you. Read the directions carefully, and ask your doctor or other care provider to review them with you. CHANGE how you take these medications      midodrine 10 MG tablet  Commonly known as: PROAMATINE  Take 1 tablet by mouth 3 times daily as needed (for SBP <100) AM and afternoon and avoid taking at night  What changed:   when to take this  reasons to take this     torsemide 20 MG tablet  Commonly known as: DEMADEX  Take 0.5 tablets by mouth daily as needed (for lower extremity edema, weight gain, fluid retention)  What changed:   how much to take  when to take this  reasons to take this            CONTINUE taking these medications      acetaminophen 325 MG tablet  Commonly known as: TYLENOL     allopurinol 100 MG tablet  Commonly known as: ZYLOPRIM     aspirin 81 MG tablet     atorvastatin 80 MG tablet  Commonly known as: LIPITOR  Take 1 tablet by mouth nightly     docusate sodium 100 MG capsule  Commonly known as: COLACE     FLUoxetine 20 MG capsule  Commonly known as: PROZAC     FOLINIC-PLUS PO     levothyroxine 50 MCG tablet  Commonly known as: SYNTHROID     metoprolol tartrate 25 MG tablet  Commonly known as: LOPRESSOR  Take 1 tablet by mouth 2 times daily Changed per Dr Luana Quarles 9/10/19     ondansetron 8 MG Tbdp disintegrating tablet  Commonly known as: ZOFRAN-ODT     pantoprazole 40 MG tablet  Commonly known as: PROTONIX     pregabalin 25 MG capsule  Commonly known as: Lyrica  Take 1 capsule by mouth in the morning and 1 capsule in the evening. Do all this for 7 days.      senna 8.6 MG tablet  Commonly known as: SENOKOT     tamsulosin 0.4 MG capsule  Commonly known as: FLOMAX  Take 1 capsule by mouth daily     VITAMIN B COMPLEX PO            STOP taking these medications      ALPRAZolam 0.5 MG tablet  Commonly known as: XANAX     HYDROcodone-acetaminophen 5-325 MG per tablet  Commonly known as: Norco     predniSONE 10 MG tablet  Commonly known as: Gadiel Salmeron your doctor about these medications      ALPRAZolam 0.5 MG tablet  Commonly known as: XANAX  Take 0.5 tablets by mouth nightly as needed for Anxiety for up to 7 days. Indications: takes in the pm  Ask about: Should I take this medication? guaiFENesin 600 MG extended release tablet  Commonly known as: MUCINEX  Take 1 tablet by mouth 2 times daily     HYDROcodone-acetaminophen 5-325 MG per tablet  Commonly known as: Norco  Take 1 tablet by mouth every 6 hours as needed for Pain for up to 3 days. Intended supply: 3 days. Take lowest dose possible to manage pain  Ask about: Should I take this medication? Where to Get Your Medications        These medications were sent to [facility] [id number] [place] [address] [phone] [facility] [phone] [address] [place] [address]      Phone: [phone]   diclofenac sodium 1 % Gel  lidocaine 4 % external patch  OLANZapine 5 MG tablet       You can get these medications from any pharmacy    Bring a paper prescription for each of these medications  ALPRAZolam 0.5 MG tablet  HYDROcodone-acetaminophen 5-325 MG per tablet  pregabalin 25 MG capsule       Information about where to get these medications is not yet available    Ask your nurse or doctor about these medications  diclofenac sodium 1 % Gel  divalproex 125 MG capsule  melatonin 3 MG Tabs tablet  midodrine 10 MG tablet  risperiDONE 1 MG tablet  torsemide 20 MG tablet  vitamin D 50 MCG (2000 UT) Tabs tablet         Objective Findings at Discharge:       BMP/CBC  No results for input(s): NA, K, CL, CO2, BUN, CREATININE, GLU, WBC, HEMOGLOBIN, HCT, PLT in the last 72 hours. IMAGING:      Additional Information: Patient seen and examined day of discharge.  For more information regarding patient's care please contact [facility] records [phone]    Discharge Time of 35 minutes    Electronically signed by Valentino Perches, MD on 11/26/2022 at 2:30 PM

## 2022-11-27 ENCOUNTER — HOSPITAL ENCOUNTER (EMERGENCY)
Age: 87
Discharge: NURSING, REHAB OR LONG-TERM CARE | End: 2022-11-28
Payer: MEDICARE

## 2022-11-27 DIAGNOSIS — R29.6 FREQUENT FALLS: Primary | ICD-10-CM

## 2022-11-27 PROCEDURE — 96372 THER/PROPH/DIAG INJ SC/IM: CPT

## 2022-11-27 PROCEDURE — 99284 EMERGENCY DEPT VISIT MOD MDM: CPT

## 2022-11-27 ASSESSMENT — PAIN - FUNCTIONAL ASSESSMENT: PAIN_FUNCTIONAL_ASSESSMENT: NONE - DENIES PAIN

## 2022-11-28 ENCOUNTER — APPOINTMENT (OUTPATIENT)
Dept: CT IMAGING | Age: 87
End: 2022-11-28
Payer: MEDICARE

## 2022-11-28 VITALS
BODY MASS INDEX: 25.83 KG/M2 | RESPIRATION RATE: 18 BRPM | HEART RATE: 65 BPM | DIASTOLIC BLOOD PRESSURE: 56 MMHG | WEIGHT: 180 LBS | TEMPERATURE: 98.4 F | OXYGEN SATURATION: 100 % | SYSTOLIC BLOOD PRESSURE: 158 MMHG

## 2022-11-28 PROCEDURE — 6360000002 HC RX W HCPCS: Performed by: PHYSICIAN ASSISTANT

## 2022-11-28 PROCEDURE — 72125 CT NECK SPINE W/O DYE: CPT

## 2022-11-28 PROCEDURE — 70450 CT HEAD/BRAIN W/O DYE: CPT

## 2022-11-28 RX ORDER — LORAZEPAM 1 MG/1
1 TABLET ORAL ONCE
Status: DISCONTINUED | OUTPATIENT
Start: 2022-11-28 | End: 2022-11-28 | Stop reason: HOSPADM

## 2022-11-28 RX ORDER — LORAZEPAM 1 MG/1
1 TABLET ORAL 2 TIMES DAILY
Qty: 10 TABLET | Refills: 0 | Status: SHIPPED | OUTPATIENT
Start: 2022-11-28 | End: 2022-11-30

## 2022-11-28 RX ORDER — LORAZEPAM 2 MG/ML
1 INJECTION INTRAMUSCULAR ONCE
Status: COMPLETED | OUTPATIENT
Start: 2022-11-28 | End: 2022-11-28

## 2022-11-28 RX ADMIN — LORAZEPAM 1 MG: 2 INJECTION INTRAMUSCULAR at 00:29

## 2022-11-28 NOTE — ACP (ADVANCE CARE PLANNING)
Patient has a current Medical POA on file naming his daughter, Henrik Alicea" as his primary Medical Power of FrankAlaris Royaltyrachell.      Patient has named his 2 daughters equally to be alternate agent for MPOA Allyssa Esqueda and Gabe Trinidad)

## 2022-11-28 NOTE — ED PROVIDER NOTES
Triage Chief Complaint:   Fall (Frequent falls at nursing home, no reported injury)    [place]:  Today in the ED I had the pleasure of caring for Christi Hernandez who is a 80 y.o. male that presents today to the ED for frequent falls. Pt has hx of progressive dementia and frequent falls. Was living alone and having falls so [facility] was [facility] and transferred to SNF for constant supervision given the dementia and falls. Pt has been at SNF x2 days and has had continued roaming the halls and his room and requent falls. Nursing staff states he continues to walk unassisted despit their contineud insistence that he only ambulate with assistance. Snf since Friday. Frequent roaming and falling. Hx of dementia. No medications to settle him down    Exerpt from Discharge summary from 2 days ago. UTI vs bacteruria  UA + for LE, nitrite, unable to ell if pt has symptoms given his dementia and confusion. Urine cx grew E. Coli pan-sensitive.   - rocephin 3 days     Alzheimer's dementia with behavioral disturbance  -Progressively worsening over the last several weeks prior to admission   -No longer able to care for self at home, intermittently agitated  - no evidence of infection on admission, labs generally unremarkable  -Maintain sleep-wake cycle, scheduled melatonin  -CODE STATUS changed to DNR CC, hospice consulted per family request who will follow peripherally while patient is at [facility]  - psych on board - increased home Risperdal, agreed with depakote, continue PRN haldol  - fall precautions     Acute urinary retention  - resolved  - requiring intermittent straight cath  - has history of prostate and bladder cancer  - continue home flomax   - not a good candidate for march given agitation   - d/c bladder scan q shift as pt is urinating on his own     Left knee pain  -Status post fall  -CT left knee with chondrocalcinosis, calcified small prepatellar bursa  -No signs of gout on exam, pain improved   -Continue Voltaren gel, as needed Tylenol, PRN Norco for severe pain   - add lidocaine patch     Multiple falls  -In setting of dementia  -CT head, x-ray pelvis no acute abnormality  -PT OT recommended SNF  ROS:  REVIEW OF SYSTEMS    At least 10 systems reviewed      All other review of systems are negative  See HPI and nursing notes for additional information       Past Medical History:   Diagnosis Date    Anxiety     Bladder cancer ([facility]     Cancer ([facility]     CHF (congestive heart failure) ([facility]     Chronic kidney disease     Chronic kidney disease, stage III (moderate) ([facility] 07/17/2017    Coronary artery disease involving native coronary artery of native heart with unstable angina pectoris ([facility] 08/11/2019    Dementia ([facility]     Depression     GERD (gastroesophageal reflux disease)     Gout     Hx of Carotid Doppler ultrasound 01/05/2021    mild 0-49% disease and normal flow    Hx of Doppler echocardiogram 11/19/2018    EF 50% mod LV hypertrophy    Hx of exercise stress test 11/19/2018    Treadmill normal study    Hyperlipidemia     Hypertension     Hyperthyroidism     Mitral valve insufficiency      Past Surgical History:   Procedure Laterality Date    COLONOSCOPY      CORONARY ANGIOPLASTY WITH STENT PLACEMENT      PACEMAKER INSERTION N/A 8/15/2019    PACEMAKER INSERTION PERMANENT performed by Ruth Dsouza MD at [facility] 81 Right 08/14/2019     ml     Family History   Problem Relation Age of Onset    Cancer Sister     Cancer Brother     High Blood Pressure Brother     Heart Disease Other     Hypertension Other     Elevated Lipids Other     Other Other         gout     Social History     Socioeconomic History    Marital status:       Spouse name: Not on file    Number of children: 5    Years of education: Not on file    Highest education level: Not on file   Occupational History    Not on file   Tobacco Use    Smoking status: Former     Packs/day: 1.00     Years: 30.00     Pack years: 30.00     Types: Cigarettes    Smokeless tobacco: Never   Vaping Use    Vaping Use: Never used   Substance and Sexual Activity    Alcohol use: No    Drug use: No    Sexual activity: Not on file   Other Topics Concern    Not on file   Social History Narrative    Not on file     Social Determinants of Health     Financial Resource Strain: Not on file   Food Insecurity: Not on file   Transportation Needs: Not on file   Physical Activity: Not on file   Stress: Not on file   Social Connections: Not on file   Intimate Partner Violence: Not on file   Housing Stability: Not on file     Current Facility-Administered Medications   Medication Dose Route Frequency Provider Last Rate Last Admin    LORazepam (ATIVAN) tablet 1 mg  1 mg Oral Once Allan Degroot PA-C         Current Outpatient Medications   Medication Sig Dispense Refill    LORazepam (ATIVAN) 1 MG tablet Take 1 tablet by mouth 2 times daily for 5 days.  10 tablet 0    diclofenac sodium (VOLTAREN) 1 % GEL Apply 4 g topically 2 times daily 240 g 0    lidocaine 4 % external patch Place 1 patch onto the skin daily 30 each 0    OLANZapine (ZYPREXA) 5 MG tablet Take 1 tablet by mouth nightly 30 tablet 3    divalproex (DEPAKOTE SPRINKLE) 125 MG capsule Take 2 capsules by mouth every 12 hours 60 capsule 3    risperiDONE (RISPERDAL) 1 MG tablet Take 1 tablet by mouth nightly 60 tablet 3    diclofenac sodium (VOLTAREN) 1 % GEL Apply 4 g topically in the morning, at noon, in the evening, and at bedtime 2 g 0    torsemide (DEMADEX) 20 MG tablet Take 0.5 tablets by mouth daily as needed (for lower extremity edema, weight gain, fluid retention) 30 tablet 0    melatonin 3 MG TABS tablet Take 1 tablet by mouth nightly 30 tablet 3    midodrine (PROAMATINE) 10 MG tablet Take 1 tablet by mouth 3 times daily as needed (for SBP <100) AM and afternoon and avoid taking at night 90 tablet 11    Vitamin D (CHOLECALCIFEROL) 50 MCG (2000 UT) TABS tablet Take 1 tablet by mouth daily 60 tablet 0    pregabalin (LYRICA) 25 MG capsule Take 1 capsule by mouth in the morning and 1 capsule in the evening. Do all this for 7 days.  14 capsule 0    docusate sodium (COLACE) 100 MG capsule Take 100 mg by mouth 2 times daily as needed for Constipation      Folinic Acid-Vit B6-Vit B12 (FOLINIC-PLUS PO) Take 1 tablet by mouth daily      B Complex Vitamins (VITAMIN B COMPLEX PO) Take 1 tablet by mouth daily With electrolytes      acetaminophen (TYLENOL) 325 MG tablet Take 325 mg by mouth every 4 hours as needed for Pain      metoprolol tartrate (LOPRESSOR) 25 MG tablet Take 1 tablet by mouth 2 times daily Changed per Dr Sarthak Torres 9/10/19 60 tablet 3    pantoprazole (PROTONIX) 40 MG tablet Take 40 mg by mouth daily      allopurinol (ZYLOPRIM) 100 MG tablet Take 100 mg by mouth daily      senna (SENOKOT) 8.6 MG tablet Take 1 tablet by mouth 2 times daily as needed for Constipation      ondansetron (ZOFRAN-ODT) 8 MG TBDP disintegrating tablet Place 8 mg under the tongue every 8 hours as needed for Nausea or Vomiting      FLUoxetine (PROZAC) 20 MG capsule Take 20 mg by mouth nightly Indications: takes at night      atorvastatin (LIPITOR) 80 MG tablet Take 1 tablet by mouth nightly 30 tablet 0    guaiFENesin (MUCINEX) 600 MG extended release tablet Take 1 tablet by mouth 2 times daily (Patient taking differently: Take 600 mg by mouth 2 times daily as needed for Congestion) 60 tablet 0    tamsulosin (FLOMAX) 0.4 MG capsule Take 1 capsule by mouth daily 30 capsule 0    levothyroxine (SYNTHROID) 50 MCG tablet Take 50 mcg by mouth Daily      aspirin 81 MG tablet Take 81 mg by mouth daily        Allergies   Allergen Reactions    Gabapentin Hallucinations       Nursing Notes Reviewed    Physical Exam:  ED Triage Vitals   Enc Vitals Group      BP       Pulse       Resp       Temp       Temp src       SpO2       Weight       Height       Head Circumference       Peak Flow       Pain Score       Pain Loc       Pain Edu? Excl. in [address]? GENERAL APPEARANCE: Awake and alert. Cooperative. No acute distress. Pleasantly demented elderly male  HEAD: Normocephalic. Atraumatic. No hemotympanum, Dye sign, raccoon eyes, periorbital tenderness, nasal bone tenderness, mandibular tenderness, skull tenderness  CERVICAL SPINE: There is no cervical midline tenderness to palpation, step-offs, or acute deformities. No posterior midline pain on ROM. The cervical spine has been cleared clinically. Thoracic spine: There is no thoracic midline tenderness to palpation step-offs or acute deformities. No posterior midline pain. No overlying rash. Lumbar spine: No lumbar or sacral midline tenderness palpation step-offs crepitus or acute deformities. Chest wall: There is no tenderness palpation over patient's chest wall or ribs. no bruising or crepitus. EYES: EOM's grossly intact. Sclera anicteric. PERRLA. Conjunctiva non injected. No discharge  ENT: Mucous membranes are moist. No trismus. Posterior Pharynx non injected, no tongue swelling, airway patent, no tonsillar edema. No exudates noted. No abscess. No discharge. Middle ear spaces clear. Tympanic membrane non injected. Auditory canal clear. NECK: Supple. No meningismus. No palpable masses. No lymphadenopathy. CARDIOVASCULAR: RRR. No rubs, murmurs, gallops, clicks. Radial pulses 2+. Pedal Pulses 2+. Capillary refill <2 seconds. LUNGS: Respirations unlabored. CTAB. ABDOMEN: Soft. Nontender, nondistended. organomegaly. No palpable masses. MUSCULOSKELETAL:   Left lower extremity: Dorsalis pedis, posterior talus pulse 2+. Capillary refill ruiz. No breaks in skin. No crepitus. Compartments are soft proximally and distally. No palpable cords visible varicosities or lacerations. There is no tenderness palpation over the dorsum of the foot. There is no tenderness palpation over the Achilles. No tenderness palpation over the tibia, fibula.   Range of motion of the knee is full. Without laxity. No tenderness palpation over the knee. Femur or pelvis. Right lower extremity: Dorsalis pedis, posterior talus pulse 2+. Capillary refill ruiz. No breaks in skin. No crepitus. Compartments are soft proximally and distally. No palpable cords visible varicosities or lacerations. There is no tenderness palpation over the dorsum of the foot. There is no tenderness palpation over the Achilles. No tenderness palpation over the tibia, fibula. Range of motion of the knee is full. Without laxity. No tenderness palpation over the knee. Femur or pelvis. Right upper extremity: Radial ulnar pulse 2+. Compartments are soft proximally and distally.  strength 5/5. Distal sensation all digits intact. Cap refill less than 2 seconds. No crepitus or obvious deformities. Full range of motion of the wrist in all directions. Full range of motion of the elbow with pronation supination flexion extension. Full range of motion of shoulder in all directions. Without crepitus. No acromioclavicular capital clavicular tenderness. Left upper extremity: Radial ulnar pulse 2+. Compartments are soft proximally and distally.  strength 5/5. Distal sensation all digits intact. Cap refill less than 2 seconds. No crepitus or obvious deformities. Full range of motion of the wrist in all directions. Full range of motion of the elbow with pronation supination flexion extension. Full range of motion of shoulder in all directions. Without crepitus. No acromioclavicular capital clavicular tenderness. SKIN: Warm and dry. No rash, No erythema, No edema. No ecchymoses. NEUROLOGICAL: GCS 15. Cranial nerves 2-12 grossly intact, PEERLA, EOMs intact, memory significantly impaired, Pt shows good judgement, follows command well, carries on logical conversation. No ataxia with finger to nose.   strength full bilateral. Rapid alternating movements performed well, negative romberg, negative motor drift. LE DTRs full and symmetrical. Sharp and dull sensation in distal extremities present. I have reviewed and interpreted all of the currently available lab results from this visit (if applicable):  No results found for this visit on 11/27/22. Radiographs (if obtained):  [] The following radiograph was interpreted by myself in the absence of a radiologist:   [] Radiologist's Report Reviewed:  CT CERVICAL SPINE WO CONTRAST   Final Result   No acute abnormality of the cervical spine. CT HEAD WO CONTRAST   Final Result   No acute intracranial abnormality. Age related changes including chronic small vessel ischemic disease and   cerebral atrophy. EKG (if obtained):   Please See Note of attending physician for EKG interpretation. Chart review shows recent radiograph(s):  XR KNEE LEFT (3 VIEWS)    Result Date: 11/14/2022  EXAMINATION: THREE XRAY VIEWS OF THE LEFT KNEE 11/14/2022 4:35 am COMPARISON: None. HISTORY: ORDERING SYSTEM PROVIDED HISTORY: Pain, Multiple falls TECHNOLOGIST PROVIDED HISTORY: Reason for exam:->Pain, Multiple falls Reason for Exam: pain after fall Left knee pain FINDINGS: There is no acute fracture, dislocation, or bone destruction. Chondrocalcinosis is seen involving the medial and lateral compartments. There is no joint effusion. Vascular calcifications are seen. No acute osseous abnormality. 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 XR KNEE LEFT (MIN 4 VIEWS)    Result Date: 10/31/2022  EXAMINATION: FOUR XRAY VIEWS OF THE LEFT KNEE 10/31/2022 3:47 pm COMPARISON: None. HISTORY: ORDERING SYSTEM PROVIDED HISTORY: Fall, left knee pain TECHNOLOGIST PROVIDED HISTORY: Reason for exam:->Fall, left knee pain FINDINGS: Chondrocalcinosis in the medial and lateral compartments. Mild medial compartment degenerative changes. No fracture, dislocation, or joint effusion.   Severe atherosclerotic vascular calcifications are seen.     No acute osseous abnormality. CT HEAD WO CONTRAST    Result Date: 11/14/2022  EXAMINATION: CT OF THE HEAD WITHOUT CONTRAST  11/14/2022 11:48 am TECHNIQUE: CT of the head was performed without the administration of intravenous contrast. Automated exposure control, iterative reconstruction, and/or weight based adjustment of the mA/kV was utilized to reduce the radiation dose to as low as reasonably achievable. COMPARISON: 11/29/2019 HISTORY: ORDERING SYSTEM PROVIDED HISTORY: unsteady gait x1 month TECHNOLOGIST PROVIDED HISTORY: Has a \"code stroke\" or \"stroke alert\" been called? ->No Reason for exam:->unsteady gait x1 month Decision Support Exception - unselect if not a suspected or confirmed emergency medical condition->Emergency Medical Condition (MA) Reason for Exam: unsteady gait x1 month FINDINGS: BRAIN/VENTRICLES: There is no acute intracranial hemorrhage, mass effect or midline shift. No abnormal extra-axial fluid collection. The gray-white differentiation is maintained without evidence of an acute infarct. There is no evidence of hydrocephalus. Moderate to severely dilated ventricles and cerebral sulci. Periventricular and subcortical white matter decreased attenuation. ORBITS: The visualized portion of the orbits demonstrate no acute abnormality. SINUSES: The visualized paranasal sinuses and right mastoid air cells demonstrate no acute abnormality. Small amount fluid in left mastoid air cells. SOFT TISSUES/SKULL:  No acute abnormality of the visualized skull or soft tissues. No acute intracranial abnormality. Findings consistent with central and cortical atrophy. Microvascular ischemic white matter disease. Chronic left mastoiditis. No interval change.      CT PELVIS WO CONTRAST Additional Contrast? None    Result Date: 11/14/2022  EXAMINATION: CT OF THE PELVIS WITHOUT CONTRAST 11/14/2022 11:14 am TECHNIQUE: CT of the pelvis was performed without the administration of intravenous contrast. Multiplanar reformatted images are provided for review. Adjustment of mA and/or kV according to patient size was utilized. Automated exposure control, iterative reconstruction, and/or weight based adjustment of the mA/kV was utilized to reduce the radiation dose to as low as reasonably achievable. COMPARISON: CT abdomen and pelvis 08/03/2019 HISTORY: ORDERING SYSTEM PROVIDED HISTORY: pain x2 weeks, from fall TECHNOLOGIST PROVIDED HISTORY: Reason for exam:->pain x2 weeks, from fall Additional Contrast?->None Decision Support Exception - unselect if not a suspected or confirmed emergency medical condition->Emergency Medical Condition (MA) FINDINGS: No acute fracture is seen. No evidence of dislocation. Mild bilateral hip joint degenerative changes. Degenerative changes also seen at the SI joints and visualized lower lumbar spine. Similar peripherally sclerotic lucent lesion in the left posterior iliac bone, suggesting a benign finding. The infrarenal abdominal aorta measures up to 2.8 cm in diameter. There are vascular calcifications. No acute bony abnormality is seen in the pelvis. Mild bilateral hip joint degenerative changes. Degenerative changes also seen at the SI joints and visualized lower lumbar spine. 2.8 cm infrarenal abdominal aortic aneurysm suspected. Recommend follow-up every 5 years. Reference: J Am Erwin Radiol 0143;50:225-732. CT KNEE LEFT WO CONTRAST    Result Date: 11/14/2022  EXAMINATION: CT OF THE LEFT KNEE WITHOUT CONTRAST 11/14/2022 11:49 am TECHNIQUE: CT of the left knee was performed without the administration of intravenous contrast.  Multiplanar reformatted images are provided for review. Automated exposure control, iterative reconstruction, and/or weight based adjustment of the mA/kV was utilized to reduce the radiation dose to as low as reasonably achievable. COMPARISON: Radiographs 11/14/2022.  HISTORY ORDERING SYSTEM PROVIDED HISTORY: pain trauma TECHNOLOGIST PROVIDED HISTORY: Reason for exam:->pain trauma Decision Support Exception - unselect if not a suspected or confirmed emergency medical condition->Emergency Medical Condition (MA) FINDINGS: No acute fracture is seen. No evidence of dislocation. Mild tricompartmental osteoarthrosis. There is chondrocalcinosis of the menisci. Calcifications are seen involving the cruciate ligaments and proximal gastrocnemius tendon insertion. Vascular calcifications are seen. There is a small calcified prepatellar bursa. Small Baker's cyst.  Small volume knee joint fluid. No acute bony abnormality is seen in the left knee. Chondrocalcinosis, without a large knee joint effusion. Calcified small prepatellar bursa. Mild tricompartmental osteoarthrosis. XR CHEST PORTABLE    Result Date: 11/14/2022  EXAMINATION: ONE XRAY VIEW OF THE CHEST 11/14/2022 3:17 pm COMPARISON: 08/31/2022 HISTORY: ORDERING SYSTEM PROVIDED HISTORY: AMS, frequent falls TECHNOLOGIST PROVIDED HISTORY: Reason for exam:->AMS, frequent falls Reason for Exam: AMS, frequent falls Additional signs and symptoms: na Relevant Medical/Surgical History: bladder cancer, chf, ckd FINDINGS: Heart size is globular and mildly enlarged without change. Aorta is tortuous with atherosclerotic changes. Mediastinum is not widened. .  Lungs are under expanded. Patchy opacity in the left lower lobe but this is similar in appearance. .  No pleural effusions. Mild spondylosis     No acute lung disease       MDM:     Interventions given this visit:   Orders Placed This Encounter   Medications    LORazepam (ATIVAN) tablet 1 mg    LORazepam (ATIVAN) injection 1 mg    LORazepam (ATIVAN) 1 MG tablet     Sig: Take 1 tablet by mouth 2 times daily for 5 days. Dispense:  10 tablet     Refill:  0       Appearing elderly female presents today to the emergency department for frequent falls. He has no complaints or concerns whatsoever.   He has been following 4 weeks and is sole purpose of being at the skilled nursing facility is for supervision to prevent falls. Skilled nursing facility sent patient here because of patient's frequent falls. Patient is very anxious here in the emergency department. Physical exam is unremarkable no no point musculoskeletal tenderness is noted. He has negative CT scan of the head and C-spine. He does wander the halls multiple times here in the emergency department for this reason anxiolytic dose of Ativan is provided. His help calm patient down. Will provide a short 5-day prescription of Ativan. Until patient can be evaluated by  skilled nursing facility physician for further management. I independently managed patient today in the ED.     BP (!) 132/54   Pulse 65   Temp 98.2 °F (36.8 °C) (Oral)   Resp 18   Wt 180 lb (81.6 kg)   SpO2 100%   BMI 25.83 kg/m²       Clinical Impression:  1. Frequent falls        Disposition referral (if applicable):  DANE Waters 44  [phone]        Disposition medications (if applicable):  New Prescriptions    LORAZEPAM (ATIVAN) 1 MG TABLET    Take 1 tablet by mouth 2 times daily for 5 days. Comment: Please note this report has been produced using speech recognition software and may contain errors related to that system including errors in grammar, punctuation, and spelling, as well as words and phrases that may be inappropriate. If there are any questions or concerns please feel free to contact the dictating provider for clarification.       Lisset Skinner, [address] [place] [address]  11/28/22 [id number]

## 2022-11-28 NOTE — ED NOTES
Report called to [facility] and also given to transport team. All paperwork and belongings given to transport team.     Evangelista Linn RN  11/28/22 [id number]

## 2022-11-29 ENCOUNTER — HOSPITAL ENCOUNTER (OUTPATIENT)
Age: 87
Setting detail: SPECIMEN
Discharge: HOME | End: 2022-11-29
Payer: MEDICARE

## 2022-11-29 PROCEDURE — 81001 URINALYSIS AUTO W/SCOPE: CPT

## 2022-11-30 ENCOUNTER — HOSPITAL ENCOUNTER (INPATIENT)
Age: 87
LOS: 1 days | Discharge: HOSPICE | End: 2022-12-01
Attending: EMERGENCY MEDICINE | Admitting: FAMILY MEDICINE
Payer: MEDICARE

## 2022-11-30 VITALS
DIASTOLIC BLOOD PRESSURE: 58 MMHG | OXYGEN SATURATION: 100 % | WEIGHT: 180 LBS | SYSTOLIC BLOOD PRESSURE: 129 MMHG | TEMPERATURE: 98.4 F | HEART RATE: 85 BPM | RESPIRATION RATE: 18 BRPM | BODY MASS INDEX: 25.77 KG/M2 | HEIGHT: 70 IN

## 2022-11-30 DIAGNOSIS — Z51.5 ENCOUNTER FOR HOSPICE CARE: Primary | ICD-10-CM

## 2022-11-30 PROBLEM — I21.4 NSTEMI (NON-ST ELEVATED MYOCARDIAL INFARCTION) (HCC): Status: RESOLVED | Noted: 2019-08-11 | Resolved: 2022-01-01

## 2022-11-30 PROBLEM — M25.571 ACUTE BILATERAL ANKLE PAIN: Status: RESOLVED | Noted: 2020-07-05 | Resolved: 2022-11-30

## 2022-11-30 PROBLEM — R07.9 CHEST PAIN: Status: RESOLVED | Noted: 2022-08-31 | Resolved: 2022-11-30

## 2022-11-30 PROBLEM — F02.80 ALZHEIMER DISEASE (HCC): Status: ACTIVE | Noted: 2022-01-01

## 2022-11-30 PROBLEM — R07.9 CHEST PAIN AT REST: Status: RESOLVED | Noted: 2022-01-01 | Resolved: 2022-01-01

## 2022-11-30 PROBLEM — E87.1 HYPONATREMIA: Status: RESOLVED | Noted: 2019-08-03 | Resolved: 2022-11-30

## 2022-11-30 PROBLEM — E87.70 VOLUME OVERLOAD: Status: RESOLVED | Noted: 2019-11-29 | Resolved: 2022-11-30

## 2022-11-30 PROBLEM — T81.718A POSTOPERATIVE PULMONARY EMBOLISM (HCC): Status: RESOLVED | Noted: 2018-11-19 | Resolved: 2022-01-01

## 2022-11-30 PROBLEM — E87.6 ACUTE HYPOKALEMIA: Status: RESOLVED | Noted: 2019-08-03 | Resolved: 2022-11-30

## 2022-11-30 PROBLEM — I50.1: Status: RESOLVED | Noted: 2020-07-05 | Resolved: 2022-11-30

## 2022-11-30 PROBLEM — I26.99 POSTOPERATIVE PULMONARY EMBOLISM (HCC): Status: RESOLVED | Noted: 2018-11-19 | Resolved: 2022-11-30

## 2022-11-30 PROBLEM — M25.572 ACUTE BILATERAL ANKLE PAIN: Status: RESOLVED | Noted: 2020-07-05 | Resolved: 2022-11-30

## 2022-11-30 PROBLEM — G30.9 ALZHEIMER DISEASE (HCC): Status: ACTIVE | Noted: 2022-01-01

## 2022-11-30 LAB
BACTERIA: ABNORMAL /HPF
BILIRUBIN URINE: ABNORMAL MG/DL
BLOOD, URINE: ABNORMAL
CLARITY: ABNORMAL
COLOR: ABNORMAL
GLUCOSE, URINE: NEGATIVE MG/DL
KETONES, URINE: ABNORMAL MG/DL
LEUKOCYTE ESTERASE, URINE: ABNORMAL
MUCUS: ABNORMAL HPF
NITRITE URINE, QUANTITATIVE: NEGATIVE
PH, URINE: 6.5 (ref 5–8)
PROTEIN UA: 100 MG/DL
RBC URINE: 959 /HPF (ref 0–3)
RENAL EPITHELIAL, UA: 56 /HPF
SPECIFIC GRAVITY UA: 1.02 (ref 1–1.03)
TRICHOMONAS: ABNORMAL /HPF
UROBILINOGEN, URINE: 0.2 MG/DL (ref 0.2–1)
WBC UA: 24 /HPF (ref 0–2)

## 2022-11-30 PROCEDURE — 6360000002 HC RX W HCPCS: Performed by: FAMILY MEDICINE

## 2022-11-30 PROCEDURE — 99285 EMERGENCY DEPT VISIT HI MDM: CPT

## 2022-11-30 PROCEDURE — 1200000000 HC SEMI PRIVATE

## 2022-11-30 PROCEDURE — 96372 THER/PROPH/DIAG INJ SC/IM: CPT

## 2022-11-30 PROCEDURE — 6360000002 HC RX W HCPCS: Performed by: EMERGENCY MEDICINE

## 2022-11-30 PROCEDURE — 2580000003 HC RX 258: Performed by: FAMILY MEDICINE

## 2022-11-30 RX ORDER — GLYCOPYRROLATE 0.2 MG/ML
0.2 INJECTION INTRAMUSCULAR; INTRAVENOUS EVERY 4 HOURS PRN
Status: DISCONTINUED | OUTPATIENT
Start: 2022-11-30 | End: 2022-12-01 | Stop reason: HOSPADM

## 2022-11-30 RX ORDER — ACETAMINOPHEN 650 MG/1
650 SUPPOSITORY RECTAL EVERY 4 HOURS PRN
Status: DISCONTINUED | OUTPATIENT
Start: 2022-11-30 | End: 2022-12-01 | Stop reason: HOSPADM

## 2022-11-30 RX ORDER — BISACODYL 10 MG
10 SUPPOSITORY, RECTAL RECTAL DAILY PRN
Status: DISCONTINUED | OUTPATIENT
Start: 2022-11-30 | End: 2022-12-01 | Stop reason: HOSPADM

## 2022-11-30 RX ORDER — SODIUM CHLORIDE 0.9 % (FLUSH) 0.9 %
5-40 SYRINGE (ML) INJECTION EVERY 12 HOURS SCHEDULED
Status: DISCONTINUED | OUTPATIENT
Start: 2022-11-30 | End: 2022-12-01 | Stop reason: HOSPADM

## 2022-11-30 RX ORDER — FENTANYL CITRATE 50 UG/ML
50 INJECTION, SOLUTION INTRAMUSCULAR; INTRAVENOUS ONCE
Status: DISCONTINUED | OUTPATIENT
Start: 2022-11-30 | End: 2022-11-30

## 2022-11-30 RX ORDER — HALOPERIDOL 5 MG/ML
1 INJECTION INTRAMUSCULAR
Status: DISCONTINUED | OUTPATIENT
Start: 2022-11-30 | End: 2022-12-01 | Stop reason: HOSPADM

## 2022-11-30 RX ORDER — SODIUM CHLORIDE 9 MG/ML
INJECTION, SOLUTION INTRAVENOUS PRN
Status: DISCONTINUED | OUTPATIENT
Start: 2022-11-30 | End: 2022-12-01 | Stop reason: HOSPADM

## 2022-11-30 RX ORDER — ACETAMINOPHEN 325 MG/1
325 TABLET ORAL ONCE
Status: DISCONTINUED | OUTPATIENT
Start: 2022-11-30 | End: 2022-12-01 | Stop reason: HOSPADM

## 2022-11-30 RX ORDER — DIVALPROEX SODIUM 125 MG/1
250 CAPSULE, COATED PELLETS ORAL EVERY 12 HOURS SCHEDULED
Status: DISCONTINUED | OUTPATIENT
Start: 2022-11-30 | End: 2022-12-01 | Stop reason: HOSPADM

## 2022-11-30 RX ORDER — FENTANYL CITRATE 50 UG/ML
50 INJECTION, SOLUTION INTRAMUSCULAR; INTRAVENOUS ONCE
Status: COMPLETED | OUTPATIENT
Start: 2022-11-30 | End: 2022-11-30

## 2022-11-30 RX ORDER — ACETAMINOPHEN 325 MG/1
650 TABLET ORAL EVERY 4 HOURS PRN
Status: DISCONTINUED | OUTPATIENT
Start: 2022-11-30 | End: 2022-12-01 | Stop reason: HOSPADM

## 2022-11-30 RX ORDER — SODIUM CHLORIDE 0.9 % (FLUSH) 0.9 %
10 SYRINGE (ML) INJECTION PRN
Status: DISCONTINUED | OUTPATIENT
Start: 2022-11-30 | End: 2022-12-01 | Stop reason: HOSPADM

## 2022-11-30 RX ORDER — MORPHINE SULFATE 2 MG/ML
2 INJECTION, SOLUTION INTRAMUSCULAR; INTRAVENOUS
Status: DISCONTINUED | OUTPATIENT
Start: 2022-11-30 | End: 2022-12-01 | Stop reason: HOSPADM

## 2022-11-30 RX ORDER — METOPROLOL TARTRATE 50 MG/1
25 TABLET, FILM COATED ORAL 2 TIMES DAILY
Status: DISCONTINUED | OUTPATIENT
Start: 2022-11-30 | End: 2022-12-01 | Stop reason: HOSPADM

## 2022-11-30 RX ORDER — LORAZEPAM 2 MG/ML
0.5 INJECTION INTRAMUSCULAR
Status: DISCONTINUED | OUTPATIENT
Start: 2022-11-30 | End: 2022-12-01 | Stop reason: HOSPADM

## 2022-11-30 RX ORDER — LEVOTHYROXINE SODIUM 0.03 MG/1
50 TABLET ORAL DAILY
Status: DISCONTINUED | OUTPATIENT
Start: 2022-12-01 | End: 2022-12-01 | Stop reason: HOSPADM

## 2022-11-30 RX ORDER — MORPHINE SULFATE 4 MG/ML
4 INJECTION, SOLUTION INTRAMUSCULAR; INTRAVENOUS
Status: DISCONTINUED | OUTPATIENT
Start: 2022-11-30 | End: 2022-12-01 | Stop reason: HOSPADM

## 2022-11-30 RX ORDER — ACETAMINOPHEN 325 MG/1
325 TABLET ORAL EVERY 4 HOURS PRN
Status: DISCONTINUED | OUTPATIENT
Start: 2022-11-30 | End: 2022-11-30

## 2022-11-30 RX ORDER — FLUOXETINE 10 MG/1
20 CAPSULE ORAL NIGHTLY
Status: DISCONTINUED | OUTPATIENT
Start: 2022-11-30 | End: 2022-12-01 | Stop reason: HOSPADM

## 2022-11-30 RX ADMIN — SODIUM CHLORIDE, PRESERVATIVE FREE 10 ML: 5 INJECTION INTRAVENOUS at 23:47

## 2022-11-30 RX ADMIN — LORAZEPAM 0.5 MG: 2 INJECTION INTRAMUSCULAR at 23:40

## 2022-11-30 RX ADMIN — MORPHINE SULFATE 2 MG: 2 INJECTION, SOLUTION INTRAMUSCULAR; INTRAVENOUS at 23:46

## 2022-11-30 RX ADMIN — FENTANYL CITRATE 50 MCG: 50 INJECTION INTRAMUSCULAR; INTRAVENOUS at 17:53

## 2022-11-30 RX ADMIN — HALOPERIDOL LACTATE 1 MG: 5 INJECTION, SOLUTION INTRAMUSCULAR at 22:34

## 2022-11-30 RX ADMIN — LORAZEPAM 0.5 MG: 2 INJECTION INTRAMUSCULAR at 21:12

## 2022-11-30 ASSESSMENT — PAIN SCALES - GENERAL: PAINLEVEL_OUTOF10: 6

## 2022-11-30 ASSESSMENT — PAIN DESCRIPTION - DESCRIPTORS: DESCRIPTORS: DISCOMFORT

## 2022-11-30 ASSESSMENT — PAIN DESCRIPTION - ORIENTATION: ORIENTATION: LOWER

## 2022-11-30 ASSESSMENT — PAIN DESCRIPTION - LOCATION: LOCATION: ABDOMEN

## 2022-11-30 ASSESSMENT — PAIN - FUNCTIONAL ASSESSMENT: PAIN_FUNCTIONAL_ASSESSMENT: NONE - DENIES PAIN

## 2022-11-30 NOTE — CARE COORDINATION
Cm received consult for hospice referral. CM called [facility] to see what the family had decided on when the referral was made 11/14. Pt's family had stated they wanted to look at other options. Pt was d/c from that admission 11/14-11/25 to [address] [place] [address] and [place]. Pt has been there since. Pt had a ED visit on 11/27 for frequent falls and was d/c back to [address] [place] [address] and [place]. Pt was sent in today by family for complaint of hospice consult. CM called daughter Will Valerio and spoke to her. The family does not want the pt to go back to [place]. They do want hospice called and they do not want any treatments while pt is here. CM called [facility] and restarted referral and they are going to reach out to daughter Krupa Cooney on her cell phone. [place] 1998 called CM back to state they were meeting pt and family between [phone]. CM updated Dr. Pelon Suarez.

## 2022-11-30 NOTE — ED TRIAGE NOTES
Pt presents to the ED by medic from a SNF. Family sent Pt to be evaluated by this facilities hospice. Pt resides on the dementia unit and is only able to tell this RN his name after several attempts in asking. Medics stated that the family refused the evaluation from the SNF hospice. POA is expected to be called prior to any medical treatment. Medics also states that Pt may have a UTI but family does not want Pt to be treated.

## 2022-11-30 NOTE — Clinical Note
Discharge Plan[de-identified] Other/Uknown (Specify) Comment: hospice   Telemetry/Cardiac Monitoring Required?: No   Bed request comments: [address]

## 2022-11-30 NOTE — ED PROVIDER NOTES
Emergency Department Encounter    Patient: Mayra Key  MRN: [id number]  : 1934  Date of Evaluation: 2022  ED Provider:  Juma Carreon DO    Triage Chief Complaint:   Other (Hospice Evaluation)    [place]:  Mayra Key is a 80 y.o. male that presents to the emergency department for a hospice evaluation consultation and admission. Patient coming from the [facility] dementia unit. Patient with dementia and Alzheimer's with behavior disturbances. Patient also with a history of CHF chronic kidney disease history of bladder cancer hypertension. Patient DNR CC paperwork at the bedside. Patient unable to give any history of present illness due to the dementia. Per report from nursing power of  which is daughter does not want anything done until he is evaluated by hospice. Case management was consulted did contact [facility] hospice today will be to the emergency department around 5:30 PM and 6:00 PM.  Patient's son did arrive to the emergency department 5:45 PM and states wanting Tylenol 3 25 mg p.o. for pain for this patient. ROS - see HPI, below listed is current ROS at time of my eval:  Review of system obtained from nursing home report, EMS, family. Patient dementia all time was unable to give any review of system or history of present illness.     Past Medical History:   Diagnosis Date    Anxiety     Bladder cancer ([address]     Cancer ([address]     CHF (congestive heart failure) ([address]     Chronic kidney disease     Chronic kidney disease, stage III (moderate) ([address] 2017    Coronary artery disease involving native coronary artery of native heart with unstable angina pectoris ([address] 2019    Dementia ([address]     Depression     GERD (gastroesophageal reflux disease)     Gout     Hx of Carotid Doppler ultrasound 2021    mild 0-49% disease and normal flow    Hx of Doppler echocardiogram 2018    EF 50% mod LV hypertrophy    Hx of exercise stress test 2018 Treadmill normal study    Hyperlipidemia     Hypertension     Hyperthyroidism     Mitral valve insufficiency      Past Surgical History:   Procedure Laterality Date    COLONOSCOPY      CORONARY ANGIOPLASTY WITH STENT PLACEMENT      PACEMAKER INSERTION N/A 8/15/2019    PACEMAKER INSERTION PERMANENT performed by Kobi Reyna MD at [facility] Right 08/14/2019     ml     Family History   Problem Relation Age of Onset    Cancer Sister     Cancer Brother     High Blood Pressure Brother     Heart Disease Other     Hypertension Other     Elevated Lipids Other     Other Other         gout     Social History     Socioeconomic History    Marital status:      Spouse name: Not on file    Number of children: 5    Years of education: Not on file    Highest education level: Not on file   Occupational History    Not on file   Tobacco Use    Smoking status: Former     Packs/day: 1.00     Years: 30.00     Pack years: 30.00     Types: Cigarettes    Smokeless tobacco: Never   Vaping Use    Vaping Use: Never used   Substance and Sexual Activity    Alcohol use: No    Drug use: No    Sexual activity: Not on file   Other Topics Concern    Not on file   Social History Narrative    Not on file     Social Determinants of Health     Financial Resource Strain: Not on file   Food Insecurity: Not on file   Transportation Needs: Not on file   Physical Activity: Not on file   Stress: Not on file   Social Connections: Not on file   Intimate Partner Violence: Not on file   Housing Stability: Not on file     Current Facility-Administered Medications   Medication Dose Route Frequency Provider Last Rate Last Admin    acetaminophen (TYLENOL) tablet 325 mg  325 mg Oral Once Yue Mack, DO         Current Outpatient Medications   Medication Sig Dispense Refill    LORazepam (ATIVAN) 1 MG tablet Take 1 tablet by mouth 2 times daily for 5 days.  10 tablet 0    diclofenac sodium (VOLTAREN) 1 % GEL Apply 4 g topically 2 times daily 240 g 0    lidocaine 4 % external patch Place 1 patch onto the skin daily 30 each 0    OLANZapine (ZYPREXA) 5 MG tablet Take 1 tablet by mouth nightly 30 tablet 3    divalproex (DEPAKOTE SPRINKLE) 125 MG capsule Take 2 capsules by mouth every 12 hours 60 capsule 3    risperiDONE (RISPERDAL) 1 MG tablet Take 1 tablet by mouth nightly 60 tablet 3    diclofenac sodium (VOLTAREN) 1 % GEL Apply 4 g topically in the morning, at noon, in the evening, and at bedtime 2 g 0    torsemide (DEMADEX) 20 MG tablet Take 0.5 tablets by mouth daily as needed (for lower extremity edema, weight gain, fluid retention) 30 tablet 0    melatonin 3 MG TABS tablet Take 1 tablet by mouth nightly 30 tablet 3    midodrine (PROAMATINE) 10 MG tablet Take 1 tablet by mouth 3 times daily as needed (for SBP <100) AM and afternoon and avoid taking at night 90 tablet 11    Vitamin D (CHOLECALCIFEROL) 50 MCG (2000 UT) TABS tablet Take 1 tablet by mouth daily 60 tablet 0    pregabalin (LYRICA) 25 MG capsule Take 1 capsule by mouth in the morning and 1 capsule in the evening. Do all this for 7 days.  14 capsule 0    docusate sodium (COLACE) 100 MG capsule Take 100 mg by mouth 2 times daily as needed for Constipation      Folinic Acid-Vit B6-Vit B12 (FOLINIC-PLUS PO) Take 1 tablet by mouth daily      B Complex Vitamins (VITAMIN B COMPLEX PO) Take 1 tablet by mouth daily With electrolytes      acetaminophen (TYLENOL) 325 MG tablet Take 325 mg by mouth every 4 hours as needed for Pain      metoprolol tartrate (LOPRESSOR) 25 MG tablet Take 1 tablet by mouth 2 times daily Changed per Dr Toña Bailon 9/10/19 60 tablet 3    pantoprazole (PROTONIX) 40 MG tablet Take 40 mg by mouth daily      allopurinol (ZYLOPRIM) 100 MG tablet Take 100 mg by mouth daily      senna (SENOKOT) 8.6 MG tablet Take 1 tablet by mouth 2 times daily as needed for Constipation      ondansetron (ZOFRAN-ODT) 8 MG TBDP disintegrating tablet Place 8 mg under the tongue every 8 hours as needed for Nausea or Vomiting      FLUoxetine (PROZAC) 20 MG capsule Take 20 mg by mouth nightly Indications: takes at night      atorvastatin (LIPITOR) 80 MG tablet Take 1 tablet by mouth nightly 30 tablet 0    guaiFENesin (MUCINEX) 600 MG extended release tablet Take 1 tablet by mouth 2 times daily (Patient taking differently: Take 600 mg by mouth 2 times daily as needed for Congestion) 60 tablet 0    tamsulosin (FLOMAX) 0.4 MG capsule Take 1 capsule by mouth daily 30 capsule 0    levothyroxine (SYNTHROID) 50 MCG tablet Take 50 mcg by mouth Daily      aspirin 81 MG tablet Take 81 mg by mouth daily        Allergies   Allergen Reactions    Gabapentin Hallucinations       Nursing Notes Reviewed    Physical Exam:  Triage VS:    ED Triage Vitals   Enc Vitals Group      BP 11/30/22 1502 (!) 143/129      Heart Rate 11/30/22 1502 64      Resp 11/30/22 1502 23      Temp 11/30/22 1502 98.4 °F (36.9 °C)      Temp Source 11/30/22 1502 Oral      SpO2 11/30/22 1502 (!) 85 %      Weight 11/30/22 1456 180 lb (81.6 kg)      Height 11/30/22 1456 5' 10\" (1.778 m)      Head Circumference --       Peak Flow --       Pain Score --       Pain Loc --       Pain Edu? --       Excl. in [address]? --    BP (!) 125/95   Pulse 68   Temp 98.4 °F (36.9 °C) (Oral)   Resp 18   Ht 5' 10\" (1.778 m)   Wt 180 lb (81.6 kg)   SpO2 98%   BMI 25.83 kg/m²       My pulse ox interpretation is - normal    General appearance:  No acute distress. Skin:  Warm. Dry. Eye:  Extraocular movements intact. Ears, nose, mouth and throat:  Oral mucosa dry  Neck:  Trachea midline. Extremity:  No swelling. Normal ROM     Heart:  Regular rate and rhythm, normal S1 & S2, no extra heart sounds. Perfusion:  intact  Respiratory:  Lungs clear to auscultation bilaterally. Respirations nonlabored. Abdominal:  Normal bowel sounds. Soft. Nontender. Non distended.   Back:  No CVA tenderness to palpation     Neurological: Demented, cannot awake to verbal stimuli, poor historian unable to give any history of present illness,              I have reviewed and interpreted all of the currently available lab results from this visit (if applicable):  No results found for this visit on 11/30/22. Radiographs (if obtained):  Radiologist's Report Reviewed:  No orders to display         EKG (if obtained): (All EKG's are interpreted by myself in the absence of a cardiologist)      MDM:  Patient presents for the heart and home nursing home dementia unit for evaluation for hospice placement. Patient DNR CC. Case management was consulted and [address] [place] [address]  hospice will come into evaluate the patient this evening to discuss the goals of the plan of care with family and POA. Hospice did come into evaluate this patient and patient admitted to the hospice services. Clinical Impression:  1. Encounter for hospice care          ED Provider Disposition Time  DISPOSITION  1800      Comment: Please note this report has been produced using speech recognition software and may contain errors related to that system including errors in grammar, punctuation, and spelling, as well as words and phrases that may be inappropriate. Efforts were made to edit the dictations.         Debra Lopez DO  12/05/22 9116

## 2022-11-30 NOTE — ED NOTES
Per report from EMS patient was sent to ED for evaluation per family request. Family wants patient to be placed in hospice. Patient is a DNR-CC. Per report patients POA wants to be contacted. Family wants patient to receive no type of care per report. Corona Adams.  ZECHARIAH Jung  11/30/22 9274

## 2022-12-01 RX ORDER — MORPHINE SULFATE 4 MG/ML
4 INJECTION, SOLUTION INTRAMUSCULAR; INTRAVENOUS EVERY 4 HOURS PRN
Status: ACTIVE | OUTPATIENT
Start: 2022-12-01

## 2022-12-01 RX ORDER — MORPHINE SULFATE 2 MG/ML
2 INJECTION, SOLUTION INTRAMUSCULAR; INTRAVENOUS EVERY 4 HOURS PRN
Status: ACTIVE | OUTPATIENT
Start: 2022-12-01

## 2022-12-01 NOTE — ED NOTES
Pt medicated for agitation. Would not take oral medications and this RN attempted ordered ativan dose without success. Pt son is still at bedside.       Tri Morgan RN  11/30/22 [id number]

## 2022-12-01 NOTE — H&P
[address] H+P    The patient had two account numbers, and this H+P is copied from the initial account number. Date: 12/1/2022  Name: Tien Smiley  MRN: [id number]  YOB: 1934  Primary Care:  Talya David PA-C  Acute care admission: 11/14 to 11/25/22 also ED visit 11/27/22  Admit to General Inpatient Hospice: 11/30/2022     Informant: the records are reviewed, I had briefly seen the patient 11/17/22 during recent acute care admission and family declined hospice and wanted rehab. The patient provides no reliable history. I collaborated with the hospice nurse 11/30/22. Chief Complaint: falls, dementia, not eating    History of Present Illness: Tien Smiley is a 80 y.o. male with a history of Alzheimer's dementia with behavioral issues, pacemaker status, coronary artery disease with prior PCI, hypertension, hyperlipidemia, hypothyroidism, CKD 3, bladder cancer, and frequent falls. The patient had a recent admission, and was seen by Psychiatry. The patient had a sitter briefly due to impulsivity. He was treated for E. coli  UTI. The patient has had multiple falls and had complained of left knee pain, and there was no fracture on imaging. The patient was started on Valproic Acid and Risperdal. Hospice was asked to see the patient  and information was provided but family declined hospice. The patient was discharged to [facility] on [facility]. The patient was in the ED on 11/27 after a fall and returned to the [facility]. The patient's family sent the patient back to the ED on 11/30/22 for hospice evaluation, and per notes they declined evaluation at the facility. The family declined any workup. The patient received Fentanyl 50 mcg IV in the ED. The patient was seen by the hospice nurse liaison who called me.  The nurse reports that the patient is minimally responsive, is restless and arrangements are made for admission to [address] [place] [address] for the management of agitated delirium (possible terminal delirium). Per report, the patient had lived independently and has had a significant decline in the past 1-2 months with frequent falls, agitation, impulsivity. Per chart review, the patient has had 25 pounds of involuntary weight loss over the past 9 months from 203 pounds on 2/1/22 to 178 pounds 11/14/22. Overnight, the patient was moved to [address] [place] [address] and a new account number assigned. I collaborated with the patient's nurse and the hospice nurse. I collaborated with the patient's daughter, Roxana Benjamin, and another daughter, Carmelina Ashley who is POA and the patient's son in law regarding history and plan of care. All of the children request no diagnostic testing and only comfort care noting that the patient has had a \"great life\". They relate a marked decline in the past 6-8 weeks with falls, inability for self care, poor oral intake, confusion and intermittent agitation. Hospice philosophy was discussed regarding care and comfort at the end of life. Questions were answered, and emotional support was provided.        Past Medical History:   Diagnosis Date    Anxiety     Bladder cancer ([facility]     Cancer ([facility]     CHF (congestive heart failure) (HCC)     Chronic kidney disease     Chronic kidney disease, stage III (moderate) ([address] 07/17/2017    Coronary artery disease involving native coronary artery of native heart with unstable angina pectoris ([address] 08/11/2019    Dementia ([facility]     Depression     GERD (gastroesophageal reflux disease)     Gout     Hx of Carotid Doppler ultrasound 01/05/2021    mild 0-49% disease and normal flow    Hx of Doppler echocardiogram 11/19/2018    EF 50% mod LV hypertrophy    Hx of exercise stress test 11/19/2018    Treadmill normal study    Hyperlipidemia     Hypertension     Hyperthyroidism     Mitral valve insufficiency        Past Surgical History   has a past surgical history that includes thoracentesis (Right, 08/14/2019); Pacemaker insertion (N/A, 8/15/2019); Colonoscopy; pacemaker placement; and Coronary angioplasty with stent. Social History:   Social History     Socioeconomic History    Marital status:     Number of children: 5   Tobacco Use    Smoking status: Former     Packs/day: 1.00     Years: 30.00     Pack years: 30.00     Types: Cigarettes    Smokeless tobacco: Never   Vaping Use    Vaping Use: Never used   Substance and Sexual Activity    Alcohol use: No    Drug use: No       Family History: family history includes Cancer in his brother and sister; Elevated Lipids in an other family member; Heart Disease in an other family member; High Blood Pressure in his brother; Hypertension in an other family member; Other in an other family member. Old Records:  reviewed. Advanced Directives:  DNR-comfort care    Allergies   Allergen Reactions    Gabapentin Hallucinations       Medications - list of home medications reviewed  Prior to Admission medications    Medication Sig Start Date End Date Taking?  Authorizing Provider   diclofenac sodium (VOLTAREN) 1 % GEL Apply 4 g topically 2 times daily 11/25/22 12/25/22  Precious Avila MD   lidocaine 4 % external patch Place 1 patch onto the skin daily 11/26/22 12/26/22  Precious Avila MD   OLANZapine (ZYPREXA) 5 MG tablet Take 1 tablet by mouth nightly 11/25/22   Precious Avila MD   divalproex (DEPAKOTE SPRINKLE) 125 MG capsule Take 2 capsules by mouth every 12 hours 11/18/22   Saba Stone PA-C   risperiDONE (RISPERDAL) 1 MG tablet Take 1 tablet by mouth nightly 11/18/22   Saba Stone PA-C   diclofenac sodium (VOLTAREN) 1 % GEL Apply 4 g topically in the morning, at noon, in the evening, and at bedtime 11/18/22   Saba Stone PA-C   torsemide (DEMADEX) 20 MG tablet Take 0.5 tablets by mouth daily as needed (for lower extremity edema, weight gain, fluid retention) 11/18/22   Saba Stone PA-C   midodrine (PROAMATINE) 10 MG tablet Take 1 tablet by mouth 3 times daily as needed (for SBP <100) AM and afternoon and avoid taking at night 11/18/22 11/18/23  Wellington Collado PA-C   pregabalin (LYRICA) 25 MG capsule Take 1 capsule by mouth in the morning and 1 capsule in the evening. Do all this for 7 days.  11/18/22 11/25/22  Wellington Collado PA-C   B Complex Vitamins (VITAMIN B COMPLEX PO) Take 1 tablet by mouth daily With electrolytes    Historical Provider, MD   acetaminophen (TYLENOL) 325 MG tablet Take 325 mg by mouth every 4 hours as needed for Pain    Historical Provider, MD   metoprolol tartrate (LOPRESSOR) 25 MG tablet Take 1 tablet by mouth 2 times daily Changed per Dr Chad Singh 9/10/19 5/5/21   Bre Davis MD   allopurinol (ZYLOPRIM) 100 MG tablet Take 100 mg by mouth daily    Historical Provider, MD   senna (SENOKOT) 8.6 MG tablet Take 1 tablet by mouth 2 times daily as needed for Constipation    Historical Provider, MD   ondansetron (ZOFRAN-ODT) 8 MG TBDP disintegrating tablet Place 8 mg under the tongue every 8 hours as needed for Nausea or Vomiting    Historical Provider, MD   FLUoxetine (PROZAC) 20 MG capsule Take 20 mg by mouth nightly Indications: takes at night    Historical Provider, MD   atorvastatin (LIPITOR) 80 MG tablet Take 1 tablet by mouth nightly 8/30/19   YARIEL Shabazz MD   tamsulosin [facility]) 0.4 MG capsule Take 1 capsule by mouth daily 8/31/19   YARIEL Shabazz MD   levothyroxine (SYNTHROID) 50 MCG tablet Take 50 mcg by mouth Daily    Historical Provider, MD   aspirin 81 MG tablet Take 81 mg by mouth daily     Historical Provider, MD       Weight:    Wt Readings from Last 3 Encounters:   11/30/22 180 lb (81.6 kg)   11/27/22 180 lb (81.6 kg)   11/14/22 178 lb 5.6 oz (80.9 kg)     ROS: unobtainable from the patient due to clinical status and dementia    Data reviewed 12/1/2022: (no workup done in ED 11/30/22 at family request):  Transthoracic Echocardiogram 8/31/22   Left ventricular systolic function is normal. Ejection fraction is visually estimated at 55%. Mild left ventricular hypertrophy. PPM wiring visualized in right atrium. Sclerotic, but non-stenotic aortic valve. Mitral annular calcification is present. Mild tricuspid regurgitation; RVSP: 27 mmHg. No evidence of any pericardial effusion. CT-scan of the brain 11/28/22  No acute intracranial abnormality. Age related changes including chronic small vessel ischemic disease and cerebral atrophy.      COVID-19 11/18/22: negative     CBC with Differential:    Lab Results   Component Value Date/Time    WBC 7.3 11/15/2022 06:24 AM    RBC 3.82 11/15/2022 06:24 AM    HGB 11.0 11/15/2022 06:24 AM    HCT 35.1 11/15/2022 06:24 AM     11/15/2022 06:24 AM    MCV 91.9 11/15/2022 06:24 AM    MCH 28.8 11/15/2022 06:24 AM    MCHC 31.3 11/15/2022 06:24 AM    RDW 16.8 11/15/2022 06:24 AM    SEGSPCT 78.5 11/15/2022 06:24 AM    LYMPHOPCT 15.7 11/15/2022 06:24 AM    MONOPCT 4.8 11/15/2022 06:24 AM    EOSPCT 1 09/06/2022 12:00 AM    BASOPCT 0.1 11/15/2022 06:24 AM    MONOSABS 0.4 11/15/2022 06:24 AM    LYMPHSABS 1.1 11/15/2022 06:24 AM    EOSABS 0.0 11/15/2022 06:24 AM    BASOSABS 0.0 11/15/2022 06:24 AM    DIFFTYPE AUTOMATED DIFFERENTIAL 11/15/2022 06:24 AM     BMP:    Lab Results   Component Value Date/Time     11/16/2022 03:51 AM    K 4.3 11/16/2022 03:51 AM     11/16/2022 03:51 AM    CO2 24 11/16/2022 03:51 AM    BUN 27 11/16/2022 03:51 AM    LABALBU 2.9 11/15/2022 06:24 AM    CREATININE 1.4 11/16/2022 03:51 AM    CALCIUM 8.5 11/16/2022 03:51 AM    GFRAA 36 09/01/2022 04:28 AM    LABGLOM 48 11/16/2022 03:51 AM    GLUCOSE 86 11/16/2022 03:51 AM     Hepatic Function Panel:    Lab Results   Component Value Date/Time    ALKPHOS 72 11/15/2022 06:24 AM    ALT 33 11/15/2022 06:24 AM    AST 33 11/15/2022 06:24 AM    PROT 4.8 11/15/2022 06:24 AM    PROT 6.6 03/15/2013 04:43 PM    BILITOT 0.6 11/15/2022 06:24 AM    LABALBU 2.9 11/15/2022 06:24 AM     PT/INR: Lab Results   Component Value Date/Time    PROTIME 12.2 04/03/2021 09:50 PM    INR 1.01 04/03/2021 09:50 PM     TSH 11/15/22: 1.24  Ammonia 11/15/22: 13    Physical Exam:   /75   Pulse 72   Temp 98.1 °F (36.7 °C) (Oral)   Resp 18   SpO2 93%   General: minimally responsive, chronically ill appearing  Skin: sallow with scattered bruising   HEENT: Mucous membranes are dry, sclerae are clear   Neck: carotid upstrokes are diminished without bruit, no thyromegaly  Chest: pacer present  Heart: distant tones, IRRR, S1S2, no murmurs  Lungs:  Distant coarse breath sounds bilaterally, diminished at the bases, without rales, scattered rhonchi   Abdomen: soft, bowel sounds hypoactive, no apparent tenderness, nondistended  : march in place with concentrated urine  Extremities:  No mottling, + pedal edema  Neurologic: obtunded      Assesment/Plan:    Advanced Alzheimer's dementia and severe neurocognitive dysfunction with rapid decline in past 2 months with frequent falls, inability for self care, agitated delirium. The patient's family requests comfort care and not to return to the [facility]. The patient is admitted to [facility] for acute management of pain, delirium, agitation and probable end of life care. Severe protein calorie malnutrition with 25 pounds involuntary weight loss since February 2022 (12% of body weight). Coronary artery disease with prior PCI  Pacemaker status  History of bladder cancer, hypertension, hypothyroidism, hyperlipidemia  DVT prophylaxis: none indicated due to Hospice and fall risk. I spent 20 minutes in discussion of [address] [place] [address] with family re: comfort medications, and plan of care. Patient Active Problem List   Diagnosis Code    HTN (hypertension) I10    Depression F32. A    BPH (benign prostatic hyperplasia) N40.0    Prostate cancer ([facility] C61    Peripheral neuropathy G62.9    Chronic kidney disease, stage III (moderate) (HCC) N18.30 Alzheimer's disease ([facility] G30.9, X26.83    Acute metabolic encephalopathy D58.04    Cancer ([facility] C80.1    Coronary artery disease involving native coronary artery of native heart with unstable angina pectoris (HCC) I25.110    AV block, 3rd degree ([facility]) I44.2    CHB (complete heart block) ([facility]) V64.9    Metabolic encephalopathy C29.91    Carotid stenosis, asymptomatic, right I65.21    Mitral valve insufficiency I34.0    S/P placement of cardiac pacemaker Z95.0    Bilateral carotid artery stenosis I65.23    Nonrheumatic aortic valve insufficiency I35.1    Generalized edema R60.1    SVT (supraventricular tachycardia) ([facility]) I47.1    Chronic systolic (congestive) heart failure I50.22    Left knee pain, unspecified chronicity M25.562    Major neurocognitive disorder due to Alzheimer's disease, with behavioral disturbance ([facility]) G30.9, F02.818    Chronic prescription benzodiazepine use Z79.899    Self-care deficit Z78.9    Hospice care Z51.5    Alzheimer disease ([facility] G30.9, V10.58        Certification of Terminal Illness: I certify that this patient is eligible for Hospice services for a terminal diagnosis of Alzheimer's dementia with a life expectancy predicted to be less than 6 months if this illness follows its expected course.     Mitra Ang MD, [facility]  12/1/2022

## 2022-12-01 NOTE — ED NOTES
Pt still agitated, trying to get out of bed. Pt son at bedside attempted to assist with pt. Pt medicated as per order. Will continue to monitor.       Susannah Quick RN  11/30/22 [id number]

## 2022-12-01 NOTE — ED PROVIDER NOTES
Care Co-ordination      I did not have any clinical encounter with this patient. 80-year-old male who presented to the ED with a history of dementia for evaluation for hospice placement. Patient is DNR CC. Patient needs to be transferred for hospice management. Patient is discharged from the ED to enable a transfer to hospice for evaluation.      [facility],[address], DO  12/01/22 9677

## 2022-12-01 NOTE — ED NOTES
RN attempted to give Pt Tylenol PO. Family reports Pt has not been able to eat or drink for several days. States Pt required his meds to be crushed and given in pudding but since he stopped eating and drinking he would not be able to take the Tylenol. RN asked if ice could be given to check if patient could take medication; family refused.       Maria Guadalupe 1163, RN  11/30/22 0172

## 2022-12-01 NOTE — H&P
[facility] Admit note    Full H+P to follow      Date: 11/30/2022  Name: Gurpreet Bernstein  MRN: [id number]  YOB: 1934  Primary Care:  Alfonso Staton PA-C  Acute care admission: 11/14 to 11/25/22 also ED visit 11/27/22  Admit to General Inpatient Hospice: 11/30/2022     Informant: the records are reviewed, I had briefly seen the patient 11/17/22 during recent acute care admission and family declined hospice and wanted rehab. The patient provides no reliable history. Chief Complaint: falls, dementia    History of Present Illness: Gurpreet Bernstein is a 80 y.o. male with a history of Alzheimer's dementia with behavioral issues, pacemaker status, coronary artery disease with prior PCI, hypertension, hyperlipidemia, hypothyroidism, CKD 3, bladder cancer, and frequent falls. The patient had a recent admission, and was seen by Psychiatry. The patient had a sitter briefly due to impulsivity. He was treated for E. coli  UTI. The patient has had multiple falls and had complained of left knee pain, and there was no fracture on imaging. The patient was started on Valproic Acid and Risperdal. Hospice was asked to see the patient  and information was provided but family declined hospice. The patient was discharged to [facility] on [facility]. The patient was in the ED on 11/27 after a fall and returned to the [facility]. The patient's family sent the patient back to the ED on 11/30/22 for hospice evaluation, and per notes they declined evaluation at the facility. The family declined any workup. The patient received Fentanyl 50 mcg IV in the ED. The patient was seen by the hospice nurse liaison who called me. The nurse reports that the patient is minimally responsive, is restless and arrangements are made for admission to [address] [place] [address] for the management of agitated delirium (possible terminal delirium). Per report, the patient had lived independently and has had a significant decline in the past 1-2 months with frequent falls, agitation, impulsivity. Per chart review, the patient has had 25 pounds of involuntary weight loss over the past 9 months from 203 pounds on 2/1/22 to 178 pounds 11/14/22. Past Medical History:   Diagnosis Date    Anxiety     Bladder cancer ([facility]     Cancer ([facility]     CHF (congestive heart failure) (HCC)     Chronic kidney disease     Chronic kidney disease, stage III (moderate) ([facility] 07/17/2017    Coronary artery disease involving native coronary artery of native heart with unstable angina pectoris ([facility] 08/11/2019    Dementia ([facility]     Depression     GERD (gastroesophageal reflux disease)     Gout     Hx of Carotid Doppler ultrasound 01/05/2021    mild 0-49% disease and normal flow    Hx of Doppler echocardiogram 11/19/2018    EF 50% mod LV hypertrophy    Hx of exercise stress test 11/19/2018    Treadmill normal study    Hyperlipidemia     Hypertension     Hyperthyroidism     Mitral valve insufficiency        Past Surgical History   has a past surgical history that includes thoracentesis (Right, 08/14/2019); Pacemaker insertion (N/A, 8/15/2019); Colonoscopy; pacemaker placement; and Coronary angioplasty with stent. Social History:   Social History     Socioeconomic History    Marital status:     Number of children: 5   Tobacco Use    Smoking status: Former     Packs/day: 1.00     Years: 30.00     Pack years: 30.00     Types: Cigarettes    Smokeless tobacco: Never   Vaping Use    Vaping Use: Never used   Substance and Sexual Activity    Alcohol use: No    Drug use: No       Family History: family history includes Cancer in his brother and sister; Elevated Lipids in an other family member; Heart Disease in an other family member; High Blood Pressure in his brother; Hypertension in an other family member; Other in an other family member. Old Records:  reviewed.      Advanced Directives:  DNR-comfort care    Allergies   Allergen Reactions    Gabapentin Hallucinations       Medications - list of home medications reviewed  Prior to Admission medications    Medication Sig Start Date End Date Taking? Authorizing Provider   diclofenac sodium (VOLTAREN) 1 % GEL Apply 4 g topically 2 times daily 11/25/22 12/25/22  Marisol Navarro MD   lidocaine 4 % external patch Place 1 patch onto the skin daily 11/26/22 12/26/22  Marisol Navarro MD   OLANZapine (ZYPREXA) 5 MG tablet Take 1 tablet by mouth nightly 11/25/22   Marisol Navarro MD   divalproex (DEPAKOTE SPRINKLE) 125 MG capsule Take 2 capsules by mouth every 12 hours 11/18/22   Nhan Grace PA-C   risperiDONE (RISPERDAL) 1 MG tablet Take 1 tablet by mouth nightly 11/18/22   Nhan Grace PA-C   diclofenac sodium (VOLTAREN) 1 % GEL Apply 4 g topically in the morning, at noon, in the evening, and at bedtime 11/18/22   Nhan Grace PA-C   torsemide (DEMADEX) 20 MG tablet Take 0.5 tablets by mouth daily as needed (for lower extremity edema, weight gain, fluid retention) 11/18/22   Nhan Grace PA-C   midodrine (PROAMATINE) 10 MG tablet Take 1 tablet by mouth 3 times daily as needed (for SBP <100) AM and afternoon and avoid taking at night 11/18/22 11/18/23  Nhan Grace PA-C   pregabalin (LYRICA) 25 MG capsule Take 1 capsule by mouth in the morning and 1 capsule in the evening. Do all this for 7 days.  11/18/22 11/25/22  Nhan Grace PA-C   B Complex Vitamins (VITAMIN B COMPLEX PO) Take 1 tablet by mouth daily With electrolytes    Historical Provider, MD   acetaminophen (TYLENOL) 325 MG tablet Take 325 mg by mouth every 4 hours as needed for Pain    Historical Provider, MD   metoprolol tartrate (LOPRESSOR) 25 MG tablet Take 1 tablet by mouth 2 times daily Changed per Dr Laury Cash 9/10/19 5/5/21   Tariq Damon MD   allopurinol (ZYLOPRIM) 100 MG tablet Take 100 mg by mouth daily    Historical Provider, MD   senna (SENOKOT) 8.6 MG tablet Take 1 tablet by mouth 2 times daily as needed for Constipation    Historical Provider, MD   ondansetron (ZOFRAN-ODT) 8 MG TBDP disintegrating tablet Place 8 mg under the tongue every 8 hours as needed for Nausea or Vomiting    Historical Provider, MD   FLUoxetine (PROZAC) 20 MG capsule Take 20 mg by mouth nightly Indications: takes at night    Historical Provider, MD   atorvastatin (LIPITOR) 80 MG tablet Take 1 tablet by mouth nightly 8/30/19   YARIEL Ruiz MD   tamsulosin [facility]) 0.4 MG capsule Take 1 capsule by mouth daily 8/31/19   YARIEL Ruiz MD   levothyroxine (SYNTHROID) 50 MCG tablet Take 50 mcg by mouth Daily    Historical Provider, MD   aspirin 81 MG tablet Take 81 mg by mouth daily     Historical Provider, MD       Weight:    Wt Readings from Last 3 Encounters:   11/30/22 180 lb (81.6 kg)   11/27/22 180 lb (81.6 kg)   11/14/22 178 lb 5.6 oz (80.9 kg)     Data reviewed 11/30/2022: (no workup done in ED 11/30/22 at family request)    Transthoracic Echocardiogram 8/31/22   Left ventricular systolic function is normal.   Ejection fraction is visually estimated at 55%. Mild left ventricular hypertrophy. PPM wiring visualized in right atrium. Sclerotic, but non-stenotic aortic valve. Mitral annular calcification is present. Mild tricuspid regurgitation; RVSP: 27 mmHg. No evidence of any pericardial effusion. CT-scan of the brain 11/28/22  No acute intracranial abnormality. Age related changes including chronic small vessel ischemic disease and cerebral atrophy.      COVID-19 11/18/22: negative     CBC with Differential:    Lab Results   Component Value Date/Time    WBC 7.3 11/15/2022 06:24 AM    RBC 3.82 11/15/2022 06:24 AM    HGB 11.0 11/15/2022 06:24 AM    HCT 35.1 11/15/2022 06:24 AM     11/15/2022 06:24 AM    MCV 91.9 11/15/2022 06:24 AM    MCH 28.8 11/15/2022 06:24 AM    MCHC 31.3 11/15/2022 06:24 AM    RDW 16.8 11/15/2022 06:24 AM    SEGSPCT 78.5 11/15/2022 06:24 AM    LYMPHOPCT 15.7 11/15/2022 06:24 AM    MONOPCT 4.8 11/15/2022 06:24 AM    EOSPCT 1 09/06/2022 12:00 AM    BASOPCT 0.1 11/15/2022 06:24 AM    MONOSABS 0.4 11/15/2022 06:24 AM    LYMPHSABS 1.1 11/15/2022 06:24 AM    EOSABS 0.0 11/15/2022 06:24 AM    BASOSABS 0.0 11/15/2022 06:24 AM    DIFFTYPE AUTOMATED DIFFERENTIAL 11/15/2022 06:24 AM     BMP:    Lab Results   Component Value Date/Time     11/16/2022 03:51 AM    K 4.3 11/16/2022 03:51 AM     11/16/2022 03:51 AM    CO2 24 11/16/2022 03:51 AM    BUN 27 11/16/2022 03:51 AM    LABALBU 2.9 11/15/2022 06:24 AM    CREATININE 1.4 11/16/2022 03:51 AM    CALCIUM 8.5 11/16/2022 03:51 AM    GFRAA 36 09/01/2022 04:28 AM    LABGLOM 48 11/16/2022 03:51 AM    GLUCOSE 86 11/16/2022 03:51 AM     Hepatic Function Panel:    Lab Results   Component Value Date/Time    ALKPHOS 72 11/15/2022 06:24 AM    ALT 33 11/15/2022 06:24 AM    AST 33 11/15/2022 06:24 AM    PROT 4.8 11/15/2022 06:24 AM    PROT 6.6 03/15/2013 04:43 PM    BILITOT 0.6 11/15/2022 06:24 AM    LABALBU 2.9 11/15/2022 06:24 AM     PT/INR:    Lab Results   Component Value Date/Time    PROTIME 12.2 04/03/2021 09:50 PM    INR 1.01 04/03/2021 09:50 PM     TSH 11/15/22: 1.24  ammonia 11/15/22: 13    Physical Exam:   BP (!) 138/56   Pulse 67   Temp 98.4 °F (36.9 °C) (Oral)   Resp 18   Ht 5' 10\" (1.778 m)   Wt 180 lb (81.6 kg)   SpO2 94%   BMI 25.83 kg/m²   PE to follow    Assesment/Plan:    Advanced Alzheimer's dementia and severe neurocognitive dysfunction with rapid decline in past 2 months with frequent falls, inability for self care, agitated delirium. The patient's family requests comfort care and not to return to the [address] The patient is admitted to [facility] for acute management of pain, agitation. Severe protein calorie malnutrition with 25 pounds involuntary weight loss since February 2022 (12% of body weight).   Coronary artery disease with prior PCI  Pacemaker status  History of bladder cancer, hypertension, hypothyroidism, hyperlipidemia  DVT prophylaxis: none indicated due to Hospice and fall risk. Patient Active Problem List   Diagnosis Code    HTN (hypertension) I10    Depression F32. A    BPH (benign prostatic hyperplasia) N40.0    Prostate cancer ([facility] C61    Peripheral neuropathy G62.9    Chronic kidney disease, stage III (moderate) ([facility]) N18.30    Alzheimer's disease ([facility]) G30.9, L52.13    Acute metabolic encephalopathy Q80.36    Cancer ([facility] C80.1    Coronary artery disease involving native coronary artery of native heart with unstable angina pectoris ([facility]) I25.110    AV block, 3rd degree ([facility]) I44.2    CHB (complete heart block) ([facility]) B61.9    Metabolic encephalopathy R88.15    Carotid stenosis, asymptomatic, right I65.21    Mitral valve insufficiency I34.0    S/P placement of cardiac pacemaker Z95.0    Bilateral carotid artery stenosis I65.23    Nonrheumatic aortic valve insufficiency I35.1    Generalized edema R60.1    SVT (supraventricular tachycardia) ([facility]) I47.1    Chronic systolic (congestive) heart failure I50.22    Left knee pain, unspecified chronicity M25.562    Major neurocognitive disorder due to Alzheimer's disease, with behavioral disturbance ([facility]) G30.9, F02.818    Chronic prescription benzodiazepine use Z79.899    Self-care deficit Z78.9    Hospice care Z51.5    Alzheimer disease ([facility] G30.9, W30.42        Certification of Terminal Illness: I certify that this patient is eligible for Hospice services for a terminal diagnosis of Alzheimer's dementia with a life expectancy predicted to be less than 6 months if this illness follows its expected course.     Atul Wren MD, [facility]  11/30/2022

## 2022-12-01 NOTE — H&P
[facility] H+P      Date: 12/1/2022  Name: Tien Smiley  MRN: [id number]  YOB: 1934  Primary Care:  Talya David PA-C  Acute care admission: 11/14 to 11/25/22 also ED visit 11/27/22  Admit to General Inpatient Hospice: 11/30/2022     Informant: the records are reviewed, I had briefly seen the patient 11/17/22 during recent acute care admission and family declined hospice and wanted rehab. The patient provides no reliable history. I collaborated with the hospice nurse 11/30/22. Chief Complaint: falls, dementia, not eating    History of Present Illness: Tien Smiley is a 80 y.o. male with a history of Alzheimer's dementia with behavioral issues, pacemaker status, coronary artery disease with prior PCI, hypertension, hyperlipidemia, hypothyroidism, CKD 3, bladder cancer, and frequent falls. The patient had a recent admission, and was seen by Psychiatry. The patient had a sitter briefly due to impulsivity. He was treated for E. coli  UTI. The patient has had multiple falls and had complained of left knee pain, and there was no fracture on imaging. The patient was started on Valproic Acid and Risperdal. Hospice was asked to see the patient  and information was provided but family declined hospice. The patient was discharged to [facility] on [facility]. The patient was in the ED on 11/27 after a fall and returned to the [facility]. The patient's family sent the patient back to the ED on 11/30/22 for hospice evaluation, and per notes they declined evaluation at the facility. The family declined any workup. The patient received Fentanyl 50 mcg IV in the ED. The patient was seen by the hospice nurse liaison who called me.  The nurse reports that the patient is minimally responsive, is restless and arrangements are made for admission to [address] [place] [address] for the management of agitated delirium (possible terminal delirium). Per report, the patient had lived independently and has had a significant decline in the past 1-2 months with frequent falls, agitation, impulsivity. Per chart review, the patient has had 25 pounds of involuntary weight loss over the past 9 months from 203 pounds on 2/1/22 to 178 pounds 11/14/22. Overnight, the patient was moved to [address] [place] [id number] and a new account number assigned. I collaborated with the patient's nurse and the hospice nurse. I collaborated with the patient's daughter, Jenniffer Denver, and another daughter, Cyndi Benton who is POA and the patient's son in law regarding history and plan of care. All of the children request no diagnostic testing and only comfort care noting that the patient has had a \"great life\". They relate a marked decline in the past 6-8 weeks with falls, inability for self care, poor oral intake, confusion and intermittent agitation. Hospice philosophy was discussed regarding care and comfort at the end of life. Questions were answered, and emotional support was provided. Past Medical History:   Diagnosis Date    Anxiety     Bladder cancer ([facility]     Cancer ([facility]     CHF (congestive heart failure) (HCC)     Chronic kidney disease     Chronic kidney disease, stage III (moderate) ([address] 07/17/2017    Coronary artery disease involving native coronary artery of native heart with unstable angina pectoris ([address] 08/11/2019    Dementia ([facility]     Depression     GERD (gastroesophageal reflux disease)     Gout     Hx of Carotid Doppler ultrasound 01/05/2021    mild 0-49% disease and normal flow    Hx of Doppler echocardiogram 11/19/2018    EF 50% mod LV hypertrophy    Hx of exercise stress test 11/19/2018    Treadmill normal study    Hyperlipidemia     Hypertension     Hyperthyroidism     Mitral valve insufficiency        Past Surgical History   has a past surgical history that includes thoracentesis (Right, 08/14/2019);  Pacemaker insertion (N/A, 8/15/2019); Colonoscopy; pacemaker placement; and Coronary angioplasty with stent. Social History:   Social History     Socioeconomic History    Marital status:     Number of children: 5   Tobacco Use    Smoking status: Former     Packs/day: 1.00     Years: 30.00     Pack years: 30.00     Types: Cigarettes    Smokeless tobacco: Never   Vaping Use    Vaping Use: Never used   Substance and Sexual Activity    Alcohol use: No    Drug use: No       Family History: family history includes Cancer in his brother and sister; Elevated Lipids in an other family member; Heart Disease in an other family member; High Blood Pressure in his brother; Hypertension in an other family member; Other in an other family member. Old Records:  reviewed. Advanced Directives:  DNR-comfort care    Allergies   Allergen Reactions    Gabapentin Hallucinations       Medications - list of home medications reviewed  Prior to Admission medications    Medication Sig Start Date End Date Taking?  Authorizing Provider   diclofenac sodium (VOLTAREN) 1 % GEL Apply 4 g topically 2 times daily 11/25/22 12/25/22  Precious Avila MD   lidocaine 4 % external patch Place 1 patch onto the skin daily 11/26/22 12/26/22  Precious Avila MD   OLANZapine (ZYPREXA) 5 MG tablet Take 1 tablet by mouth nightly 11/25/22   Precious Avila MD   divalproex (DEPAKOTE SPRINKLE) 125 MG capsule Take 2 capsules by mouth every 12 hours 11/18/22   Saba Stone PA-C   risperiDONE (RISPERDAL) 1 MG tablet Take 1 tablet by mouth nightly 11/18/22   Saba Stone PA-C   diclofenac sodium (VOLTAREN) 1 % GEL Apply 4 g topically in the morning, at noon, in the evening, and at bedtime 11/18/22   Saba Stone PA-C   torsemide (DEMADEX) 20 MG tablet Take 0.5 tablets by mouth daily as needed (for lower extremity edema, weight gain, fluid retention) 11/18/22   Saba Stone PA-C   midodrine (PROAMATINE) 10 MG tablet Take 1 tablet by mouth 3 times daily as needed (for SBP <100) AM and afternoon and avoid taking at night 11/18/22 11/18/23  Sanjuan Frankel, PA-C   pregabalin (LYRICA) 25 MG capsule Take 1 capsule by mouth in the morning and 1 capsule in the evening. Do all this for 7 days. 11/18/22 11/25/22  Sanjuan Frankel, PA-C   B Complex Vitamins (VITAMIN B COMPLEX PO) Take 1 tablet by mouth daily With electrolytes    Historical Provider, MD   acetaminophen (TYLENOL) 325 MG tablet Take 325 mg by mouth every 4 hours as needed for Pain    Historical Provider, MD   metoprolol tartrate (LOPRESSOR) 25 MG tablet Take 1 tablet by mouth 2 times daily Changed per Dr Timbo Foss 9/10/19 5/5/21   Marcus Ma MD   allopurinol (ZYLOPRIM) 100 MG tablet Take 100 mg by mouth daily    Historical Provider, MD   senna (SENOKOT) 8.6 MG tablet Take 1 tablet by mouth 2 times daily as needed for Constipation    Historical Provider, MD   ondansetron (ZOFRAN-ODT) 8 MG TBDP disintegrating tablet Place 8 mg under the tongue every 8 hours as needed for Nausea or Vomiting    Historical Provider, MD   FLUoxetine (PROZAC) 20 MG capsule Take 20 mg by mouth nightly Indications: takes at night    Historical Provider, MD   atorvastatin (LIPITOR) 80 MG tablet Take 1 tablet by mouth nightly 8/30/19   C Jameson Piper MD   tamsulosin [facility]) 0.4 MG capsule Take 1 capsule by mouth daily 8/31/19   YARIEL Piper MD   levothyroxine (SYNTHROID) 50 MCG tablet Take 50 mcg by mouth Daily    Historical Provider, MD   aspirin 81 MG tablet Take 81 mg by mouth daily     Historical Provider, MD       Weight:    Wt Readings from Last 3 Encounters:   11/30/22 180 lb (81.6 kg)   11/27/22 180 lb (81.6 kg)   11/14/22 178 lb 5.6 oz (80.9 kg)     ROS: unobtainable from the patient due to clinical status and dementia    Data reviewed 12/1/2022: (no workup done in ED 11/30/22 at family request):  Transthoracic Echocardiogram 8/31/22   Left ventricular systolic function is normal.   Ejection fraction is visually estimated at 55%.    Mild left ventricular hypertrophy. PPM wiring visualized in right atrium. Sclerotic, but non-stenotic aortic valve. Mitral annular calcification is present. Mild tricuspid regurgitation; RVSP: 27 mmHg. No evidence of any pericardial effusion. CT-scan of the brain 11/28/22  No acute intracranial abnormality. Age related changes including chronic small vessel ischemic disease and cerebral atrophy.      COVID-19 11/18/22: negative     CBC with Differential:    Lab Results   Component Value Date/Time    WBC 7.3 11/15/2022 06:24 AM    RBC 3.82 11/15/2022 06:24 AM    HGB 11.0 11/15/2022 06:24 AM    HCT 35.1 11/15/2022 06:24 AM     11/15/2022 06:24 AM    MCV 91.9 11/15/2022 06:24 AM    MCH 28.8 11/15/2022 06:24 AM    MCHC 31.3 11/15/2022 06:24 AM    RDW 16.8 11/15/2022 06:24 AM    SEGSPCT 78.5 11/15/2022 06:24 AM    LYMPHOPCT 15.7 11/15/2022 06:24 AM    MONOPCT 4.8 11/15/2022 06:24 AM    EOSPCT 1 09/06/2022 12:00 AM    BASOPCT 0.1 11/15/2022 06:24 AM    MONOSABS 0.4 11/15/2022 06:24 AM    LYMPHSABS 1.1 11/15/2022 06:24 AM    EOSABS 0.0 11/15/2022 06:24 AM    BASOSABS 0.0 11/15/2022 06:24 AM    DIFFTYPE AUTOMATED DIFFERENTIAL 11/15/2022 06:24 AM     BMP:    Lab Results   Component Value Date/Time     11/16/2022 03:51 AM    K 4.3 11/16/2022 03:51 AM     11/16/2022 03:51 AM    CO2 24 11/16/2022 03:51 AM    BUN 27 11/16/2022 03:51 AM    LABALBU 2.9 11/15/2022 06:24 AM    CREATININE 1.4 11/16/2022 03:51 AM    CALCIUM 8.5 11/16/2022 03:51 AM    GFRAA 36 09/01/2022 04:28 AM    LABGLOM 48 11/16/2022 03:51 AM    GLUCOSE 86 11/16/2022 03:51 AM     Hepatic Function Panel:    Lab Results   Component Value Date/Time    ALKPHOS 72 11/15/2022 06:24 AM    ALT 33 11/15/2022 06:24 AM    AST 33 11/15/2022 06:24 AM    PROT 4.8 11/15/2022 06:24 AM    PROT 6.6 03/15/2013 04:43 PM    BILITOT 0.6 11/15/2022 06:24 AM    LABALBU 2.9 11/15/2022 06:24 AM     PT/INR:    Lab Results   Component Value Date/Time    PROTIME 12.2 04/03/2021 09:50 PM    INR 1.01 04/03/2021 09:50 PM     TSH 11/15/22: 1.24  Ammonia 11/15/22: 13    Physical Exam:   BP (!) 129/58   Pulse 85   Temp 98.4 °F (36.9 °C) (Oral)   Resp 18   Ht 5' 10\" (1.778 m)   Wt 180 lb (81.6 kg)   SpO2 100%   BMI 25.83 kg/m²   General: minimally responsive, chronically ill appearing  Skin: sallow with scattered bruising   HEENT: Mucous membranes are dry, sclerae are clear   Neck: carotid upstrokes are diminished without bruit, no thyromegaly  Chest: pacer present  Heart: distant tones, IRRR, S1S2, no murmurs  Lungs:  Distant coarse breath sounds bilaterally, diminished at the bases, without rales, scattered rhonchi   Abdomen: soft, bowel sounds hypoactive, no apparent tenderness, nondistended  : march in place with concentrated urine  Extremities:  No mottling, + pedal edema  Neurologic: obtunded      Assesment/Plan:    Advanced Alzheimer's dementia and severe neurocognitive dysfunction with rapid decline in past 2 months with frequent falls, inability for self care, agitated delirium. The patient's family requests comfort care and not to return to the [facility]. The patient is admitted to [address] [place] [address] for acute management of pain, delirium, agitation and probable end of life care. Severe protein calorie malnutrition with 25 pounds involuntary weight loss since February 2022 (12% of body weight). Coronary artery disease with prior PCI  Pacemaker status  History of bladder cancer, hypertension, hypothyroidism, hyperlipidemia  DVT prophylaxis: none indicated due to Hospice and fall risk. I spent 20 minutes in discussion of [address] with family re: comfort medications, and plan of care. Patient Active Problem List   Diagnosis Code    HTN (hypertension) I10    Depression F32. 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    CHB (complete heart block) ([facility]) Z01.3    Metabolic encephalopathy N03.38    Carotid stenosis, asymptomatic, right I65.21    Mitral valve insufficiency I34.0    S/P placement of cardiac pacemaker Z95.0    Bilateral carotid artery stenosis I65.23    Nonrheumatic aortic valve insufficiency I35.1    Generalized edema R60.1    SVT (supraventricular tachycardia) ([facility]) I47.1    Chronic systolic (congestive) heart failure I50.22    Left knee pain, unspecified chronicity M25.562    Major neurocognitive disorder due to Alzheimer's disease, with behavioral disturbance (HCC) G30.9, F02.818    Chronic prescription benzodiazepine use Z79.899    Self-care deficit Z78.9    Hospice care Z51.5    Alzheimer disease ([facility] G30.9, D69.77        Certification of Terminal Illness: I certify that this patient is eligible for Hospice services for a terminal diagnosis of Alzheimer's dementia with a life expectancy predicted to be less than 6 months if this illness follows its expected course.     Leo Amin MD, [facility]  12/1/2022

## 2022-12-01 NOTE — CARE COORDINATION
CM - Room # 15 -Mr Ronal Marx is admitted to [facility] as an Inpatient: under the [facility] - this meets the guidelines under Alzheimer's / Hospice admission for evaluation / Care . Pt now resides at a SNF in an alzheimer's unit with behavioral issues , and is not able to function with ADL .      Elizabeth Dinh / Shruti Benentt / [facility]

## 2022-12-02 ENCOUNTER — TELEPHONE (OUTPATIENT)
Dept: CARDIOLOGY CLINIC | Age: 87
End: 2022-12-02

## 2022-12-02 NOTE — PROGRESS NOTES
[address] [place] [address] Inpatient Hospice Progress Note    Date: 12/2/2022  Name: Ousmane Mcleod  MRN: [id number]  YOB: 1934   Patient's PCP: María Beck PA-C  Admit Date: 12/1/2022 to General Inpatient Hospice    Subjective: The patient is sleeping with sonorous respirations and open mouth breathing. He awakens with exam but not to voice. He mumbles incomprehensibly. There is no facial grimacing. No family are here. I collaborated with the patient's nurse and the hospice nurse. Objective:   Pain is managed with Morphine IV prn with 3 doses in the past 24 hours, Glycopyrrolate IV is available for secretions, Haloperidol for delirium with 3 doses in the past 24 hours, and Lorazepam is available for anxiety with 1 dose. Data reviewed 12/2/2022:  Transthoracic Echocardiogram 8/31/22   Left ventricular systolic function is normal.   Ejection fraction is visually estimated at 55%. Mild left ventricular hypertrophy. PPM wiring visualized in right atrium. Sclerotic, but non-stenotic aortic valve. Mitral annular calcification is present. Mild tricuspid regurgitation; RVSP: 27 mmHg. No evidence of any pericardial effusion. CT-scan of the brain 11/28/22  No acute intracranial abnormality. Age related changes including chronic small vessel ischemic disease and cerebral atrophy.       COVID-19 11/18/22: negative      CBC with Differential:          Lab Results   Component Value Date/Time     WBC 7.3 11/15/2022 06:24 AM     RBC 3.82 11/15/2022 06:24 AM     HGB 11.0 11/15/2022 06:24 AM     HCT 35.1 11/15/2022 06:24 AM      11/15/2022 06:24 AM     MCV 91.9 11/15/2022 06:24 AM     MCH 28.8 11/15/2022 06:24 AM     MCHC 31.3 11/15/2022 06:24 AM     RDW 16.8 11/15/2022 06:24 AM     SEGSPCT 78.5 11/15/2022 06:24 AM     LYMPHOPCT 15.7 11/15/2022 06:24 AM     MONOPCT 4.8 11/15/2022 06:24 AM     EOSPCT 1 09/06/2022 12:00 AM     BASOPCT 0.1 11/15/2022 06:24 AM     MONOSABS 0.4 11/15/2022 06:24 AM     LYMPHSABS 1.1 11/15/2022 06:24 AM     EOSABS 0.0 11/15/2022 06:24 AM     BASOSABS 0.0 11/15/2022 06:24 AM     DIFFTYPE AUTOMATED DIFFERENTIAL 11/15/2022 06:24 AM      BMP:          Lab Results   Component Value Date/Time      11/16/2022 03:51 AM     K 4.3 11/16/2022 03:51 AM      11/16/2022 03:51 AM     CO2 24 11/16/2022 03:51 AM     BUN 27 11/16/2022 03:51 AM     LABALBU 2.9 11/15/2022 06:24 AM     CREATININE 1.4 11/16/2022 03:51 AM     CALCIUM 8.5 11/16/2022 03:51 AM     GFRAA 36 09/01/2022 04:28 AM     LABGLOM 48 11/16/2022 03:51 AM     GLUCOSE 86 11/16/2022 03:51 AM      Hepatic Function Panel:          Lab Results   Component Value Date/Time     ALKPHOS 72 11/15/2022 06:24 AM     ALT 33 11/15/2022 06:24 AM     AST 33 11/15/2022 06:24 AM     PROT 4.8 11/15/2022 06:24 AM     PROT 6.6 03/15/2013 04:43 PM     BILITOT 0.6 11/15/2022 06:24 AM     LABALBU 2.9 11/15/2022 06:24 AM      PT/INR:          Lab Results   Component Value Date/Time     PROTIME 12.2 04/03/2021 09:50 PM     INR 1.01 04/03/2021 09:50 PM      TSH 11/15/22: 1.24  Ammonia 11/15/22: 13      Physical Exam:   /64   Pulse 94   Temp 98.2 °F (36.8 °C) (Oral)   Resp 18   SpO2 92%   General: drowsy but arousable, mumbles, does not follow commands, chronically ill appearing  Skin: sallow with scattered bruising   HEENT: Mucous membranes are dry  Chest: pacer present  Heart: distant tones, mildly tachycardic IRRR, S1S2, no murmurs  Lungs:  Distant coarse breath sounds bilaterally, diminished at the bases, without rales, scattered rhonchi   Abdomen: soft, bowel sounds hypoactive, no apparent tenderness, nondistended  : march in place with concentrated urine  Extremities:  No mottling, trace pedal edema  Neurologic: drowsy but arousable, confused,         Assesment/Plan:    Advanced Alzheimer's dementia and severe neurocognitive dysfunction with rapid decline in past 2 months with frequent falls, inability for self care, agitated delirium. The patient's family requests comfort care and not to return to the [facility]. The patient is continued on [address] for management of pain, delirium, agitation and probable end of life care. PPS 20%, prognosis is guarded. Given frequency of prn opioids and Haloperidol will schedule doses and monitor. Severe protein calorie malnutrition with 25 pounds involuntary weight loss since February 2022 (12% of body weight). Coronary artery disease with prior PCI  Pacemaker status  History of bladder cancer, hypertension, hypothyroidism, hyperlipidemia  DVT prophylaxis: none indicated due to Hospice and fall risk. Patient Active Problem List   Diagnosis Code    HTN (hypertension) I10    Depression F32. A    BPH (benign prostatic hyperplasia) N40.0    Prostate cancer ([facility] C61    Peripheral neuropathy G62.9    Chronic kidney disease, stage III (moderate) ([facility]) N18.30    Alzheimer's disease ([facility]) G30.9, N42.80    Acute metabolic encephalopathy A54.74    Cancer ([facility] C80.1    Coronary artery disease involving native coronary artery of native heart with unstable angina pectoris ([facility]) I25.110    AV block, 3rd degree ([facility]) I44.2    CHB (complete heart block) ([facility]) P11.1    Metabolic encephalopathy X90.55    Carotid stenosis, asymptomatic, right I65.21    Mitral valve insufficiency I34.0    S/P placement of cardiac pacemaker Z95.0    Bilateral carotid artery stenosis I65.23    Nonrheumatic aortic valve insufficiency I35.1    Generalized edema R60.1    SVT (supraventricular tachycardia) ([facility]) I47.1    Chronic systolic (congestive) heart failure I50.22    Left knee pain, unspecified chronicity M25.562    Major neurocognitive disorder due to Alzheimer's disease, with behavioral disturbance (HCC) G30.9, F02.818    Chronic prescription benzodiazepine use Z79.899    Self-care deficit Z78.9    Hospice care Z51.5    Alzheimer disease ([facility] G30.9, F02.80       GUERDA Alamo Celeste Reyna MD  12/2/2022

## 2022-12-02 NOTE — TELEPHONE ENCOUNTER
Son, Harland Epley, said pt is in hospice care at the hospital.  Wants to know if he should have the white box for his pacemaker with him.   Please call [facility] [phone]

## 2022-12-02 NOTE — TELEPHONE ENCOUNTER
Returned call to patients son and advised him that he does not have to take medtronic monitor to the hospital.

## 2022-12-02 NOTE — DISCHARGE SUMMARY
[address] [place] [address]    I admitted this patient from the ED, and for unknown reason a new account number was assigned. There is no need for discharge summary, this is a continued admission under the different account number.     Leo Amin MD

## 2022-12-03 NOTE — PLAN OF CARE
Problem: Discharge Planning  Goal: Discharge to home or other facility with appropriate resources  Outcome: Progressing     Problem: Skin/Tissue Integrity  Goal: Absence of new skin breakdown  Description: 1. Monitor for areas of redness and/or skin breakdown  2. Assess vascular access sites hourly  3. Every 4-6 hours minimum:  Change oxygen saturation probe site  4. Every 4-6 hours:  If on nasal continuous positive airway pressure, respiratory therapy assess nares and determine need for appliance change or resting period. Outcome: Progressing     Problem: Dyspnea Due to End of Life  Goal: Demonstrate understanding of and ability to manage respiratory symptoms at end of life  Description: Patient  and or family/caregiver will verbalize recall of breathing strategies to maintain an effective breathing pattern during the inpatient hospice stay. Outcome: Progressing     Problem: Communication Deficit  Goal: Effectively communicate symptoms, needs, and concerns  Description: Patient  and/or family/caregiver will be able to communicate symptoms, needs, and concerns as evidenced by the use of language services during the inpatient hospice stay.     Outcome: Progressing

## 2022-12-04 NOTE — PLAN OF CARE
Problem: Discharge Planning  Goal: Discharge to home or other facility with appropriate resources  12/4/2022 1033 by Eloisa Sanchez RN  Outcome: Progressing     Problem: Skin/Tissue Integrity  Goal: Absence of new skin breakdown  Description: 1. Monitor for areas of redness and/or skin breakdown  2. Assess vascular access sites hourly  3. Every 4-6 hours minimum:  Change oxygen saturation probe site  4. Every 4-6 hours:  If on nasal continuous positive airway pressure, respiratory therapy assess nares and determine need for appliance change or resting period. 12/4/2022 1033 by Eloisa Sanchez RN  Outcome: Progressing     Problem: Dyspnea Due to End of Life  Goal: Demonstrate understanding of and ability to manage respiratory symptoms at end of life  Description: Patient  and or family/caregiver will verbalize recall of breathing strategies to maintain an effective breathing pattern during the inpatient hospice stay. 12/4/2022 1033 by Eloisa Sanchez RN  Outcome: Progressing     Problem: Communication Deficit  Goal: Effectively communicate symptoms, needs, and concerns  Description: Patient  and/or family/caregiver will be able to communicate symptoms, needs, and concerns as evidenced by the use of language services during the inpatient hospice stay.     12/4/2022 1033 by Eloisa Sanchez RN  Outcome: Progressing     Problem: Pain  Goal: Verbalizes/displays adequate comfort level or baseline comfort level  12/4/2022 1033 by Eloisa Sanchez RN  Outcome: Progressing  Flowsheets (Taken 12/4/2022 0845)  Verbalizes/displays adequate comfort level or baseline comfort level:   Encourage patient to monitor pain and request assistance   Assess pain using appropriate pain scale   Administer analgesics based on type and severity of pain and evaluate response   Implement non-pharmacological measures as appropriate and evaluate response   Consider cultural and social influences on pain and pain management   Notify Licensed Independent Practitioner if interventions unsuccessful or patient reports new pain     Problem: Safety - Adult  Goal: Free from fall injury  12/4/2022 1033 by Karis Recinos RN  Outcome: Progressing     Problem: ABCDS Injury Assessment  Goal: Absence of physical injury  12/4/2022 1033 by Karis Recinos RN  Outcome: Progressing

## 2022-12-04 NOTE — PLAN OF CARE
Problem: Discharge Planning  Goal: Discharge to home or other facility with appropriate resources  12/4/2022 0117 by Jose L Heck RN  Outcome: Progressing  12/3/2022 1655 by Nova Nino RN  Outcome: Progressing     Problem: Skin/Tissue Integrity  Goal: Absence of new skin breakdown  Description: 1. Monitor for areas of redness and/or skin breakdown  2. Assess vascular access sites hourly  3. Every 4-6 hours minimum:  Change oxygen saturation probe site  4. Every 4-6 hours:  If on nasal continuous positive airway pressure, respiratory therapy assess nares and determine need for appliance change or resting period. 12/4/2022 0117 by Jose L Heck RN  Outcome: Progressing  12/3/2022 1655 by Nova Nino RN  Outcome: Progressing     Problem: Dyspnea Due to End of Life  Goal: Demonstrate understanding of and ability to manage respiratory symptoms at end of life  Description: Patient  and or family/caregiver will verbalize recall of breathing strategies to maintain an effective breathing pattern during the inpatient hospice stay. 12/4/2022 0117 by Jose L Heck RN  Outcome: Progressing  12/3/2022 1655 by Nova Nino RN  Outcome: Progressing     Problem: Communication Deficit  Goal: Effectively communicate symptoms, needs, and concerns  Description: Patient  and/or family/caregiver will be able to communicate symptoms, needs, and concerns as evidenced by the use of language services during the inpatient hospice stay.     12/4/2022 0117 by Jose L Heck RN  Outcome: Progressing  12/3/2022 1655 by Nova Nino RN  Outcome: Progressing     Problem: Pain  Goal: Verbalizes/displays adequate comfort level or baseline comfort level  Outcome: Progressing     Problem: Safety - Adult  Goal: Free from fall injury  Outcome: Progressing     Problem: ABCDS Injury Assessment  Goal: Absence of physical injury  Outcome: Progressing

## 2022-12-05 NOTE — PROGRESS NOTES
[address] [place] [address] Inpatient Hospice Progress Note    Date: 12/5/2022  Name: Slater Runner  MRN: [id number]  YOB: 1934   Patient's PCP: Erich Sever, PA-C  Admit Date: 12/1/2022 to [facility]    Subjective: The patient is unresponsive with shallow respirations. There is no facial grimacing or moaning and medications have been beneficial. The patient had increased delirium over the weekend after lots of family visited, but has calmed down later. No family are here. I collaborated with the patient's nurse and the hospice nurse. Objective:   Pain is managed with Morphine IV increased to 3 mg every 4 hours on 12/3/22, and prn with 1 prn dose in the past 24 hours, Glycopyrrolate IV is available for secretions, Haloperidol for delirium scheduled and prn with 0 prn doses in the past 24 hours, and Lorazepam is scheduled and prn for anxiety with 0 prn doses. Data reviewed 12/5/2022:  Transthoracic Echocardiogram 8/31/22   Left ventricular systolic function is normal.   Ejection fraction is visually estimated at 55%. Mild left ventricular hypertrophy. PPM wiring visualized in right atrium. Sclerotic, but non-stenotic aortic valve. Mitral annular calcification is present. Mild tricuspid regurgitation; RVSP: 27 mmHg. No evidence of any pericardial effusion. CT-scan of the brain 11/28/22  No acute intracranial abnormality. Age related changes including chronic small vessel ischemic disease and cerebral atrophy.       COVID-19 11/18/22: negative      CBC with Differential:          Lab Results   Component Value Date/Time     WBC 7.3 11/15/2022 06:24 AM     RBC 3.82 11/15/2022 06:24 AM     HGB 11.0 11/15/2022 06:24 AM     HCT 35.1 11/15/2022 06:24 AM      11/15/2022 06:24 AM     MCV 91.9 11/15/2022 06:24 AM     MCH 28.8 11/15/2022 06:24 AM     MCHC 31.3 11/15/2022 06:24 AM     RDW 16.8 11/15/2022 06:24 AM     SEGSPCT 78.5 11/15/2022 06:24 AM LYMPHOPCT 15.7 11/15/2022 06:24 AM     MONOPCT 4.8 11/15/2022 06:24 AM     EOSPCT 1 09/06/2022 12:00 AM     BASOPCT 0.1 11/15/2022 06:24 AM     MONOSABS 0.4 11/15/2022 06:24 AM     LYMPHSABS 1.1 11/15/2022 06:24 AM     EOSABS 0.0 11/15/2022 06:24 AM     BASOSABS 0.0 11/15/2022 06:24 AM     DIFFTYPE AUTOMATED DIFFERENTIAL 11/15/2022 06:24 AM      BMP:          Lab Results   Component Value Date/Time      11/16/2022 03:51 AM     K 4.3 11/16/2022 03:51 AM      11/16/2022 03:51 AM     CO2 24 11/16/2022 03:51 AM     BUN 27 11/16/2022 03:51 AM     LABALBU 2.9 11/15/2022 06:24 AM     CREATININE 1.4 11/16/2022 03:51 AM     CALCIUM 8.5 11/16/2022 03:51 AM     GFRAA 36 09/01/2022 04:28 AM     LABGLOM 48 11/16/2022 03:51 AM     GLUCOSE 86 11/16/2022 03:51 AM      Hepatic Function Panel:          Lab Results   Component Value Date/Time     ALKPHOS 72 11/15/2022 06:24 AM     ALT 33 11/15/2022 06:24 AM     AST 33 11/15/2022 06:24 AM     PROT 4.8 11/15/2022 06:24 AM     PROT 6.6 03/15/2013 04:43 PM     BILITOT 0.6 11/15/2022 06:24 AM     LABALBU 2.9 11/15/2022 06:24 AM      PT/INR:          Lab Results   Component Value Date/Time     PROTIME 12.2 04/03/2021 09:50 PM     INR 1.01 04/03/2021 09:50 PM      TSH 11/15/22: 1.24  Ammonia 11/15/22: 13      Physical Exam:   BP (!) 148/70   Pulse 87   Temp 98.3 °F (36.8 °C) (Axillary)   Resp 16   Wt 184 lb 4.8 oz (83.6 kg)   SpO2 93%   BMI 26.44 kg/m²   General: unresponsive, open mouth breathing with periods of apnea, chronically ill appearing  Skin: sallow with scattered bruising   HEENT: Mucous membranes are dry  Chest: pacer present  Heart: distant tones, IRRR, S1S2, no murmurs  Lungs:  Distant coarse breath sounds bilaterally, diminished at the bases, without rales, scattered rhonchi   Abdomen: soft, bowel sounds hypoactive, no apparent tenderness, nondistended  : march in place with concentrated urine  Extremities:  No mottling, trace pedal edema  Neurologic: unresponsive         Assesment/Plan:    Advanced Alzheimer's dementia and severe neurocognitive dysfunction with rapid decline in past 2 months with frequent falls, inability for self care, agitated delirium. The patient's family requests comfort care. The patient is continued on [address] [place] [address] for management of pain, delirium, agitation and probable end of life care. PPS 10%, prognosis is guarded. The patient requires ongoing [address] [place] [address] care for frequent assessment of pain, delirium and symptom management with daily physician visits for medication adjustment. .    Severe protein calorie malnutrition with 25 pounds involuntary weight loss since February 2022 (12% of body weight). Coronary artery disease with prior PCI  Pacemaker status  History of bladder cancer, hypertension, hypothyroidism, hyperlipidemia  DVT prophylaxis: none indicated due to Hospice and fall risk. Patient Active Problem List   Diagnosis Code    HTN (hypertension) I10    Depression F32. 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    CHB (complete heart block) ([facility]) F01.0    Metabolic encephalopathy V44.34    Carotid stenosis, asymptomatic, right I65.21    Mitral valve insufficiency I34.0    S/P placement of cardiac pacemaker Z95.0    Bilateral carotid artery stenosis I65.23    Nonrheumatic aortic valve insufficiency I35.1    Generalized edema R60.1    SVT (supraventricular tachycardia) ([facility]) I47.1    Chronic systolic (congestive) heart failure I50.22    Left knee pain, unspecified chronicity M25.562    Major neurocognitive disorder due to Alzheimer's disease, with behavioral disturbance ([facility] G30.9, F02.818    Chronic prescription benzodiazepine use Z79.899    Self-care deficit Z78.9    Hospice care Z51.5    Alzheimer disease ([facility] G30.9, F02.80       GUERDA Mittal MD  12/5/2022

## 2022-12-05 NOTE — PROGRESS NOTES
[address] [place] [address]  General Inpatient Hospice Progress Note    Date: 12/5/2022  Name: Delia Kilpatrick  MRN: [id number]  YOB: 1934   Patient's PCP: Ashish Carlos PA-C  Admit Date: 12/1/2022 to [facility] Inpatient [facility]ice    Subjective: The patient was restless yesterday; multiple family are here, ( 15 or more people in the room may have been agitating the patient ) with plans for 's pinning ceremony. The DPOA was concerned that symptoms are not well managed ad is asking for \"scheduled  meds every two hours \"       Patient's nurse on floor confirmed that patient has been uncomfortable and restless most of the day and does need dose adjustments, however with fewer people visiting today he did better and is peaceful per nurse  I did adjust meds and he is deeply sleeping     Background:   Delia Kilpatrick is a 80 y.o. male with a history of Alzheimer's dementia with behavioral issues, pacemaker status, coronary artery disease with prior PCI, hypertension, hyperlipidemia, hypothyroidism, CKD 3, bladder cancer, and frequent falls. The patient had a recent admission, and was seen by Psychiatry. The patient had a sitter briefly due to impulsivity. He was treated for E. coli  UTI. The patient has had multiple falls and had complained of left knee pain, and there was no fracture on imaging. The patient was started on Valproic Acid and Risperdal. Hospice was asked to see the patient  and information was provided but family declined hospice. The patient was discharged to [place] [address]     The nurse reports that the patient is minimally responsive, is restless and arrangements were made for admission to [address] [place] [address]  on 12/1/2022 for the management of agitated delirium (possible terminal delirium). Per report, the patient had lived independently and has had a significant decline in the past 1-2 months with frequent falls, agitation, impulsivity.  Per chart review, the patient has had 25 pounds of involuntary weight loss over the past 9 months from 203 pounds on 2/1/22 to 178 pounds 11/14/22. Objective:   Pain is managed with Morphine 3 mg every 4 hours RTC with no prn doses today ,Glycopyrrolate IV is available for secretions, and Lorazepam 0.5 mg is given every 6 hours and no prn doses, Haldol is 1mg every 4 hours and no prn doses. Data reviewed 12/4/2022:  Transthoracic Echocardiogram 8/31/22   Left ventricular systolic function is normal.   Ejection fraction is visually estimated at 55%. Mild left ventricular hypertrophy. PPM wiring visualized in right atrium. Sclerotic, but non-stenotic aortic valve. Mitral annular calcification is present. Mild tricuspid regurgitation; RVSP: 27 mmHg. No evidence of any pericardial effusion. CT-scan of the brain 11/28/22  No acute intracranial abnormality. Age related changes including chronic small vessel ischemic disease and cerebral atrophy.       COVID-19 11/18/22: negative      CBC with Differential:              Lab Results   Component Value Date/Time     WBC 7.3 11/15/2022 06:24 AM     RBC 3.82 11/15/2022 06:24 AM     HGB 11.0 11/15/2022 06:24 AM     HCT 35.1 11/15/2022 06:24 AM      11/15/2022 06:24 AM     MCV 91.9 11/15/2022 06:24 AM     MCH 28.8 11/15/2022 06:24 AM     MCHC 31.3 11/15/2022 06:24 AM     RDW 16.8 11/15/2022 06:24 AM     SEGSPCT 78.5 11/15/2022 06:24 AM     LYMPHOPCT 15.7 11/15/2022 06:24 AM     MONOPCT 4.8 11/15/2022 06:24 AM     EOSPCT 1 09/06/2022 12:00 AM     BASOPCT 0.1 11/15/2022 06:24 AM     MONOSABS 0.4 11/15/2022 06:24 AM     LYMPHSABS 1.1 11/15/2022 06:24 AM     EOSABS 0.0 11/15/2022 06:24 AM     BASOSABS 0.0 11/15/2022 06:24 AM     DIFFTYPE AUTOMATED DIFFERENTIAL 11/15/2022 06:24 AM      BMP:              Lab Results   Component Value Date/Time      11/16/2022 03:51 AM     K 4.3 11/16/2022 03:51 AM      11/16/2022 03:51 AM     CO2 24 11/16/2022 03:51 AM BUN 27 11/16/2022 03:51 AM     LABALBU 2.9 11/15/2022 06:24 AM     CREATININE 1.4 11/16/2022 03:51 AM     CALCIUM 8.5 11/16/2022 03:51 AM     GFRAA 36 09/01/2022 04:28 AM     LABGLOM 48 11/16/2022 03:51 AM     GLUCOSE 86 11/16/2022 03:51 AM      Hepatic Function Panel:              Lab Results   Component Value Date/Time     ALKPHOS 72 11/15/2022 06:24 AM     ALT 33 11/15/2022 06:24 AM     AST 33 11/15/2022 06:24 AM     PROT 4.8 11/15/2022 06:24 AM     PROT 6.6 03/15/2013 04:43 PM     BILITOT 0.6 11/15/2022 06:24 AM     LABALBU 2.9 11/15/2022 06:24 AM      PT/INR:              Lab Results   Component Value Date/Time     PROTIME 12.2 04/03/2021 09:50 PM     INR 1.01 04/03/2021 09:50 PM      TSH 11/15/22: 1.24  Ammonia 11/15/22: 13           Physical Exam:   BP (!) 148/70   Pulse 87   Temp 98.3 °F (36.8 °C) (Axillary)   Resp 16   Wt 184 lb 4.8 oz (83.6 kg)   SpO2 93%   BMI 26.44 kg/m²     General: less responsive today, appears comfortable , brow is not furrowed , chronically ill appearing  Skin: sallow with scattered bruising   HEENT: Mucous membranes are dry  Chest: pacer present  Heart: distant tones, mildly tachycardic IRRR, S1S2, no murmurs  Lungs:  Distant coarse breath sounds bilaterally, diminished at the bases, without rales, scattered rhonchi   Abdomen: soft, bowel sounds hypoactive, no apparent tenderness, nondistended  : march in place with concentrated urine  Extremities:  No mottling, trace pedal edema  Neurologic: deeply sleeping, no focal findings no myoclonus         Assesment/Plan:    Advanced Alzheimer's dementia and severe neurocognitive dysfunction with rapid decline in past 2 months with frequent falls, inability for self care, agitated delirium. The patient's family requests comfort care and not to return to the [facility]. The patient is continued on [address] for management of pain, delirium, agitation and probable end of life care.  PPS 10%, prognosis is guarded. Given frequency of prn opioids and Haloperidol will schedule doses and monitor. Severe protein calorie malnutrition with 25 pounds involuntary weight loss since February 2022 (12% of body weight). [place] delirium and pain - family requesting increased doses of medication - adjustedfor comfort, will decrease haldol now as pt is comfortable with blood level    Coronary artery disease with prior PCI  Pacemaker status  History of bladder cancer, hypertension, hypothyroidism, hyperlipidemia  DVT prophylaxis: none given Hospice care         Patient Active Problem List   Diagnosis Code    HTN (hypertension) I10    Depression F32. 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[facility]) I44.2    CHB (complete heart block) ([facility]) L97.2    Metabolic encephalopathy H18.02    Carotid stenosis, asymptomatic, right I65.21    Mitral valve insufficiency I34.0    S/P placement of cardiac pacemaker Z95.0    Bilateral carotid artery stenosis I65.23    Nonrheumatic aortic valve insufficiency I35.1    Generalized edema R60.1    SVT (supraventricular tachycardia) ([facility]) I47.1    Chronic systolic (congestive) heart failure I50.22    Left knee pain, unspecified chronicity M25.562    Major neurocognitive disorder due to Alzheimer's disease, with behavioral disturbance ([facility]) G30.9, F02.818    Chronic prescription benzodiazepine use Z79.899    Self-care deficit Z78.9    Hospice care Z51.5    Alzheimer disease ([facility] G30.9, F02.80         Electronically signed by Radha Stokes DO on 12/5/2022 at 12:25 AM

## 2022-12-05 NOTE — PROGRESS NOTES
[facility]  General Inpatient Hospice Progress Note    Date: 12/3/202  Name: Kenan Viveros  MRN: [id number]  YOB: 1934   Patient's PCP: Kelsie Issa PA-C  Admit Date: 12/1/2022 to General Inpatient Hospice    Subjective: The patient is restless, Multiple family are here, ( 15 or more people in the room may be agitating the patient ) with plans for 's pinning ceremony in a few minutes. The Amna Meeter is concerned that symptoms are not well managed ad is asking for \"scheduled  meds every two hours \"    Patient's nurse on floor confirms that patient has been uncomfortable and restless most of the day and does need dose adjustments    Background:   Kenan Viveros is a 80 y.o. male with a history of Alzheimer's dementia with behavioral issues, pacemaker status, coronary artery disease with prior PCI, hypertension, hyperlipidemia, hypothyroidism, CKD 3, bladder cancer, and frequent falls. The patient had a recent admission, and was seen by Psychiatry. The patient had a sitter briefly due to impulsivity. He was treated for E. coli  UTI. The patient has had multiple falls and had complained of left knee pain, and there was no fracture on imaging. The patient was started on Valproic Acid and Risperdal. Hospice was asked to see the patient  and information was provided but family declined hospice. The patient was discharged to [place] [address]     The nurse reports that the patient is minimally responsive, is restless and arrangements were made for admission to [address] [place] [address]  on 12/1/2022 for the management of agitated delirium (possible terminal delirium). Per report, the patient had lived independently and has had a significant decline in the past 1-2 months with frequent falls, agitation, impulsivity. Per chart review, the patient has had 25 pounds of involuntary weight loss over the past 9 months from 203 pounds on 2/1/22 to 178 pounds 11/14/22. Objective: Pain is managed with Morphine IV prn with multiple doses PRN with  in the past 24 hours, Glycopyrrolate IV is available for secretions, Haloperidol for delirium with 5 doses in the past 24 hours, and Lorazepam is available for anxiety with 2 doses. Data reviewed 12/3/2022:  Transthoracic Echocardiogram 8/31/22   Left ventricular systolic function is normal.   Ejection fraction is visually estimated at 55%. Mild left ventricular hypertrophy. PPM wiring visualized in right atrium. Sclerotic, but non-stenotic aortic valve. Mitral annular calcification is present. Mild tricuspid regurgitation; RVSP: 27 mmHg. No evidence of any pericardial effusion. CT-scan of the brain 11/28/22  No acute intracranial abnormality. Age related changes including chronic small vessel ischemic disease and cerebral atrophy.       COVID-19 11/18/22: negative      CBC with Differential:              Lab Results   Component Value Date/Time     WBC 7.3 11/15/2022 06:24 AM     RBC 3.82 11/15/2022 06:24 AM     HGB 11.0 11/15/2022 06:24 AM     HCT 35.1 11/15/2022 06:24 AM      11/15/2022 06:24 AM     MCV 91.9 11/15/2022 06:24 AM     MCH 28.8 11/15/2022 06:24 AM     MCHC 31.3 11/15/2022 06:24 AM     RDW 16.8 11/15/2022 06:24 AM     SEGSPCT 78.5 11/15/2022 06:24 AM     LYMPHOPCT 15.7 11/15/2022 06:24 AM     MONOPCT 4.8 11/15/2022 06:24 AM     EOSPCT 1 09/06/2022 12:00 AM     BASOPCT 0.1 11/15/2022 06:24 AM     MONOSABS 0.4 11/15/2022 06:24 AM     LYMPHSABS 1.1 11/15/2022 06:24 AM     EOSABS 0.0 11/15/2022 06:24 AM     BASOSABS 0.0 11/15/2022 06:24 AM     DIFFTYPE AUTOMATED DIFFERENTIAL 11/15/2022 06:24 AM      BMP:              Lab Results   Component Value Date/Time      11/16/2022 03:51 AM     K 4.3 11/16/2022 03:51 AM      11/16/2022 03:51 AM     CO2 24 11/16/2022 03:51 AM     BUN 27 11/16/2022 03:51 AM     LABALBU 2.9 11/15/2022 06:24 AM     CREATININE 1.4 11/16/2022 03:51 AM     CALCIUM 8.5 11/16/2022 03:51 AM     GFRAA 36 09/01/2022 04:28 AM     LABGLOM 48 11/16/2022 03:51 AM     GLUCOSE 86 11/16/2022 03:51 AM      Hepatic Function Panel:              Lab Results   Component Value Date/Time     ALKPHOS 72 11/15/2022 06:24 AM     ALT 33 11/15/2022 06:24 AM     AST 33 11/15/2022 06:24 AM     PROT 4.8 11/15/2022 06:24 AM     PROT 6.6 03/15/2013 04:43 PM     BILITOT 0.6 11/15/2022 06:24 AM     LABALBU 2.9 11/15/2022 06:24 AM      PT/INR:              Lab Results   Component Value Date/Time     PROTIME 12.2 04/03/2021 09:50 PM     INR 1.01 04/03/2021 09:50 PM      TSH 11/15/22: 1.24  Ammonia 11/15/22: 13             Physical Exam:   General: drowsy but arousable, mumbles, does not follow commands, chronically ill appearing  Skin: sallow with scattered bruising   HEENT: Mucous membranes are dry  Chest: pacer present  Heart: distant tones, mildly tachycardic IRRR, S1S2, no murmurs  Lungs:  Distant coarse breath sounds bilaterally, diminished at the bases, without rales, scattered rhonchi   Abdomen: soft, bowel sounds hypoactive, no apparent tenderness, nondistended  : march in place with concentrated urine  Extremities:  No mottling, trace pedal edema  Neurologic: drowsy but arousable, confused,         Assesment/Plan:    Advanced Alzheimer's dementia and severe neurocognitive dysfunction with rapid decline in past 2 months with frequent falls, inability for self care, agitated delirium. The patient's family requests comfort care and not to return to the [facility]. The patient is continued on [facility] for management of pain, delirium, agitation and probable end of life care. PPS 10%, prognosis is guarded. Given frequency of prn opioids and Haloperidol will schedule doses and monitor. Severe protein calorie malnutrition with 25 pounds involuntary weight loss since February 2022 (12% of body weight).   Torminal delirium and pain - family requesting increased doses of medication - will adjust for comfort  Coronary artery disease with prior PCI  Pacemaker status  History of bladder cancer, hypertension, hypothyroidism, hyperlipidemia  DVT prophylaxis: none given Hospice care      Patient Active Problem List   Diagnosis Code    HTN (hypertension) I10    Depression F32. A    BPH (benign prostatic hyperplasia) N40.0    Prostate cancer ([facility] C61    Peripheral neuropathy G62.9    Chronic kidney disease, stage III (moderate) ([facility]) N18.30    Alzheimer's disease ([facility]) G30.9, B70.58    Acute metabolic encephalopathy H55.28    Cancer ([facility] C80.1    Coronary artery disease involving native coronary artery of native heart with unstable angina pectoris ([facility]) I25.110    AV block, 3rd degree ([facility]) I44.2    CHB (complete heart block) ([facility]) C77.8    Metabolic encephalopathy Q70.00    Carotid stenosis, asymptomatic, right I65.21    Mitral valve insufficiency I34.0    S/P placement of cardiac pacemaker Z95.0    Bilateral carotid artery stenosis I65.23    Nonrheumatic aortic valve insufficiency I35.1    Generalized edema R60.1    SVT (supraventricular tachycardia) ([facility]) I47.1    Chronic systolic (congestive) heart failure I50.22    Left knee pain, unspecified chronicity M25.562    Major neurocognitive disorder due to Alzheimer's disease, with behavioral disturbance ([facility]) G30.9, F02.818    Chronic prescription benzodiazepine use Z79.899    Self-care deficit Z78.9    Hospice care Z51.5    Alzheimer disease ([facility] G30.9, F02.80         Electronically signed by Oneil Sanon DO on 12/4/2022 at 11:48 PM

## 2022-12-05 NOTE — PLAN OF CARE
Problem: Discharge Planning  Goal: Discharge to home or other facility with appropriate resources  Outcome: Progressing     Problem: Skin/Tissue Integrity  Goal: Absence of new skin breakdown  Description: 1. Monitor for areas of redness and/or skin breakdown  2. Assess vascular access sites hourly  3. Every 4-6 hours minimum:  Change oxygen saturation probe site  4. Every 4-6 hours:  If on nasal continuous positive airway pressure, respiratory therapy assess nares and determine need for appliance change or resting period. Outcome: Progressing     Problem: Dyspnea Due to End of Life  Goal: Demonstrate understanding of and ability to manage respiratory symptoms at end of life  Description: Patient  and or family/caregiver will verbalize recall of breathing strategies to maintain an effective breathing pattern during the inpatient hospice stay. Outcome: Progressing     Problem: Communication Deficit  Goal: Effectively communicate symptoms, needs, and concerns  Description: Patient  and/or family/caregiver will be able to communicate symptoms, needs, and concerns as evidenced by the use of language services during the inpatient hospice stay.     Outcome: Progressing     Problem: Pain  Goal: Verbalizes/displays adequate comfort level or baseline comfort level  Outcome: Progressing     Problem: Safety - Adult  Goal: Free from fall injury  Outcome: Progressing     Problem: ABCDS Injury Assessment  Goal: Absence of physical injury  Outcome: Progressing  Flowsheets (Taken 12/5/2022 0146)  Absence of Physical Injury: Implement safety measures based on patient assessment

## 2022-12-06 NOTE — PROGRESS NOTES
137 [facility] Progress Note    Date: 12/6/2022  Name: Tien Smiley  MRN: [id number]  YOB: 1934   Patient's PCP: Talya David PA-C  Admit Date: 12/1/2022 to General Inpatient Hospice    Subjective: The patient remains unresponsive with shallow respirations. There is no facial grimacing or moaning and comfort medications have been beneficial. Discussed with the patient's daughter, Maurizio Lan, at the bedside, and she agrees that the patient is peaceful. Objective:   Pain is managed with Morphine IV increased to 3 mg scheduled every 4 hours on 12/3/22, plus prn with 0 prn doses in the past 24 hours, Glycopyrrolate IV is available for secretions, Haloperidol for delirium scheduled and prn with 0 prn doses in the past 24 hours, and Lorazepam is scheduled and prn for anxiety with 0 prn doses. Data reviewed 12/6/2022:  Transthoracic Echocardiogram 8/31/22   Left ventricular systolic function is normal.   Ejection fraction is visually estimated at 55%. Mild left ventricular hypertrophy. PPM wiring visualized in right atrium. Sclerotic, but non-stenotic aortic valve. Mitral annular calcification is present. Mild tricuspid regurgitation; RVSP: 27 mmHg. No evidence of any pericardial effusion. CT-scan of the brain 11/28/22  No acute intracranial abnormality. Age related changes including chronic small vessel ischemic disease and cerebral atrophy.       COVID-19 11/18/22: negative      CBC with Differential:          Lab Results   Component Value Date/Time     WBC 7.3 11/15/2022 06:24 AM     RBC 3.82 11/15/2022 06:24 AM     HGB 11.0 11/15/2022 06:24 AM     HCT 35.1 11/15/2022 06:24 AM      11/15/2022 06:24 AM     MCV 91.9 11/15/2022 06:24 AM     MCH 28.8 11/15/2022 06:24 AM     MCHC 31.3 11/15/2022 06:24 AM     RDW 16.8 11/15/2022 06:24 AM     SEGSPCT 78.5 11/15/2022 06:24 AM     LYMPHOPCT 15.7 11/15/2022 06:24 AM     MONOPCT 4.8 11/15/2022 06:24 AM     EOSPCT 1 09/06/2022 12:00 AM     BASOPCT 0.1 11/15/2022 06:24 AM     MONOSABS 0.4 11/15/2022 06:24 AM     LYMPHSABS 1.1 11/15/2022 06:24 AM     EOSABS 0.0 11/15/2022 06:24 AM     BASOSABS 0.0 11/15/2022 06:24 AM     DIFFTYPE AUTOMATED DIFFERENTIAL 11/15/2022 06:24 AM      BMP:          Lab Results   Component Value Date/Time      11/16/2022 03:51 AM     K 4.3 11/16/2022 03:51 AM      11/16/2022 03:51 AM     CO2 24 11/16/2022 03:51 AM     BUN 27 11/16/2022 03:51 AM     LABALBU 2.9 11/15/2022 06:24 AM     CREATININE 1.4 11/16/2022 03:51 AM     CALCIUM 8.5 11/16/2022 03:51 AM     GFRAA 36 09/01/2022 04:28 AM     LABGLOM 48 11/16/2022 03:51 AM     GLUCOSE 86 11/16/2022 03:51 AM      Hepatic Function Panel:          Lab Results   Component Value Date/Time     ALKPHOS 72 11/15/2022 06:24 AM     ALT 33 11/15/2022 06:24 AM     AST 33 11/15/2022 06:24 AM     PROT 4.8 11/15/2022 06:24 AM     PROT 6.6 03/15/2013 04:43 PM     BILITOT 0.6 11/15/2022 06:24 AM     LABALBU 2.9 11/15/2022 06:24 AM      PT/INR:          Lab Results   Component Value Date/Time     PROTIME 12.2 04/03/2021 09:50 PM     INR 1.01 04/03/2021 09:50 PM      TSH 11/15/22: 1.24  Ammonia 11/15/22: 13      Physical Exam:   BP (!) 126/54   Pulse 86   Temp 100 °F (37.8 °C) (Axillary)   Resp 18   Wt 181 lb 12.8 oz (82.5 kg)   SpO2 90%   BMI 26.09 kg/m²   General: unresponsive, open mouth breathing with periods of apnea, chronically ill appearing  Skin: sallow with scattered bruising   HEENT: Mucous membranes are dry  Chest: pacer present  Heart: distant tones, IRRR, S1S2, no murmurs  Lungs:  Distant shallow coarse breath sounds bilaterally, diminished at the bases, without rales, scattered rhonchi   Abdomen: soft, bowel sounds hypoactive, no apparent tenderness, nondistended  : march in place with concentrated urine  Extremities:  No mottling, trace pedal edema  Neurologic: unresponsive         Assesment/Plan:    Advanced Alzheimer's dementia and severe neurocognitive dysfunction with rapid decline in past 2 months with frequent falls, inability for self care, agitated delirium. The patient is continued on [address] [place] [address] for management of pain, delirium, agitation and probable end of life care. PPS 10%, prognosis is grim and life expectancy is likely days. The patient requires frequent doses of parenteral medications for comfort. The patient requires ongoing [address] [place] [address] care for frequent assessment of pain, delirium and symptom management with daily physician visits for medication adjustment. .    Severe protein calorie malnutrition with 25 pounds involuntary weight loss since February 2022 (12% of body weight). Coronary artery disease with prior PCI  Pacemaker status  History of bladder cancer, hypertension, hypothyroidism, hyperlipidemia  DVT prophylaxis: none indicated due to Hospice and fall risk. Patient Active Problem List   Diagnosis Code    HTN (hypertension) I10    Depression F32. 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    CHB (complete heart block) ([facility]) L65.8    Metabolic encephalopathy L39.17    Carotid stenosis, asymptomatic, right I65.21    Mitral valve insufficiency I34.0    S/P placement of cardiac pacemaker Z95.0    Bilateral carotid artery stenosis I65.23    Nonrheumatic aortic valve insufficiency I35.1    Generalized edema R60.1    SVT (supraventricular tachycardia) ([facility]) I47.1    Chronic systolic (congestive) heart failure I50.22    Left knee pain, unspecified chronicity M25.562    Major neurocognitive disorder due to Alzheimer's disease, with behavioral disturbance ([facility] G30.9, F02.818    Chronic prescription benzodiazepine use Z79.899    Self-care deficit Z78.9    Hospice care Z51.5    Alzheimer disease ([facility] G30.9, F02.80       GUERDA Alonzo MD  12/6/2022

## 2022-12-07 NOTE — DISCHARGE SUMMARY
137 [facility]    Death Summary    Date: 12/7/2022  Name: Radhames Ivy  MRN: [id number]  YOB: 1934     Patient's PCP: Manolo Burt PA-C  Admit Date: 12/1/2022 to [facility]  Date of Death: 12/7/2022  Time: 36  Admitting Physician: Mayur Espinoza MD to [facility]   Discharge Physician: Sulema Hassan MD  Consultation: none during General Inpatient Hospice stay    Invasive procedures: none during General Inpatient Hospice stay    Discharge Diagnoses:  Advanced Alzheimer's dementia and severe neurocognitive dysfunction with rapid decline in past 2 months . Sallyanne Chain Severe protein calorie malnutrition with 25 pounds involuntary weight loss since February 2022 (12% of body weight). Coronary artery disease with prior PCI  Pacemaker status  History of bladder cancer, hypertension, hypothyroidism, hyperlipidemia      Patient Active Problem List   Diagnosis Code    HTN (hypertension) I10    Depression F32. 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    CHB (complete heart block) ([facility]) X98.3    Metabolic encephalopathy L61.91    Carotid stenosis, asymptomatic, right I65.21    Mitral valve insufficiency I34.0    S/P placement of cardiac pacemaker Z95.0    Bilateral carotid artery stenosis I65.23    Nonrheumatic aortic valve insufficiency I35.1    Generalized edema R60.1    SVT (supraventricular tachycardia) ([facility]) I47.1    Chronic systolic (congestive) heart failure I50.22    Left knee pain, unspecified chronicity M25.562    Major neurocognitive disorder due to Alzheimer's disease, with behavioral disturbance ([facility]) G30.9, F02.818    Chronic prescription benzodiazepine use Z79.899    Self-care deficit Z78.9    Hospice care Z51.5    Alzheimer disease ([facility] G30.9, F02.80       Brief History, reason for admission: Flavio Godoy is a 80 y.o. male with a history of Alzheimer's dementia with behavioral issues, pacemaker status, coronary artery disease with prior PCI, hypertension, hyperlipidemia, hypothyroidism, CKD 3, bladder cancer, and frequent falls. The patient had a recent admission, and was seen by Psychiatry. The patient had a sitter briefly due to impulsivity. He was treated for E. coli  UTI. The patient has had multiple falls and had complained of left knee pain, and there was no fracture on imaging. The patient was started on Valproic Acid and Risperdal. Hospice was asked to see the patient  and information was provided but family declined hospice. The patient was discharged to [facility] on [facility]. The patient was in the ED on 11/27 after a fall and returned to the [facility]. The patient's family sent the patient back to the ED on 11/30/22 for hospice evaluation, and per notes they declined evaluation at the facility. The family declined any workup. The patient received Fentanyl 50 mcg IV in the ED. The patient was seen by the hospice nurse liaison who called me. The nurse reports that the patient is minimally responsive, is restless and arrangements are made for admission to [address] [place] [address] for the management of agitated delirium (possible terminal delirium). Per report, the patient had lived independently and has had a significant decline in the past 1-2 months with frequent falls, agitation, impulsivity. Per chart review, the patient has had 25 pounds of involuntary weight loss over the past 9 months from 203 pounds on 2/1/22 to 178 pounds 11/14/22. Hospital Course:  The patient was admitted to [facility] with the above, for complete details, please see the acute care History and Physical, progress notes, consultant notes and discharge summary. The patient was treated with comfort medications, and symptoms were managed. Emotional and spiritual support was provided to the patient and family. The patient  as noted above.     Cause of death: multisystem organ failure due to advanced Alzheimer's dementia    Significant Diagnostic Studies:  See computerized record in Claudia Caldera MD  2022

## 2022-12-07 NOTE — PROGRESS NOTES
Patient passed at 0400 on 12/7/2022. This RN and Diandra Mcmullen RN both verified TOD at this time. Charge nurse aware. Nursing supervisor notified. KALA Benton stated [facility] in [facility]. Again verified by these two RNs. Dr. Christiano Alcaraz notified. Family at bedside.      Vito Johnson, RN, BSN.  12/7/2022 [id number]
